# Patient Record
Sex: MALE | Race: WHITE | Employment: OTHER | URBAN - METROPOLITAN AREA
[De-identification: names, ages, dates, MRNs, and addresses within clinical notes are randomized per-mention and may not be internally consistent; named-entity substitution may affect disease eponyms.]

---

## 2017-01-03 PROBLEM — R07.9 CHEST PAIN: Status: ACTIVE | Noted: 2017-01-03

## 2017-02-07 ENCOUNTER — OFFICE VISIT (OUTPATIENT)
Dept: CARDIOLOGY CLINIC | Age: 62
End: 2017-02-07

## 2017-02-07 VITALS
OXYGEN SATURATION: 97 % | HEIGHT: 69 IN | SYSTOLIC BLOOD PRESSURE: 138 MMHG | HEART RATE: 78 BPM | BODY MASS INDEX: 41.77 KG/M2 | DIASTOLIC BLOOD PRESSURE: 72 MMHG | WEIGHT: 282 LBS

## 2017-02-07 DIAGNOSIS — I10 ESSENTIAL HYPERTENSION, BENIGN: ICD-10-CM

## 2017-02-07 DIAGNOSIS — E78.2 MIXED HYPERLIPIDEMIA: ICD-10-CM

## 2017-02-07 DIAGNOSIS — I25.10 ATHEROSCLEROSIS OF NATIVE CORONARY ARTERY OF NATIVE HEART WITHOUT ANGINA PECTORIS: Primary | ICD-10-CM

## 2017-02-07 PROCEDURE — 99214 OFFICE O/P EST MOD 30 MIN: CPT | Performed by: NURSE PRACTITIONER

## 2017-02-07 RX ORDER — GLIMEPIRIDE 2 MG/1
2 TABLET ORAL 2 TIMES DAILY
COMMUNITY
End: 2017-02-16 | Stop reason: SDUPTHER

## 2017-02-14 DIAGNOSIS — Z13.1 DM (DIABETES MELLITUS SCREEN): ICD-10-CM

## 2017-02-15 ENCOUNTER — OFFICE VISIT (OUTPATIENT)
Dept: FAMILY MEDICINE CLINIC | Age: 62
End: 2017-02-15

## 2017-02-15 VITALS
OXYGEN SATURATION: 97 % | HEIGHT: 69 IN | HEART RATE: 73 BPM | DIASTOLIC BLOOD PRESSURE: 88 MMHG | WEIGHT: 284 LBS | RESPIRATION RATE: 16 BRPM | BODY MASS INDEX: 42.06 KG/M2 | SYSTOLIC BLOOD PRESSURE: 132 MMHG

## 2017-02-15 DIAGNOSIS — I10 ESSENTIAL HYPERTENSION: ICD-10-CM

## 2017-02-15 DIAGNOSIS — Z13.1 DM (DIABETES MELLITUS SCREEN): Primary | ICD-10-CM

## 2017-02-15 DIAGNOSIS — I25.10 CORONARY ARTERY DISEASE INVOLVING NATIVE CORONARY ARTERY OF NATIVE HEART WITHOUT ANGINA PECTORIS: ICD-10-CM

## 2017-02-15 LAB
BILIRUBIN, POC: NORMAL
BLOOD URINE, POC: NORMAL
CLARITY, POC: CLEAR
COLOR, POC: YELLOW
CREATININE URINE POCT: NORMAL
GLUCOSE URINE, POC: NORMAL
HBA1C MFR BLD: NORMAL %
KETONES, POC: NORMAL
LEUKOCYTE EST, POC: NORMAL
MICROALBUMIN/CREAT 24H UR: NORMAL MG/G{CREAT}
NITRITE, POC: NORMAL
PH, POC: 5.5
PROTEIN, POC: NORMAL
SPECIFIC GRAVITY, POC: 1.01
UROBILINOGEN, POC: 0.2

## 2017-02-15 PROCEDURE — 82044 UR ALBUMIN SEMIQUANTITATIVE: CPT | Performed by: FAMILY MEDICINE

## 2017-02-15 PROCEDURE — 83036 HEMOGLOBIN GLYCOSYLATED A1C: CPT | Performed by: FAMILY MEDICINE

## 2017-02-15 PROCEDURE — 99214 OFFICE O/P EST MOD 30 MIN: CPT | Performed by: FAMILY MEDICINE

## 2017-02-15 PROCEDURE — 81002 URINALYSIS NONAUTO W/O SCOPE: CPT | Performed by: FAMILY MEDICINE

## 2017-02-16 DIAGNOSIS — I10 ESSENTIAL HYPERTENSION: ICD-10-CM

## 2017-02-16 RX ORDER — HYDROCHLOROTHIAZIDE 25 MG/1
25 TABLET ORAL DAILY
Qty: 90 TABLET | Refills: 1 | Status: SHIPPED | OUTPATIENT
Start: 2017-02-16 | End: 2017-08-08 | Stop reason: SDUPTHER

## 2017-02-16 RX ORDER — GLIMEPIRIDE 2 MG/1
2 TABLET ORAL 2 TIMES DAILY
Qty: 90 TABLET | Refills: 1 | Status: SHIPPED | OUTPATIENT
Start: 2017-02-16 | End: 2017-04-25 | Stop reason: SDUPTHER

## 2017-02-22 RX ORDER — CARVEDILOL 6.25 MG/1
TABLET ORAL
Qty: 180 TABLET | Refills: 3 | Status: SHIPPED | OUTPATIENT
Start: 2017-02-22 | End: 2018-01-17 | Stop reason: SDUPTHER

## 2017-04-25 RX ORDER — LOVASTATIN 40 MG/1
TABLET ORAL
Qty: 90 TABLET | Refills: 2 | Status: SHIPPED | OUTPATIENT
Start: 2017-04-25 | End: 2017-12-02 | Stop reason: SDUPTHER

## 2017-04-25 RX ORDER — GLIMEPIRIDE 2 MG/1
2 TABLET ORAL 2 TIMES DAILY
Qty: 90 TABLET | Refills: 3 | Status: SHIPPED | OUTPATIENT
Start: 2017-04-25 | End: 2017-08-17 | Stop reason: SDUPTHER

## 2017-05-15 ENCOUNTER — OFFICE VISIT (OUTPATIENT)
Dept: FAMILY MEDICINE CLINIC | Age: 62
End: 2017-05-15

## 2017-05-15 VITALS
RESPIRATION RATE: 14 BRPM | OXYGEN SATURATION: 97 % | HEIGHT: 72 IN | WEIGHT: 285 LBS | SYSTOLIC BLOOD PRESSURE: 136 MMHG | DIASTOLIC BLOOD PRESSURE: 80 MMHG | BODY MASS INDEX: 38.6 KG/M2 | HEART RATE: 77 BPM

## 2017-05-15 DIAGNOSIS — M10.9 PODAGRA: ICD-10-CM

## 2017-05-15 DIAGNOSIS — I25.10 CORONARY ARTERY DISEASE INVOLVING NATIVE CORONARY ARTERY OF NATIVE HEART WITHOUT ANGINA PECTORIS: ICD-10-CM

## 2017-05-15 DIAGNOSIS — I10 ESSENTIAL HYPERTENSION: Primary | ICD-10-CM

## 2017-05-15 DIAGNOSIS — Z13.1 DM (DIABETES MELLITUS SCREEN): ICD-10-CM

## 2017-05-15 DIAGNOSIS — I73.9 PVD (PERIPHERAL VASCULAR DISEASE) (HCC): ICD-10-CM

## 2017-05-15 LAB — HBA1C MFR BLD: 8.2 %

## 2017-05-15 PROCEDURE — 99214 OFFICE O/P EST MOD 30 MIN: CPT | Performed by: FAMILY MEDICINE

## 2017-05-15 PROCEDURE — 83036 HEMOGLOBIN GLYCOSYLATED A1C: CPT | Performed by: FAMILY MEDICINE

## 2017-08-08 ENCOUNTER — OFFICE VISIT (OUTPATIENT)
Dept: CARDIOLOGY CLINIC | Age: 62
End: 2017-08-08

## 2017-08-08 VITALS
DIASTOLIC BLOOD PRESSURE: 80 MMHG | OXYGEN SATURATION: 97 % | BODY MASS INDEX: 37.65 KG/M2 | SYSTOLIC BLOOD PRESSURE: 134 MMHG | WEIGHT: 278 LBS | HEIGHT: 72 IN | HEART RATE: 78 BPM

## 2017-08-08 DIAGNOSIS — E78.2 MIXED HYPERLIPIDEMIA: ICD-10-CM

## 2017-08-08 DIAGNOSIS — I25.10 ATHEROSCLEROSIS OF NATIVE CORONARY ARTERY OF NATIVE HEART WITHOUT ANGINA PECTORIS: Primary | ICD-10-CM

## 2017-08-08 DIAGNOSIS — I10 ESSENTIAL HYPERTENSION: ICD-10-CM

## 2017-08-08 PROCEDURE — 99214 OFFICE O/P EST MOD 30 MIN: CPT | Performed by: NURSE PRACTITIONER

## 2017-08-08 RX ORDER — GLIMEPIRIDE 2 MG/1
TABLET ORAL
COMMUNITY
End: 2017-08-17 | Stop reason: SDUPTHER

## 2017-08-08 RX ORDER — HYDROCHLOROTHIAZIDE 25 MG/1
25 TABLET ORAL DAILY
Qty: 90 TABLET | Refills: 3 | Status: SHIPPED | OUTPATIENT
Start: 2017-08-08 | End: 2018-06-28 | Stop reason: SDUPTHER

## 2017-08-17 ENCOUNTER — OFFICE VISIT (OUTPATIENT)
Dept: FAMILY MEDICINE CLINIC | Age: 62
End: 2017-08-17

## 2017-08-17 VITALS
OXYGEN SATURATION: 98 % | RESPIRATION RATE: 14 BRPM | SYSTOLIC BLOOD PRESSURE: 120 MMHG | BODY MASS INDEX: 37.65 KG/M2 | HEART RATE: 73 BPM | HEIGHT: 72 IN | WEIGHT: 278 LBS | DIASTOLIC BLOOD PRESSURE: 80 MMHG

## 2017-08-17 DIAGNOSIS — I25.10 CORONARY ARTERY DISEASE INVOLVING NATIVE CORONARY ARTERY OF NATIVE HEART WITHOUT ANGINA PECTORIS: ICD-10-CM

## 2017-08-17 DIAGNOSIS — E78.5 OTHER AND UNSPECIFIED HYPERLIPIDEMIA: ICD-10-CM

## 2017-08-17 DIAGNOSIS — E11.9 TYPE 2 DIABETES MELLITUS WITHOUT COMPLICATION, WITHOUT LONG-TERM CURRENT USE OF INSULIN (HCC): ICD-10-CM

## 2017-08-17 DIAGNOSIS — I10 ESSENTIAL HYPERTENSION: Primary | ICD-10-CM

## 2017-08-17 DIAGNOSIS — Z23 NEED FOR PNEUMOCOCCAL VACCINATION: ICD-10-CM

## 2017-08-17 LAB — HBA1C MFR BLD: 7.3 %

## 2017-08-17 PROCEDURE — 90471 IMMUNIZATION ADMIN: CPT | Performed by: FAMILY MEDICINE

## 2017-08-17 PROCEDURE — 90732 PPSV23 VACC 2 YRS+ SUBQ/IM: CPT | Performed by: FAMILY MEDICINE

## 2017-08-17 PROCEDURE — 99214 OFFICE O/P EST MOD 30 MIN: CPT | Performed by: FAMILY MEDICINE

## 2017-08-17 PROCEDURE — 83036 HEMOGLOBIN GLYCOSYLATED A1C: CPT | Performed by: FAMILY MEDICINE

## 2017-08-17 RX ORDER — GLIMEPIRIDE 4 MG/1
4 TABLET ORAL 2 TIMES DAILY
Qty: 180 TABLET | Refills: 3 | Status: SHIPPED | OUTPATIENT
Start: 2017-08-17 | End: 2018-06-23 | Stop reason: SDUPTHER

## 2017-08-17 RX ORDER — GABAPENTIN 100 MG/1
100 CAPSULE ORAL DAILY
Qty: 90 CAPSULE | Refills: 3 | Status: SHIPPED | OUTPATIENT
Start: 2017-08-17 | End: 2017-12-27 | Stop reason: SDUPTHER

## 2017-12-04 RX ORDER — LOVASTATIN 40 MG/1
TABLET ORAL
Qty: 90 TABLET | Refills: 2 | Status: SHIPPED | OUTPATIENT
Start: 2017-12-04 | End: 2018-07-31 | Stop reason: SDUPTHER

## 2017-12-04 NOTE — TELEPHONE ENCOUNTER
Last OV  08/17/2017 hypertension   Last Refill 04/25/2017 # 90 2 refills   Lab Results   Component Value Date    CHOL 133 01/03/2017    CHOL 122 (L) 11/13/2015    CHOL 84 11/13/2015     Lab Results   Component Value Date    TRIG 180 (H) 01/03/2017    TRIG 130 11/13/2015    TRIG 136 10/29/2014     Lab Results   Component Value Date    HDL 32 (L) 01/03/2017    HDL 38 (L) 11/13/2015    HDL 35 (L) 10/29/2014     Lab Results   Component Value Date    LDLCALC 65 01/03/2017    LDLCALC 58 11/13/2015    LDLCALC 76 10/29/2014     Lab Results   Component Value Date    LABVLDL 36 01/03/2017    LABVLDL 18 07/15/2011     Lab Results   Component Value Date    CHOLHDLRATIO 3.2 11/13/2015    CHOLHDLRATIO 3.9 10/29/2014    CHOLHDLRATIO 4.1 03/04/2013

## 2017-12-19 ENCOUNTER — TELEPHONE (OUTPATIENT)
Dept: FAMILY MEDICINE CLINIC | Age: 62
End: 2017-12-19

## 2017-12-19 RX ORDER — AMOXICILLIN AND CLAVULANATE POTASSIUM 875; 125 MG/1; MG/1
1 TABLET, FILM COATED ORAL 2 TIMES DAILY
Qty: 20 TABLET | Refills: 0 | Status: SHIPPED | OUTPATIENT
Start: 2017-12-19 | End: 2017-12-29

## 2017-12-19 NOTE — TELEPHONE ENCOUNTER
Patient is complaining of cough up greenish phlegm for two days, patient has a sore throat,no post nasal drippin  Patient has a headache and ear ache,slight ear pain no sob or wheezing body aches,with slight fever. You are requesting something in to the [facility]. please advise

## 2017-12-23 ENCOUNTER — OFFICE VISIT (OUTPATIENT)
Dept: FAMILY MEDICINE CLINIC | Age: 62
End: 2017-12-23

## 2017-12-23 VITALS
OXYGEN SATURATION: 97 % | HEIGHT: 71 IN | RESPIRATION RATE: 14 BRPM | WEIGHT: 283 LBS | SYSTOLIC BLOOD PRESSURE: 132 MMHG | TEMPERATURE: 97.9 F | DIASTOLIC BLOOD PRESSURE: 84 MMHG | BODY MASS INDEX: 39.62 KG/M2

## 2017-12-23 DIAGNOSIS — J01.90 ACUTE BACTERIAL SINUSITIS: Primary | ICD-10-CM

## 2017-12-23 DIAGNOSIS — B96.89 ACUTE BACTERIAL SINUSITIS: Primary | ICD-10-CM

## 2017-12-23 DIAGNOSIS — R09.82 POSTNASAL DRIP: ICD-10-CM

## 2017-12-23 DIAGNOSIS — R68.89 FLU-LIKE SYMPTOMS: ICD-10-CM

## 2017-12-23 LAB
INFLUENZA A ANTIBODY: NEGATIVE
INFLUENZA B ANTIBODY: NEGATIVE

## 2017-12-23 PROCEDURE — 87804 INFLUENZA ASSAY W/OPTIC: CPT | Performed by: FAMILY MEDICINE

## 2017-12-23 PROCEDURE — 99213 OFFICE O/P EST LOW 20 MIN: CPT | Performed by: FAMILY MEDICINE

## 2017-12-23 RX ORDER — FLUTICASONE PROPIONATE 50 MCG
2 SPRAY, SUSPENSION (ML) NASAL DAILY
Qty: 1 BOTTLE | Refills: 3 | Status: SHIPPED | OUTPATIENT
Start: 2017-12-23 | End: 2018-03-02

## 2017-12-23 RX ORDER — AMOXICILLIN AND CLAVULANATE POTASSIUM 875; 125 MG/1; MG/1
1 TABLET, FILM COATED ORAL 2 TIMES DAILY
Qty: 20 TABLET | Refills: 0 | Status: SHIPPED | OUTPATIENT
Start: 2017-12-23 | End: 2017-12-23 | Stop reason: SDUPTHER

## 2017-12-27 ENCOUNTER — OFFICE VISIT (OUTPATIENT)
Dept: FAMILY MEDICINE CLINIC | Age: 62
End: 2017-12-27

## 2017-12-27 VITALS
SYSTOLIC BLOOD PRESSURE: 134 MMHG | HEIGHT: 71 IN | OXYGEN SATURATION: 94 % | WEIGHT: 283 LBS | DIASTOLIC BLOOD PRESSURE: 88 MMHG | BODY MASS INDEX: 39.62 KG/M2 | HEART RATE: 78 BPM

## 2017-12-27 DIAGNOSIS — I25.10 CORONARY ARTERY DISEASE INVOLVING NATIVE CORONARY ARTERY OF NATIVE HEART WITHOUT ANGINA PECTORIS: ICD-10-CM

## 2017-12-27 DIAGNOSIS — I10 ESSENTIAL HYPERTENSION: ICD-10-CM

## 2017-12-27 DIAGNOSIS — J06.9 VIRAL URI: ICD-10-CM

## 2017-12-27 DIAGNOSIS — E11.8 TYPE 2 DIABETES MELLITUS WITH COMPLICATION, WITHOUT LONG-TERM CURRENT USE OF INSULIN (HCC): Primary | ICD-10-CM

## 2017-12-27 DIAGNOSIS — G62.9 NEUROPATHY: ICD-10-CM

## 2017-12-27 LAB — HBA1C MFR BLD: 8.6 %

## 2017-12-27 PROCEDURE — 99214 OFFICE O/P EST MOD 30 MIN: CPT | Performed by: FAMILY MEDICINE

## 2017-12-27 PROCEDURE — 83036 HEMOGLOBIN GLYCOSYLATED A1C: CPT | Performed by: FAMILY MEDICINE

## 2017-12-27 RX ORDER — GABAPENTIN 100 MG/1
100 CAPSULE ORAL 2 TIMES DAILY
Qty: 180 CAPSULE | Refills: 3 | Status: SHIPPED | OUTPATIENT
Start: 2017-12-27 | End: 2018-11-10 | Stop reason: SDUPTHER

## 2017-12-27 RX ORDER — LEVOFLOXACIN 500 MG/1
500 TABLET, FILM COATED ORAL DAILY
Qty: 10 TABLET | Refills: 0 | Status: SHIPPED | OUTPATIENT
Start: 2017-12-27 | End: 2018-01-06

## 2017-12-27 ASSESSMENT — PATIENT HEALTH QUESTIONNAIRE - PHQ9
1. LITTLE INTEREST OR PLEASURE IN DOING THINGS: 0
2. FEELING DOWN, DEPRESSED OR HOPELESS: 0
SUM OF ALL RESPONSES TO PHQ QUESTIONS 1-9: 0
SUM OF ALL RESPONSES TO PHQ9 QUESTIONS 1 & 2: 0

## 2017-12-27 NOTE — PROGRESS NOTES
TWICE DAILY WITH MEALS Yes Sharath Serrato MD   carvedilol (COREG) 6.25 MG tablet TAKE 1 TABLET TWICE DAILY WITH MEALS Yes Sharath Serrato MD   Multiple Vitamins-Minerals (MULTIVITAMIN PO) Take by mouth Yes Historical Provider, MD   aspirin 81 MG tablet Take 81 mg by mouth daily Yes Historical Provider, MD       Past Medical History:   Diagnosis Date    CAD (coronary artery disease)     Disc disorder     ED (erectile dysfunction)     Hyperlipidemia     Hypertension     MI (myocardial infarction)     Obstructive sleep apnea     Other disorders of kidney and ureter in diseases classified elsewhere     Pneumonia 1970s    Polyp of colon     Renal calculi     Type II or unspecified type diabetes mellitus without mention of complication, not stated as uncontrolled        Social History   Substance Use Topics    Smoking status: Former Smoker     Packs/day: 0.25     Years: 30.00     Types: Cigarettes, Cigars     Quit date: 4/9/2011    Smokeless tobacco: Never Used      Comment: cigars every once in a while     Alcohol use 0.6 oz/week     1 Cans of beer per week      Comment: social       Family History   Problem Relation Age of Onset    Diabetes Father     Mental Illness Mother     High Blood Pressure Brother     Heart Disease Brother     High Cholesterol Neg Hx        Allergies   Allergen Reactions    Fexofenadine-Pseudoephed Er     Lisinopril     Morphine Hives    Zithromax [Azithromycin]      Heart palpitations     Losartan      Dry c ough       OBJECTIVE:  /88   Pulse 78   Ht 5' 10.98\" (1.803 m)   Wt 283 lb (128.4 kg)   SpO2 94%   BMI 39.49 kg/m²   GEN:  in NAD, Obese weight is slightly up  HEENT:  NCAT, TMs:normal and throat: clear  NECK:  Supple without adenopathy. CV:  Regular rate and rhythm, S1 and S2 normal, no murmurs, clicks  PULM:  Chest is clear, diffuse mild rhonchi throughout ,  symmetric air entry throughout both lung fields. a1c 8.6  ASSESSMENT/PLAN:  1.  Type 2

## 2018-01-08 RX ORDER — CANAGLIFLOZIN 300 MG/1
TABLET, FILM COATED ORAL
Qty: 90 TABLET | Refills: 3 | Status: SHIPPED | OUTPATIENT
Start: 2018-01-08 | End: 2018-06-28 | Stop reason: SDUPTHER

## 2018-01-15 ENCOUNTER — OFFICE VISIT (OUTPATIENT)
Dept: FAMILY MEDICINE CLINIC | Age: 63
End: 2018-01-15

## 2018-01-15 ENCOUNTER — HOSPITAL ENCOUNTER (OUTPATIENT)
Dept: OTHER | Age: 63
Discharge: HOME | End: 2018-01-15
Attending: FAMILY MEDICINE | Admitting: FAMILY MEDICINE

## 2018-01-15 VITALS
DIASTOLIC BLOOD PRESSURE: 82 MMHG | SYSTOLIC BLOOD PRESSURE: 138 MMHG | BODY MASS INDEX: 39.42 KG/M2 | RESPIRATION RATE: 14 BRPM | OXYGEN SATURATION: 97 % | HEIGHT: 72 IN | TEMPERATURE: 97.2 F | WEIGHT: 291 LBS

## 2018-01-15 DIAGNOSIS — M54.12 CERVICAL RADICULOPATHY: Primary | ICD-10-CM

## 2018-01-15 DIAGNOSIS — M54.12 CERVICAL RADICULOPATHY: ICD-10-CM

## 2018-01-15 PROCEDURE — 99213 OFFICE O/P EST LOW 20 MIN: CPT | Performed by: FAMILY MEDICINE

## 2018-01-15 RX ORDER — PREDNISONE 10 MG/1
TABLET ORAL
Qty: 30 TABLET | Refills: 0 | Status: SHIPPED | OUTPATIENT
Start: 2018-01-15 | End: 2018-02-02 | Stop reason: ALTCHOICE

## 2018-01-15 NOTE — TELEPHONE ENCOUNTER
Pharmacy requesting clarification for Sig on Rx for predniSONE (DELTASONE) 10 MG tablet sent to pharmacy today

## 2018-01-17 RX ORDER — CARVEDILOL 6.25 MG/1
TABLET ORAL
Qty: 180 TABLET | Refills: 3 | Status: SHIPPED | OUTPATIENT
Start: 2018-01-17 | End: 2019-02-15 | Stop reason: SDUPTHER

## 2018-02-01 NOTE — PROGRESS NOTES
use of accessory muscles  · Resp Auscultation: Normal breath sounds without dullness. Cardiovascular:  · The apical impulses not displaced  · JVD is normal  · The carotid upstroke is normal in amplitude and contour without delay or bruit  · Normal S1S2, No S3, No Murmur. · Peripheral pulses are symmetrical and full  · There is no clubbing, cyanosis of the extremities. · No edema. · Pedal Pulses: 2+ and equal   Abdomen:  · No masses or tenderness  · Liver/Spleen: No Abnormalities Noted  Neurological/Psychiatric:  · Alert and oriented in all spheres  · Moves all extremities well  · Exhibits normal gait balance and coordination  · No abnormalities of mood, affect, memory, mentation, or behavior are noted    Myoview stress: 1/3/17:  Summary    There is normal isotope uptake at stress and rest. There is no evidence of    myocardial ischemia or scar.    Normal LV function.    Overall findings represent a low risk scan.           Echo: Aug '14:  Summary  -Normal left ventricle size, wall thickness and systolic function with an  estimated ejection fraction of 55%. -The aortic valve appears sclerotic but opens well. -There is mild tricuspid regurgitation with RVSP estimated at 27 mmHg.      Angiogram: April '11: [facility]  LHC showed 100% discrete distal stenosis and 80% proximal complex stenosis. Lcx with luminal irregularities throughout vessel  PCI of the RCA with BMS to mid (culprit) and proximal eccentric plaque. Inferior wall, HK; Low normal LVEF at 50%       Assessment:     1. Coronary artery disease involving native coronary artery of native heart without angina pectoris    2. Essential hypertension    3. Dyslipidemia        Coronary artery disease  4/2014 with IWMI, s/p BMS to proximal and distal RCA. Lcx with luminal irregularities.   EF 50% with inferior wall HK  8/2014 Echo - EF 55%  1/2017 Myoview GXT - no evidence of ischemia or scar, normal EF  No anginal symptoms    Hypertension:  Blood pressure 122/84,

## 2018-02-02 ENCOUNTER — OFFICE VISIT (OUTPATIENT)
Dept: CARDIOLOGY CLINIC | Age: 63
End: 2018-02-02

## 2018-02-02 VITALS
BODY MASS INDEX: 37.88 KG/M2 | WEIGHT: 279.7 LBS | HEART RATE: 80 BPM | SYSTOLIC BLOOD PRESSURE: 122 MMHG | DIASTOLIC BLOOD PRESSURE: 84 MMHG | OXYGEN SATURATION: 96 % | HEIGHT: 72 IN

## 2018-02-02 DIAGNOSIS — I25.10 CORONARY ARTERY DISEASE INVOLVING NATIVE CORONARY ARTERY OF NATIVE HEART WITHOUT ANGINA PECTORIS: Primary | ICD-10-CM

## 2018-02-02 DIAGNOSIS — I10 ESSENTIAL HYPERTENSION: ICD-10-CM

## 2018-02-02 DIAGNOSIS — E78.5 DYSLIPIDEMIA: ICD-10-CM

## 2018-02-02 PROCEDURE — 99214 OFFICE O/P EST MOD 30 MIN: CPT | Performed by: INTERNAL MEDICINE

## 2018-03-02 ENCOUNTER — OFFICE VISIT (OUTPATIENT)
Dept: FAMILY MEDICINE CLINIC | Age: 63
End: 2018-03-02

## 2018-03-02 VITALS
BODY MASS INDEX: 38.22 KG/M2 | SYSTOLIC BLOOD PRESSURE: 138 MMHG | HEART RATE: 73 BPM | OXYGEN SATURATION: 94 % | DIASTOLIC BLOOD PRESSURE: 86 MMHG | WEIGHT: 281.8 LBS

## 2018-03-02 DIAGNOSIS — M54.12 CERVICAL RADICULOPATHY AT C5: Primary | ICD-10-CM

## 2018-03-02 DIAGNOSIS — I10 ESSENTIAL HYPERTENSION: ICD-10-CM

## 2018-03-02 PROCEDURE — 99213 OFFICE O/P EST LOW 20 MIN: CPT | Performed by: FAMILY MEDICINE

## 2018-03-02 RX ORDER — HYDROCODONE BITARTRATE AND ACETAMINOPHEN 7.5; 325 MG/1; MG/1
1 TABLET ORAL EVERY 6 HOURS PRN
Qty: 20 TABLET | Refills: 0 | Status: SHIPPED | OUTPATIENT
Start: 2018-03-02 | End: 2018-03-09

## 2018-03-02 RX ORDER — PREDNISONE 20 MG/1
TABLET ORAL
Qty: 10 TABLET | Refills: 0 | Status: SHIPPED | OUTPATIENT
Start: 2018-03-02 | End: 2018-04-12 | Stop reason: ALTCHOICE

## 2018-03-06 DIAGNOSIS — G95.20 CERVICAL SPINAL CORD COMPRESSION (HCC): Primary | ICD-10-CM

## 2018-03-07 ENCOUNTER — TELEPHONE (OUTPATIENT)
Dept: FAMILY MEDICINE CLINIC | Age: 63
End: 2018-03-07

## 2018-03-07 RX ORDER — DIAZEPAM 10 MG/1
TABLET ORAL
Qty: 1 TABLET | Refills: 0 | Status: SHIPPED | OUTPATIENT
Start: 2018-03-07 | End: 2018-03-14

## 2018-03-07 RX ORDER — DIAZEPAM 10 MG/1
TABLET ORAL
Qty: 1 TABLET | Refills: 0 | Status: SHIPPED | OUTPATIENT
Start: 2018-03-07 | End: 2018-03-07 | Stop reason: SDUPTHER

## 2018-04-12 ENCOUNTER — OFFICE VISIT (OUTPATIENT)
Dept: FAMILY MEDICINE CLINIC | Age: 63
End: 2018-04-12

## 2018-04-12 VITALS
WEIGHT: 281 LBS | DIASTOLIC BLOOD PRESSURE: 80 MMHG | BODY MASS INDEX: 38.06 KG/M2 | HEIGHT: 72 IN | HEART RATE: 77 BPM | RESPIRATION RATE: 14 BRPM | OXYGEN SATURATION: 94 % | SYSTOLIC BLOOD PRESSURE: 132 MMHG

## 2018-04-12 DIAGNOSIS — R53.83 FATIGUE, UNSPECIFIED TYPE: ICD-10-CM

## 2018-04-12 DIAGNOSIS — I25.10 CORONARY ARTERY DISEASE INVOLVING NATIVE CORONARY ARTERY OF NATIVE HEART WITHOUT ANGINA PECTORIS: ICD-10-CM

## 2018-04-12 DIAGNOSIS — I10 ESSENTIAL HYPERTENSION: ICD-10-CM

## 2018-04-12 DIAGNOSIS — E11.8 TYPE 2 DIABETES MELLITUS WITH COMPLICATION, WITHOUT LONG-TERM CURRENT USE OF INSULIN (HCC): Primary | ICD-10-CM

## 2018-04-12 DIAGNOSIS — E78.5 DYSLIPIDEMIA: ICD-10-CM

## 2018-04-12 LAB
CREATININE URINE POCT: ABNORMAL
HBA1C MFR BLD: 8.5 %
MICROALBUMIN/CREAT 24H UR: ABNORMAL MG/G{CREAT}
MICROALBUMIN/CREAT UR-RTO: ABNORMAL

## 2018-04-12 PROCEDURE — 82044 UR ALBUMIN SEMIQUANTITATIVE: CPT | Performed by: FAMILY MEDICINE

## 2018-04-12 PROCEDURE — 83036 HEMOGLOBIN GLYCOSYLATED A1C: CPT | Performed by: FAMILY MEDICINE

## 2018-04-12 PROCEDURE — 99214 OFFICE O/P EST MOD 30 MIN: CPT | Performed by: FAMILY MEDICINE

## 2018-06-23 DIAGNOSIS — I10 ESSENTIAL HYPERTENSION: ICD-10-CM

## 2018-06-25 RX ORDER — GLIMEPIRIDE 4 MG/1
TABLET ORAL
Qty: 180 TABLET | Refills: 3 | Status: SHIPPED | OUTPATIENT
Start: 2018-06-25 | End: 2019-07-16 | Stop reason: SDUPTHER

## 2018-06-28 DIAGNOSIS — I10 ESSENTIAL HYPERTENSION: ICD-10-CM

## 2018-06-28 RX ORDER — HYDROCHLOROTHIAZIDE 25 MG/1
25 TABLET ORAL DAILY
Qty: 90 TABLET | Refills: 3 | Status: SHIPPED | OUTPATIENT
Start: 2018-06-28 | End: 2018-06-30

## 2018-06-30 RX ORDER — HYDROCHLOROTHIAZIDE 25 MG/1
TABLET ORAL
Qty: 90 TABLET | Refills: 3 | Status: SHIPPED | OUTPATIENT
Start: 2018-06-30 | End: 2019-07-16 | Stop reason: SDUPTHER

## 2018-07-18 ENCOUNTER — TELEPHONE (OUTPATIENT)
Dept: CARDIOLOGY CLINIC | Age: 63
End: 2018-07-18

## 2018-07-31 RX ORDER — LOVASTATIN 40 MG/1
TABLET ORAL
Qty: 90 TABLET | Refills: 2 | Status: SHIPPED | OUTPATIENT
Start: 2018-07-31 | End: 2019-02-15 | Stop reason: SDUPTHER

## 2018-07-31 NOTE — TELEPHONE ENCOUNTER
Last OV: 04/12/2018  Last refill: 12/04/2017, #90, 2 refills.      Lab Results   Component Value Date    CHOL 111 07/14/2018    CHOL 143 07/14/2018    CHOL 133 01/03/2017     Lab Results   Component Value Date    TRIG 311 (H) 07/14/2018    TRIG 180 (H) 01/03/2017    TRIG 130 11/13/2015     Lab Results   Component Value Date    HDL 32 (L) 07/14/2018    HDL 32 (L) 01/03/2017    HDL 38 (L) 11/13/2015     Lab Results   Component Value Date    LDLCALC 74 07/14/2018    LDLCALC 65 01/03/2017    LDLCALC 58 11/13/2015     Lab Results   Component Value Date    LABVLDL 36 01/03/2017    LABVLDL 18 07/15/2011     Lab Results   Component Value Date    CHOLHDLRATIO 4.5 07/14/2018    CHOLHDLRATIO 3.2 11/13/2015    CHOLHDLRATIO 3.9 10/29/2014

## 2018-08-02 ENCOUNTER — OFFICE VISIT (OUTPATIENT)
Dept: CARDIOLOGY CLINIC | Age: 63
End: 2018-08-02

## 2018-08-02 VITALS
BODY MASS INDEX: 37.93 KG/M2 | DIASTOLIC BLOOD PRESSURE: 86 MMHG | OXYGEN SATURATION: 98 % | WEIGHT: 280 LBS | HEIGHT: 72 IN | SYSTOLIC BLOOD PRESSURE: 138 MMHG | HEART RATE: 74 BPM

## 2018-08-02 DIAGNOSIS — I25.10 CORONARY ARTERY DISEASE INVOLVING NATIVE CORONARY ARTERY OF NATIVE HEART WITHOUT ANGINA PECTORIS: Primary | ICD-10-CM

## 2018-08-02 DIAGNOSIS — E78.2 MIXED HYPERLIPIDEMIA: ICD-10-CM

## 2018-08-02 DIAGNOSIS — I10 ESSENTIAL HYPERTENSION: ICD-10-CM

## 2018-08-02 PROCEDURE — 99214 OFFICE O/P EST MOD 30 MIN: CPT | Performed by: NURSE PRACTITIONER

## 2018-08-02 RX ORDER — DIPHENHYDRAMINE HCL 25 MG
25 TABLET ORAL NIGHTLY PRN
COMMUNITY

## 2018-08-02 NOTE — PATIENT INSTRUCTIONS
Ref. Range 7/14/2018 00:00   Chol/HDL Ratio Latest Ref Range: <5.0 (calc) 4.5   Cholesterol Latest Ref Range: <130 mg/dL (calc) 111   Cholesterol, Total Latest Ref Range: <200 mg/dL 143   HDL Cholesterol Latest Ref Range: >40 mg/dL 32 (L)   LDL Calculated Latest Units: mg/dL (calc) 74   Triglycerides Latest Ref Range: <150 mg/dL 311 (H)

## 2018-08-02 NOTE — PROGRESS NOTES
[address]     Outpatient Follow Up Note    Rosio Montes De Oca is 66 y.o. male who presents today with a history of IWMI CAD s/p PTCA prox & distal-RCA '11, HTN and hyperlipidemia. CHIEF COMPLAINT / HPI:  Follow Up secondary to coronary artery disease. Subjective:   He denies significant chest pain. His angina equivalent was severe left elbow pain (heaviness like a buffalo sitting on his arms; and his eyes were stuttering). He denies recurrence. There is no SOB/DAMIAN. The patient denies orthopnea/PND. The patient does not have palpitations/dizziness/swelling. He gets sinus drainage in the evening which he controls taking a benadryl and takes every HS. His weight is b/w 280-285# depending on the season. These symptoms are stable since the OV. With regard to medication therapy the patient has been compliant with prescribed regimen. They have tolerated therapy to date.      Past Medical History:   Diagnosis Date    CAD (coronary artery disease)     Disc disorder     ED (erectile dysfunction)     Hyperlipidemia     Hypertension     MI (myocardial infarction)     Obstructive sleep apnea     Other disorders of kidney and ureter in diseases classified elsewhere     Pneumonia 1970s    Polyp of colon     Renal calculi     Type II or unspecified type diabetes mellitus without mention of complication, not stated as uncontrolled      Social History:    History   Smoking Status    Former Smoker    Packs/day: 0.25    Years: 30.00    Types: Cigarettes, Cigars    Quit date: 4/9/2011   Smokeless Tobacco    Never Used     Comment: cigars every once in a while      Current Medications:  Current Outpatient Prescriptions   Medication Sig Dispense Refill    diphenhydrAMINE (BENADRYL) 25 MG tablet Take 25 mg by mouth nightly as needed for Itching      lovastatin (MEVACOR) 40 MG tablet TAKE 1 TABLET EVERY NIGHT 90 tablet 2    hydrochlorothiazide (HYDRODIURIL) 25 MG tablet TAKE 1 TABLET EVERY DAY 90 tablet 3    canagliflozin (INVOKANA) 300 MG TABS tablet TAKE 1 TABLET EVERY MORNING BEFORE BREAKFAST 90 tablet 3    glimepiride (AMARYL) 4 MG tablet TAKE 1 TABLET TWICE DAILY 180 tablet 3    metFORMIN (GLUCOPHAGE) 1000 MG tablet TAKE 1 TABLET TWICE DAILY WITH MEALS 180 tablet 3    carvedilol (COREG) 6.25 MG tablet TAKE 1 TABLET TWICE DAILY WITH MEALS 180 tablet 3    gabapentin (NEURONTIN) 100 MG capsule Take 1 capsule by mouth 2 times daily 180 capsule 3    Multiple Vitamins-Minerals (MULTIVITAMIN PO) Take by mouth      aspirin 81 MG tablet Take 81 mg by mouth daily       No current facility-administered medications for this visit. REVIEW OF SYSTEMS:    CONSTITUTIONAL: - weight change; - fatigue, weakness, night sweats or fever. HEENT: No new vision difficulties or ringing in the ears; narrow angle glaucoma. RESPIRATORY: No new SOB, PND, orthopnea or cough. CARDIOVASCULAR: See HPI  GI: No nausea, vomiting, diarrhea, constipation, abdominal pain or changes in bowel habits. : No urinary frequency, urgency, incontinence hematuria or dysuria. SKIN: No cyanosis or skin lesions. MUSCULOSKELETAL: No new muscle or joint pain. NEUROLOGICAL: No syncope or TIA-like symptoms. PSYCHIATRIC: No anxiety, pain, insomnia or depression    Objective:   PHYSICAL EXAM:    Vitals:    08/02/18 0850 08/02/18 0856 08/02/18 0908   BP: (!) 154/92 (!) 150/90 138/86   Site: Right Arm  Right Arm   Position: Sitting     Cuff Size:   Large Adult   Pulse: 74     SpO2: 98%     Weight: 280 lb (127 kg)     Height: 6' (1.829 m)            BMI 41.64 kg/m2     CONSTITUTIONAL: Cooperative, no apparent distress, and appears well nourished / over weight  NEUROLOGIC:  Awake and orientated to person, place and time. PSYCH: Calm affect. SKIN: Warm and dry. HEENT: Sclera non-icteric, normocephalic, neck supple, no elevation of JVP, normal carotid pulses with no bruits and thyroid normal size.   LUNGS:  No increased work of breathing

## 2018-08-10 ENCOUNTER — OFFICE VISIT (OUTPATIENT)
Dept: FAMILY MEDICINE CLINIC | Age: 63
End: 2018-08-10

## 2018-08-10 VITALS
OXYGEN SATURATION: 98 % | HEART RATE: 72 BPM | DIASTOLIC BLOOD PRESSURE: 84 MMHG | BODY MASS INDEX: 38.27 KG/M2 | WEIGHT: 282.2 LBS | SYSTOLIC BLOOD PRESSURE: 138 MMHG

## 2018-08-10 DIAGNOSIS — I10 ESSENTIAL HYPERTENSION: ICD-10-CM

## 2018-08-10 DIAGNOSIS — I73.9 PVD (PERIPHERAL VASCULAR DISEASE) (HCC): ICD-10-CM

## 2018-08-10 DIAGNOSIS — E78.5 DYSLIPIDEMIA: ICD-10-CM

## 2018-08-10 DIAGNOSIS — I25.10 CORONARY ARTERY DISEASE INVOLVING NATIVE CORONARY ARTERY OF NATIVE HEART WITHOUT ANGINA PECTORIS: ICD-10-CM

## 2018-08-10 DIAGNOSIS — Z13.1 ENCOUNTER FOR SCREENING FOR DIABETES MELLITUS: Primary | ICD-10-CM

## 2018-08-10 LAB — HBA1C MFR BLD: 8.1 %

## 2018-08-10 PROCEDURE — 99214 OFFICE O/P EST MOD 30 MIN: CPT | Performed by: FAMILY MEDICINE

## 2018-08-10 PROCEDURE — 83036 HEMOGLOBIN GLYCOSYLATED A1C: CPT | Performed by: FAMILY MEDICINE

## 2018-08-10 NOTE — PROGRESS NOTES
07/14/2018    PROT 6.8 07/14/2018    LABALBU 4.3 07/14/2018    BILITOT 0.4 07/14/2018    ALKPHOS 43 07/14/2018    AST 23 07/14/2018    ALT 32 07/14/2018    LABGLOM 61 07/14/2018    GFRAA 70 07/14/2018    AGRATIO 1.7 07/14/2018    GLOB 2.5 07/14/2018     Lab Results   Component Value Date    CHOL 111 07/14/2018    CHOL 143 07/14/2018    CHOL 133 01/03/2017     Lab Results   Component Value Date    TRIG 311 (H) 07/14/2018    TRIG 180 (H) 01/03/2017    TRIG 130 11/13/2015     Lab Results   Component Value Date    HDL 32 (L) 07/14/2018    HDL 32 (L) 01/03/2017    HDL 38 (L) 11/13/2015     Lab Results   Component Value Date    LDLCALC 74 07/14/2018    LDLCALC 65 01/03/2017    LDLCALC 58 11/13/2015     Lab Results   Component Value Date    LABVLDL 36 01/03/2017    LABVLDL 18 07/15/2011     Lab Results   Component Value Date    CHOLHDLRATIO 4.5 07/14/2018    CHOLHDLRATIO 3.2 11/13/2015    CHOLHDLRATIO 3.9 10/29/2014     ASSESSMENT/PLAN:  1. Encounter for screening for diabetes mellitus  Sugar is doing better but there is still room to improve  We'll hold on the current medication and encouraged him to eat better exercise more  - POCT glycosylated hemoglobin (Hb A1C)    2. Coronary artery disease involving native coronary artery of native heart without angina pectoris  Stable, continue present medication    3. Dyslipidemia  Stable except for triglyceride level which is correlating with sugar    4. Essential hypertension  Stable continue present medication  Would like to have a goal of less than 130/80    5.  PVD (peripheral vascular disease) (HCC)  Asymptomatic    6 HM/IMM - shingrix    7 ckd - stagle 3  Repeat BMP in 3 months continue aggressive blood pressure and IVs treatment    Spent 20 minutes with patient greater than 50% of time reviewing healthy lifestyle and corning his care

## 2018-10-25 ENCOUNTER — TELEPHONE (OUTPATIENT)
Dept: FAMILY MEDICINE CLINIC | Age: 63
End: 2018-10-25

## 2018-10-26 ENCOUNTER — OFFICE VISIT (OUTPATIENT)
Dept: FAMILY MEDICINE CLINIC | Age: 63
End: 2018-10-26
Payer: COMMERCIAL

## 2018-10-26 VITALS
OXYGEN SATURATION: 98 % | WEIGHT: 273.6 LBS | BODY MASS INDEX: 37.06 KG/M2 | DIASTOLIC BLOOD PRESSURE: 68 MMHG | HEIGHT: 72 IN | SYSTOLIC BLOOD PRESSURE: 124 MMHG | HEART RATE: 75 BPM

## 2018-10-26 DIAGNOSIS — J20.8 VIRAL BRONCHITIS: Primary | ICD-10-CM

## 2018-10-26 PROCEDURE — 99213 OFFICE O/P EST LOW 20 MIN: CPT | Performed by: FAMILY MEDICINE

## 2018-10-26 RX ORDER — FLUTICASONE FUROATE AND VILANTEROL 200; 25 UG/1; UG/1
200 POWDER RESPIRATORY (INHALATION) DAILY
Qty: 1 EACH | Refills: 0 | COMMUNITY
Start: 2018-10-26 | End: 2018-11-09

## 2018-10-26 RX ORDER — PROMETHAZINE HYDROCHLORIDE AND CODEINE PHOSPHATE 6.25; 1 MG/5ML; MG/5ML
5 SYRUP ORAL EVERY 4 HOURS PRN
Qty: 120 ML | Refills: 0 | Status: SHIPPED | OUTPATIENT
Start: 2018-10-26 | End: 2018-11-02

## 2018-10-26 RX ORDER — BENZONATATE 200 MG/1
200 CAPSULE ORAL 3 TIMES DAILY PRN
Qty: 30 CAPSULE | Refills: 0 | Status: SHIPPED | OUTPATIENT
Start: 2018-10-26 | End: 2018-11-02

## 2018-10-26 ASSESSMENT — PATIENT HEALTH QUESTIONNAIRE - PHQ9
2. FEELING DOWN, DEPRESSED OR HOPELESS: 0
SUM OF ALL RESPONSES TO PHQ QUESTIONS 1-9: 0
SUM OF ALL RESPONSES TO PHQ QUESTIONS 1-9: 0
1. LITTLE INTEREST OR PLEASURE IN DOING THINGS: 0
SUM OF ALL RESPONSES TO PHQ9 QUESTIONS 1 & 2: 0

## 2018-10-26 NOTE — PROGRESS NOTES
Subjective:       Kailyn Perez is a 66 y.o. male who presents for evaluation of symptoms of a URI. Symptoms include congestion, headache described as mod, nasal congestion, productive cough with  yellow colored sputum, purulent nasal discharge, sinus pressure, sore throat and wheezing. Onset of symptoms was 2 days ago, gradually worsening since that time. Treatment to date: oral decongestant, antihistamines, OTC cough suppressant. Patient's medications, allergies, past medical, surgical, social and family histories were reviewed and updated as appropriate. Review of Systems  Pertinent items are noted in HPI. Objective:      /68   Pulse 75   Ht 6' (1.829 m)   Wt 273 lb 9.6 oz (124.1 kg)   SpO2 98%   BMI 37.11 kg/m²   General appearance: alert, appears stated age, cooperative and fatigued  Ears: normal TM's and external ear canals both earsear wax bilat  Throat: lips, mucosa, and tongue normal; teeth and gums normal  Neck: no adenopathy, no carotid bruit, no JVD, supple, symmetrical, trachea midline and thyroid not enlarged, symmetric, no tenderness/mass/nodules  Lungs: clear to auscultation bilaterally  Heart: regular rate and rhythm, S1, S2 normal, no murmur, click, rub or gallop     Assessment:      viral upper respiratory illness and bronchitis     Plan:      Discussed dx and tx of URIs  Suggested symptomatic OTC remedies. Nasal saline sprays for congestion.   RTC prn.    tessaon  pheneergan with codeine  Sample breo 200

## 2018-11-09 ENCOUNTER — OFFICE VISIT (OUTPATIENT)
Dept: FAMILY MEDICINE CLINIC | Age: 63
End: 2018-11-09
Payer: COMMERCIAL

## 2018-11-09 VITALS
WEIGHT: 275 LBS | HEART RATE: 75 BPM | BODY MASS INDEX: 37.3 KG/M2 | SYSTOLIC BLOOD PRESSURE: 120 MMHG | DIASTOLIC BLOOD PRESSURE: 86 MMHG | OXYGEN SATURATION: 95 %

## 2018-11-09 DIAGNOSIS — Z23 FLU VACCINE NEED: ICD-10-CM

## 2018-11-09 DIAGNOSIS — I10 ESSENTIAL HYPERTENSION: ICD-10-CM

## 2018-11-09 DIAGNOSIS — E11.8 TYPE 2 DIABETES MELLITUS WITH COMPLICATION, WITHOUT LONG-TERM CURRENT USE OF INSULIN (HCC): Primary | ICD-10-CM

## 2018-11-09 DIAGNOSIS — I73.9 PVD (PERIPHERAL VASCULAR DISEASE) (HCC): ICD-10-CM

## 2018-11-09 DIAGNOSIS — I25.10 CORONARY ARTERY DISEASE INVOLVING NATIVE CORONARY ARTERY OF NATIVE HEART WITHOUT ANGINA PECTORIS: ICD-10-CM

## 2018-11-09 LAB — HBA1C MFR BLD: 7.1 %

## 2018-11-09 PROCEDURE — 83036 HEMOGLOBIN GLYCOSYLATED A1C: CPT | Performed by: FAMILY MEDICINE

## 2018-11-09 PROCEDURE — 99214 OFFICE O/P EST MOD 30 MIN: CPT | Performed by: FAMILY MEDICINE

## 2018-11-09 PROCEDURE — 90682 RIV4 VACC RECOMBINANT DNA IM: CPT | Performed by: FAMILY MEDICINE

## 2018-11-09 PROCEDURE — 90471 IMMUNIZATION ADMIN: CPT | Performed by: FAMILY MEDICINE

## 2018-11-10 DIAGNOSIS — G62.9 NEUROPATHY: ICD-10-CM

## 2018-11-12 RX ORDER — GABAPENTIN 100 MG/1
CAPSULE ORAL
Qty: 180 CAPSULE | Refills: 3 | Status: SHIPPED | OUTPATIENT
Start: 2018-11-12 | End: 2019-09-24 | Stop reason: SDUPTHER

## 2019-01-25 ENCOUNTER — OFFICE VISIT (OUTPATIENT)
Dept: CARDIOLOGY CLINIC | Age: 64
End: 2019-01-25
Payer: COMMERCIAL

## 2019-01-25 VITALS
BODY MASS INDEX: 37.05 KG/M2 | HEIGHT: 72 IN | HEART RATE: 70 BPM | OXYGEN SATURATION: 95 % | WEIGHT: 273.5 LBS | DIASTOLIC BLOOD PRESSURE: 72 MMHG | SYSTOLIC BLOOD PRESSURE: 142 MMHG

## 2019-01-25 DIAGNOSIS — E78.2 MIXED HYPERLIPIDEMIA: ICD-10-CM

## 2019-01-25 DIAGNOSIS — I25.10 CORONARY ARTERY DISEASE INVOLVING NATIVE CORONARY ARTERY OF NATIVE HEART WITHOUT ANGINA PECTORIS: Primary | ICD-10-CM

## 2019-01-25 DIAGNOSIS — I10 ESSENTIAL HYPERTENSION: ICD-10-CM

## 2019-01-25 PROCEDURE — 99214 OFFICE O/P EST MOD 30 MIN: CPT | Performed by: NURSE PRACTITIONER

## 2019-01-25 PROCEDURE — 93000 ELECTROCARDIOGRAM COMPLETE: CPT | Performed by: NURSE PRACTITIONER

## 2019-01-25 RX ORDER — AMLODIPINE BESYLATE 5 MG/1
5 TABLET ORAL DAILY
Qty: 90 TABLET | Refills: 1 | Status: SHIPPED | OUTPATIENT
Start: 2019-01-25 | End: 2019-02-28 | Stop reason: DRUGHIGH

## 2019-01-29 ENCOUNTER — HOSPITAL ENCOUNTER (OUTPATIENT)
Dept: NON INVASIVE DIAGNOSTICS | Age: 64
Discharge: HOME | End: 2019-01-29
Payer: COMMERCIAL

## 2019-01-29 LAB
LV EF: 72 %
LVEF MODALITY: NORMAL

## 2019-01-29 PROCEDURE — 78452 HT MUSCLE IMAGE SPECT MULT: CPT

## 2019-01-29 PROCEDURE — A9502 TC99M TETROFOSMIN: HCPCS | Performed by: NURSE PRACTITIONER

## 2019-01-29 PROCEDURE — 3430000000 HC RX DIAGNOSTIC RADIOPHARMACEUTICAL: Performed by: NURSE PRACTITIONER

## 2019-01-29 PROCEDURE — 93017 CV STRESS TEST TRACING ONLY: CPT | Performed by: INTERNAL MEDICINE

## 2019-01-29 RX ADMIN — TETROFOSMIN 30 MILLICURIE: 0.23 INJECTION, POWDER, LYOPHILIZED, FOR SOLUTION INTRAVENOUS at 08:55

## 2019-01-29 RX ADMIN — TETROFOSMIN 10 MILLICURIE: 0.23 INJECTION, POWDER, LYOPHILIZED, FOR SOLUTION INTRAVENOUS at 07:58

## 2019-02-15 ENCOUNTER — OFFICE VISIT (OUTPATIENT)
Dept: FAMILY MEDICINE CLINIC | Age: 64
End: 2019-02-15
Payer: COMMERCIAL

## 2019-02-15 VITALS
DIASTOLIC BLOOD PRESSURE: 95 MMHG | HEART RATE: 73 BPM | BODY MASS INDEX: 37.27 KG/M2 | WEIGHT: 274.8 LBS | OXYGEN SATURATION: 98 % | SYSTOLIC BLOOD PRESSURE: 152 MMHG

## 2019-02-15 DIAGNOSIS — E11.9 TYPE 2 DIABETES MELLITUS WITHOUT COMPLICATION, WITHOUT LONG-TERM CURRENT USE OF INSULIN (HCC): Primary | ICD-10-CM

## 2019-02-15 DIAGNOSIS — I10 ESSENTIAL HYPERTENSION: ICD-10-CM

## 2019-02-15 DIAGNOSIS — E78.5 DYSLIPIDEMIA: ICD-10-CM

## 2019-02-15 DIAGNOSIS — R10.9 FLANK PAIN: ICD-10-CM

## 2019-02-15 DIAGNOSIS — H61.23 BILATERAL IMPACTED CERUMEN: ICD-10-CM

## 2019-02-15 DIAGNOSIS — I25.10 CORONARY ARTERY DISEASE INVOLVING NATIVE CORONARY ARTERY OF NATIVE HEART WITHOUT ANGINA PECTORIS: ICD-10-CM

## 2019-02-15 LAB
BILIRUBIN, POC: NORMAL
BLOOD URINE, POC: NORMAL
CLARITY, POC: NORMAL
COLOR, POC: YELLOW
GLUCOSE URINE, POC: 500
HBA1C MFR BLD: 7.7 %
KETONES, POC: NORMAL
LEUKOCYTE EST, POC: NORMAL
NITRITE, POC: NORMAL
PH, POC: 6
PROTEIN, POC: NORMAL
SPECIFIC GRAVITY, POC: 1.02
UROBILINOGEN, POC: 0.2

## 2019-02-15 PROCEDURE — 83036 HEMOGLOBIN GLYCOSYLATED A1C: CPT | Performed by: FAMILY MEDICINE

## 2019-02-15 PROCEDURE — 99214 OFFICE O/P EST MOD 30 MIN: CPT | Performed by: FAMILY MEDICINE

## 2019-02-15 PROCEDURE — 69210 REMOVE IMPACTED EAR WAX UNI: CPT | Performed by: FAMILY MEDICINE

## 2019-02-15 PROCEDURE — 81002 URINALYSIS NONAUTO W/O SCOPE: CPT | Performed by: FAMILY MEDICINE

## 2019-02-15 RX ORDER — LOVASTATIN 40 MG/1
TABLET ORAL
Qty: 90 TABLET | Refills: 2 | Status: SHIPPED | OUTPATIENT
Start: 2019-02-15 | End: 2020-02-10

## 2019-02-15 RX ORDER — AMLODIPINE BESYLATE 10 MG/1
10 TABLET ORAL DAILY
Qty: 90 TABLET | Refills: 3 | Status: SHIPPED | OUTPATIENT
Start: 2019-02-15 | End: 2020-02-10

## 2019-02-15 RX ORDER — CARVEDILOL 6.25 MG/1
TABLET ORAL
Qty: 180 TABLET | Refills: 3 | Status: SHIPPED | OUTPATIENT
Start: 2019-02-15 | End: 2020-02-12

## 2019-02-15 ASSESSMENT — PATIENT HEALTH QUESTIONNAIRE - PHQ9
SUM OF ALL RESPONSES TO PHQ QUESTIONS 1-9: 0
2. FEELING DOWN, DEPRESSED OR HOPELESS: 0
SUM OF ALL RESPONSES TO PHQ QUESTIONS 1-9: 0
1. LITTLE INTEREST OR PLEASURE IN DOING THINGS: 0
SUM OF ALL RESPONSES TO PHQ9 QUESTIONS 1 & 2: 0

## 2019-02-28 ENCOUNTER — OFFICE VISIT (OUTPATIENT)
Dept: CARDIOLOGY CLINIC | Age: 64
End: 2019-02-28
Payer: COMMERCIAL

## 2019-02-28 VITALS
HEART RATE: 59 BPM | SYSTOLIC BLOOD PRESSURE: 130 MMHG | OXYGEN SATURATION: 94 % | WEIGHT: 275.2 LBS | HEIGHT: 72 IN | BODY MASS INDEX: 37.27 KG/M2 | DIASTOLIC BLOOD PRESSURE: 78 MMHG

## 2019-02-28 DIAGNOSIS — E78.2 MIXED HYPERLIPIDEMIA: ICD-10-CM

## 2019-02-28 DIAGNOSIS — I25.10 CORONARY ARTERY DISEASE INVOLVING NATIVE CORONARY ARTERY OF NATIVE HEART WITHOUT ANGINA PECTORIS: Primary | ICD-10-CM

## 2019-02-28 DIAGNOSIS — I10 ESSENTIAL HYPERTENSION: ICD-10-CM

## 2019-02-28 PROCEDURE — 99214 OFFICE O/P EST MOD 30 MIN: CPT | Performed by: NURSE PRACTITIONER

## 2019-03-26 LAB
CATARACTS: POSITIVE
DIABETIC RETINOPATHY: NEGATIVE
GLAUCOMA: NEGATIVE
INTRAOCULAR PRESSURE EYE: NORMAL
VISUAL ACUITY DISTANCE LEFT EYE: NORMAL
VISUAL ACUITY DISTANCE RIGHT EYE: NORMAL

## 2019-05-16 ENCOUNTER — OFFICE VISIT (OUTPATIENT)
Dept: FAMILY MEDICINE CLINIC | Age: 64
End: 2019-05-16
Payer: COMMERCIAL

## 2019-05-16 VITALS
HEIGHT: 72 IN | BODY MASS INDEX: 37.17 KG/M2 | SYSTOLIC BLOOD PRESSURE: 138 MMHG | DIASTOLIC BLOOD PRESSURE: 88 MMHG | WEIGHT: 274.4 LBS | TEMPERATURE: 97.9 F | OXYGEN SATURATION: 98 % | HEART RATE: 72 BPM

## 2019-05-16 DIAGNOSIS — I10 ESSENTIAL HYPERTENSION: ICD-10-CM

## 2019-05-16 DIAGNOSIS — I25.10 CORONARY ARTERY DISEASE INVOLVING NATIVE CORONARY ARTERY OF NATIVE HEART WITHOUT ANGINA PECTORIS: ICD-10-CM

## 2019-05-16 DIAGNOSIS — E78.5 DYSLIPIDEMIA: ICD-10-CM

## 2019-05-16 DIAGNOSIS — E11.9 TYPE 2 DIABETES MELLITUS WITHOUT COMPLICATION, WITHOUT LONG-TERM CURRENT USE OF INSULIN (HCC): Primary | ICD-10-CM

## 2019-05-16 DIAGNOSIS — Z00.00 WELL ADULT EXAM: ICD-10-CM

## 2019-05-16 DIAGNOSIS — E11.9 TYPE 2 DIABETES MELLITUS WITHOUT COMPLICATION, WITHOUT LONG-TERM CURRENT USE OF INSULIN (HCC): ICD-10-CM

## 2019-05-16 LAB
A/G RATIO: 1.6 (ref 1.1–2.2)
ALBUMIN SERPL-MCNC: 4.5 G/DL (ref 3.4–5)
ALP BLD-CCNC: 49 U/L (ref 40–129)
ALT SERPL-CCNC: 38 U/L (ref 10–40)
ANION GAP SERPL CALCULATED.3IONS-SCNC: 13 MMOL/L (ref 3–16)
AST SERPL-CCNC: 24 U/L (ref 15–37)
BASOPHILS ABSOLUTE: 0.1 K/UL (ref 0–0.2)
BASOPHILS RELATIVE PERCENT: 0.6 %
BILIRUB SERPL-MCNC: 0.3 MG/DL (ref 0–1)
BUN BLDV-MCNC: 20 MG/DL (ref 7–20)
CALCIUM SERPL-MCNC: 10 MG/DL (ref 8.3–10.6)
CHLORIDE BLD-SCNC: 100 MMOL/L (ref 99–110)
CHOLESTEROL, TOTAL: 132 MG/DL (ref 0–199)
CO2: 26 MMOL/L (ref 21–32)
CREAT SERPL-MCNC: 1.2 MG/DL (ref 0.8–1.3)
CREATININE URINE POCT: 100
EOSINOPHILS ABSOLUTE: 0.3 K/UL (ref 0–0.6)
EOSINOPHILS RELATIVE PERCENT: 2.7 %
GFR AFRICAN AMERICAN: >60
GFR NON-AFRICAN AMERICAN: >60
GLOBULIN: 2.8 G/DL
GLUCOSE BLD-MCNC: 209 MG/DL (ref 70–99)
HCT VFR BLD CALC: 42.3 % (ref 40.5–52.5)
HDLC SERPL-MCNC: 37 MG/DL (ref 40–60)
HEMOGLOBIN: 14.3 G/DL (ref 13.5–17.5)
LDL CHOLESTEROL CALCULATED: 61 MG/DL
LYMPHOCYTES ABSOLUTE: 2.7 K/UL (ref 1–5.1)
LYMPHOCYTES RELATIVE PERCENT: 28.8 %
MCH RBC QN AUTO: 29.8 PG (ref 26–34)
MCHC RBC AUTO-ENTMCNC: 33.8 G/DL (ref 31–36)
MCV RBC AUTO: 88.3 FL (ref 80–100)
MICROALBUMIN/CREAT 24H UR: 30 MG/G{CREAT}
MICROALBUMIN/CREAT UR-RTO: 30
MONOCYTES ABSOLUTE: 0.6 K/UL (ref 0–1.3)
MONOCYTES RELATIVE PERCENT: 6.6 %
NEUTROPHILS ABSOLUTE: 5.7 K/UL (ref 1.7–7.7)
NEUTROPHILS RELATIVE PERCENT: 61.3 %
PDW BLD-RTO: 14 % (ref 12.4–15.4)
PLATELET # BLD: 225 K/UL (ref 135–450)
PMV BLD AUTO: 8 FL (ref 5–10.5)
POTASSIUM SERPL-SCNC: 4.2 MMOL/L (ref 3.5–5.1)
PROSTATE SPECIFIC ANTIGEN: 0.73 NG/ML (ref 0–4)
RBC # BLD: 4.79 M/UL (ref 4.2–5.9)
SODIUM BLD-SCNC: 139 MMOL/L (ref 136–145)
TOTAL PROTEIN: 7.3 G/DL (ref 6.4–8.2)
TRIGL SERPL-MCNC: 168 MG/DL (ref 0–150)
TSH SERPL DL<=0.05 MIU/L-ACNC: 2.64 UIU/ML (ref 0.27–4.2)
VLDLC SERPL CALC-MCNC: 34 MG/DL
WBC # BLD: 9.3 K/UL (ref 4–11)

## 2019-05-16 PROCEDURE — 82044 UR ALBUMIN SEMIQUANTITATIVE: CPT | Performed by: FAMILY MEDICINE

## 2019-05-16 PROCEDURE — 99214 OFFICE O/P EST MOD 30 MIN: CPT | Performed by: FAMILY MEDICINE

## 2019-05-16 NOTE — PROGRESS NOTES
Jose Velasco is a 60 y.o. male. HPI: Here for complex medical visit  Has managed to lose symmetric and weight but has plateaued now down from 300-275 range  Knees are painful causes him to be unsteady took one of his wife's pain pills once and he can walk just fine  He's been taking for adult strength aspirin twice daily to control the knee pain fortunately has no GI distress  He has not been to his orthopedic specialist in some time  Sees his eye doctor every 6 months for borderline glaucoma  Occasional numbness but no pain in his feet  Meds, vitamins and allergies reviewed with pt    ROS: No TIA's or unusual headaches, no dysphagia. No prolonged cough. No dyspnea or chest pain on exertion. No abdominal pain, change in bowel habits, black or bloody stools. No urinary tract symptoms. No new or unusual musculoskeletal symptoms. Prior to Visit Medications    Medication Sig Taking?  Authorizing Provider   carvedilol (COREG) 6.25 MG tablet TAKE 1 TABLET TWICE DAILY WITH MEALS Yes Herbert Evans MD   metFORMIN (GLUCOPHAGE) 1000 MG tablet TAKE 1 TABLET TWICE DAILY WITH MEALS Yes Herbert Evans MD   lovastatin (MEVACOR) 40 MG tablet TAKE 1 TABLET EVERY NIGHT Yes Herbert Evans MD   amLODIPine (NORVASC) 10 MG tablet Take 1 tablet by mouth daily Yes Herbert Evans MD   diphenhydrAMINE (BENADRYL) 25 MG tablet Take 25 mg by mouth nightly as needed for Allergies  Yes Historical Provider, MD   hydrochlorothiazide (HYDRODIURIL) 25 MG tablet TAKE 1 TABLET EVERY DAY Yes Herbert Taylor APRN - CNP   canagliflozin (INVOKANA) 300 MG TABS tablet TAKE 1 TABLET EVERY MORNING BEFORE BREAKFAST Yes Herbert Evans MD   glimepiride (AMARYL) 4 MG tablet TAKE 1 TABLET TWICE DAILY Yes Herbert Evans MD   Multiple Vitamins-Minerals (MULTIVITAMIN PO) Take by mouth Yes Historical Provider, MD   aspirin 81 MG tablet Take 81 mg by mouth daily Yes Historical Provider, MD   gabapentin (NEURONTIN) 100 MG capsule TAKE 1 CAPSULE TWICE DAILY  Estephania Holm MD       Past Medical History:   Diagnosis Date    CAD (coronary artery disease)     Disc disorder     ED (erectile dysfunction)     Hyperlipidemia     Hypertension     MI [facility]myocardial infarction) ([facility] [address]     Obstructive sleep apnea     Other disorders of kidney and ureter in diseases classified elsewhere     Pneumonia 1970s    Polyp of colon     Renal calculi     Type II or unspecified type diabetes mellitus without mention of complication, not stated as uncontrolled        Social History     Tobacco Use    Smoking status: Former Smoker     Packs/day: 0.25     Years: 30.00     Pack years: 7.50     Types: Cigarettes, Cigars     Last attempt to quit: 2011     Years since quittin.1    Smokeless tobacco: Never Used    Tobacco comment: cigars every once in a while    Substance Use Topics    Alcohol use: Yes     Alcohol/week: 0.6 oz     Types: 1 Cans of beer per week     Comment: social    Drug use: No       Family History   Problem Relation Age of Onset    Diabetes Father     Mental Illness Mother     High Blood Pressure Brother     Heart Disease Brother     High Cholesterol Neg Hx        Allergies   Allergen Reactions    Fexofenadine-Pseudoephed Er Other (See Comments)     cough    Lisinopril     Morphine Hives    Zithromax [Azithromycin]      Heart palpitations     Losartan      Dry c ough       OBJECTIVE:  /88   Pulse 72   Temp 97.9 °F (36.6 °C) (Tympanic)   Ht 6' 0.01\" (1.829 m)   Wt 274 lb 6.4 oz (124.5 kg)   SpO2 98%   BMI 37.21 kg/m²   GEN:  in NAD, obese but has lost weight  NECK:  Supple without adenopathy. No bruits  CV:  Regular rate and rhythm, S1 and S2 normal, no murmurs, clicks  PULM:  Chest is clear, no wheezing ,  symmetric air entry throughout both lung fields. EXT: No rash or edema bilateral knee crepitus  NEURO: nonfocal    ASSESSMENT/PLAN:  1.  Type 2 diabetes mellitus without complication, without long-term current use of insulin ([facility] [address]  Up-to-date with eye exam  - POCT microalbumin  - HM DIABETES FOOT EXAM  - Hemoglobin A1C; Future    2. Well adult exam    - CBC Auto Differential; Future  - Comprehensive Metabolic Panel; Future  - Lipid Panel; Future  - Psa screening; Future  - TSH without Reflex; Future    3. Essential hypertension  Stable continue current medication    4. Coronary artery disease involving native coronary artery of native heart without angina pectoris  Stable-continue aggressive risk  factor modification  See cardiology every 6 months    5.  Dyslipidemia  Fasting lipid panel today    6 imm - shingrix    7 djd knees -strongly urged him to see ortho  Perhaps he get a cortisone injection and stop taking so much aspirin which is high risk    To 25 minutes with patient greater than 50% of time addressing his arthritis diabetes hypertension and coronary artery disease as well as coordinating his care

## 2019-05-16 NOTE — PROGRESS NOTES
Visual inspection:  Deformity/amputation: absent  Skin lesions/pre-ulcerative calluses: absent  Edema: right- negative, left- negative    Sensory exam:  Monofilament sensation: normal  (minimum of 5 random plantar locations tested, avoiding callused areas - > 1 area with absence of sensation is + for neuropathy)    Plus at least one of the following:  Pulses: normal,   Pinprick: Intact  Proprioception: Intact  Vibration (128 Hz):  Intact

## 2019-05-17 LAB
ESTIMATED AVERAGE GLUCOSE: 162.8 MG/DL
HBA1C MFR BLD: 7.3 %

## 2019-07-16 DIAGNOSIS — I10 ESSENTIAL HYPERTENSION: ICD-10-CM

## 2019-07-17 RX ORDER — GLIMEPIRIDE 4 MG/1
TABLET ORAL
Qty: 180 TABLET | Refills: 3 | Status: SHIPPED | OUTPATIENT
Start: 2019-07-17 | End: 2020-04-27

## 2019-07-17 RX ORDER — HYDROCHLOROTHIAZIDE 25 MG/1
TABLET ORAL
Qty: 90 TABLET | Refills: 3 | Status: SHIPPED | OUTPATIENT
Start: 2019-07-17 | End: 2019-07-18 | Stop reason: SDUPTHER

## 2019-07-18 DIAGNOSIS — I10 ESSENTIAL HYPERTENSION: ICD-10-CM

## 2019-07-18 RX ORDER — HYDROCHLOROTHIAZIDE 25 MG/1
TABLET ORAL
Qty: 90 TABLET | Refills: 3 | Status: SHIPPED | OUTPATIENT
Start: 2019-07-18 | End: 2020-06-04

## 2019-08-13 ENCOUNTER — APPOINTMENT (RX ONLY)
Dept: URBAN - METROPOLITAN AREA CLINIC 170 | Facility: CLINIC | Age: 64
Setting detail: DERMATOLOGY
End: 2019-08-13

## 2019-08-13 DIAGNOSIS — D22 MELANOCYTIC NEVI: ICD-10-CM

## 2019-08-13 DIAGNOSIS — L82.1 OTHER SEBORRHEIC KERATOSIS: ICD-10-CM

## 2019-08-13 DIAGNOSIS — L81.4 OTHER MELANIN HYPERPIGMENTATION: ICD-10-CM

## 2019-08-13 DIAGNOSIS — D18.0 HEMANGIOMA: ICD-10-CM

## 2019-08-13 DIAGNOSIS — L57.0 ACTINIC KERATOSIS: ICD-10-CM

## 2019-08-13 PROBLEM — D18.01 HEMANGIOMA OF SKIN AND SUBCUTANEOUS TISSUE: Status: ACTIVE | Noted: 2019-08-13

## 2019-08-13 PROBLEM — I10 ESSENTIAL (PRIMARY) HYPERTENSION: Status: ACTIVE | Noted: 2019-08-13

## 2019-08-13 PROBLEM — D48.5 NEOPLASM OF UNCERTAIN BEHAVIOR OF SKIN: Status: ACTIVE | Noted: 2019-08-13

## 2019-08-13 PROBLEM — D22.5 MELANOCYTIC NEVI OF TRUNK: Status: ACTIVE | Noted: 2019-08-13

## 2019-08-13 PROCEDURE — ? PHOTO-DOCUMENTATION

## 2019-08-13 PROCEDURE — ? INVENTORY

## 2019-08-13 PROCEDURE — ? COUNSELING

## 2019-08-13 PROCEDURE — ? LIQUID NITROGEN

## 2019-08-13 PROCEDURE — ? DEFER

## 2019-08-13 PROCEDURE — 17003 DESTRUCT PREMALG LES 2-14: CPT

## 2019-08-13 PROCEDURE — 99202 OFFICE O/P NEW SF 15 MIN: CPT | Mod: 25

## 2019-08-13 PROCEDURE — 17000 DESTRUCT PREMALG LESION: CPT

## 2019-08-13 PROCEDURE — ? SUNSCREEN RECOMMENDATIONS

## 2019-08-13 ASSESSMENT — LOCATION SIMPLE DESCRIPTION DERM
LOCATION SIMPLE: RIGHT SCALP
LOCATION SIMPLE: SCALP
LOCATION SIMPLE: LEFT LOWER BACK
LOCATION SIMPLE: LEFT SCALP
LOCATION SIMPLE: RIGHT UPPER BACK
LOCATION SIMPLE: ABDOMEN

## 2019-08-13 ASSESSMENT — LOCATION DETAILED DESCRIPTION DERM
LOCATION DETAILED: RIGHT INFERIOR UPPER BACK
LOCATION DETAILED: LEFT SUPERIOR MEDIAL MIDBACK
LOCATION DETAILED: PERIUMBILICAL SKIN
LOCATION DETAILED: LEFT CENTRAL FRONTAL SCALP
LOCATION DETAILED: RIGHT SUPERIOR PARIETAL SCALP
LOCATION DETAILED: LEFT SUPERIOR PARIETAL SCALP
LOCATION DETAILED: EPIGASTRIC SKIN
LOCATION DETAILED: RIGHT CENTRAL FRONTAL SCALP

## 2019-08-13 ASSESSMENT — LOCATION ZONE DERM
LOCATION ZONE: SCALP
LOCATION ZONE: TRUNK

## 2019-08-13 NOTE — PROCEDURE: PHOTO-DOCUMENTATION
Details (Free Text): Left cheek
Detail Level: Detailed
Photo Preface (Leave Blank If You Do Not Want): Photographs were obtained today

## 2019-08-13 NOTE — PROCEDURE: LIQUID NITROGEN
Detail Level: Detailed
Render Post-Care Instructions In Note?: yes
Number Of Freeze-Thaw Cycles: 1 freeze-thaw cycle
Consent: The patient's consent was obtained including but not limited to risks of crusting, scabbing, blistering, scarring, darker or lighter pigmentary change, recurrence, incomplete removal and infection.
Render Note In Bullet Format When Appropriate: No
Duration Of Freeze Thaw-Cycle (Seconds): 5
Post-Care Instructions: I reviewed with the patient in detail post-care instructions. Patient is to wear sunprotection, and avoid picking at any of the treated lesions. Pt may apply Vaseline to crusted or scabbing areas.

## 2019-08-19 ENCOUNTER — OFFICE VISIT (OUTPATIENT)
Dept: FAMILY MEDICINE CLINIC | Age: 64
End: 2019-08-19
Payer: COMMERCIAL

## 2019-08-19 VITALS
OXYGEN SATURATION: 97 % | BODY MASS INDEX: 37.49 KG/M2 | DIASTOLIC BLOOD PRESSURE: 88 MMHG | SYSTOLIC BLOOD PRESSURE: 132 MMHG | HEIGHT: 72 IN | TEMPERATURE: 98.2 F | HEART RATE: 70 BPM | WEIGHT: 276.8 LBS

## 2019-08-19 DIAGNOSIS — I10 ESSENTIAL HYPERTENSION: ICD-10-CM

## 2019-08-19 DIAGNOSIS — E11.9 TYPE 2 DIABETES MELLITUS WITHOUT COMPLICATION, WITHOUT LONG-TERM CURRENT USE OF INSULIN (HCC): Primary | ICD-10-CM

## 2019-08-19 DIAGNOSIS — E78.5 DYSLIPIDEMIA: ICD-10-CM

## 2019-08-19 DIAGNOSIS — I25.10 CORONARY ARTERY DISEASE INVOLVING NATIVE CORONARY ARTERY OF NATIVE HEART WITHOUT ANGINA PECTORIS: ICD-10-CM

## 2019-08-19 DIAGNOSIS — G47.33 OBSTRUCTIVE SLEEP APNEA: ICD-10-CM

## 2019-08-19 LAB — HBA1C MFR BLD: 7.4 %

## 2019-08-19 PROCEDURE — 83036 HEMOGLOBIN GLYCOSYLATED A1C: CPT | Performed by: FAMILY MEDICINE

## 2019-08-19 PROCEDURE — 99214 OFFICE O/P EST MOD 30 MIN: CPT | Performed by: FAMILY MEDICINE

## 2019-08-19 NOTE — PROGRESS NOTES
Mariely Chaudhry is a 60 y.o. male. HPI: For three-month complex medical visit  Still working 3 days a week  Has been exercising  Has seen his eye doctor last week  His feet are numb and tingly but there are no open sores or wounds  Sees cardiology dermatology ophthalmology and myself  Had several precancerous keratoses on his scalp frozen the other day and has a small lesion to the left side of his nose that will be biopsied shortly  Meds, vitamins and allergies reviewed with pt    ROS: No TIA's or unusual headaches, no dysphagia. No prolonged cough. No dyspnea or chest pain on exertion. No abdominal pain, change in bowel habits, black or bloody stools. No urinary tract symptoms. No new or unusual musculoskeletal symptoms. Prior to Visit Medications    Medication Sig Taking?  Authorizing Provider   canagliflozin (INVOKANA) 300 MG TABS tablet TAKE 1 TABLET EVERY MORNING BEFORE BREAKFAST Yes Manju Yadav MD   hydrochlorothiazide (HYDRODIURIL) 25 MG tablet TAKE 1 TABLET EVERY DAY Yes Manju Yadav MD   glimepiride (AMARYL) 4 MG tablet TAKE 1 TABLET TWICE DAILY Yes Manju Yadav MD   carvedilol (COREG) 6.25 MG tablet TAKE 1 TABLET TWICE DAILY WITH MEALS Yes Manju Yadav MD   metFORMIN (GLUCOPHAGE) 1000 MG tablet TAKE 1 TABLET TWICE DAILY WITH MEALS Yes Manju Yadav MD   lovastatin (MEVACOR) 40 MG tablet TAKE 1 TABLET EVERY NIGHT Yes Manju Yadav MD   amLODIPine (NORVASC) 10 MG tablet Take 1 tablet by mouth daily Yes Manju Yadav MD   diphenhydrAMINE (BENADRYL) 25 MG tablet Take 25 mg by mouth nightly as needed for Allergies  Yes Historical Provider, MD   Multiple Vitamins-Minerals (MULTIVITAMIN PO) Take by mouth Yes Historical Provider, MD   aspirin 81 MG tablet Take 81 mg by mouth daily Yes Historical Provider, MD   gabapentin (NEURONTIN) 100 MG capsule TAKE 1 CAPSULE TWICE DAILY  Manju Yadav MD       Past Medical History:   Diagnosis Date    CAD (coronary artery disease)     Disc disorder     ED (erectile dysfunction)     Hyperlipidemia     Hypertension     MI [facility]myocardial infarction) [facility] [address]     Obstructive sleep apnea     Other disorders of kidney and ureter in diseases classified elsewhere     Pneumonia 1970s    Polyp of colon     Renal calculi     Type II or unspecified type diabetes mellitus without mention of complication, not stated as uncontrolled        Social History     Tobacco Use    Smoking status: Former Smoker     Packs/day: 0.25     Years: 30.00     Pack years: 7.50     Types: Cigarettes, Cigars     Last attempt to quit: 2011     Years since quittin.3    Smokeless tobacco: Never Used    Tobacco comment: cigars every once in a while    Substance Use Topics    Alcohol use: Yes     Alcohol/week: 1.0 standard drinks     Types: 1 Cans of beer per week     Comment: social    Drug use: No       Family History   Problem Relation Age of Onset    Diabetes Father     Mental Illness Mother     High Blood Pressure Brother     Heart Disease Brother     High Cholesterol Neg Hx        Allergies   Allergen Reactions    Fexofenadine-Pseudoephed Er Other (See Comments)     cough    Lisinopril     Morphine Hives    Zithromax [Azithromycin]      Heart palpitations     Losartan      Dry c ough       OBJECTIVE:  /88   Pulse 70   Temp 98.2 °F (36.8 °C) (Tympanic)   Ht 6' 0.01\" (1.829 m)   Wt 276 lb 12.8 oz (125.6 kg)   SpO2 97%   BMI 37.53 kg/m²   GEN:  in NAD, moderately obese but weight is stable  NECK:  Supple without adenopathy. No bruits  CV:  Regular rate and rhythm, S1 and S2 normal, no murmurs, clicks  PULM:  Chest is clear, no wheezing ,  symmetric air entry throughout both lung fields. EXT: No rash or edema  NEURO: nonfocal  .a1c - 7.4  ASSESSMENT/PLAN:  1.  Type 2 diabetes mellitus without complication, without long-term current use of insulin (HCC)  To be stable on Invokana metformin and glimepiride  Continue to try to lose weight  Return to office in 3 months  - POCT glycosylated hemoglobin (Hb A1C)    2. Coronary artery disease involving native coronary artery of native heart without angina pectoris  Stable, continue current medication and aggressive risk factor modification  Follow-up with cardiology    3. Dyslipidemia  Stable when last checked continue present medication    4. Essential hypertension  Stable,, continue present medication    5.  Obstructive sleep apnea  Only use CPAP when his weight was up around 300 now that is down to 275 his wife says he does not snore so he no longer uses the CPAP    6 precancerous skin lesions  Avoid sun  Follow-up with dermatology    7 IMM   Shingrix when available    Spent 25 minutes with patient greater than 50% of time reviewing healthy lifestyle, discussing weight loss techniques, and coordinating his care

## 2019-09-05 ENCOUNTER — APPOINTMENT (RX ONLY)
Dept: URBAN - METROPOLITAN AREA CLINIC 170 | Facility: CLINIC | Age: 64
Setting detail: DERMATOLOGY
End: 2019-09-05

## 2019-09-05 DIAGNOSIS — L57.0 ACTINIC KERATOSIS: ICD-10-CM

## 2019-09-05 PROBLEM — D48.5 NEOPLASM OF UNCERTAIN BEHAVIOR OF SKIN: Status: ACTIVE | Noted: 2019-09-05

## 2019-09-05 PROCEDURE — 17000 DESTRUCT PREMALG LESION: CPT | Mod: 59

## 2019-09-05 PROCEDURE — ? LIQUID NITROGEN

## 2019-09-05 PROCEDURE — ? BIOPSY BY SHAVE METHOD

## 2019-09-05 PROCEDURE — 11102 TANGNTL BX SKIN SINGLE LES: CPT

## 2019-09-05 PROCEDURE — ? COUNSELING

## 2019-09-05 ASSESSMENT — LOCATION SIMPLE DESCRIPTION DERM: LOCATION SIMPLE: LEFT SCALP

## 2019-09-05 ASSESSMENT — LOCATION DETAILED DESCRIPTION DERM: LOCATION DETAILED: LEFT CENTRAL FRONTAL SCALP

## 2019-09-05 ASSESSMENT — LOCATION ZONE DERM: LOCATION ZONE: SCALP

## 2019-09-05 NOTE — PROCEDURE: MIPS QUALITY
Quality 226: Preventive Care And Screening: Tobacco Use: Screening And Cessation Intervention: Patient screened for tobacco use and is an ex/non-smoker
Quality 130: Documentation Of Current Medications In The Medical Record: Current Medications Documented
Detail Level: Detailed
Quality 431: Preventive Care And Screening: Unhealthy Alcohol Use - Screening: Patient screened for unhealthy alcohol use using a single question and scores less than 2 times per year
Quality 402: Tobacco Use And Help With Quitting Among Adolescents: Patient screened for tobacco and is an ex-smoker

## 2019-09-05 NOTE — PROCEDURE: BIOPSY BY SHAVE METHOD
Was A Bandage Applied: Yes
Silver Nitrate Text: The wound bed was treated with silver nitrate after the biopsy was performed.
Bill [id number] For Specimen Handling/Conveyance To Laboratory?: no
Anesthesia Volume In Cc (Will Not Render If 0): 0.5
Dressing: Band-Aid
Billing Type: Third-Party Bill
Lab: -[id number]
Hemostasis: Aluminum Chloride and Electrocautery
Biopsy Method: Dermablade
Curettage Text: The wound bed was treated with curettage after the biopsy was performed.
Detail Level: Detailed
Additional Anesthesia Volume In Cc (Will Not Render If 0): 0
Wound Care: Aquaphor
Electrodesiccation Text: The wound bed was treated with electrodesiccation after the biopsy was performed.
Cryotherapy Text: The wound bed was treated with cryotherapy after the biopsy was performed.
Post-Care Instructions: I reviewed with the patient in detail post-care instructions. Patient is to keep the biopsy site dry overnight, and then apply bacitracin twice daily until healed. Patient may apply hydrogen peroxide soaks to remove any crusting.
Lab Facility: [id number]
Anesthesia Type: 2% Xylocaine with 1:100,000 epinephrine
Type Of Destruction Used: Curettage
Electrodesiccation And Curettage Text: The wound bed was treated with electrodesiccation and curettage after the biopsy was performed.
Notification Instructions: Patient will be notified of biopsy results. However, patient instructed to call the office if not contacted within 2 weeks.
Depth Of Biopsy: dermis
Biopsy Type: H and E
Consent: Written consent was obtained and risks were reviewed including but not limited to scarring, infection, bleeding, scabbing, incomplete removal, nerve damage and allergy to anesthesia.

## 2019-09-05 NOTE — PROCEDURE: LIQUID NITROGEN
Render Note In Bullet Format When Appropriate: No
Render Post-Care Instructions In Note?: yes
Number Of Freeze-Thaw Cycles: 1 freeze-thaw cycle
Consent: The patient's consent was obtained including but not limited to risks of crusting, scabbing, blistering, scarring, darker or lighter pigmentary change, recurrence, incomplete removal and infection.
Post-Care Instructions: I reviewed with the patient in detail post-care instructions. Patient is to wear sunprotection, and avoid picking at any of the treated lesions. Pt may apply Vaseline to crusted or scabbing areas.
Duration Of Freeze Thaw-Cycle (Seconds): 5
Detail Level: Detailed

## 2019-09-13 ENCOUNTER — OFFICE VISIT (OUTPATIENT)
Dept: CARDIOLOGY CLINIC | Age: 64
End: 2019-09-13
Payer: COMMERCIAL

## 2019-09-13 VITALS
HEIGHT: 73 IN | SYSTOLIC BLOOD PRESSURE: 124 MMHG | WEIGHT: 275 LBS | BODY MASS INDEX: 36.45 KG/M2 | HEART RATE: 76 BPM | OXYGEN SATURATION: 96 % | DIASTOLIC BLOOD PRESSURE: 78 MMHG

## 2019-09-13 DIAGNOSIS — I10 ESSENTIAL HYPERTENSION: ICD-10-CM

## 2019-09-13 DIAGNOSIS — I25.10 CORONARY ARTERY DISEASE INVOLVING NATIVE CORONARY ARTERY OF NATIVE HEART WITHOUT ANGINA PECTORIS: Primary | ICD-10-CM

## 2019-09-13 DIAGNOSIS — E78.2 MIXED HYPERLIPIDEMIA: ICD-10-CM

## 2019-09-13 PROCEDURE — 99214 OFFICE O/P EST MOD 30 MIN: CPT | Performed by: NURSE PRACTITIONER

## 2019-09-13 NOTE — PROGRESS NOTES
EVERY DAY 90 tablet 3    glimepiride (AMARYL) 4 MG tablet TAKE 1 TABLET TWICE DAILY 180 tablet 3    carvedilol (COREG) 6.25 MG tablet TAKE 1 TABLET TWICE DAILY WITH MEALS 180 tablet 3    metFORMIN (GLUCOPHAGE) 1000 MG tablet TAKE 1 TABLET TWICE DAILY WITH MEALS 180 tablet 3    lovastatin (MEVACOR) 40 MG tablet TAKE 1 TABLET EVERY NIGHT 90 tablet 2    amLODIPine (NORVASC) 10 MG tablet Take 1 tablet by mouth daily 90 tablet 3    diphenhydrAMINE (BENADRYL) 25 MG tablet Take 25 mg by mouth nightly as needed for Allergies       Multiple Vitamins-Minerals (MULTIVITAMIN PO) Take by mouth      aspirin 81 MG tablet Take 81 mg by mouth daily      gabapentin (NEURONTIN) 100 MG capsule TAKE 1 CAPSULE TWICE DAILY 180 capsule 3     No current facility-administered medications for this visit. REVIEW OF SYSTEMS:    CONSTITUTIONAL: - weight loss; - fatigue, weakness, night sweats or fever. HEENT: No new vision difficulties or ringing in the ears; narrow angle glaucoma. RESPIRATORY: No new SOB, PND, orthopnea or cough. CARDIOVASCULAR: See HPI  GI: No nausea, vomiting, diarrhea, constipation, abdominal pain or changes in bowel habits. : No urinary frequency, urgency, incontinence hematuria or dysuria. SKIN: No cyanosis or skin lesions. MUSCULOSKELETAL: No new muscle or joint pain. NEUROLOGICAL: No syncope or TIA-like symptoms. PSYCHIATRIC: No anxiety, pain, insomnia or depression    Objective:   PHYSICAL EXAM:    Vitals:    09/13/19 1128 09/13/19 1148   BP: 118/70 124/78   Site: Left Upper Arm Left Upper Arm   Position: Sitting    Cuff Size: Large Adult Large Adult   Pulse: 76    SpO2: 96%    Weight: 275 lb (124.7 kg)    Height: 6' 1\" (1.854 m)        CONSTITUTIONAL: Cooperative, no apparent distress, and appears well nourished / over weight  NEUROLOGIC:  Awake and orientated to person, place and time. PSYCH: Calm affect. SKIN: Warm and dry.   HEENT: Sclera non-icteric, normocephalic, neck supple, no

## 2019-09-24 DIAGNOSIS — G62.9 NEUROPATHY: ICD-10-CM

## 2019-09-24 RX ORDER — GABAPENTIN 100 MG/1
CAPSULE ORAL
Qty: 180 CAPSULE | Refills: 3 | Status: SHIPPED | OUTPATIENT
Start: 2019-09-24 | End: 2020-12-29

## 2019-11-18 ENCOUNTER — OFFICE VISIT (OUTPATIENT)
Dept: FAMILY MEDICINE CLINIC | Age: 64
End: 2019-11-18
Payer: COMMERCIAL

## 2019-11-18 VITALS
SYSTOLIC BLOOD PRESSURE: 130 MMHG | WEIGHT: 277 LBS | DIASTOLIC BLOOD PRESSURE: 80 MMHG | HEIGHT: 73 IN | BODY MASS INDEX: 36.71 KG/M2 | HEART RATE: 75 BPM | OXYGEN SATURATION: 95 %

## 2019-11-18 DIAGNOSIS — E11.9 TYPE 2 DIABETES MELLITUS WITHOUT COMPLICATION, WITHOUT LONG-TERM CURRENT USE OF INSULIN (HCC): ICD-10-CM

## 2019-11-18 DIAGNOSIS — Z23 FLU VACCINE NEED: ICD-10-CM

## 2019-11-18 DIAGNOSIS — G47.33 OBSTRUCTIVE SLEEP APNEA: Primary | ICD-10-CM

## 2019-11-18 DIAGNOSIS — E78.5 DYSLIPIDEMIA: ICD-10-CM

## 2019-11-18 DIAGNOSIS — I25.10 CORONARY ARTERY DISEASE INVOLVING NATIVE CORONARY ARTERY OF NATIVE HEART WITHOUT ANGINA PECTORIS: ICD-10-CM

## 2019-11-18 DIAGNOSIS — I10 ESSENTIAL HYPERTENSION: ICD-10-CM

## 2019-11-18 LAB — HBA1C MFR BLD: 7.8 %

## 2019-11-18 PROCEDURE — 90471 IMMUNIZATION ADMIN: CPT | Performed by: FAMILY MEDICINE

## 2019-11-18 PROCEDURE — 99214 OFFICE O/P EST MOD 30 MIN: CPT | Performed by: FAMILY MEDICINE

## 2019-11-18 PROCEDURE — 90686 IIV4 VACC NO PRSV 0.5 ML IM: CPT | Performed by: FAMILY MEDICINE

## 2019-11-18 PROCEDURE — 83036 HEMOGLOBIN GLYCOSYLATED A1C: CPT | Performed by: FAMILY MEDICINE

## 2020-02-10 RX ORDER — AMLODIPINE BESYLATE 10 MG/1
TABLET ORAL
Qty: 90 TABLET | Refills: 1 | Status: SHIPPED | OUTPATIENT
Start: 2020-02-10 | End: 2020-06-04

## 2020-02-10 RX ORDER — LOVASTATIN 40 MG/1
TABLET ORAL
Qty: 90 TABLET | Refills: 1 | Status: SHIPPED | OUTPATIENT
Start: 2020-02-10 | End: 2020-06-04

## 2020-02-10 NOTE — TELEPHONE ENCOUNTER
Medication:   Requested Prescriptions     Pending Prescriptions Disp Refills    lovastatin (MEVACOR) 40 MG tablet [Pharmacy Med Name: LOVASTATIN 40 MG Tablet] 90 tablet 2     Sig: TAKE 1 TABLET EVERY NIGHT    amLODIPine (NORVASC) 10 MG tablet [Pharmacy Med Name: AMLODIPINE BESYLATE 10 MG Tablet] 90 tablet 3     Sig: TAKE 1 TABLET EVERY DAY    canagliflozin (INVOKANA) 300 MG TABS tablet [Pharmacy Med Name: Narvis Burton 300 MG Tablet] 90 tablet 2     Sig: TAKE 1 TABLET EVERY MORNING BEFORE BREAKFAST    metFORMIN (GLUCOPHAGE) 1000 MG tablet [Pharmacy Med Name: METFORMIN HYDROCHLORIDE 1000 MG Tablet] 180 tablet 3     Sig: TAKE 1 TABLET TWICE DAILY WITH MEALS       Last Filled:  invokana 8/5/19 #90, 2 RF   Amlodipine 2/15/19 #90, 3 RF  Lovastatin 2/15/19 #90, 2 RF  Metformin 2/15/19 #180, 3 RF     Patient Phone Number: [phone] (home) [phone] (work)    Last appt: 11/18/2019 sleep apnea  Next appt: 2/19/2020    Lab Results   Component Value Date     05/16/2019    K 4.2 05/16/2019     05/16/2019    CO2 26 05/16/2019    BUN 20 05/16/2019    CREATININE 1.2 05/16/2019    GLUCOSE 209 (H) 05/16/2019    CALCIUM 10.0 05/16/2019    PROT 7.3 05/16/2019    LABALBU 4.5 05/16/2019    BILITOT 0.3 05/16/2019    ALKPHOS 49 05/16/2019    AST 24 05/16/2019    ALT 38 05/16/2019    LABGLOM >60 05/16/2019    GFRAA >60 05/16/2019    AGRATIO 1.6 05/16/2019    GLOB 2.8 05/16/2019     Lab Results   Component Value Date    LABA1C 7.8 11/18/2019     Lab Results   Component Value Date    .8 05/16/2019     Lab Results   Component Value Date    CHOL 132 05/16/2019    CHOL 111 07/14/2018    CHOL 143 07/14/2018     Lab Results   Component Value Date    TRIG 168 (H) 05/16/2019    TRIG 311 (H) 07/14/2018    TRIG 180 (H) 01/03/2017     Lab Results   Component Value Date    HDL 37 (L) 05/16/2019    HDL 32 (L) 07/14/2018    HDL 32 (L) 01/03/2017     Lab Results   Component Value Date    LDLCALC 61 05/16/2019    LDLCALC 74

## 2020-02-12 RX ORDER — CARVEDILOL 6.25 MG/1
TABLET ORAL
Qty: 180 TABLET | Refills: 3 | Status: SHIPPED | OUTPATIENT
Start: 2020-02-12 | End: 2021-02-17

## 2020-02-12 NOTE — TELEPHONE ENCOUNTER
Medication:   Requested Prescriptions     Pending Prescriptions Disp Refills    carvedilol (COREG) 6.25 MG tablet [Pharmacy Med Name: CARVEDILOL 6.25 MG Tablet] 180 tablet 3     Sig: TAKE 1 TABLET TWICE DAILY WITH MEALS       Last Filled:  2/15/19 #180 3rf    Patient Phone Number: [phone] (home) [phone] (work)    Last appt: 11/18/2019   Next appt: 2/19/2020    Lab Results   Component Value Date     05/16/2019    K 4.2 05/16/2019     05/16/2019    CO2 26 05/16/2019    BUN 20 05/16/2019    CREATININE 1.2 05/16/2019    GLUCOSE 209 (H) 05/16/2019    CALCIUM 10.0 05/16/2019    PROT 7.3 05/16/2019    LABALBU 4.5 05/16/2019    BILITOT 0.3 05/16/2019    ALKPHOS 49 05/16/2019    AST 24 05/16/2019    ALT 38 05/16/2019    LABGLOM >60 05/16/2019    GFRAA >60 05/16/2019    AGRATIO 1.6 05/16/2019    GLOB 2.8 05/16/2019

## 2020-02-19 ENCOUNTER — OFFICE VISIT (OUTPATIENT)
Dept: FAMILY MEDICINE CLINIC | Age: 65
End: 2020-02-19
Payer: COMMERCIAL

## 2020-02-19 VITALS
DIASTOLIC BLOOD PRESSURE: 86 MMHG | BODY MASS INDEX: 37.24 KG/M2 | HEART RATE: 79 BPM | OXYGEN SATURATION: 95 % | SYSTOLIC BLOOD PRESSURE: 138 MMHG | HEIGHT: 73 IN | WEIGHT: 281 LBS

## 2020-02-19 LAB — HBA1C MFR BLD: 7.9 %

## 2020-02-19 PROCEDURE — 3051F HG A1C>EQUAL 7.0%<8.0%: CPT | Performed by: FAMILY MEDICINE

## 2020-02-19 PROCEDURE — 83036 HEMOGLOBIN GLYCOSYLATED A1C: CPT | Performed by: FAMILY MEDICINE

## 2020-02-19 PROCEDURE — 99214 OFFICE O/P EST MOD 30 MIN: CPT | Performed by: FAMILY MEDICINE

## 2020-02-19 ASSESSMENT — PATIENT HEALTH QUESTIONNAIRE - PHQ9
SUM OF ALL RESPONSES TO PHQ QUESTIONS 1-9: 0
2. FEELING DOWN, DEPRESSED OR HOPELESS: 0
1. LITTLE INTEREST OR PLEASURE IN DOING THINGS: 0
SUM OF ALL RESPONSES TO PHQ9 QUESTIONS 1 & 2: 0
SUM OF ALL RESPONSES TO PHQ QUESTIONS 1-9: 0

## 2020-02-19 NOTE — PROGRESS NOTES
dysfunction)     Hyperlipidemia     Hypertension     MI [facility]my[facility] infarction) ([facility] [address]     Obstructive sleep apnea     Other disorders of kidney and ureter in diseases classified elsewhere     Pneumonia 1970s    Polyp of colon     Renal calculi     Type II or unspecified type diabetes mellitus without mention of complication, not stated as uncontrolled        Social History     Tobacco Use    Smoking status: Former Smoker     Packs/day: 0.25     Years: 30.00     Pack years: 7.50     Types: Cigarettes, Cigars     Last attempt to quit: 2011     Years since quittin.8    Smokeless tobacco: Never Used    Tobacco comment: cigars every once in a while    Substance Use Topics    Alcohol use: Yes     Alcohol/week: 1.0 standard drinks     Types: 1 Cans of beer per week     Comment: social    Drug use: No       Family History   Problem Relation Age of Onset    Diabetes Father     Mental Illness Mother     High Blood Pressure Brother     Heart Disease Brother     High Cholesterol Neg Hx        Allergies   Allergen Reactions    Fexofenadine-Pseudoephed Er Other (See Comments)     cough    Lisinopril     Morphine Hives    Zithromax [Azithromycin]      Heart palpitations     Losartan      Dry c ough       OBJECTIVE:  /86   Pulse 79   Ht 6' 0.91\" (1.852 m)   Wt 281 lb (127.5 kg)   SpO2 95%   BMI 37.16 kg/m²   GEN:  in NAD, moderately obese weight up 4 5 pounds  HEENT:  NCAT, TMs:normal and throat: Throat slightly red without exudate  NECK:  Supple without adenopathy. No bruit  CV:  Regular rate and rhythm, S1 and S2 normal, no murmurs, clicks  PULM:  Chest is clear, no wheezing ,  symmetric air entry throughout both lung fields.     EXT: No rash or edema  NEURO: nonfocal  a1c - 7.9  Lab Results   Component Value Date     2019    K 4.2 2019     2019    CO2 26 2019    BUN 20 2019    CREATININE 1.2 2019    GLUCOSE 209 2019    CALCIUM 10.0 05/16/2019     Lab Results   Component Value Date    CHOL 132 05/16/2019    CHOL 111 07/14/2018    CHOL 143 07/14/2018     Lab Results   Component Value Date    TRIG 168 (H) 05/16/2019    TRIG 311 (H) 07/14/2018    TRIG 180 (H) 01/03/2017     Lab Results   Component Value Date    HDL 37 (L) 05/16/2019    HDL 32 (L) 07/14/2018    HDL 32 (L) 01/03/2017     Lab Results   Component Value Date    LDLCALC 61 05/16/2019    LDLCALC 74 07/14/2018    LDLCALC 65 01/03/2017     Lab Results   Component Value Date    LABVLDL 34 05/16/2019    LABVLDL 36 01/03/2017    LABVLDL 18 07/15/2011     Lab Results   Component Value Date    CHOLHDLRATIO 4.5 07/14/2018    CHOLHDLRATIO 3.2 11/13/2015    CHOLHDLRATIO 3.9 10/29/2014     ASSESSMENT/PLAN:  1. Type 2 diabetes mellitus without complication, without long-term current use of insulin [facility]  sli worse today  Patient motivated to eat better have smaller portions continue exercise and lose weight  Does not wish to add any medication at this time  Continue his triple therapy and return to office in 3 months  - POCT glycosylated hemoglobin (Hb A1C)    2. Obstructive sleep apnea  Not using cpap  Not snoring  Further weight loss    3. Essential hypertension  stable continue present medication    4. Coronary artery disease involving native coronary artery of native heart without angina pectoris  stable, continue aggressive risk factor modification  See cardiologist regularly    5.  Dyslipidemia  stable, continue present medication    6 IMM - shingrix when available otherwise up-to-date    7 URI/pharyngitis  Appears viral, treat supportively  Warning signs given, no antibiotics at this time    Spent 25 minutes with patient greater than 50% of time reviewing his medications, going over his labs, and encouraging healthy lifestyle as well as coordinating his care

## 2020-02-21 ENCOUNTER — TELEPHONE (OUTPATIENT)
Dept: FAMILY MEDICINE CLINIC | Age: 65
End: 2020-02-21

## 2020-02-21 RX ORDER — CEFUROXIME AXETIL 250 MG/1
250 TABLET ORAL 2 TIMES DAILY
Qty: 20 TABLET | Refills: 0 | Status: SHIPPED | OUTPATIENT
Start: 2020-02-21 | End: 2020-03-02

## 2020-02-25 ENCOUNTER — TELEPHONE (OUTPATIENT)
Dept: FAMILY MEDICINE CLINIC | Age: 65
End: 2020-02-25

## 2020-02-25 RX ORDER — PROMETHAZINE HYDROCHLORIDE AND CODEINE PHOSPHATE 6.25; 1 MG/5ML; MG/5ML
5 SYRUP ORAL EVERY 4 HOURS PRN
Qty: 120 ML | Refills: 0 | Status: SHIPPED | OUTPATIENT
Start: 2020-02-25 | End: 2020-02-28

## 2020-02-25 RX ORDER — CLINDAMYCIN HYDROCHLORIDE 300 MG/1
300 CAPSULE ORAL 3 TIMES DAILY
Qty: 21 CAPSULE | Refills: 0 | Status: SHIPPED | OUTPATIENT
Start: 2020-02-25 | End: 2020-03-03

## 2020-02-25 NOTE — TELEPHONE ENCOUNTER
Patient calling stating he has had no relief from the new medication, patient is still coughing (so much rib cage sore), congestion , runny nose and phelgm.  Questions for patient call [address] [facility], [address] [place] [address] Drive [address]

## 2020-03-13 ENCOUNTER — OFFICE VISIT (OUTPATIENT)
Dept: FAMILY MEDICINE CLINIC | Age: 65
End: 2020-03-13
Payer: COMMERCIAL

## 2020-03-13 ENCOUNTER — NURSE TRIAGE (OUTPATIENT)
Dept: OTHER | Facility: CLINIC | Age: 65
End: 2020-03-13

## 2020-03-13 VITALS
HEART RATE: 75 BPM | DIASTOLIC BLOOD PRESSURE: 82 MMHG | BODY MASS INDEX: 38.19 KG/M2 | WEIGHT: 282 LBS | OXYGEN SATURATION: 95 % | SYSTOLIC BLOOD PRESSURE: 128 MMHG | HEIGHT: 72 IN

## 2020-03-13 PROCEDURE — 99213 OFFICE O/P EST LOW 20 MIN: CPT | Performed by: FAMILY MEDICINE

## 2020-03-13 RX ORDER — CEPHALEXIN 500 MG/1
500 CAPSULE ORAL 3 TIMES DAILY
Qty: 21 CAPSULE | Refills: 0 | Status: SHIPPED | OUTPATIENT
Start: 2020-03-13 | End: 2020-06-02 | Stop reason: ALTCHOICE

## 2020-03-13 NOTE — PROGRESS NOTES
Zac Arechiga is a 63 y.o. male. HPI:  Was at a conference in [facility] walking 13 miles a day at times when he noticed his feet were swelling and's red flat non-itchy rash developed on both ankles  He is back home now  Denies any fever cough or shortness of breath  Wife concerned about cellulitis as he is a diabetic    Wt Readings from Last 3 Encounters:   03/13/20 282 lb (127.9 kg)   02/19/20 281 lb (127.5 kg)   11/18/19 277 lb (125.6 kg)     Meds, vitamins and allergies reviewed with Patient    ROS:  Gen: No fever  HEENT: No cold symptoms, no sore throat. CV:  Denies chest pain or palpitations. Pulm:  Denies shortness of breath, cough. Abd:  Denies abdominal pain, nausea and vomiting. Skin: no rash    Allergies   Allergen Reactions    Fexofenadine-Pseudoephed Er Other (See Comments)     cough    Lisinopril     Morphine Hives    Zithromax [Azithromycin]      Heart palpitations     Losartan      Dry c ough       Prior to Visit Medications    Medication Sig Taking?  Authorizing Provider   cephALEXin (KEFLEX) 500 MG capsule Take 1 capsule by mouth 3 times daily Yes Rhoda Henry MD   carvedilol (COREG) 6.25 MG tablet TAKE 1 TABLET TWICE DAILY WITH MEALS Yes Rhoda Henry MD   lovastatin (MEVACOR) 40 MG tablet TAKE 1 TABLET EVERY NIGHT Yes Rhoda Henry MD   amLODIPine (NORVASC) 10 MG tablet TAKE 1 TABLET EVERY DAY Yes Rhoda Henry MD   canagliflozin (INVOKANA) 300 MG TABS tablet TAKE 1 TABLET EVERY MORNING BEFORE BREAKFAST Yes Rhoda Henry MD   metFORMIN (GLUCOPHAGE) 1000 MG tablet TAKE 1 TABLET TWICE DAILY WITH MEALS Yes Rhoda Henry MD   hydrochlorothiazide (HYDRODIURIL) 25 MG tablet TAKE 1 TABLET EVERY DAY Yes Rhoda Henry MD   glimepiride (AMARYL) 4 MG tablet TAKE 1 TABLET TWICE DAILY Yes Rhoda Henry MD   diphenhydrAMINE (BENADRYL) 25 MG tablet Take 25 mg by mouth nightly as needed for Allergies  Yes Historical Provider, MD   Multiple Vitamins-Minerals

## 2020-04-27 RX ORDER — GLIMEPIRIDE 4 MG/1
TABLET ORAL
Qty: 180 TABLET | Refills: 3 | Status: SHIPPED | OUTPATIENT
Start: 2020-04-27 | End: 2021-03-01

## 2020-04-27 NOTE — TELEPHONE ENCOUNTER
Medication:   Requested Prescriptions     Pending Prescriptions Disp Refills    glimepiride (AMARYL) 4 MG tablet [Pharmacy Med Name: GLIMEPIRIDE 4 MG Tablet] 180 tablet 3     Sig: TAKE 1 TABLET TWICE DAILY       Last Filled:  07/17/2019 #180 3rf     Patient Phone Number: [phone] (home) [phone] (work)    Last appt: 3/13/2020   Next appt: Visit date not found    Last Labs DM:   Lab Results   Component Value Date    LABA1C 7.9 02/19/2020

## 2020-05-21 ENCOUNTER — OFFICE VISIT (OUTPATIENT)
Dept: FAMILY MEDICINE CLINIC | Age: 65
End: 2020-05-21
Payer: COMMERCIAL

## 2020-05-21 VITALS
BODY MASS INDEX: 36.98 KG/M2 | DIASTOLIC BLOOD PRESSURE: 84 MMHG | TEMPERATURE: 98.4 F | SYSTOLIC BLOOD PRESSURE: 130 MMHG | OXYGEN SATURATION: 96 % | HEIGHT: 72 IN | HEART RATE: 69 BPM | WEIGHT: 273 LBS

## 2020-05-21 LAB
CREATININE URINE POCT: 100
HBA1C MFR BLD: 8.2 %
MICROALBUMIN/CREAT 24H UR: 80 MG/G{CREAT}
MICROALBUMIN/CREAT UR-RTO: ABNORMAL

## 2020-05-21 PROCEDURE — 83036 HEMOGLOBIN GLYCOSYLATED A1C: CPT | Performed by: FAMILY MEDICINE

## 2020-05-21 PROCEDURE — 90750 HZV VACC RECOMBINANT IM: CPT | Performed by: FAMILY MEDICINE

## 2020-05-21 PROCEDURE — 82044 UR ALBUMIN SEMIQUANTITATIVE: CPT | Performed by: FAMILY MEDICINE

## 2020-05-21 PROCEDURE — 90471 IMMUNIZATION ADMIN: CPT | Performed by: FAMILY MEDICINE

## 2020-05-21 PROCEDURE — 3051F HG A1C>EQUAL 7.0%<8.0%: CPT | Performed by: FAMILY MEDICINE

## 2020-05-21 PROCEDURE — 99214 OFFICE O/P EST MOD 30 MIN: CPT | Performed by: FAMILY MEDICINE

## 2020-05-21 NOTE — PROGRESS NOTES
Historical Provider, MD   aspirin 81 MG tablet Take 81 mg by mouth daily Yes Historical Provider, MD   gabapentin (NEURONTIN) 100 MG capsule TAKE 1 CAPSULE TWICE DAILY  Beronica Bledsoe MD       Past Medical History:   Diagnosis Date    CAD (coronary artery disease)     Disc disorder     ED (erectile dysfunction)     Hyperlipidemia     Hypertension     MI (my[facility] infarction) ([facility] [address]     Obstructive sleep apnea     Other disorders of kidney and ureter in diseases classified elsewhere     Pneumonia 1970s    Polyp of colon     Renal calculi     Type II or unspecified type diabetes mellitus without mention of complication, not stated as uncontrolled        Social History     Tobacco Use    Smoking status: Former Smoker     Packs/day: 0.25     Years: 30.00     Pack years: 7.50     Types: Cigarettes, Cigars     Last attempt to quit: 2011     Years since quittin.1    Smokeless tobacco: Never Used    Tobacco comment: cigars every once in a while    Substance Use Topics    Alcohol use: Yes     Alcohol/week: 1.0 standard drinks     Types: 1 Cans of beer per week     Comment: social    Drug use: No       Family History   Problem Relation Age of Onset    Diabetes Father     Mental Illness Mother     High Blood Pressure Brother     Heart Disease Brother     High Cholesterol Neg Hx        Allergies   Allergen Reactions    Fexofenadine-Pseudoephed Er Other (See Comments)     cough    Lisinopril     Morphine Hives    Zithromax [Azithromycin]      Heart palpitations     Losartan      Dry c ough       OBJECTIVE:  /84   Pulse 69   Temp 98.4 °F (36.9 °C) (Oral)   Ht 6' 0.01\" (1.829 m)   Wt 273 lb (123.8 kg)   SpO2 96%   BMI 37.02 kg/m²   GEN:  in NAD, wt down 7 #  NECK:  Supple without adenopathy. no bruit  CV:  Regular rate and rhythm, S1 and S2 normal, no murmurs, clicks  PULM:  Chest is clear, no wheezing ,  symmetric air entry throughout both lung fields.     EXT: No rash or edema, slight ulnar nerve weakness 4 out of 5 on the left only  Diabetic foot exam was normal per MA  NEURO: nonfocal  Lab Results   Component Value Date    LABA1C 7.9 02/19/2020     Lab Results   Component Value Date    .8 05/16/2019   a1c - 8.2  Lab Results   Component Value Date    CHOL 132 05/16/2019    CHOL 111 07/14/2018    CHOL 143 07/14/2018     Lab Results   Component Value Date    TRIG 168 (H) 05/16/2019    TRIG 311 (H) 07/14/2018    TRIG 180 (H) 01/03/2017     Lab Results   Component Value Date    HDL 37 (L) 05/16/2019    HDL 32 (L) 07/14/2018    HDL 32 (L) 01/03/2017     Lab Results   Component Value Date    LDLCALC 61 05/16/2019    LDLCALC 74 07/14/2018    LDLCALC 65 01/03/2017     Lab Results   Component Value Date    LABVLDL 34 05/16/2019    LABVLDL 36 01/03/2017    LABVLDL 18 07/15/2011     Lab Results   Component Value Date    CHOLHDLRATIO 4.5 07/14/2018    CHOLHDLRATIO 3.2 11/13/2015    CHOLHDLRATIO 3.9 10/29/2014     ASSESSMENT/PLAN:  1. Coronary artery disease involving native coronary artery of native heart without angina pectoris  Stable, continue aggressive medical treatment  Follow-up with cardiology    2. Dyslipidemia  Stable, continue current medication  Repeat fasting lipid panel in 3 months  3. Essential hypertension  Stable, continue current medication  Repeat BMP in 3 months    4. Obstructive sleep apnea  Not using CPAP as he is lost 25 pounds  Asked wife to monitor him for apnea and snoring    5.  Type 2 diabetes mellitus without complication, without long-term current use of insulin (HCC)  Despite Invokana, metformin, and glipizide his A1c is worse  He will try better diet and exercise  Return to office in 3 months  Consider adding Januvia or pioglitazone as needed    6 HM/IMM   shingrix # 1      25 minutes with patient greater than 50% time reviewing his medications labs diet and emphasizing healthy lifestyle

## 2020-06-01 ENCOUNTER — PROCEDURE VISIT (OUTPATIENT)
Dept: NEUROLOGY | Age: 65
End: 2020-06-01
Payer: COMMERCIAL

## 2020-06-01 PROCEDURE — 95911 NRV CNDJ TEST 9-10 STUDIES: CPT | Performed by: PSYCHIATRY & NEUROLOGY

## 2020-06-01 PROCEDURE — 95886 MUSC TEST DONE W/N TEST COMP: CPT | Performed by: PSYCHIATRY & NEUROLOGY

## 2020-06-02 ENCOUNTER — OFFICE VISIT (OUTPATIENT)
Dept: CARDIOLOGY CLINIC | Age: 65
End: 2020-06-02
Payer: COMMERCIAL

## 2020-06-02 VITALS
WEIGHT: 271 LBS | HEART RATE: 74 BPM | SYSTOLIC BLOOD PRESSURE: 132 MMHG | HEIGHT: 72 IN | BODY MASS INDEX: 36.7 KG/M2 | DIASTOLIC BLOOD PRESSURE: 72 MMHG | OXYGEN SATURATION: 98 %

## 2020-06-02 PROCEDURE — 99214 OFFICE O/P EST MOD 30 MIN: CPT | Performed by: NURSE PRACTITIONER

## 2020-06-02 NOTE — PROGRESS NOTES
Calm affect. SKIN: Warm and dry. HEENT: Sclera non-icteric, normocephalic, neck supple, no elevation of JVP, normal carotid pulses with no bruits and thyroid normal size. LUNGS:  No increased work of breathing and clear to auscultation, no crackles or wheezing  CARDIOVASCULAR:  Regular rate 76 and rhythm with no murmurs, gallops, rubs, or abnormal heart sounds, normal PMI. The apical impulses not displaced  JVP less than 8 cm H2O  Heart tones are crisp and normal  Cervical veins are not engorged  The carotid upstroke is normal in amplitude and contour without delay or bruit  JVP is not elevated  ABDOMEN:  Normal bowel sounds, non-distended and non-tender to palpation  EXT: trace elsa LE edema, no calf tenderness. Pulses are present bilaterally. DATA:    Lab Results   Component Value Date    ALT 38 05/16/2019    AST 24 05/16/2019    ALKPHOS 49 05/16/2019    BILITOT 0.3 05/16/2019     Lab Results   Component Value Date    CREATININE 1.2 05/16/2019    BUN 20 05/16/2019     05/16/2019    K 4.2 05/16/2019     05/16/2019    CO2 26 05/16/2019     Lab Results   Component Value Date    TSH 2.64 05/16/2019    Y2NHKPS 6.5 09/18/2010       Lab Results   Component Value Date    TRIG 168 (H) 05/16/2019    TRIG 311 (H) 07/14/2018    TRIG 180 (H) 01/03/2017     Lab Results   Component Value Date    HDL 37 (L) 05/16/2019    HDL 32 (L) 07/14/2018    HDL 32 (L) 01/03/2017     Lab Results   Component Value Date    LDLCALC 61 05/16/2019    [address] 74 07/14/2018    [address] 65 01/03/2017     Radiology Review:  Pertinent images / reports were reviewed as a part of this visit and reveals the following:    Myoview: 1/29/19:      Summary    Normal myocardial perfusion study.    Normal LV size and systolic function. Last Echo: Aug '14:  Summary  -Normal left ventricle size, wall thickness and systolic function with an  estimated ejection fraction of 55%. -The aortic valve appears sclerotic but opens well.   -There is mild

## 2020-06-04 RX ORDER — AMLODIPINE BESYLATE 10 MG/1
TABLET ORAL
Qty: 90 TABLET | Refills: 2 | Status: SHIPPED | OUTPATIENT
Start: 2020-06-04 | End: 2021-04-12

## 2020-06-04 RX ORDER — HYDROCHLOROTHIAZIDE 25 MG/1
TABLET ORAL
Qty: 90 TABLET | Refills: 2 | Status: SHIPPED | OUTPATIENT
Start: 2020-06-04 | End: 2021-02-17

## 2020-06-04 RX ORDER — LOVASTATIN 40 MG/1
TABLET ORAL
Qty: 90 TABLET | Refills: 2 | Status: SHIPPED | OUTPATIENT
Start: 2020-06-04 | End: 2021-07-07

## 2020-06-04 RX ORDER — CANAGLIFLOZIN 300 MG/1
TABLET, FILM COATED ORAL
Qty: 90 TABLET | Refills: 2 | Status: SHIPPED | OUTPATIENT
Start: 2020-06-04 | End: 2021-07-06

## 2020-06-17 ENCOUNTER — TELEPHONE (OUTPATIENT)
Dept: FAMILY MEDICINE CLINIC | Age: 65
End: 2020-06-17

## 2020-06-17 NOTE — TELEPHONE ENCOUNTER
EMG shows mild to moderate carpal tunnel syndrome on both right and left wrists, and mild right and severe left elbow ulnar tunnel syndrome. Suggest patient now go to see his hand specialist with these results to get further treatment options.   Referral to Quang Mclean been made

## 2020-06-18 ENCOUNTER — TELEPHONE (OUTPATIENT)
Dept: FAMILY MEDICINE CLINIC | Age: 65
End: 2020-06-18

## 2020-08-24 ENCOUNTER — OFFICE VISIT (OUTPATIENT)
Dept: FAMILY MEDICINE CLINIC | Age: 65
End: 2020-08-24
Payer: COMMERCIAL

## 2020-08-24 VITALS
HEIGHT: 72 IN | SYSTOLIC BLOOD PRESSURE: 138 MMHG | DIASTOLIC BLOOD PRESSURE: 88 MMHG | HEART RATE: 72 BPM | WEIGHT: 274 LBS | BODY MASS INDEX: 37.11 KG/M2 | OXYGEN SATURATION: 96 % | TEMPERATURE: 97.4 F

## 2020-08-24 LAB
A/G RATIO: 1.8 (ref 1.1–2.2)
ALBUMIN SERPL-MCNC: 4.6 G/DL (ref 3.4–5)
ALP BLD-CCNC: 51 U/L (ref 40–129)
ALT SERPL-CCNC: 37 U/L (ref 10–40)
ANION GAP SERPL CALCULATED.3IONS-SCNC: 12 MMOL/L (ref 3–16)
AST SERPL-CCNC: 25 U/L (ref 15–37)
BASOPHILS ABSOLUTE: 0 K/UL (ref 0–0.2)
BASOPHILS RELATIVE PERCENT: 0.5 %
BILIRUB SERPL-MCNC: 0.3 MG/DL (ref 0–1)
BUN BLDV-MCNC: 24 MG/DL (ref 7–20)
CALCIUM SERPL-MCNC: 10.3 MG/DL (ref 8.3–10.6)
CHLORIDE BLD-SCNC: 102 MMOL/L (ref 99–110)
CHOLESTEROL, TOTAL: 148 MG/DL (ref 0–199)
CO2: 25 MMOL/L (ref 21–32)
CREAT SERPL-MCNC: 1.2 MG/DL (ref 0.8–1.3)
EOSINOPHILS ABSOLUTE: 0.3 K/UL (ref 0–0.6)
EOSINOPHILS RELATIVE PERCENT: 3.4 %
GFR AFRICAN AMERICAN: >60
GFR NON-AFRICAN AMERICAN: >60
GLOBULIN: 2.5 G/DL
GLUCOSE BLD-MCNC: 181 MG/DL (ref 70–99)
HBA1C MFR BLD: 7.8 %
HCT VFR BLD CALC: 43.6 % (ref 40.5–52.5)
HDLC SERPL-MCNC: 38 MG/DL (ref 40–60)
HEMOGLOBIN: 14.2 G/DL (ref 13.5–17.5)
LDL CHOLESTEROL CALCULATED: 62 MG/DL
LYMPHOCYTES ABSOLUTE: 2.9 K/UL (ref 1–5.1)
LYMPHOCYTES RELATIVE PERCENT: 29 %
MCH RBC QN AUTO: 28.8 PG (ref 26–34)
MCHC RBC AUTO-ENTMCNC: 32.6 G/DL (ref 31–36)
MCV RBC AUTO: 88.3 FL (ref 80–100)
MONOCYTES ABSOLUTE: 0.7 K/UL (ref 0–1.3)
MONOCYTES RELATIVE PERCENT: 7 %
NEUTROPHILS ABSOLUTE: 5.9 K/UL (ref 1.7–7.7)
NEUTROPHILS RELATIVE PERCENT: 60.1 %
PDW BLD-RTO: 13.9 % (ref 12.4–15.4)
PLATELET # BLD: 228 K/UL (ref 135–450)
PMV BLD AUTO: 8.6 FL (ref 5–10.5)
POTASSIUM SERPL-SCNC: 4.5 MMOL/L (ref 3.5–5.1)
PROSTATE SPECIFIC ANTIGEN: 0.81 NG/ML (ref 0–4)
RBC # BLD: 4.93 M/UL (ref 4.2–5.9)
SODIUM BLD-SCNC: 139 MMOL/L (ref 136–145)
TOTAL PROTEIN: 7.1 G/DL (ref 6.4–8.2)
TRIGL SERPL-MCNC: 240 MG/DL (ref 0–150)
VLDLC SERPL CALC-MCNC: 48 MG/DL
WBC # BLD: 9.9 K/UL (ref 4–11)

## 2020-08-24 PROCEDURE — 99214 OFFICE O/P EST MOD 30 MIN: CPT | Performed by: FAMILY MEDICINE

## 2020-08-24 PROCEDURE — 3051F HG A1C>EQUAL 7.0%<8.0%: CPT | Performed by: FAMILY MEDICINE

## 2020-08-24 PROCEDURE — 90471 IMMUNIZATION ADMIN: CPT | Performed by: FAMILY MEDICINE

## 2020-08-24 PROCEDURE — 90750 HZV VACC RECOMBINANT IM: CPT | Performed by: FAMILY MEDICINE

## 2020-08-24 PROCEDURE — 83036 HEMOGLOBIN GLYCOSYLATED A1C: CPT | Performed by: FAMILY MEDICINE

## 2020-08-24 RX ORDER — ZOSTER VACCINE RECOMBINANT, ADJUVANTED 50 MCG/0.5
0.5 KIT INTRAMUSCULAR SEE ADMIN INSTRUCTIONS
Qty: 0.5 ML | Refills: 0 | Status: SHIPPED | OUTPATIENT
Start: 2020-08-24 | End: 2020-08-25

## 2020-08-24 NOTE — PROGRESS NOTES
Elba Horta is a 63 y.o. male. HPI: Here for complex medical visit  Exercising more than usual, contemplating early senior living  Still eats out a lot and finds it hard to eat right all the time  Wife works in an extended care facility gets tested for [place] regularly and has been negative for times and he is never had any symptoms  Sees an eye doctor twice a year  No pain in the feet but does have some numbness has had for years known ulnar and medial nerve issues by EMG but did not see the hand specialist yet  Has been diagnosed with struct of sleep apnea but is only lasted a week on the CPAP, not using it at all    Meds, vitamins and allergies reviewed with pt    ROS: No TIA's or unusual headaches, no dysphagia. No prolonged cough. No dyspnea or chest pain on exertion. No abdominal pain, change in bowel habits, black or bloody stools. No urinary tract symptoms. No new or unusual musculoskeletal symptoms. Prior to Visit Medications    Medication Sig Taking?  Authorizing Provider   amLODIPine (NORVASC) 10 MG tablet TAKE 1 TABLET EVERY DAY Yes Roxane Goodrich MD   canagliflozin (INVOKANA) 300 MG TABS tablet TAKE 1 TABLET EVERY MORNING BEFORE BREAKFAST Yes Roxane Goodrich MD   metFORMIN (GLUCOPHAGE) 1000 MG tablet TAKE 1 TABLET TWICE DAILY WITH MEALS Yes Roxane Goodrich MD   lovastatin (MEVACOR) 40 MG tablet TAKE 1 TABLET EVERY NIGHT Yes Roxane Goodrich MD   hydroCHLOROthiazide (HYDRODIURIL) 25 MG tablet TAKE 1 TABLET EVERY DAY Yes Roxane Goodrich MD   glimepiride (AMARYL) 4 MG tablet TAKE 1 TABLET TWICE DAILY Yes Roxane Goodrich MD   carvedilol (COREG) 6.25 MG tablet TAKE 1 TABLET TWICE DAILY WITH MEALS Yes Roxane Goodrich MD   diphenhydrAMINE (BENADRYL) 25 MG tablet Take 25 mg by mouth nightly as needed for Sleep  Yes Historical Provider, MD   Multiple Vitamins-Minerals (MULTIVITAMIN PO) Take by mouth Yes Historical Provider, MD   aspirin 81 MG tablet Take 81 mg by mouth daily Yes Historical Provider, MD   gabapentin (NEURONTIN) 100 MG capsule TAKE 1 CAPSULE TWICE DAILY  Ingris Naidu MD       Past Medical History:   Diagnosis Date    CAD (coronary artery disease)     Disc disorder     ED (erectile dysfunction)     Hyperlipidemia     Hypertension     [facility]) [facility]     Obstructive sleep apnea     Other disorders of kidney and ureter in diseases classified elsewhere     Pneumonia 1970s    Polyp of colon     Renal calculi     Type 2 diabetes mellitus without complication, without long-term current use of insulin ([facility] 2019    Type II or unspecified type diabetes mellitus without mention of complication, not stated as uncontrolled        Social History     Tobacco Use    Smoking status: Former Smoker     Packs/day: 0.25     Years: 30.00     Pack years: 7.50     Types: Cigarettes, Cigars     Last attempt to quit: 2011     Years since quittin.3    Smokeless tobacco: Never Used    Tobacco comment: cigars every once in a while    Substance Use Topics    Alcohol use: Yes     Alcohol/week: 1.0 standard drinks     Types: 1 Cans of beer per week     Comment: social    Drug use: No       Family History   Problem Relation Age of Onset    Diabetes Father     Mental Illness Mother     High Blood Pressure Brother     Heart Disease Brother     High Cholesterol Neg Hx        Allergies   Allergen Reactions    Fexofenadine-Pseudoephed Er Other (See Comments)     cough    Lisinopril     Morphine Hives    Zithromax [Azithromycin]      Heart palpitations     Losartan      Dry c ough       OBJECTIVE:  /88   Pulse 72   Temp 97.4 °F (36.3 °C)   Ht 6' 0.01\" (1.829 m)   Wt 247 lb (112 kg)   SpO2 96%   BMI 33.49 kg/m²   GEN:  in NAD, obese weight is up a few pounds  NECK:  Supple without adenopathy.   No bruits  CV:  Regular rate and rhythm, S1 and S2 normal, no murmurs, clicks  PULM:  Chest is clear, no wheezing ,  symmetric air entry throughout both lung fields. EXT: No rash or edema  NEURO: nonfocal  a1c - 7.8  ASSESSMENT/PLAN:  1. Type 2 diabetes mellitus without complication, without long-term current use of insulin (HCC)  Diabetes control is slightly improved on fark [employer] metformin and glipizide. Patient does not want take a fourth medicine to get his A1c down to 7  He will try harder with diet and exercise  Return to office in 3 months  - POCT glycosylated hemoglobin (Hb A1C)    2. Essential hypertension  stable, continue present medication  CMP, CBC   today  3. Dyslipidemia  flp today    4. Coronary artery disease involving native coronary artery of native heart without angina pectoris  Stable no angina  To new aggressive risk factor management    5.  Obstructive sleep apnea  Currently not treated  Denies snoring or apnea  We will asked wife to observe and consider repeat sleep study  Further weight loss would be ideal    6 IMM/HM  shingrix #2 given today      Spent 25 minutes with patient greater than 50% time addressing healthy lifestyle issues, updating immunizations, and coordinating his care

## 2020-09-29 ENCOUNTER — OFFICE VISIT (OUTPATIENT)
Dept: ORTHOPEDIC SURGERY | Age: 65
End: 2020-09-29
Payer: COMMERCIAL

## 2020-09-29 VITALS — WEIGHT: 274 LBS | BODY MASS INDEX: 37.11 KG/M2 | HEIGHT: 72 IN

## 2020-09-29 PROCEDURE — 99203 OFFICE O/P NEW LOW 30 MIN: CPT | Performed by: ORTHOPAEDIC SURGERY

## 2020-09-29 NOTE — PROGRESS NOTES
Chief Complaint  Hand Pain (bilateral)      HISTORY OF PRESENT ILLNESS:  Nuris Abraham is a  right-hand-dominant patient , here for a numbness & tingling in ulnar fingers of his Bilateral hand(s)  that began approximately 5 months ago. Since then symptoms have been persistent. Patient does  have associated hand weakness. Symptoms are worsening over time. EMG testing: yes -electrodiagnostic study from 6/1/2020 demonstrating bilateral ulnar nerve lesion at the elbow left side severe right side moderately severe and mild to moderate bilateral carpal tunnel syndrome  Patient does report a history of bilateral carpal tunnel release performed approximately 18 years ago  He also reports a history of right elbow surgery about 15 years ago where he thinks that the ulnar nerve was \"moved\" at that time  In addition he does have history of diabetes mellitus with reported peripheral neuropathy bilateral lower extremities    The patient was referred for a hand surgery specialty evaluation by: Dr. Melinda Mitchell History:  Patient's medications, allergies, past medical, surgical, social and family histories were reviewed and updated as appropriate.   Past Medical History:   Diagnosis Date    CAD (coronary artery disease)     Disc disorder     ED (erectile dysfunction)     Hyperlipidemia     Hypertension     MI (myocardial infarction) ([address]     Obstructive sleep apnea     Other disorders of kidney and ureter in diseases classified elsewhere     Pneumonia 1970s    Polyp of colon     Renal calculi     Type 2 diabetes mellitus without complication, without long-term current use of insulin ([address] 5/16/2019    Type II or unspecified type diabetes mellitus without mention of complication, not stated as uncontrolled        ROS:  Pertinent items are noted in HPI  Review of systems reviewed from Patient History Form dated on 9/29/20 and available in the patient's chart under the Media tab. PHYSICAL EXAMINATION:    Gen/Psych: Examination reveals a pleasant individual in no acute distress. The patient is oriented to time, place and person. The patient's mood and affect are appropriate. Lymph: The lymphatic examination bilaterally reveals all areas to be without enlargement or induration. Skin intact without lymphadenopathy, discoloration, or abnormal temperature. Vascular: There is intact, symmetric circulation in both upper extremities. Musculoskeletal/Neurologic           Cervical spine and shoulders      Cervical spine, shoulders, elbows, wrist:  satisfactory comfortable baseline range of motion, strength, and stability          Bilateral hand(s) wrist(s)  Satisfactory range of motion bilaterally with full composite fist full active extension of all fingers. There is  muscular atrophy. Wartenberg's sign is not present. Sensation is subjectively decreased, tingling in the Middle Finger, Ring Finger and Small Finger. Positive tinel's, nerve compression, and Phalen's sign at level of the wrist at the carpal tunnel. Well-healed bilateral palmar incisions at carpal tunnel  No thenar atrophy  No additional skin changes throughout bilateral hands  Right side pinch strength 15 pounds  strength 36 pounds  Left side pinch strength 8 pounds,  strength 27 pounds           Bilateral elbow(s)   well-healed medial surgical incision left elbow full ROM 0 to 130 degrees. Right elbow range of motion 10 to 125 degrees positive Tinel's at the elbow bilaterally. No crepitus throughout range of motion or instability  Positive elbow flexion test.  Absence of tenderness to palpation of medial epicondyle and common flexor origin.       2+ brachioradialis triceps tendon reflexes negative German sign bilaterally      Radiology:     X-rays obtained and reviewed in office:  3 Views of right elbow: Degenerative changes the right elbow radiocapitellar as well as ulnohumeral joint. Joint space narrowing and osteophyte formation noted  No acute fracture or additional acute osseous abnormality    3 views of left elbow: Degenerative changes of the left elbow radiocapitellar and ulnohumeral joint with joint space narrowing and osteophyte formation with no evidence of acute osseous abnormality    DIAGNOSTIC TESTING: EMG: Bilateral carpal tunnel syndrome and bilateral cubital tunnel syndrome, please see report for full details personally reviewed by me from 6/1/2020    IMPRESSION AND PLAN: 65-year-old male presenting with history of bilateral hand numbness and tingling with associated weakness worsening now    1 Bilateral cubital tunnel syndrome. 2 bilateral carpal tunnel syndrome recurrent  I have reviewed the diagnosis and evidence based treatment options for the patient, both surgical and non-surgical.      Electrodiagnostic study was reviewed and correlated with patient's clinical exam.  He does have objective weakness noted on exam with atrophy intrinsic musculature. At this time we discussed some of the complex nature of his symptoms including his history of right elbow surgery with possible ulnar nerve transposition  We will attempt to obtain records for his right upper extremity  In addition he has had prior right carpal tunnel release and left carpal tunnel release but it appears that a component of his symptoms may be related to this as well and considering treatment for carpal tunnel syndrome should be considered along with cubital tunnel syndrome  Finally he does have a history of diabetes mellitus with history of peripheral neuropathy that may be contributing to his symptoms as well  We discussed spectrum of treatment options today and will initiate conservative management with ulnar nerve glides and relative extension splinting and wrist splinting at night.   We will also begin the process of discussing surgical intervention as the patient does have rather severe and advanced changes with muscle atrophy bilateral upper extremity. Specifically he feels that left-sided symptoms are more severe than right-sided symptoms  Risk benefits alternatives of surgery were discussed today including but not limited to infection bleeding damage to tendon nerve or blood vessel need for additional procedures continued pain numbness and tingling continued incomplete relief of symptoms weakness as well as risks of anesthesia including stroke heart attack blood clot and death.   After thorough discussion he understands his treatment options and we will anticipate surgical intervention with left carpal tunnel release as well as left in situ cubital tunnel release versus anterior ulnar nerve transposition at the elbow

## 2020-09-29 NOTE — LETTER
[facility]  [address] [place] [address]  [facility] [address]  Phone: [phone]  Fax: [phone]    Jennyfer Blackmon MD        September 29, 2020     [address] [place], [address]    Patient: Liana Ferguson   MR Number: [id number]   YOB: 1955   Date of Visit: 9/29/2020       Dear Dr. Marthena Galeazzi: Thank you for referring Андрей Reynolds to me for evaluation. Below are the relevant portions of my assessment and plan of care. IMPRESSION AND PLAN: 30-year-old male presenting with history of bilateral hand numbness and tingling with associated weakness worsening now    1 Bilateral cubital tunnel syndrome. 2 bilateral carpal tunnel syndrome recurrent  I have reviewed the diagnosis and evidence based treatment options for the patient, both surgical and non-surgical.      Electrodiagnostic study was reviewed and correlated with patient's clinical exam.  He does have objective weakness noted on exam with atrophy intrinsic musculature.   At this time we discussed some of the complex nature of his symptoms including his history of right elbow surgery with possible ulnar nerve transposition  We will attempt to obtain records for his right upper extremity  In addition he has had prior right carpal tunnel release and left carpal tunnel release but it appears that a component of his symptoms may be related to this as well and considering treatment for carpal tunnel syndrome should be considered along with cubital tunnel syndrome  Finally he does have a history of diabetes mellitus with history of peripheral neuropathy that may be contributing to his symptoms as well We discussed spectrum of treatment options today and will initiate conservative management with ulnar nerve glides and relative extension splinting and wrist splinting at night. We will also begin the process of discussing surgical intervention as the patient does have rather severe and advanced changes with muscle atrophy bilateral upper extremity. Specifically he feels that left-sided symptoms are more severe than right-sided symptoms  Risk benefits alternatives of surgery were discussed today including but not limited to infection bleeding damage to tendon nerve or blood vessel need for additional procedures continued pain numbness and tingling continued incomplete relief of symptoms weakness as well as risks of anesthesia including stroke heart attack blood clot and death. After thorough discussion he understands his treatment options and we will anticipate surgical intervention with left carpal tunnel release as well as left in situ cubital tunnel release versus anterior ulnar nerve transposition at the elbow    If you have questions, please do not hesitate to call me. I look forward to following Jimmy Fortune along with you.     Sincerely,      Cristino Gonzalez MD    Hand & Upper Extremity Surgery  [address]  A partner of Caty Priest MD

## 2020-10-06 ENCOUNTER — TELEPHONE (OUTPATIENT)
Dept: FAMILY MEDICINE CLINIC | Age: 65
End: 2020-10-06

## 2020-10-06 NOTE — TELEPHONE ENCOUNTER
Pt having surgery oct 27 on ulnar nerve. He needs a preop asap. Surgeon is dr Sourav Curtis.  Where can we work him in?

## 2020-10-21 ENCOUNTER — OFFICE VISIT (OUTPATIENT)
Dept: FAMILY MEDICINE CLINIC | Age: 65
End: 2020-10-21
Payer: COMMERCIAL

## 2020-10-21 ENCOUNTER — TELEPHONE (OUTPATIENT)
Dept: ORTHOPEDIC SURGERY | Age: 65
End: 2020-10-21

## 2020-10-21 VITALS
HEIGHT: 72 IN | DIASTOLIC BLOOD PRESSURE: 76 MMHG | OXYGEN SATURATION: 97 % | TEMPERATURE: 97.5 F | BODY MASS INDEX: 37.25 KG/M2 | HEART RATE: 75 BPM | WEIGHT: 275 LBS | SYSTOLIC BLOOD PRESSURE: 134 MMHG

## 2020-10-21 PROCEDURE — 99214 OFFICE O/P EST MOD 30 MIN: CPT | Performed by: FAMILY MEDICINE

## 2020-10-21 PROCEDURE — 90686 IIV4 VACC NO PRSV 0.5 ML IM: CPT | Performed by: FAMILY MEDICINE

## 2020-10-21 PROCEDURE — 90471 IMMUNIZATION ADMIN: CPT | Performed by: FAMILY MEDICINE

## 2020-10-21 PROCEDURE — 93000 ELECTROCARDIOGRAM COMPLETE: CPT | Performed by: FAMILY MEDICINE

## 2020-10-21 NOTE — PROGRESS NOTES
Chief Complaint:     Arin Khalil is a 63 y.o. male who presents for a preoperative physical examination. He is scheduled to have left wrist surgery  done by Rose Carnes at [facility] on 10-27-20.     History of Present Illness:      Recurrent left cts and left ulnar nerve damage  Failed conservative therapy  Having bilat finger numbness and forearm pain left greater than right  Past Medical History:   Diagnosis Date    CAD (coronary artery disease)     Disc disorder     ED (erectile dysfunction)     Hyperlipidemia     Hypertension     MI (myocardial infarction) ([address]     Obstructive sleep apnea     Other disorders of kidney and ureter in diseases classified elsewhere     Pneumonia 1970s    Polyp of colon     Renal calculi     Type 2 diabetes mellitus without complication, without long-term current use of insulin ([facility] [facility] 5/16/2019    Type II or unspecified type diabetes mellitus without mention of complication, not stated as uncontrolled         Review of patient's past surgical history indicates:     Past Surgical History:   Procedure Laterality Date    ABDOMEN SURGERY      Exploratory Laparotomy    CARPAL TUNNEL RELEASE      right and left    COLONOSCOPY  12/2/13    COLONOSCOPY  12/2/13    polyps X 2 removed    CORONARY ANGIOPLASTY WITH STENT PLACEMENT      CYSTOSCOPY Left 12/12/14    CYSTOSCOPY, LEFT RETROGRADE PYELOGRAM, PLACEMENT OF LEFT    FRACTURE SURGERY      Ankle & Wrist    TONSILLECTOMY AND ADENOIDECTOMY                                                     Current Outpatient Medications   Medication Sig Dispense Refill    amLODIPine (NORVASC) 10 MG tablet TAKE 1 TABLET EVERY DAY 90 tablet 2    canagliflozin (INVOKANA) 300 MG TABS tablet TAKE 1 TABLET EVERY MORNING BEFORE BREAKFAST 90 tablet 2    metFORMIN (GLUCOPHAGE) 1000 MG tablet TAKE 1 TABLET TWICE DAILY WITH MEALS 180 tablet 2    lovastatin (MEVACOR) 40 MG tablet TAKE 1 TABLET EVERY NIGHT 90 tablet 2    hydroCHLOROthiazide (1.829 m)   Wt 275 lb (124.7 kg)   SpO2 97%   BMI 37.29 kg/m²   General appearance - healthy, alert, no distress  Skin - Skin color, texture, turgor normal. No rashes or lesions. Head - Normocephalic. No masses, lesions, tenderness or abnormalities  Eyes - conjunctivae/corneas clear. PERRL, EOM's intact. Ears - External ears normal. Canals clear. TM's normal. Hearing grossly intact to finger rub. Nose/Sinuses - Nares normal. Septum midline. Mucosa normal. No drainage or sinus tenderness. Oropharynx - Lips, mucosa, and tongue normal. Teeth and gums normal. Orop  Neck - Neck supple. No adenopathy. Thyroid symmetric, normal size. No bruits. Back - Back symmetric, no curvature. ROM normal. No CVA tenderness. Lungs - Percussion normal. Good diaphragmatic excursion. Lungs clear  Heart - Regular rate and rhythm, with no rub, murmur or gallop noted. Abdomen - Abdomen soft, non-tender. BS normal. No masses, organomegaly, obese  Extremities - Extremities normal. No deformities, edema, or skin discolora  Musculoskeletal - Spine ROM normal. Muscular strength intact. Peripheral pulses - radial=4/4, dorsalis pedis=4/4,  Neuro - Gait normal. Reflexes normal and symmetric.  Sensation grossly normal.  No focal weakness  Lab Results   Component Value Date    WBC 9.9 08/24/2020    HGB 14.2 08/24/2020    HCT 43.6 08/24/2020    MCV 88.3 08/24/2020     08/24/2020     Lab Results   Component Value Date    LABA1C 7.8 08/24/2020     Lab Results   Component Value Date    .8 05/16/2019     Lab Results   Component Value Date     08/24/2020    K 4.5 08/24/2020     08/24/2020    CO2 25 08/24/2020    BUN 24 (H) 08/24/2020    CREATININE 1.2 08/24/2020    GLUCOSE 181 (H) 08/24/2020    CALCIUM 10.3 08/24/2020    PROT 7.1 08/24/2020    LABALBU 4.6 08/24/2020    BILITOT 0.3 08/24/2020    ALKPHOS 51 08/24/2020    AST 25 08/24/2020    ALT 37 08/24/2020    LABGLOM >60 08/24/2020    GFRAA >60 08/24/2020    AGRATIO 1.8 08/24/2020    GLOB 2.5 08/24/2020       EKG - nl  ASSESSMENT:  Encounter Diagnoses   Name Primary?  Pre-op examination Yes    PVD (peripheral vascular disease) ([facility]     Essential hypertension     Dyslipidemia     Coronary artery disease involving native coronary artery of native heart without angina pectoris     Mixed hyperlipidemia     Obstructive sleep apnea    left ulnar neuropathy and mild recurrent cts  Per encounter diagnoses  He is medically cleared for surgery and anesthesia. If covid test  neg    Plan:  Avoid nsaids  Per operating surgeon. See also orders filed with this encounter, if any.

## 2020-10-22 ENCOUNTER — TELEPHONE (OUTPATIENT)
Dept: ORTHOPEDIC SURGERY | Age: 65
End: 2020-10-22

## 2020-10-22 ENCOUNTER — OFFICE VISIT (OUTPATIENT)
Dept: PRIMARY CARE CLINIC | Age: 65
End: 2020-10-22
Payer: COMMERCIAL

## 2020-10-22 PROCEDURE — 99211 OFF/OP EST MAY X REQ PHY/QHP: CPT | Performed by: NURSE PRACTITIONER

## 2020-10-22 NOTE — PATIENT INSTRUCTIONS

## 2020-10-22 NOTE — PROCEDURE: DEFER
Patient's first and last name, , procedure, and correct site confirmed prior to the start of procedure.
Detail Level: Zone
Introduction Text (Please End With A Colon): The following procedure was deferred:
Instructions (Optional): Left cheek r/o bcc shave biopsy
Scheduling Instructions (Optional): 20 minutes

## 2020-10-22 NOTE — PROGRESS NOTES
[facility] / [facility] [place] ([facility]) [address] [facility], [address]    Acknowledgment of Informed Consent for Surgical or Medical Procedure and Sedation  I agree to allow doctor(s) [facility] and his/her associates or assistants, including residents and/or other qualified medical practitioner to perform the following medical treatment or procedure and to administer or direct the administration of sedation as necessary:  Procedure(s): LEFT CARPAL TUNNEL RELEASE; LEFT INSITU CUBITAL TUNNEL RELEASE VERSUS ANTERIOR ULNAR NERVE TRANSPOSITION AT [address]  My doctor has explained the following regarding the proposed procedure:   the explanation of the procedure   the benefits of the procedure   the potential problems that might occur during recuperation   the risks and side effects of the procedure which could include but are not limited to severe blood loss, infection, stroke or death   the benefits, risks and side effect of alternative procedures including the consequences of declining this procedure or any alternative procedures   the likelihood of achieving satisfactory results. I acknowledge no guarantee or assurance has been made to me regarding the results. I understand that during the course of this treatment/procedure, unforeseen conditions can occur which require an additional or different procedure. I agree to allow my physician or assistants to perform such extension of the original procedure as they may find necessary. I understand that sedation will often result in temporary impairment of memory and fine motor skills and that sedation can occasionally progress to a state of deep sedation or general anesthesia. I understand the risks of anesthesia for surgery include, but are not limited to, sore throat, hoarseness, injury to face, mouth, or teeth; nausea; headache; injury to blood vessels or nerves; death, brain damage, or paralysis.     I understand that if I have a Limitation of Treatment order in effect during my hospitalization, the order may or may not be in effect during this procedure. I give my doctor permission to give me blood or blood products. I understand that there are risks with receiving blood such as hepatitis, AIDS, fever, or allergic reaction. I acknowledge that the risks, benefits, and alternatives of this treatment have been explained to me and that no express or implied warranty has been given by the hospital, any blood bank, or any person or entity as to the blood or blood components transfused. At the discretion of my doctor, I agree to allow observers, equipment/product representatives and allow photographing, and/or televising of the procedure, provided my name or identity is maintained confidentially. I agree the hospital may dispose of or use for scientific or educational purposes any tissue, fluid, or body parts which may be removed.     ________________________________Date________Time______ am/pm  ([facility])  Patient or Signature of Closest Relative or Legal Guardian    ________________________________Date________Time______am/pm      Page 1 of  1  Witness

## 2020-10-23 NOTE — PROGRESS NOTES

## 2020-10-24 LAB — SARS-COV-2, PCR: NOT DETECTED

## 2020-10-26 ENCOUNTER — ANESTHESIA EVENT (OUTPATIENT)
Dept: OPERATING ROOM | Age: 65
End: 2020-10-26
Payer: COMMERCIAL

## 2020-10-26 NOTE — RESULT ENCOUNTER NOTE

## 2020-10-27 ENCOUNTER — ANESTHESIA (OUTPATIENT)
Dept: OPERATING ROOM | Age: 65
End: 2020-10-27
Payer: COMMERCIAL

## 2020-10-27 ENCOUNTER — HOSPITAL ENCOUNTER (OUTPATIENT)
Age: 65
Setting detail: OUTPATIENT SURGERY
Discharge: HOME | End: 2020-10-27
Attending: ORTHOPAEDIC SURGERY | Admitting: ORTHOPAEDIC SURGERY
Payer: COMMERCIAL

## 2020-10-27 VITALS
SYSTOLIC BLOOD PRESSURE: 128 MMHG | TEMPERATURE: 97.7 F | OXYGEN SATURATION: 93 % | RESPIRATION RATE: 16 BRPM | HEART RATE: 78 BPM | HEIGHT: 72 IN | BODY MASS INDEX: 36.3 KG/M2 | DIASTOLIC BLOOD PRESSURE: 85 MMHG | WEIGHT: 268 LBS

## 2020-10-27 VITALS — SYSTOLIC BLOOD PRESSURE: 106 MMHG | TEMPERATURE: 97.9 F | OXYGEN SATURATION: 99 % | DIASTOLIC BLOOD PRESSURE: 66 MMHG

## 2020-10-27 LAB
GLUCOSE BLD-MCNC: 141 MG/DL (ref 70–99)
GLUCOSE BLD-MCNC: 172 MG/DL (ref 70–99)
PERFORMED ON: ABNORMAL
PERFORMED ON: ABNORMAL

## 2020-10-27 PROCEDURE — 3600000014 HC SURGERY LEVEL 4 ADDTL 15MIN: Performed by: ORTHOPAEDIC SURGERY

## 2020-10-27 PROCEDURE — 7100000010 HC PHASE II RECOVERY - FIRST 15 MIN: Performed by: ORTHOPAEDIC SURGERY

## 2020-10-27 PROCEDURE — 3700000001 HC ADD 15 MINUTES (ANESTHESIA): Performed by: ORTHOPAEDIC SURGERY

## 2020-10-27 PROCEDURE — 2500000003 HC RX 250 WO HCPCS: Performed by: NURSE ANESTHETIST, CERTIFIED REGISTERED

## 2020-10-27 PROCEDURE — 2709999900 HC NON-CHARGEABLE SUPPLY: Performed by: ORTHOPAEDIC SURGERY

## 2020-10-27 PROCEDURE — 2580000003 HC RX 258: Performed by: ANESTHESIOLOGY

## 2020-10-27 PROCEDURE — 3600000004 HC SURGERY LEVEL 4 BASE: Performed by: ORTHOPAEDIC SURGERY

## 2020-10-27 PROCEDURE — 7100000011 HC PHASE II RECOVERY - ADDTL 15 MIN: Performed by: ORTHOPAEDIC SURGERY

## 2020-10-27 PROCEDURE — 7100000000 HC PACU RECOVERY - FIRST 15 MIN: Performed by: ORTHOPAEDIC SURGERY

## 2020-10-27 PROCEDURE — 6370000000 HC RX 637 (ALT 250 FOR IP): Performed by: ORTHOPAEDIC SURGERY

## 2020-10-27 PROCEDURE — C1763 CONN TISS, NON-HUMAN: HCPCS | Performed by: ORTHOPAEDIC SURGERY

## 2020-10-27 PROCEDURE — 2500000003 HC RX 250 WO HCPCS: Performed by: ORTHOPAEDIC SURGERY

## 2020-10-27 PROCEDURE — 7100000001 HC PACU RECOVERY - ADDTL 15 MIN: Performed by: ORTHOPAEDIC SURGERY

## 2020-10-27 PROCEDURE — 3700000000 HC ANESTHESIA ATTENDED CARE: Performed by: ORTHOPAEDIC SURGERY

## 2020-10-27 PROCEDURE — 6370000000 HC RX 637 (ALT 250 FOR IP): Performed by: ANESTHESIOLOGY

## 2020-10-27 PROCEDURE — 2580000003 HC RX 258: Performed by: ORTHOPAEDIC SURGERY

## 2020-10-27 PROCEDURE — 6360000002 HC RX W HCPCS: Performed by: NURSE ANESTHETIST, CERTIFIED REGISTERED

## 2020-10-27 DEVICE — PROTECTOR NRV L40MM DIA7MM PORCINE EXTRACELLULAR MTRX WRP: Type: IMPLANTABLE DEVICE | Site: ELBOW | Status: FUNCTIONAL

## 2020-10-27 RX ORDER — CARVEDILOL 6.25 MG/1
6.25 TABLET ORAL ONCE
Status: COMPLETED | OUTPATIENT
Start: 2020-10-27 | End: 2020-10-27

## 2020-10-27 RX ORDER — SODIUM CHLORIDE 0.9 % (FLUSH) 0.9 %
10 SYRINGE (ML) INJECTION EVERY 12 HOURS SCHEDULED
Status: DISCONTINUED | OUTPATIENT
Start: 2020-10-27 | End: 2020-10-27 | Stop reason: HOSPADM

## 2020-10-27 RX ORDER — EPHEDRINE SULFATE 50 MG/ML
INJECTION INTRAVENOUS PRN
Status: DISCONTINUED | OUTPATIENT
Start: 2020-10-27 | End: 2020-10-27 | Stop reason: SDUPTHER

## 2020-10-27 RX ORDER — PHENYLEPHRINE HYDROCHLORIDE 10 MG/ML
INJECTION INTRAVENOUS PRN
Status: DISCONTINUED | OUTPATIENT
Start: 2020-10-27 | End: 2020-10-27 | Stop reason: SDUPTHER

## 2020-10-27 RX ORDER — ONDANSETRON 2 MG/ML
4 INJECTION INTRAMUSCULAR; INTRAVENOUS
Status: DISCONTINUED | OUTPATIENT
Start: 2020-10-27 | End: 2020-10-27 | Stop reason: HOSPADM

## 2020-10-27 RX ORDER — LIDOCAINE HYDROCHLORIDE 20 MG/ML
INJECTION, SOLUTION INTRAVENOUS PRN
Status: DISCONTINUED | OUTPATIENT
Start: 2020-10-27 | End: 2020-10-27 | Stop reason: SDUPTHER

## 2020-10-27 RX ORDER — SODIUM CHLORIDE 0.9 % (FLUSH) 0.9 %
10 SYRINGE (ML) INJECTION PRN
Status: DISCONTINUED | OUTPATIENT
Start: 2020-10-27 | End: 2020-10-27 | Stop reason: HOSPADM

## 2020-10-27 RX ORDER — LIDOCAINE HYDROCHLORIDE 10 MG/ML
1 INJECTION, SOLUTION EPIDURAL; INFILTRATION; INTRACAUDAL; PERINEURAL
Status: DISCONTINUED | OUTPATIENT
Start: 2020-10-27 | End: 2020-10-27 | Stop reason: HOSPADM

## 2020-10-27 RX ORDER — ONDANSETRON 2 MG/ML
INJECTION INTRAMUSCULAR; INTRAVENOUS PRN
Status: DISCONTINUED | OUTPATIENT
Start: 2020-10-27 | End: 2020-10-27 | Stop reason: SDUPTHER

## 2020-10-27 RX ORDER — SODIUM CHLORIDE, SODIUM LACTATE, POTASSIUM CHLORIDE, CALCIUM CHLORIDE 600; 310; 30; 20 MG/100ML; MG/100ML; MG/100ML; MG/100ML
INJECTION, SOLUTION INTRAVENOUS CONTINUOUS
Status: DISCONTINUED | OUTPATIENT
Start: 2020-10-27 | End: 2020-10-27 | Stop reason: HOSPADM

## 2020-10-27 RX ORDER — FENTANYL CITRATE 50 UG/ML
50 INJECTION, SOLUTION INTRAMUSCULAR; INTRAVENOUS EVERY 5 MIN PRN
Status: DISCONTINUED | OUTPATIENT
Start: 2020-10-27 | End: 2020-10-27 | Stop reason: HOSPADM

## 2020-10-27 RX ORDER — TRAMADOL HYDROCHLORIDE 50 MG/1
50 TABLET ORAL EVERY 6 HOURS PRN
Qty: 16 TABLET | Refills: 0 | Status: SHIPPED | OUTPATIENT
Start: 2020-10-27 | End: 2020-11-01

## 2020-10-27 RX ORDER — MIDAZOLAM HYDROCHLORIDE 1 MG/ML
INJECTION INTRAMUSCULAR; INTRAVENOUS PRN
Status: DISCONTINUED | OUTPATIENT
Start: 2020-10-27 | End: 2020-10-27 | Stop reason: SDUPTHER

## 2020-10-27 RX ORDER — MAGNESIUM HYDROXIDE 1200 MG/15ML
LIQUID ORAL CONTINUOUS PRN
Status: COMPLETED | OUTPATIENT
Start: 2020-10-27 | End: 2020-10-27

## 2020-10-27 RX ORDER — GLYCOPYRROLATE 0.2 MG/ML
INJECTION INTRAMUSCULAR; INTRAVENOUS PRN
Status: DISCONTINUED | OUTPATIENT
Start: 2020-10-27 | End: 2020-10-27 | Stop reason: SDUPTHER

## 2020-10-27 RX ORDER — PROPOFOL 10 MG/ML
INJECTION, EMULSION INTRAVENOUS PRN
Status: DISCONTINUED | OUTPATIENT
Start: 2020-10-27 | End: 2020-10-27 | Stop reason: SDUPTHER

## 2020-10-27 RX ORDER — LABETALOL 20 MG/4 ML (5 MG/ML) INTRAVENOUS SYRINGE
5 EVERY 10 MIN PRN
Status: DISCONTINUED | OUTPATIENT
Start: 2020-10-27 | End: 2020-10-27 | Stop reason: HOSPADM

## 2020-10-27 RX ORDER — FENTANYL CITRATE 50 UG/ML
25 INJECTION, SOLUTION INTRAMUSCULAR; INTRAVENOUS EVERY 5 MIN PRN
Status: DISCONTINUED | OUTPATIENT
Start: 2020-10-27 | End: 2020-10-27 | Stop reason: HOSPADM

## 2020-10-27 RX ORDER — ACETAMINOPHEN 325 MG/1
650 TABLET ORAL EVERY 6 HOURS PRN
COMMUNITY
End: 2021-06-25

## 2020-10-27 RX ORDER — HYDRALAZINE HYDROCHLORIDE 20 MG/ML
5 INJECTION INTRAMUSCULAR; INTRAVENOUS EVERY 10 MIN PRN
Status: DISCONTINUED | OUTPATIENT
Start: 2020-10-27 | End: 2020-10-27 | Stop reason: HOSPADM

## 2020-10-27 RX ORDER — OXYCODONE HYDROCHLORIDE AND ACETAMINOPHEN 5; 325 MG/1; MG/1
1 TABLET ORAL
Status: COMPLETED | OUTPATIENT
Start: 2020-10-27 | End: 2020-10-27

## 2020-10-27 RX ORDER — GINSENG 100 MG
CAPSULE ORAL PRN
Status: DISCONTINUED | OUTPATIENT
Start: 2020-10-27 | End: 2020-10-27 | Stop reason: ALTCHOICE

## 2020-10-27 RX ORDER — PROMETHAZINE HYDROCHLORIDE 25 MG/ML
6.25 INJECTION, SOLUTION INTRAMUSCULAR; INTRAVENOUS
Status: DISCONTINUED | OUTPATIENT
Start: 2020-10-27 | End: 2020-10-27 | Stop reason: HOSPADM

## 2020-10-27 RX ORDER — FENTANYL CITRATE 50 UG/ML
INJECTION, SOLUTION INTRAMUSCULAR; INTRAVENOUS PRN
Status: DISCONTINUED | OUTPATIENT
Start: 2020-10-27 | End: 2020-10-27 | Stop reason: SDUPTHER

## 2020-10-27 RX ADMIN — MIDAZOLAM HYDROCHLORIDE 1 MG: 2 INJECTION, SOLUTION INTRAMUSCULAR; INTRAVENOUS at 13:16

## 2020-10-27 RX ADMIN — LIDOCAINE HYDROCHLORIDE 80 MG: 20 INJECTION, SOLUTION INTRAVENOUS at 13:16

## 2020-10-27 RX ADMIN — FENTANYL CITRATE 25 MCG: 50 INJECTION INTRAMUSCULAR; INTRAVENOUS at 14:18

## 2020-10-27 RX ADMIN — EPHEDRINE SULFATE 10 MG: 50 INJECTION INTRAVENOUS at 14:31

## 2020-10-27 RX ADMIN — ONDANSETRON 4 MG: 2 INJECTION INTRAMUSCULAR; INTRAVENOUS at 13:16

## 2020-10-27 RX ADMIN — PHENYLEPHRINE HYDROCHLORIDE 80 MCG: 10 INJECTION INTRAVENOUS at 13:25

## 2020-10-27 RX ADMIN — MIDAZOLAM HYDROCHLORIDE 1 MG: 2 INJECTION, SOLUTION INTRAMUSCULAR; INTRAVENOUS at 13:07

## 2020-10-27 RX ADMIN — GLYCOPYRROLATE 0.2 MG: 0.2 INJECTION INTRAMUSCULAR; INTRAVENOUS at 13:22

## 2020-10-27 RX ADMIN — OXYCODONE HYDROCHLORIDE AND ACETAMINOPHEN 1 TABLET: 5; 325 TABLET ORAL at 16:26

## 2020-10-27 RX ADMIN — CARVEDILOL 6.25 MG: 6.25 TABLET, FILM COATED ORAL at 11:32

## 2020-10-27 RX ADMIN — SODIUM CHLORIDE, SODIUM LACTATE, POTASSIUM CHLORIDE, AND CALCIUM CHLORIDE: 600; 310; 30; 20 INJECTION, SOLUTION INTRAVENOUS at 14:41

## 2020-10-27 RX ADMIN — EPHEDRINE SULFATE 10 MG: 50 INJECTION INTRAVENOUS at 13:24

## 2020-10-27 RX ADMIN — EPHEDRINE SULFATE 5 MG: 50 INJECTION INTRAVENOUS at 13:25

## 2020-10-27 RX ADMIN — PHENYLEPHRINE HYDROCHLORIDE 80 MCG: 10 INJECTION INTRAVENOUS at 13:34

## 2020-10-27 RX ADMIN — FENTANYL CITRATE 100 MCG: 50 INJECTION INTRAMUSCULAR; INTRAVENOUS at 13:16

## 2020-10-27 RX ADMIN — PHENYLEPHRINE HYDROCHLORIDE 80 MCG: 10 INJECTION INTRAVENOUS at 13:21

## 2020-10-27 RX ADMIN — FENTANYL CITRATE 50 MCG: 50 INJECTION INTRAMUSCULAR; INTRAVENOUS at 13:52

## 2020-10-27 RX ADMIN — PROPOFOL 250 MG: 10 INJECTION, EMULSION INTRAVENOUS at 13:16

## 2020-10-27 RX ADMIN — FENTANYL CITRATE 25 MCG: 50 INJECTION INTRAMUSCULAR; INTRAVENOUS at 15:03

## 2020-10-27 RX ADMIN — SODIUM CHLORIDE, SODIUM LACTATE, POTASSIUM CHLORIDE, AND CALCIUM CHLORIDE: 600; 310; 30; 20 INJECTION, SOLUTION INTRAVENOUS at 11:20

## 2020-10-27 RX ADMIN — PHENYLEPHRINE HYDROCHLORIDE 80 MCG: 10 INJECTION INTRAVENOUS at 13:23

## 2020-10-27 RX ADMIN — EPHEDRINE SULFATE 15 MG: 50 INJECTION INTRAVENOUS at 14:36

## 2020-10-27 RX ADMIN — EPHEDRINE SULFATE 10 MG: 50 INJECTION INTRAVENOUS at 13:34

## 2020-10-27 ASSESSMENT — PULMONARY FUNCTION TESTS
PIF_VALUE: 9
PIF_VALUE: 6
PIF_VALUE: 9
PIF_VALUE: 8
PIF_VALUE: 15
PIF_VALUE: 9
PIF_VALUE: 9
PIF_VALUE: 10
PIF_VALUE: 9
PIF_VALUE: 6
PIF_VALUE: 9
PIF_VALUE: 9
PIF_VALUE: 8
PIF_VALUE: 8
PIF_VALUE: 9
PIF_VALUE: 14
PIF_VALUE: 9
PIF_VALUE: 10
PIF_VALUE: 14
PIF_VALUE: 8
PIF_VALUE: 10
PIF_VALUE: 9
PIF_VALUE: 8
PIF_VALUE: 15
PIF_VALUE: 17
PIF_VALUE: 13
PIF_VALUE: 8
PIF_VALUE: 19
PIF_VALUE: 9
PIF_VALUE: 0
PIF_VALUE: 9
PIF_VALUE: 10
PIF_VALUE: 9
PIF_VALUE: 17
PIF_VALUE: 9
PIF_VALUE: 8
PIF_VALUE: 18
PIF_VALUE: 17
PIF_VALUE: 11
PIF_VALUE: 8
PIF_VALUE: 9
PIF_VALUE: 8
PIF_VALUE: 8
PIF_VALUE: 13
PIF_VALUE: 12
PIF_VALUE: 3
PIF_VALUE: 9
PIF_VALUE: 9
PIF_VALUE: 17
PIF_VALUE: 17
PIF_VALUE: 9
PIF_VALUE: 11
PIF_VALUE: 18
PIF_VALUE: 14
PIF_VALUE: 10
PIF_VALUE: 9
PIF_VALUE: 15
PIF_VALUE: 3
PIF_VALUE: 1
PIF_VALUE: 5
PIF_VALUE: 9
PIF_VALUE: 9
PIF_VALUE: 8
PIF_VALUE: 15
PIF_VALUE: 10
PIF_VALUE: 40
PIF_VALUE: 19
PIF_VALUE: 15
PIF_VALUE: 9
PIF_VALUE: 1
PIF_VALUE: 9
PIF_VALUE: 21
PIF_VALUE: 3
PIF_VALUE: 8
PIF_VALUE: 7
PIF_VALUE: 22
PIF_VALUE: 17
PIF_VALUE: 5
PIF_VALUE: 18
PIF_VALUE: 17
PIF_VALUE: 17
PIF_VALUE: 9
PIF_VALUE: 14
PIF_VALUE: 9
PIF_VALUE: 7
PIF_VALUE: 9
PIF_VALUE: 12
PIF_VALUE: 17
PIF_VALUE: 17
PIF_VALUE: 0
PIF_VALUE: 5
PIF_VALUE: 8
PIF_VALUE: 8
PIF_VALUE: 10
PIF_VALUE: 9
PIF_VALUE: 8
PIF_VALUE: 9
PIF_VALUE: 9
PIF_VALUE: 7
PIF_VALUE: 14
PIF_VALUE: 9
PIF_VALUE: 8
PIF_VALUE: 2
PIF_VALUE: 17
PIF_VALUE: 10

## 2020-10-27 ASSESSMENT — PAIN - FUNCTIONAL ASSESSMENT
PAIN_FUNCTIONAL_ASSESSMENT: ACTIVITIES ARE NOT PREVENTED
PAIN_FUNCTIONAL_ASSESSMENT: 0-10

## 2020-10-27 ASSESSMENT — PAIN DESCRIPTION - PROGRESSION: CLINICAL_PROGRESSION: GRADUALLY WORSENING

## 2020-10-27 ASSESSMENT — PAIN SCALES - GENERAL
PAINLEVEL_OUTOF10: 2
PAINLEVEL_OUTOF10: 0
PAINLEVEL_OUTOF10: 3

## 2020-10-27 ASSESSMENT — PAIN DESCRIPTION - DESCRIPTORS: DESCRIPTORS: SHARP

## 2020-10-27 ASSESSMENT — PAIN DESCRIPTION - PAIN TYPE: TYPE: ACUTE PAIN

## 2020-10-27 ASSESSMENT — PAIN DESCRIPTION - FREQUENCY: FREQUENCY: CONTINUOUS

## 2020-10-27 ASSESSMENT — PAIN DESCRIPTION - LOCATION: LOCATION: WRIST

## 2020-10-27 ASSESSMENT — PAIN DESCRIPTION - ONSET: ONSET: ON-GOING

## 2020-10-27 ASSESSMENT — PAIN DESCRIPTION - ORIENTATION: ORIENTATION: LEFT

## 2020-10-27 NOTE — H&P
[address] [facility] Same Day Surgery Update H & P  Department of General Surgery   Surgical Service   Pre-operative History and Physical  Last H & P within the last 30 days. DIAGNOSIS:   Carpal tunnel syndrome on left [G56.02]  Cubital tunnel syndrome, left [G56.22]    Procedure(s):  LEFT CARPAL TUNNEL RELEASE; LEFT INSITU CUBITAL TUNNEL RELEASE VERSUS ANTERIOR ULNAR NERVE TRANSPOSITION AT THE ELBOW  . HISTORY OF PRESENT ILLNESS:   Patient with left hand pain, numbness and tingling in the setting of EMG demonstrated ulnar and median nerve compression. The symptoms have been recalcitrant to conservative treatment and the patient presents today for the above procedure. Covid 19:  Patient denies fever, chills, cough or known exposure to Covid-19.       Past Medical History:        Diagnosis Date    CAD (coronary artery disease)     Disc disorder     ED (erectile dysfunction)     Hyperlipidemia     Hyperlipidemia     Hypertension     MI (myocardial infarction) ([address]     Obstructive sleep apnea     Other disorders of kidney and ureter in diseases classified elsewhere     Pneumonia 1970s    Polyp of colon     Renal calculi     Type 2 diabetes mellitus without complication, without long-term current use of insulin ([address] 5/16/2019    Type II or unspecified type diabetes mellitus without mention of complication, not stated as uncontrolled      Past Surgical History:        Procedure Laterality Date    ABDOMEN SURGERY      Exploratory Laparotomy    CARPAL TUNNEL RELEASE      right and left    COLONOSCOPY  12/2/13    COLONOSCOPY  12/2/13    polyps X 2 removed    CORONARY ANGIOPLASTY WITH STENT PLACEMENT      CYSTOSCOPY Left 12/12/14    CYSTOSCOPY, LEFT RETROGRADE PYELOGRAM, PLACEMENT OF LEFT    FRACTURE SURGERY      Ankle & Wrist    TONSILLECTOMY AND ADENOIDECTOMY       Past Social History:  Social History     Socioeconomic History    Marital status:  Spouse name: Not on file    Number of children: Not on file    Years of education: Not on file    Highest education level: Not on file   Occupational History    Not on file   Social Needs    Financial resource strain: Not on file    Food insecurity     Worry: Not on file     Inability: Not on file    Transportation needs     Medical: Not on file     Non-medical: Not on file   Tobacco Use    Smoking status: Former Smoker     Packs/day: 0.25     Years: 30.00     Pack years: 7.50     Types: Cigarettes, Cigars     Last attempt to quit: 2011     Years since quittin.5    Smokeless tobacco: Never Used    Tobacco comment: cigars every once in a while    Substance and Sexual Activity    Alcohol use: Yes     Alcohol/week: 1.0 standard drinks     Types: 1 Cans of beer per week     Comment: social    Drug use: No    Sexual activity: Yes     Partners: Female   Lifestyle    Physical activity     Days per week: Not on file     Minutes per session: Not on file    Stress: Not on file   Relationships    Social connections     Talks on phone: Not on file     Gets together: Not on file     Attends [employer] service: Not on file     Active member of club or organization: Not on file     Attends meetings of clubs or organizations: Not on file     Relationship status: Not on file    Intimate partner violence     Fear of current or ex partner: Not on file     Emotionally abused: Not on file     Physically abused: Not on file     Forced sexual activity: Not on file   Other Topics Concern    Not on file   Social History Narrative    Not on file         Medications Prior to Admission:      Prior to Admission medications    Medication Sig Start Date End Date Taking?  Authorizing Provider   amLODIPine (NORVASC) 10 MG tablet TAKE 1 TABLET EVERY DAY 20   Ariana Vigil MD   canagliflozin (INVOKANA) 300 MG TABS tablet TAKE 1 TABLET EVERY MORNING BEFORE BREAKFAST 20   Ariana Vigil MD   metFORMIN (GLUCOPHAGE) 1000 MG tablet TAKE 1 TABLET TWICE DAILY WITH MEALS 6/4/20   Sangeeta Huddleston MD   lovastatin (MEVACOR) 40 MG tablet TAKE 1 TABLET EVERY NIGHT 6/4/20   Sangeeta Huddleston MD   hydroCHLOROthiazide (HYDRODIURIL) 25 MG tablet TAKE 1 TABLET EVERY DAY 6/4/20   Sangeeta Huddleston MD   glimepiride (AMARYL) 4 MG tablet TAKE 1 TABLET TWICE DAILY 4/27/20   Sangeeta Huddleston MD   carvedilol (COREG) 6.25 MG tablet TAKE 1 TABLET TWICE DAILY WITH MEALS 2/12/20   Sangeeta Huddleston MD   gabapentin (NEURONTIN) 100 MG capsule TAKE 1 CAPSULE TWICE DAILY 9/24/19 12/23/19  Sangeeta Huddleston MD   diphenhydrAMINE (BENADRYL) 25 MG tablet Take 25 mg by mouth nightly as needed for Sleep     Historical Provider, MD   Multiple Vitamins-Minerals (MULTIVITAMIN PO) Take by mouth    Historical Provider, MD   aspirin 81 MG tablet Take 81 mg by mouth daily    Historical Provider, MD         Allergies:  Fexofenadine-pseudoephed er; Lisinopril; Morphine; Zithromax [azithromycin]; and Losartan    PHYSICAL EXAM:      BP (!) 143/91   Pulse 71   Temp 97.3 °F (36.3 °C) (Oral)   Resp 20   Ht 6' (1.829 m)   Wt 268 lb (121.6 kg)   SpO2 96%   BMI 36.35 kg/m²      Airway:  Airway patent with no audible stridor    Heart:  Regular rate and rhythm, No murmur noted    Lungs:  No increased work of breathing, good air exchange, clear to auscultation bilaterally, no crackles or wheezing    Abdomen:  Soft, non-distended, non-tender, normal active bowel sounds, no masses palpated    ASSESSMENT AND PLAN    Patient is a 63 y.o. male with above specified procedure planned. 1.  Patient seen and focused exam done today- no new changes since last physical exam on 10/21/20     2. Access to ancillary services are available per request of the provider.     KWAKU Miller - CNP     10/27/2020

## 2020-10-27 NOTE — OP NOTE
Alda Acosta (1955)    Date of Surgery- 10/27/2020    Pre-Op Diagnoses:  1.   left carpal tunnel syndrome, recurrent  2. left cubital tunnel syndrome         Post-op Diagnoses:   1. Same  2. Procedure(s) Performed:  1.  left carpal tunnel release, open with neurolysis and protective nerve wrap application   2. left cubital tunnel release, in-situ          Surgeon: Talita Pendleton MD    Anesthesia Type:   General, Local    Estimated Blood Loss:   10ml  Pathology:   None    Complications:   None immediate apparent    Assistant:  [facility]    The patient has history of left carpal tunnel syndrome and left cubital tunnel syndrome based on clinical evaluation and electrodiagnostic study. He has had prior left carpal tunnel release performed approximately 20 years ago with a component of recurrent symptoms. The risks and benefits were discussed thoroughly involving cubital tunnel release and open carpal tunnel release. These included, but were not limited to infection, tendon or nerve injury, scar or thenar sensitivity, need for transfusion, adverse effects of anesthesia, incomplete relief of numbness, pain, and/or weakness. The left palm and elbow were marked in preop holding by Dr Nima Szymanski after discussing the surgical procedures once again with the patient. All questions and concerns were addressed. Informed consent was on the chart. The patient was brought to the operating and room and placed in the supine position. After the induction of anesthesia a standard time-out was performed. Description of the Procedure: We verified that antibiotics had been given. The left upper extremity was prepped and draped in normal sterile fashion. Final timeout was held. The upper extremity was exsanguinated and the tourniquet was inflated to 250 mmHg.   An extensile curvilinear incision was made in the mid palm extending obliquely across the wrist crease into the left wrist.  This was carried down through the subcutaneous tissues and palmar fascia to the transverse carpal ligament. The distal one half of the transverse carpal ligament was incised longitudinally under direct vision using a #15 blade. Careful dissection was performed and the median nerve was identified. The dissection was carried more proximally through the antebrachial fascia as well until the median nerve was identified and well visualized proximally. The proximal portion of the transverse carpal ligament was also released at this point. .  At this point the contents of the carpal tunnel were visualized with no evidence of mass or bony prominence. There was evidence of compression of the median nerve and scar tissue formation surrounding the nerve at the  Carpal tunnel. This was gently dissected using loupe magnification and microinstruments while protecting the nerve fibers. No other abnormalities were noted. The nerve was nicely decompressed at this time. .  The wound was copiously irrigated. At this point with the nerve decompressed an Axogen nerve protective wrap was applied around the nerve as this was a recurrent carpal tunnel that had occurred. A size 7 x 40mm wrap was selected and prepared on the back table per manufacuture instructions. This was then cut to fit nicely around the nerve at the area of most previous compression and sutured in place with several 8.0 interrupted nylon sutures with no excess tension or compression around the nerve. After tourniquet was deflated, the wound was irrigated with normal saline irrigation and then closed with skin 4.0 vicryl and 4.0 nylon after hemostasis achieved. We then turned our attention to the left cubital tunnel release. A separate 5-6 cm curvilinear incision was made just behind the medial epicondyle of the left elbow, through skin only. Meticulous hemostasis. Transversing sensory branches to the medial elbow and medial forearm were identified and protected.

## 2020-10-27 NOTE — H&P
I have reviewed the history and physical and examined the patient and find no relevant changes. I have reviewed with the patient and/or family the risks, benefits, and alternatives to the procedure.     Juan Matute MD  10/27/2020

## 2020-10-27 NOTE — ANESTHESIA POSTPROCEDURE EVALUATION
Department of Anesthesiology  Postprocedure Note    Patient: Dawson Ramos  MRN: [id number]  YOB: 1955  Date of evaluation: 10/27/2020  Time:  5:46 PM     Procedure Summary     Date:  10/27/20 Room / Location:  [facility] OR [facility] / [facility]    Anesthesia Start:  7656 Anesthesia Stop:  6882    Procedures:       LEFT CARPAL TUNNEL RELEASE; LEFT INSITU CUBITAL TUNNEL RELEASE (Left Hand)      . (Left Elbow) Diagnosis:       Carpal tunnel syndrome on left      Cubital tunnel syndrome, left      (G56.02 LEFT CARPAL TUNNEL SYNDROME; G56.22 LEFT CUBITAL TUNNEL SYNDROME)    Surgeon:  Selena Urena MD Responsible Provider:  Jaden Caldwell DO    Anesthesia Type:  general ASA Status:  3          Anesthesia Type: general    Cuong Phase I: Cuong Score: 10    Cuong Phase II: Cuong Score: 10    Last vitals: Reviewed and per EMR flowsheets.        Anesthesia Post Evaluation    Patient location during evaluation: PACU  Patient participation: complete - patient participated  Level of consciousness: awake and alert  Airway patency: patent  Nausea & Vomiting: no nausea and no vomiting  Cardiovascular status: blood pressure returned to baseline  Respiratory status: acceptable  Hydration status: euvolemic

## 2020-10-27 NOTE — BRIEF OP NOTE
Brief Postoperative Note      Patient: Bradley Bhandari  YOB: 1955  MRN: [id number]    Date of Procedure: 10/27/2020    Pre-Op Diagnosis: G56.02 LEFT CARPAL TUNNEL SYNDROME; G56.22 LEFT CUBITAL TUNNEL SYNDROME    Post-Op Diagnosis: Same       Procedure(s):  LEFT CARPAL TUNNEL RELEASE; LEFT INSITU CUBITAL TUNNEL RELEASE  .     Surgeon(s):  Harry Weston MD    Assistant:  Surgical Assistant: Rich Soto RN    Anesthesia: General, local    Estimated Blood Loss (mL): 48ZR    Complications: None immediate apparent    Specimens:   none  Implants:  none    Drains: none    Findings: see fully dictated operative report    Electronically signed by Judy Lui MD on 10/27/2020 at 3:13 PM

## 2020-10-27 NOTE — PROGRESS NOTES
Dressing to LUE CDI. Sling to L arm. Pt c/o sharp pain to L arm. Percocet given. Able to eat and drink. Denies N/V. Will continue to monitor comfort.

## 2020-10-27 NOTE — ANESTHESIA PRE PROCEDURE
Department of Anesthesiology  Preprocedure Note       Name:  Alia Humphrey   Age:  63 y.o.  :  1955                                          MRN:  [id number]         Date:  10/27/2020      Surgeon: Lesia Traylor):  Timi Lindsay MD    Procedure: Procedure(s):  LEFT CARPAL TUNNEL RELEASE; LEFT INSITU CUBITAL TUNNEL RELEASE VERSUS ANTERIOR ULNAR NERVE TRANSPOSITION AT THE ELBOW  . Medications prior to admission:   Prior to Admission medications    Medication Sig Start Date End Date Taking?  Authorizing Provider   acetaminophen (TYLENOL) 325 MG tablet Take 650 mg by mouth every 6 hours as needed for Pain   Yes Historical Provider, MD   amLODIPine (NORVASC) 10 MG tablet TAKE 1 TABLET EVERY DAY 20  Yes Nancie Hernandez MD   canagliflozin (INVOKANA) 300 MG TABS tablet TAKE 1 TABLET EVERY MORNING BEFORE BREAKFAST 20  Yes Nancie Hernandez MD   lovastatin (MEVACOR) 40 MG tablet TAKE 1 TABLET EVERY NIGHT 20  Yes Nancie Hernandez MD   hydroCHLOROthiazide (HYDRODIURIL) 25 MG tablet TAKE 1 TABLET EVERY DAY 20  Yes Nancie Hernandez MD   glimepiride (AMARYL) 4 MG tablet TAKE 1 TABLET TWICE DAILY 20  Yes Nancie Hernandez MD   carvedilol (COREG) 6.25 MG tablet TAKE 1 TABLET TWICE DAILY WITH MEALS 20  Yes Nancie Hernandez MD   diphenhydrAMINE (BENADRYL) 25 MG tablet Take 25 mg by mouth nightly as needed for Sleep    Yes Historical Provider, MD   Multiple Vitamins-Minerals (MULTIVITAMIN PO) Take by mouth   Yes Historical Provider, MD   metFORMIN (GLUCOPHAGE) 1000 MG tablet TAKE 1 TABLET TWICE DAILY WITH MEALS 20   Nancie Hernandez MD   gabapentin (NEURONTIN) 100 MG capsule TAKE 1 CAPSULE TWICE DAILY 19  Nancie Hernandez MD   aspirin 81 MG tablet Take 81 mg by mouth daily    Historical Provider, MD       Current medications:    Current Facility-Administered Medications   Medication Dose Route Frequency Provider Last Rate Last Dose    ceFAZolin (ANCEF) 3 g in dextrose 5 % 100 mL IVPB  3 g Intravenous Once Sridhar Junior MD        lactated ringers infusion   Intravenous Continuous Myla Delvalle  mL/hr at 10/27/20 1120      sodium chloride flush 0.9 % injection 10 mL  10 mL Intravenous 2 times per day Myla Delvalle MD        sodium chloride flush 0.9 % injection 10 mL  10 mL Intravenous PRN Myla Delvalle MD        lidocaine PF 1 % injection 1 mL  1 mL Intradermal Once PRN Myla Delvalle MD        carvedilol (COREG) tablet 6.25 mg  6.25 mg Oral Once Myla Delvalle MD           Allergies:     Allergies   Allergen Reactions    Fexofenadine-Pseudoephed Er Other (See Comments)     cough    Lisinopril     Morphine Hives    Zithromax [Azithromycin]      Heart palpitations     Losartan      Dry c ough       Problem List:    Patient Active Problem List   Diagnosis Code    Hypertension I10    Disorder of intervertebral disc M51.9    Polyp of colon K63.5    CAD (coronary artery disease) I25.10    Dyslipidemia E78.5    PVD (peripheral vascular disease) ([facility]) I73.9    Cough due to ACE inhibitor R05, T46.4X5A    MI (myocardial infarction) ([facility]) I21.9    ED (erectile dysfunction) N52.9    Ureteral calculi W74.1    Renal colic on left side H97    Hydronephrosis N13.30    Microscopic hematuria R31.29    Chest pain R07.9    Obstructive sleep apnea G47.33    Hyperlipidemia E78.5       Past Medical History:        Diagnosis Date    CAD (coronary artery disease)     Disc disorder     ED (erectile dysfunction)     Hyperlipidemia     Hyperlipidemia     Hypertension     [facility] infarction) [address]     Obstructive sleep apnea     Other disorders of kidney and ureter in diseases classified elsewhere     Pneumonia 1970s    Polyp of colon     Renal calculi     Type 2 diabetes mellitus without complication, without long-term current use of insulin ([facility] 5/16/2019    Type II or unspecified type diabetes mellitus without mention of complication, not stated as uncontrolled        Past Surgical History:        Procedure Laterality Date    ABDOMEN SURGERY      Exploratory Laparotomy    CARPAL TUNNEL RELEASE      right and left    COLONOSCOPY  13    COLONOSCOPY  13    polyps X 2 removed    CORONARY ANGIOPLASTY WITH STENT PLACEMENT      CYSTOSCOPY Left 14    CYSTOSCOPY, LEFT RETROGRADE PYELOGRAM, PLACEMENT OF LEFT    FRACTURE SURGERY      Ankle & Wrist    TONSILLECTOMY AND ADENOIDECTOMY         Social History:    Social History     Tobacco Use    Smoking status: Former Smoker     Packs/day: 0.25     Years: 30.00     Pack years: 7.50     Types: Cigarettes, Cigars     Last attempt to quit: 2011     Years since quittin.5    Smokeless tobacco: Never Used    Tobacco comment: cigars every once in a while    Substance Use Topics    Alcohol use: Yes     Alcohol/week: 1.0 standard drinks     Types: 1 Cans of beer per week     Comment: social                                Counseling given: Not Answered  Comment: cigars every once in a while       Vital Signs (Current):   Vitals:    10/23/20 1341 10/27/20 1054   BP:  (!) 143/91   Pulse:  71   Resp:  20   Temp:  97.3 °F (36.3 °C)   TempSrc:  Oral   SpO2:  96%   Weight: 275 lb (124.7 kg) 268 lb (121.6 kg)   Height: 6' (1.829 m) 6' (1.829 m)                                              BP Readings from Last 3 Encounters:   10/27/20 (!) 143/91   10/21/20 134/76   20 138/88       NPO Status: Time of last liquid consumption:                         Time of last solid consumption:                         Date of last liquid consumption: 10/26/20                        Date of last solid food consumption: 10/26/20    BMI:   Wt Readings from Last 3 Encounters:   10/27/20 268 lb (121.6 kg)   10/21/20 275 lb (124.7 kg)   20 274 lb (124.3 kg)     Body mass index is 36.35 kg/m².     CBC:   Lab Results   Component Value Date    WBC 9.9 2020    RBC 4.93 2020    HGB 14.2 08/24/2020    HCT 43.6 08/24/2020    MCV 88.3 08/24/2020    RDW 13.9 08/24/2020     08/24/2020       CMP:   Lab Results   Component Value Date     08/24/2020    K 4.5 08/24/2020     08/24/2020    CO2 25 08/24/2020    BUN 24 08/24/2020    CREATININE 1.2 08/24/2020    GFRAA >60 08/24/2020    GFRAA >60 07/15/2011    AGRATIO 1.8 08/24/2020    LABGLOM >60 08/24/2020    LABGLOM 61 07/14/2018    GLUCOSE 181 08/24/2020    PROT 7.1 08/24/2020    PROT 6.8 03/04/2013    PROT 6.8 03/04/2013    CALCIUM 10.3 08/24/2020    BILITOT 0.3 08/24/2020    ALKPHOS 51 08/24/2020    AST 25 08/24/2020    ALT 37 08/24/2020       POC Tests:   Recent Labs     10/27/20  1114   POCGLU 172*       Coags:   Lab Results   Component Value Date    PROTIME 10.4 01/02/2017    INR 0.91 01/02/2017    APTT 26.4 04/09/2011       HCG (If Applicable): No results found for: PREGTESTUR, PREGSERUM, HCG, HCGQUANT     ABGs: No results found for: PHART, PO2ART, UOW4XYW, EMS9ALS, BEART, I8HBBQQB     Type & Screen (If Applicable):  No results found for: LABABO, LABRH    Drug/Infectious Status (If Applicable):  Lab Results   Component Value Date    HEPCAB NON-REACTIVE 07/14/2018       COVID-19 Screening (If Applicable):   Lab Results   Component Value Date    COVID19 Not Detected 10/22/2020         Anesthesia Evaluation  Patient summary reviewed and Nursing notes reviewed no history of anesthetic complications:   Airway: Mallampati: II  TM distance: >3 FB   Neck ROM: full  Mouth opening: > = 3 FB Dental:          Pulmonary:   (+) pneumonia:  sleep apnea: on noncompliant,                             Cardiovascular:    (+) hypertension:, past MI:, CAD:, hyperlipidemia                  Neuro/Psych:   Negative Neuro/Psych ROS              GI/Hepatic/Renal:             Endo/Other:    (+) DiabetesType II DM, , .                 Abdominal:           Vascular: negative vascular ROS.                                        Anesthesia Plan      general     ASA 3

## 2020-10-27 NOTE — PROGRESS NOTES
PACU Transfer to [facility]    Vitals:    10/27/20 1600   BP: 116/74   Pulse: 77   Resp: 12   Temp: 96.2 °F (35.7 °C)   SpO2: 93%         Intake/Output Summary (Last 24 hours) at 10/27/2020 1603  Last data filed at 10/27/2020 1442  Gross per 24 hour   Intake 1200 ml   Output 20 ml   Net 1180 ml       Pain assessment:  Pain Level: 0    Patient transferred to care of [facility] RN.    10/27/2020 4:03 PM

## 2020-11-02 ENCOUNTER — PATIENT MESSAGE (OUTPATIENT)
Dept: FAMILY MEDICINE CLINIC | Age: 65
End: 2020-11-02

## 2020-11-02 NOTE — TELEPHONE ENCOUNTER
From: Amita Garza  To: Stephen Snow MD  Sent: 11/2/2020 5:50 PM EST  Subject: Non-Urgent Medical Question    Dr Abi Acosta  I need referral to a good knee surgeon with [facility]) -part[facility] ?   Stephanie urgent knee is giving out when walking

## 2020-11-03 PROBLEM — E78.5 HYPERLIPIDEMIA: Status: RESOLVED | Noted: 2020-11-03 | Resolved: 2020-11-03

## 2020-11-04 ENCOUNTER — OFFICE VISIT (OUTPATIENT)
Dept: ORTHOPEDIC SURGERY | Age: 65
End: 2020-11-04
Payer: COMMERCIAL

## 2020-11-04 VITALS — WEIGHT: 268 LBS | BODY MASS INDEX: 36.3 KG/M2 | HEIGHT: 72 IN

## 2020-11-04 PROCEDURE — 99024 POSTOP FOLLOW-UP VISIT: CPT | Performed by: ORTHOPAEDIC SURGERY

## 2020-11-04 PROCEDURE — L3908 WHO COCK-UP NONMOLDE PRE OTS: HCPCS | Performed by: ORTHOPAEDIC SURGERY

## 2020-11-04 NOTE — PROGRESS NOTES
Chief Complaint:  Post-Op Check (Left Wrist & Elbow)      History of Present of Illness:  Mr. Meena Westbrook is a 43-year-old male presenting his first postoperative visit after his left arm surgery  Procedure: Left carpal tunnel release  Left in situ cubital tunnel release  He is here today approximately 8 days status post surgery  Patient has been doing well overall  No fever chills or other constitutional symptoms reported today  He does feel that there is some overall improvement in the sensation in his hand and all fingers although he does continue to have some numbness and tingling throughout all the fingers. He denies any other additional changes today with regards to his left upper extremity    Examination:  General: Pleasant well-appearing no acute distress  Left hand and elbow/left upper extremity:  Elbow medial incision appears clean dry and intact with sutures in place with healed incision  No evidence of hematoma and no evidence of additional skin changes  Palmar and volar wrist incision from extensile carpal tunnel release appears clean with mild swelling, no erythema no fluctuance drainage no evidence of wound dehiscence  Sutures are in place  There is appropriate finger tenodesis and resting cascade  Comfortable full composite fist and full active extension of all fingers and active motion of thumb  Gross sensation intact median ulnar radial nerve with subjective diminished sensation in the median and ulnar nerve distribution although improved compared with prior to surgery  Brisk capillary refill all fingers    Radiology:     X-rays obtained and reviewed in office:  Images obtained today    No orders of the defined types were placed in this encounter. Impression:  Encounter Diagnosis   Name Primary?     Cubital tunnel syndrome of both upper extremities Yes         Treatment Plan:  Postoperative treatment plan discussed with patient today  We will plan to remove sutures from his elbow today as this area does appear healed. We discussed local wound care including scar massage to this area  For his palm, this is a more extensile incision and based on his skin and still the amount of swelling would like to keep his sutures in place at least for another additional 1 week. We will plan for a wound check in 1 week and likely plan for suture removal at that time  Okay for finger range of motion as tolerated but I did discuss with him avoiding lifting and gripping with the hand.   A gel pad brace was also provided to patient for comfort  Okay for some gentle hand hygiene for his left hand and left upper extremity in the meanwhile  He is agreeable and understanding of the plan today

## 2020-11-06 ENCOUNTER — OFFICE VISIT (OUTPATIENT)
Dept: ORTHOPEDIC SURGERY | Age: 65
End: 2020-11-06
Payer: COMMERCIAL

## 2020-11-06 VITALS — BODY MASS INDEX: 36.3 KG/M2 | TEMPERATURE: 97.9 F | WEIGHT: 268 LBS | HEIGHT: 72 IN

## 2020-11-06 PROCEDURE — 20610 DRAIN/INJ JOINT/BURSA W/O US: CPT | Performed by: PHYSICIAN ASSISTANT

## 2020-11-06 PROCEDURE — 99213 OFFICE O/P EST LOW 20 MIN: CPT | Performed by: PHYSICIAN ASSISTANT

## 2020-11-06 RX ORDER — LIDOCAINE HYDROCHLORIDE 10 MG/ML
5 INJECTION, SOLUTION INFILTRATION; PERINEURAL ONCE
Status: COMPLETED | OUTPATIENT
Start: 2020-11-06 | End: 2020-11-06

## 2020-11-06 RX ORDER — BETAMETHASONE SODIUM PHOSPHATE AND BETAMETHASONE ACETATE 3; 3 MG/ML; MG/ML
12 INJECTION, SUSPENSION INTRA-ARTICULAR; INTRALESIONAL; INTRAMUSCULAR; SOFT TISSUE ONCE
Status: COMPLETED | OUTPATIENT
Start: 2020-11-06 | End: 2020-11-06

## 2020-11-06 RX ADMIN — LIDOCAINE HYDROCHLORIDE 5 ML: 10 INJECTION, SOLUTION INFILTRATION; PERINEURAL at 16:27

## 2020-11-06 RX ADMIN — BETAMETHASONE SODIUM PHOSPHATE AND BETAMETHASONE ACETATE 12 MG: 3; 3 INJECTION, SUSPENSION INTRA-ARTICULAR; INTRALESIONAL; INTRAMUSCULAR; SOFT TISSUE at 16:26

## 2020-11-06 NOTE — PROGRESS NOTES
Patient Name:Jeramie Rich  Medical Record Number: [id number]  YOB: 1955  Date of Encounter: 11/6/2020     Chief Complaint   Patient presents with    Knee Pain     new, Bilateral knee pain. NKI, pain for 2 months. RT is worse than LT       History of Present Illness:  Jerrold Alpers is a 63 y.o. male here for evaluation of his bilateral knee pain. His pain assessment is documented below and I reviewed this with him today. Patient states he had a left knee arthroscopy with meniscectomy in 2010. He has had chronic intermittent left knee pain since that time. 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 He states over the last several months his right knee has worsened significantly. He states it is his right knee pain that keeps him awake at night. He frequently has popping and catching of the right knee. Most of his pain is along the medial aspect.     Pain Assessment  Location of Pain: Knee  Location Modifiers: Left, Right  Severity of Pain: 10  Quality of Pain: Aching, Dull, Popping, Cracking, Buckling, Grinding  Duration of Pain: Persistent  Frequency of Pain: Constant  Aggravating Factors: Walking, Standing, Squatting  Limiting Behavior: Yes  Relieving Factors: Rest  Result of Injury: No  Work-Related Injury: No  Are there other pain locations you wish to document?: No    Past Medical History:   Diagnosis Date    CAD (coronary artery disease)     Disc disorder     ED (erectile dysfunction)     Hyperlipidemia     Hyperlipidemia     Hypertension     [facility]) [facility] [address]     Obstructive sleep apnea     Other disorders of kidney and ureter in diseases classified elsewhere     Pneumonia 1970s    Polyp of colon     Renal calculi     Type 2 diabetes mellitus without complication, without long-term current use of insulin ([facility] 5/16/2019    Type II or unspecified type diabetes mellitus without mention of complication, not stated as uncontrolled Past Surgical History:   Procedure Laterality Date    ABDOMEN SURGERY      Exploratory Laparotomy    ARM SURGERY Left 10/27/2020    .  performed by Desiree Borges MD at [place] [address]      right and left    CARPAL TUNNEL RELEASE Left 10/27/2020    LEFT CARPAL TUNNEL RELEASE; LEFT INSITU CUBITAL TUNNEL RELEASE performed by Desiree Borges MD at [facility]  12/2/13    COLONOSCOPY  12/2/13    polyps X 2 removed    CORONARY ANGIOPLASTY WITH STENT PLACEMENT      CYSTOSCOPY Left 12/12/14    CYSTOSCOPY, LEFT RETROGRADE PYELOGRAM, PLACEMENT OF LEFT    FRACTURE SURGERY      Ankle & Wrist    TONSILLECTOMY AND ADENOIDECTOMY         Current Outpatient Medications   Medication Sig Dispense Refill    acetaminophen (TYLENOL) 325 MG tablet Take 650 mg by mouth every 6 hours as needed for Pain      amLODIPine (NORVASC) 10 MG tablet TAKE 1 TABLET EVERY DAY 90 tablet 2    canagliflozin (INVOKANA) 300 MG TABS tablet TAKE 1 TABLET EVERY MORNING BEFORE BREAKFAST 90 tablet 2    metFORMIN (GLUCOPHAGE) 1000 MG tablet TAKE 1 TABLET TWICE DAILY WITH MEALS 180 tablet 2    lovastatin (MEVACOR) 40 MG tablet TAKE 1 TABLET EVERY NIGHT 90 tablet 2    hydroCHLOROthiazide (HYDRODIURIL) 25 MG tablet TAKE 1 TABLET EVERY DAY 90 tablet 2    glimepiride (AMARYL) 4 MG tablet TAKE 1 TABLET TWICE DAILY 180 tablet 3    carvedilol (COREG) 6.25 MG tablet TAKE 1 TABLET TWICE DAILY WITH MEALS 180 tablet 3    diphenhydrAMINE (BENADRYL) 25 MG tablet Take 25 mg by mouth nightly as needed for Sleep       Multiple Vitamins-Minerals (MULTIVITAMIN PO) Take by mouth      aspirin 81 MG tablet Take 81 mg by mouth daily      gabapentin (NEURONTIN) 100 MG capsule TAKE 1 CAPSULE TWICE DAILY 180 capsule 3     Current Facility-Administered Medications   Medication Dose Route Frequency Provider Last Rate Last Dose    lidocaine 1 % injection 5 mL  5 mL Intra-articular Once Rai Morris PA-C        lidocaine 1 % injection 5 mL  5 mL Intra-articular Once Linnea Sails, PA-C        betamethasone acetate-betamethasone sodium phosphate (CELESTONE) injection 12 mg  12 mg Intra-articular Once Linnea Sails, PA-C        betamethasone acetate-betamethasone sodium phosphate (CELESTONE) injection 12 mg  12 mg Intra-articular Once Linnea Sails, PA-C         Allergies, social and family histories were reviewed and updated as appropriate. Review of Systems:  Relevant review of systems reviewed and available in the patient's chart under the 'MEDIA' tab. Vital Signs:  Temp 97.9 °F (36.6 °C)   Ht 6' (1.829 m)   Wt 268 lb (121.6 kg)   BMI 36.35 kg/m²     General Exam:   Constitutional: Patient is adequately groomed with no evidence of malnutrition  Mental Status: The patient is oriented to time, place and person. The patient's mood and affect are appropriate. Lymphatic: The lymphatic examination bilaterally reveals all areas to be without enlargement or induration. Neurological: The patient has good coordination and balance. There is no focal weakness or sensory deficit. Bilateral Knee Examination:    Inspection: Normal muscle contours and no significant limb length discrepancy. No gross atrophy in any particular myotome. There is no obvious swelling or joint effusion of the knee. There are no abrasions, lacerations, contusions, hematomas or ecchymosis. There is no erythema, induration or warmth to suggest an infectious process. Palpation: Patient has mild tenderness on palpation of his medial joint lines bilaterally. There is mild patellofemoral crepitus. Range of Motion:    Flexion: 120°   Extension: 0°    Strength:  Quad 5 / 5  ; Hamstrings 5 / 5.   Gross motor to hip and ankle intact    Special Tests:    Lachman (ACL) - negative   Posterior Lachman (PCL) - negative    Anterior Drawer (ACL) - negative   Posterior Drawer (PCL) - negative   Pivot shift (ACL) - negative    Posterior sag (PCL) - negative   Isidoro's Test (Meniscus) - negative   Homans Test (Thrombophlebitis)- negative   Does not open to valgus or varus stress at 0 or 30° (MCL/LCL)    Skin: There are no rashes, ulcerations or lesions. Sensation to light touch intact. Circulation: The limb is warm and well perfused. Distal pulses intact. Capillary refill is intact. Edema: none. Venous stasis changes: none. Gait: Patient has a antalgic gait and is taking short strides. Radiology:  X-rays obtained today and reviewed in office:  Views: 4 view bilateral knee with comparison AP, flexion PA and skyline views  Impression: No evidence for acute fracture or subluxation / dislocation. There are no loose bodies appreciated. There is no evidence of tibiofemoral subluxation. There are severe arthritic changes of the left knee that is worse in the medial compartment with complete loss of joint space, subchondral sclerosis and osteophyte formation. Right knee shows mild to moderate degenerative changes. Impression:  Encounter Diagnoses   Name Primary?     Tear of medial meniscus of right knee, current, unspecified tear type, initial encounter Yes    Primary osteoarthritis of left knee     Chronic pain of both knees        Office Procedures:  Orders Placed This Encounter   Procedures    XR KNEE BILATERAL STANDING     Standing Status:   Future     Number of Occurrences:   1     Standing Expiration Date:   12/6/2020    XR KNEE LEFT (3 VIEWS)     Standing Status:   Future     Number of Occurrences:   1     Standing Expiration Date:   12/6/2020    XR KNEE RIGHT (3 VIEWS)     Standing Status:   Future     Number of Occurrences:   1     Standing Expiration Date:   12/6/2020    03240 - NH DRAIN/INJECT LARGE JOINT/BURSA       Injections:  After discussing the risks and benefits of cortisone injection including increased pain, drug reaction, infection, bleeding, blood glucose elevation, lack of improvement and neurovascular injury he agreed to receive one today. Questions were encouraged and answered. The correct patient, procedure, site and side were identified. Both knees were prepped with Betadine and 2 mL of betamethasone (12mg) mixed with 5 mL 1% lidocaine plain (50mg) were instilled with careful aspiration and injection under aseptic technique. The skin was then cleaned again with alcohol and a sterile adhesive dressing was applied. He tolerated this well and was instructed regarding post-injection care of icing and decreased activity as necessary. Treatment Plan:          Patient is left knee pain is likely coming from his advanced degenerative changes with bone-on-bone in the medial compartment. He states his left knee pain is somewhat stable. He is really here today because of his right knee pain and the fact that is keeping him awake at night. Clinically he has medial meniscal pathology. He has been trying over-the-counter medications, ice, heat and rest without relief of his pain. We discussed other conservative treatment options and he elected to proceed with cortisone injection. These were completed as recorded above in the procedure note. Patient is very active working construction. He states he walks several miles daily. He does not feel formal physical therapy will help. He is advised to call the office in 7 to 10 days if right knee pain has not improved and we will proceed to MRI to further evaluate. Zulema Lesch was informed of the results of any imaging. We discussed treatment options and a time was given to answer questions. A plan was proposed and Zulema Lesch understand and accepts this course of care.           Electronically signed by Prosper Choe PA-C on 37/8/3450  Board Certified [facility]    Please note that portions of this note were completed with a voice recognition program.  Efforts were made to edit the dictations but occasionally words are mis-transcribed.

## 2020-11-11 ENCOUNTER — OFFICE VISIT (OUTPATIENT)
Dept: ORTHOPEDIC SURGERY | Age: 65
End: 2020-11-11

## 2020-11-11 VITALS — BODY MASS INDEX: 36.3 KG/M2 | WEIGHT: 268 LBS | HEIGHT: 72 IN

## 2020-11-11 PROCEDURE — 99024 POSTOP FOLLOW-UP VISIT: CPT | Performed by: ORTHOPAEDIC SURGERY

## 2020-11-11 NOTE — PROGRESS NOTES
Chief Complaint:  Follow-up (Left hand and elbow)      History of Present of Illness:  Mr. Lauryn Landeros is a 62-year-old male presenting his first postoperative visit after his left arm surgery  Date of procedure: 10/27/2020  Procedure: Left carpal tunnel release  Left in situ cubital tunnel release  He is here today approximately 2 weeks status post surgery  He feels that the numbness and tingling in his fingers continues to progressively improve  He is here today mainly for wound check for his left palmar and wrist incision what we had left the sutures in place last week  He denies any other new complaints, no new swelling pain or other symptoms described today  He is also here today to discuss his right hand and fingers.   He has had no new weakness and fairly stable numbness tingling in the small and ring fingers that is occasional.  He does have a history of both carpal tunnel release as well as an ulnar nerve transposition at the right elbow in the past    Examination:  General: Pleasant well-appearing no acute distress    Left hand and elbow/left upper extremity:  Elbow medial incision appears healed with no evidence of swelling or erythema  No evidence of hematoma and no evidence of additional skin changes  Palmar and volar wrist incision from extensile carpal tunnel release appears clean with mild swelling, no erythema no fluctuance drainage no evidence of wound dehiscence with sutures in place    There is appropriate finger tenodesis and resting cascade  Comfortable full composite fist and full active extension of all fingers and active motion of thumb  Gross sensation intact median ulnar radial nerve with subjective diminished sensation in the median and ulnar nerve distribution although improved compared with prior to surgery  Brisk capillary refill all fingers    Right hand/right upper extremity  Well-healed previous incisions at the right elbow and wrist  No evidence of thenar atrophy or intrinsic muscle wasting  5-5 EPL FPL thumb adduction  5 5 FDS FDP EDC interossei  Negative Tinel's at carpal tunnel and mild positive Tinel's around his surgical incision at the medial elbow    Radiology:     X-rays obtained and reviewed in office:  No new images obtained today    No orders of the defined types were placed in this encounter. Impression:  Encounter Diagnoses   Name Primary?  Cubital tunnel syndrome of both upper extremities Yes    Bilateral carpal tunnel syndrome          Treatment Plan:  *For his left hand and left upper extremity the patient is doing well. He does have some mild swelling still around his palmar incision but I think that his wound is healed enough we can plan for suture removal today. We will plan to remove sutures in place Steri-Strips over the wound and continue to protect this wound over the next week from any shear forces and continue with a gel pad brace intermittently for comfort and protection  He has had progressive improvement in his numbness and tingling in all fingers since his surgery and is pleased with this outcome thus far  We also had a discussion today regarding his right hand management including his prior history of carpal tunnel and cubital tunnel transposition  We specifically discussed some of the risks involved particularly with repeat transposition of his ulnar nerve and outcomes which can be unpredictable. For now he feels that his symptoms are stable and would like to continue to monitor the symptoms.   We will plan to see him back for both of his arms in 1 month for repeat evaluation

## 2020-11-25 ENCOUNTER — OFFICE VISIT (OUTPATIENT)
Dept: FAMILY MEDICINE CLINIC | Age: 65
End: 2020-11-25
Payer: COMMERCIAL

## 2020-11-25 VITALS
BODY MASS INDEX: 36.84 KG/M2 | HEART RATE: 74 BPM | HEIGHT: 72 IN | TEMPERATURE: 96.6 F | DIASTOLIC BLOOD PRESSURE: 88 MMHG | SYSTOLIC BLOOD PRESSURE: 138 MMHG | WEIGHT: 272 LBS | OXYGEN SATURATION: 96 %

## 2020-11-25 LAB — HBA1C MFR BLD: 7.9 %

## 2020-11-25 PROCEDURE — 99214 OFFICE O/P EST MOD 30 MIN: CPT | Performed by: FAMILY MEDICINE

## 2020-11-25 PROCEDURE — 3051F HG A1C>EQUAL 7.0%<8.0%: CPT | Performed by: FAMILY MEDICINE

## 2020-11-25 PROCEDURE — 83036 HEMOGLOBIN GLYCOSYLATED A1C: CPT | Performed by: FAMILY MEDICINE

## 2020-11-25 NOTE — PROGRESS NOTES
Todd Ortiz is a 63 y.o. male. HPI:s/p left cts and ulnar surgery 10-27-20, numbness has improved but still persists  Gets 3 500 steps in a day at work but had trouble walking due to pain in his knees  Saw Ortho and got cores injections now his knees feel better  Saw his ophthalmologist  Gets neuropathy in both feet, mainly numbness no pain  Denies any open sores  Meds, vitamins and allergies reviewed with pt    ROS: No TIA's or unusual headaches, no dysphagia. No prolonged cough. No dyspnea or chest pain on exertion. No abdominal pain, change in bowel habits, black or bloody stools. No urinary tract symptoms. No new or unusual musculoskeletal symptoms. Prior to Visit Medications    Medication Sig Taking?  Authorizing Provider   acetaminophen (TYLENOL) 325 MG tablet Take 650 mg by mouth every 6 hours as needed for Pain Yes Historical Provider, MD   amLODIPine (NORVASC) 10 MG tablet TAKE 1 TABLET EVERY DAY Yes Haydee Dutta MD   canagliflozin (INVOKANA) 300 MG TABS tablet TAKE 1 TABLET EVERY MORNING BEFORE BREAKFAST Yes Haydee Dutta MD   metFORMIN (GLUCOPHAGE) 1000 MG tablet TAKE 1 TABLET TWICE DAILY WITH MEALS Yes Haydee Dutta MD   lovastatin (MEVACOR) 40 MG tablet TAKE 1 TABLET EVERY NIGHT Yes Haydee Dutta MD   hydroCHLOROthiazide (HYDRODIURIL) 25 MG tablet TAKE 1 TABLET EVERY DAY Yes Haydee Dutta MD   glimepiride (AMARYL) 4 MG tablet TAKE 1 TABLET TWICE DAILY Yes Haydee Dutta MD   carvedilol (COREG) 6.25 MG tablet TAKE 1 TABLET TWICE DAILY WITH MEALS Yes Haydee Dutta MD   diphenhydrAMINE (BENADRYL) 25 MG tablet Take 25 mg by mouth nightly as needed for Sleep  Yes Historical Provider, MD   Multiple Vitamins-Minerals (MULTIVITAMIN PO) Take by mouth Yes Historical Provider, MD   aspirin 81 MG tablet Take 81 mg by mouth daily Yes Historical Provider, MD   gabapentin (NEURONTIN) 100 MG capsule TAKE 1 CAPSULE TWICE DAILY  Haydee Dutta MD       Past Medical Date    CHOL 148 08/24/2020    CHOL 132 05/16/2019    CHOL 143 07/14/2018     Lab Results   Component Value Date    TRIG 240 (H) 08/24/2020    TRIG 168 (H) 05/16/2019    TRIG 311 (H) 07/14/2018     Lab Results   Component Value Date    HDL 38 (L) 08/24/2020    HDL 37 (L) 05/16/2019    HDL 32 (L) 07/14/2018     Lab Results   Component Value Date    LDLCALC 62 08/24/2020    LDLCALC 61 05/16/2019    LDLCALC 74 07/14/2018     Lab Results   Component Value Date    LABVLDL 48 08/24/2020    LABVLDL 34 05/16/2019    LABVLDL 36 01/03/2017     Lab Results   Component Value Date    CHOLHDLRATIO 4.5 07/14/2018    CHOLHDLRATIO 3.2 11/13/2015    CHOLHDLRATIO 3.9 10/29/2014     Lab Results   Component Value Date     08/24/2020    K 4.5 08/24/2020     08/24/2020    CO2 25 08/24/2020    BUN 24 (H) 08/24/2020    CREATININE 1.2 08/24/2020    GLUCOSE 181 (H) 08/24/2020    CALCIUM 10.3 08/24/2020    PROT 7.1 08/24/2020    LABALBU 4.6 08/24/2020    BILITOT 0.3 08/24/2020    ALKPHOS 51 08/24/2020    AST 25 08/24/2020    ALT 37 08/24/2020    LABGLOM >60 08/24/2020    GFRAA >60 08/24/2020    AGRATIO 1.8 08/24/2020    GLOB 2.5 08/24/2020     Lab Results   Component Value Date    LABA1C 7.8 08/24/2020     Lab Results   Component Value Date    .8 05/16/2019     a1c - 7.9  ASSESSMENT/PLAN:  1. Mixed hyperlipidemia  Stable continue current medication    2. Essential hypertension  Stable continue current medication    3. Coronary artery disease involving native coronary artery of native heart without angina pectoris  Stable continue maximal medical management    4. Obstructive sleep apnea  Not using CPAP  Weight loss    5.  Type 2 diabetes mellitus without complication, without long-term current use of insulin (HCC)  Control is fair on maximal Invokana, Metformin, and glimepiride  Strongly urged diet exercise and 10 pound weight loss  Return to office in 3 months  If A1c continues to drift off he will need to start a fourth medication    6 immunization/health maintenance  All up-to-date    Spent 25 minutes with patient greater than 50% of time reviewing his surgical postop course, reviewing labs, reviewing meds, and emphasizing healthy lifestyle

## 2020-12-02 ENCOUNTER — OFFICE VISIT (OUTPATIENT)
Dept: CARDIOLOGY CLINIC | Age: 65
End: 2020-12-02
Payer: COMMERCIAL

## 2020-12-02 VITALS
WEIGHT: 276.2 LBS | SYSTOLIC BLOOD PRESSURE: 138 MMHG | HEIGHT: 72 IN | HEART RATE: 72 BPM | DIASTOLIC BLOOD PRESSURE: 82 MMHG | OXYGEN SATURATION: 97 % | BODY MASS INDEX: 37.41 KG/M2

## 2020-12-02 PROCEDURE — 99214 OFFICE O/P EST MOD 30 MIN: CPT | Performed by: NURSE PRACTITIONER

## 2020-12-02 NOTE — PROGRESS NOTES
[address]     Outpatient Follow Up Note    Dawson Ramos is 63 y.o. male who presents today with a history of IWMI CAD s/p PTCA prox & distal-RCA '11, HTN and hyperlipidemia. CHIEF COMPLAINT / HPI:  Follow Up secondary to CAD    Subjective:   He denies chest discomfort. There is no SOB/DAMIAN. The patient denies orthopnea/PND. He has sleep apnea but does not use a mask : intolerant described as can't breath. The patient does not have palpitations/dizziness/swelling. He picked up a couple of pounds d/t not being all that active since the pandemic. His angina equivalent was severe left elbow pain (heaviness like a buffalo sitting on his arms; and his eyes were stuttering). He denies recurrence. He's had a carpal tunnel release Lt 10/27 (making the second time for surgery). The first two fingers on his left hand no longer feel numb but the others do and attributes to his ulnar nerve. These symptoms are stable since the OV. His home BPs have been 130/80 126/70    With regard to medication therapy the patient has been compliant with prescribed regimen. They have tolerated therapy to date.      Past Medical History:   Diagnosis Date    CAD (coronary artery disease)     Disc disorder     ED (erectile dysfunction)     Hyperlipidemia     Hyperlipidemia     Hypertension     MI (myocardial infarction) ([address]     Obstructive sleep apnea     Other disorders of kidney and ureter in diseases classified elsewhere     Pneumonia 1970s    Polyp of colon     Renal calculi     Type 2 diabetes mellitus without complication, without long-term current use of insulin ([address] 5/16/2019    Type II or unspecified type diabetes mellitus without mention of complication, not stated as uncontrolled      Social History:    Social History     Tobacco Use   Smoking Status Former Smoker    Packs/day: 0.25    Years: 30.00    Pack years: 7.50    Types: Cigarettes, Cigars    Last attempt to quit: 4/9/2011    Years since quittin.6   Smokeless Tobacco Never Used   Tobacco Comment    cigars every once in a while      Current Medications:  Current Outpatient Medications   Medication Sig Dispense Refill    amLODIPine (NORVASC) 10 MG tablet TAKE 1 TABLET EVERY DAY 90 tablet 2    canagliflozin (INVOKANA) 300 MG TABS tablet TAKE 1 TABLET EVERY MORNING BEFORE BREAKFAST 90 tablet 2    metFORMIN (GLUCOPHAGE) 1000 MG tablet TAKE 1 TABLET TWICE DAILY WITH MEALS 180 tablet 2    lovastatin (MEVACOR) 40 MG tablet TAKE 1 TABLET EVERY NIGHT 90 tablet 2    hydroCHLOROthiazide (HYDRODIURIL) 25 MG tablet TAKE 1 TABLET EVERY DAY 90 tablet 2    glimepiride (AMARYL) 4 MG tablet TAKE 1 TABLET TWICE DAILY 180 tablet 3    carvedilol (COREG) 6.25 MG tablet TAKE 1 TABLET TWICE DAILY WITH MEALS 180 tablet 3    gabapentin (NEURONTIN) 100 MG capsule TAKE 1 CAPSULE TWICE DAILY (Patient taking differently: Take 100 mg by mouth daily. ) 180 capsule 3    diphenhydrAMINE (BENADRYL) 25 MG tablet Take 25 mg by mouth nightly as needed for Sleep       Multiple Vitamins-Minerals (MULTIVITAMIN PO) Take by mouth      aspirin 81 MG tablet Take 81 mg by mouth daily      acetaminophen (TYLENOL) 325 MG tablet Take 650 mg by mouth every 6 hours as needed for Pain       No current facility-administered medications for this visit. REVIEW OF SYSTEMS:    CONSTITUTIONAL: - weight loss; - fatigue, weakness, night sweats or fever. HEENT: No new vision difficulties or ringing in the ears; narrow angle glaucoma. RESPIRATORY: No new SOB, PND, orthopnea or cough. CARDIOVASCULAR: See HPI  GI: No nausea, vomiting, diarrhea, constipation, abdominal pain or changes in bowel habits. : No urinary frequency, urgency, incontinence hematuria or dysuria. SKIN: No cyanosis or skin lesions. MUSCULOSKELETAL: No new muscle or joint pain. NEUROLOGICAL: No syncope or TIA-like symptoms.   PSYCHIATRIC: No anxiety, pain, insomnia or depression    Objective:   PHYSICAL Pertinent images / reports were reviewed as a part of this visit and reveals the following:    Myoview: 1/29/19:      Summary    Normal myocardial perfusion study.    Normal LV size and systolic function. Last Echo: Aug '14:  Summary  -Normal left ventricle size, wall thickness and systolic function with an  estimated ejection fraction of 55%. -The aortic valve appears sclerotic but opens well. -There is mild tricuspid regurgitation with RVSP estimated at 27 mmHg. Last Angiogram: April '11: [facility]  PCI of the RCA with BMS to mid (culprit) and proximal eccentric plaque. Inferior wall, HK; Low normal LVEF at 50%      Assessment:          1. Atherosclerosis of native coronary artery of native heart without angina pectoris  ~stable : offers no c/o angina   ~neg nuclear stress for ischemia Jan '19  ~s/p PTCA '11  ~ ASA / statin / BB    2. Essential hypertension, benign   ~controlled   ~ carvedilol / amlodipine / HCTZ    3. Mixed hyperlipidemia   ~trig elevated ; HDL low  ~last A1c 7.9% in Nov '20  ~lovastatin with diabetic agents (DM followed by PCP)      I had the opportunity to review the clinical symptoms and presentation of Magen Cardenas. Plan:     1. Continue present management   2. F/U in 6 months     Overall the patient is stable from CV standpoint    I have addresed the patient's cardiac risk factors and adjusted pharmacologic treatment as needed. In addition, I have reinforced the need for patient directed risk factor modification. Further evaluation will be based upon the patient's clinical course and testing results. All questions and concerns were addressed to the patient. Alternatives to my treatment were discussed. The patient is not currently smoking: quit April '11. The risks related to smoking were reviewed with the patient. Recommend maintaining a smoke-free lifestyle.      Patient is on a beta-blocker  Patient is not on an ARB / ace : intolerant  Patient is on a statin Antiplatelet therapy has been recommended / prescribed for this patient. Education conducted on adverse reactions including bleeding was discussed. The patient verbalizes understanding not to stop medications without discussing with us. Discussed exercise: 30-60 minutes 7 days/week. 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 SMBG 152 yesterday    Thank you for allowing to us to participate in the care of Dawson Ramos.

## 2020-12-09 ENCOUNTER — OFFICE VISIT (OUTPATIENT)
Dept: ORTHOPEDIC SURGERY | Age: 65
End: 2020-12-09

## 2020-12-09 VITALS — BODY MASS INDEX: 37.38 KG/M2 | WEIGHT: 276 LBS | HEIGHT: 72 IN

## 2020-12-09 PROCEDURE — 99024 POSTOP FOLLOW-UP VISIT: CPT | Performed by: ORTHOPAEDIC SURGERY

## 2020-12-09 NOTE — PROGRESS NOTES
encounter. Impression:  No diagnosis found. Treatment Plan:  I discussed with the patient today his current symptoms. He seems to have had minimal change in his specifically ulnar neuropathy compared with his prior visit. I did discuss the based on severity of the symptoms preoperatively based on electrodiagnostic study and exam with evidence of atrophy and constant numbness and tingling that his symptoms may take several months or even up to a year to demonstrate improvement or may have incomplete improvement in symptoms certainly with some of his  and pinch strength from intrinsic atrophy. He is understanding of this and I would not recommend any further studies at this point. For now we will continue to monitor his progress and I would like to see him back in approximately 6 to 8 weeks. He may continue to do ulnar nerve glide exercises and begin to progress his activity as tolerated. We also discussed his right side he would like to hold off and monitor the symptoms for now as well.   Plan to follow-up in 6 to 8 weeks for repeat evaluation

## 2020-12-29 RX ORDER — GABAPENTIN 100 MG/1
CAPSULE ORAL
Qty: 180 CAPSULE | Refills: 3 | Status: SHIPPED | OUTPATIENT
Start: 2020-12-29 | End: 2021-06-25

## 2020-12-29 NOTE — TELEPHONE ENCOUNTER
Medication:   Requested Prescriptions     Pending Prescriptions Disp Refills    gabapentin (NEURONTIN) 100 MG capsule [Pharmacy Med Name: GABAPENTIN 100 MG Capsule] 180 capsule 3     Sig: TAKE 1 CAPSULE TWICE DAILY        Last Filled:  9/24/19 #180, 3 RF     Patient Phone Number: [phone] (home) [phone] (work)    Last appt: 11/25/2020  DM   Next appt: 2/25/2021

## 2021-02-15 ENCOUNTER — OFFICE VISIT (OUTPATIENT)
Dept: ORTHOPEDIC SURGERY | Age: 66
End: 2021-02-15
Payer: COMMERCIAL

## 2021-02-15 VITALS — TEMPERATURE: 97.2 F | BODY MASS INDEX: 36.98 KG/M2 | HEIGHT: 72 IN | WEIGHT: 273 LBS

## 2021-02-15 DIAGNOSIS — M17.12 PRIMARY OSTEOARTHRITIS OF LEFT KNEE: Primary | ICD-10-CM

## 2021-02-15 DIAGNOSIS — S83.241D TEAR OF MEDIAL MENISCUS OF RIGHT KNEE, CURRENT, UNSPECIFIED TEAR TYPE, SUBSEQUENT ENCOUNTER: ICD-10-CM

## 2021-02-15 DIAGNOSIS — G89.29 CHRONIC PAIN OF BOTH KNEES: ICD-10-CM

## 2021-02-15 DIAGNOSIS — M25.562 CHRONIC PAIN OF BOTH KNEES: ICD-10-CM

## 2021-02-15 DIAGNOSIS — M25.561 CHRONIC PAIN OF BOTH KNEES: ICD-10-CM

## 2021-02-15 PROCEDURE — 20610 DRAIN/INJ JOINT/BURSA W/O US: CPT | Performed by: PHYSICIAN ASSISTANT

## 2021-02-15 PROCEDURE — 99213 OFFICE O/P EST LOW 20 MIN: CPT | Performed by: PHYSICIAN ASSISTANT

## 2021-02-15 RX ORDER — CYCLOBENZAPRINE HCL 10 MG
10 TABLET ORAL 2 TIMES DAILY PRN
Qty: 45 TABLET | Refills: 0 | Status: SHIPPED | OUTPATIENT
Start: 2021-02-15 | End: 2021-06-25

## 2021-02-15 NOTE — PROGRESS NOTES
Patient Name: Janeth Yip  Medical Record Number: [id number]  YOB: 1955  Date of Encounter: 2/15/2021     Chief Complaint   Patient presents with    Follow-up     Bilateral knee cortisone injection; 11/6/20. History of Present Illness:   Mr. Janeth Yip is here in follow up regarding his chronic bilateral knee pain. Patient was first seen on 11/6/2020 complaining of chronic bilateral knee pain, worse on the right. X-rays showed severe arthritic changes of the left knee with only mild arthritic changes of the right knee. He was given bilateral cortisone injections at that visit and presents today stating the cortisone injections worked great up until about last week. 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 His knee pain worsens throughout the day. He denies any recent injury to his knees bilaterally. He states his pain is very symmetric at this point. He does get some knee swelling with activity. He denies redness or warmth. The patient's past medical history, medications, allergies, family history, social history, and review of systems have been reviewed, and dated and are recorded in the chart under the 'MEDIA\" tab. Physical Exam:    Mr. Janeth Yip appears well, he is in no apparent distress, he demonstrates appropriate mood & affect. He is alert and oriented to person, place and time. Temp 97.2 °F (36.2 °C) (Infrared)   Ht 6' (1.829 m)   Wt 273 lb (123.8 kg)   BMI 37.03 kg/m²     On examination of patient's knees bilaterally there is no obvious swelling or joint effusion. He has mild tenderness on palpation of the medial joint lines bilaterally. Range of motion is 0 to 120 degrees with 5/5 motor strength. Special testing is negative. Orders:  Orders Placed This Encounter   Procedures    NE ARTHROCENTESIS ASPIR&/INJ MAJOR JT/BURSA W/O US       Impression:   Diagnosis Orders   1.  Primary osteoarthritis of left knee  hyaluronate (MONOVISC) injection 88 mg    hyaluronate (MONOVISC) injection 88 mg    OH ARTHROCENTESIS ASPIR&/INJ MAJOR JT/BURSA W/O US    cyclobenzaprine (FLEXERIL) 10 MG tablet   2. Tear of medial meniscus of right knee, current, unspecified tear type, subsequent encounter  hyaluronate (MONOVISC) injection 88 mg    hyaluronate (MONOVISC) injection 88 mg    OH ARTHROCENTESIS ASPIR&/INJ MAJOR JT/BURSA W/O US   3. Chronic pain of both knees         Injections:   Discussed Monovisc injections including all risks and benefits including increased pain, drug reaction, infection, bleeding, lack of improvement and neurovascular injury. Questions were encouraged and answered. The correct patient, procedure, site and side were identified. With the patient's permission, both knees were prepped in standard sterile fashion with betadine and alcohol. The prefilled injection of Monovisc was injected into the medial joint space compartment under aseptic technique. The skin was then cleaned again with alcohol and a sterile adhesive dressing was applied. He tolerated this well. Treatment Plan:    Patient presented today with worsening chronic bilateral knee pain stating his cortisone injections started wearing off last week. He has advanced osteoarthritis of the left knee with only mild arthritis of his right knee. We discussed additional conservative treatment options and he elected to trial viscosupplementation. These were completed as recorded above in the procedure note. He will continue resting, icing and elevating his knees when possible. He will avoid any high impact activities. Patient understands he will likely require a left TKA in the future. He is advised to follow-up as needed. Eloina Chavarria was informed of the results of any imaging. We discussed treatment options and a time was given to answer questions. A plan was proposed and Eloina Chavarria understand and accepts this course of care.

## 2021-02-17 DIAGNOSIS — I10 ESSENTIAL HYPERTENSION: ICD-10-CM

## 2021-02-17 RX ORDER — HYDROCHLOROTHIAZIDE 25 MG/1
TABLET ORAL
Qty: 90 TABLET | Refills: 2 | Status: SHIPPED | OUTPATIENT
Start: 2021-02-17 | End: 2021-08-02

## 2021-02-17 RX ORDER — CARVEDILOL 6.25 MG/1
TABLET ORAL
Qty: 180 TABLET | Refills: 2 | Status: SHIPPED | OUTPATIENT
Start: 2021-02-17 | End: 2021-10-13

## 2021-02-17 NOTE — TELEPHONE ENCOUNTER
Medication:   Requested Prescriptions     Pending Prescriptions Disp Refills    hydroCHLOROthiazide (HYDRODIURIL) 25 MG tablet [Pharmacy Med Name: HYDROCHLOROTHIAZIDE 25 MG Tablet] 90 tablet 2     Sig: TAKE 1 TABLET EVERY DAY    carvedilol (COREG) 6.25 MG tablet [Pharmacy Med Name: CARVEDILOL 6.25 MG Tablet] 180 tablet 3     Sig: TAKE 1 TABLET TWICE DAILY WITH MEALS     Last Filled:  Coreg 02/12/2020 #180 3rf  Hydrodiuril 06/04/2020 #90 2rf    Patient Phone Number: [phone] (home) [phone] (work)    Last appt: 11/25/2020   Next appt: 2/25/2021    Lab Results   Component Value Date     08/24/2020    K 4.5 08/24/2020     08/24/2020    CO2 25 08/24/2020    BUN 24 (H) 08/24/2020    CREATININE 1.2 08/24/2020    GLUCOSE 181 (H) 08/24/2020    CALCIUM 10.3 08/24/2020    PROT 7.1 08/24/2020    LABALBU 4.6 08/24/2020    BILITOT 0.3 08/24/2020    ALKPHOS 51 08/24/2020    AST 25 08/24/2020    ALT 37 08/24/2020    LABGLOM >60 08/24/2020    GFRAA >60 08/24/2020    AGRATIO 1.8 08/24/2020    GLOB 2.5 08/24/2020

## 2021-02-25 ENCOUNTER — TELEPHONE (OUTPATIENT)
Dept: FAMILY MEDICINE CLINIC | Age: 66
End: 2021-02-25

## 2021-02-25 NOTE — TELEPHONE ENCOUNTER
Pt asking to rescheduled missed OV from today. Pt said he is out of town during the week and only in town on Fridays. Can 2 same day slots be used on a Friday for DM follow up?

## 2021-02-26 ENCOUNTER — OFFICE VISIT (OUTPATIENT)
Dept: FAMILY MEDICINE CLINIC | Age: 66
End: 2021-02-26
Payer: COMMERCIAL

## 2021-02-26 VITALS
HEIGHT: 72 IN | TEMPERATURE: 97 F | BODY MASS INDEX: 36.7 KG/M2 | HEART RATE: 73 BPM | WEIGHT: 271 LBS | OXYGEN SATURATION: 96 % | DIASTOLIC BLOOD PRESSURE: 82 MMHG | SYSTOLIC BLOOD PRESSURE: 132 MMHG

## 2021-02-26 DIAGNOSIS — E11.9 TYPE 2 DIABETES MELLITUS WITHOUT COMPLICATION, WITHOUT LONG-TERM CURRENT USE OF INSULIN (HCC): Primary | ICD-10-CM

## 2021-02-26 DIAGNOSIS — I10 ESSENTIAL HYPERTENSION: ICD-10-CM

## 2021-02-26 DIAGNOSIS — E78.5 DYSLIPIDEMIA: ICD-10-CM

## 2021-02-26 DIAGNOSIS — G47.33 OBSTRUCTIVE SLEEP APNEA: ICD-10-CM

## 2021-02-26 LAB — HBA1C MFR BLD: 7.4 %

## 2021-02-26 PROCEDURE — 83036 HEMOGLOBIN GLYCOSYLATED A1C: CPT | Performed by: FAMILY MEDICINE

## 2021-02-26 PROCEDURE — 3051F HG A1C>EQUAL 7.0%<8.0%: CPT | Performed by: FAMILY MEDICINE

## 2021-02-26 PROCEDURE — 99214 OFFICE O/P EST MOD 30 MIN: CPT | Performed by: FAMILY MEDICINE

## 2021-02-26 ASSESSMENT — PATIENT HEALTH QUESTIONNAIRE - PHQ9: 2. FEELING DOWN, DEPRESSED OR HOPELESS: 0

## 2021-02-26 NOTE — PROGRESS NOTES
Freddy Leon is a 61 y.o. male. HPI: Here for general medical follow-up and diabetes checkup  Has been traveling a lot lately and therefore he has to eat out and cannot exercise much  Also is having significant knee pain that keeps him from walking like he would like to  He has been working hard to keep his diet reasonable eating salads  His weakness is drinking beer when he goes out with his coworkers  Meds, vitamins and allergies reviewed with pt    ROS: No TIA's or unusual headaches, no dysphagia. No prolonged cough. No dyspnea or chest pain on exertion. No abdominal pain, change in bowel habits, black or bloody stools. No urinary tract symptoms. No new or unusual musculoskeletal symptoms. Prior to Visit Medications    Medication Sig Taking?  Authorizing Provider   hydroCHLOROthiazide (HYDRODIURIL) 25 MG tablet TAKE 1 TABLET EVERY DAY Yes Latonia Carlson MD   carvedilol (COREG) 6.25 MG tablet TAKE 1 TABLET TWICE DAILY WITH MEALS Yes Latonia Carlson MD   cyclobenzaprine (FLEXERIL) 10 MG tablet Take 1 tablet by mouth 2 times daily as needed for Muscle spasms Yes Albert Castillo PA-C   gabapentin (NEURONTIN) 100 MG capsule TAKE 1 CAPSULE TWICE DAILY Yes Latonia Carlson MD   acetaminophen (TYLENOL) 325 MG tablet Take 650 mg by mouth every 6 hours as needed for Pain Yes Historical Provider, MD   amLODIPine (NORVASC) 10 MG tablet TAKE 1 TABLET EVERY DAY Yes Latonia Carlson MD   canagliflozin (INVOKANA) 300 MG TABS tablet TAKE 1 TABLET EVERY MORNING BEFORE BREAKFAST Yes Latonia Carlson MD   metFORMIN (GLUCOPHAGE) 1000 MG tablet TAKE 1 TABLET TWICE DAILY WITH MEALS Yes Latonia Carlson MD   lovastatin (MEVACOR) 40 MG tablet TAKE 1 TABLET EVERY NIGHT Yes Latonia Carlson MD   glimepiride (AMARYL) 4 MG tablet TAKE 1 TABLET TWICE DAILY Yes Latonia Carlson MD   diphenhydrAMINE (BENADRYL) 25 MG tablet Take 25 mg by mouth nightly as needed for Sleep  Yes Historical Provider, MD Multiple Vitamins-Minerals (MULTIVITAMIN PO) Take by mouth Yes Historical Provider, MD   aspirin 81 MG tablet Take 81 mg by mouth daily Yes Historical Provider, MD       Past Medical History:   Diagnosis Date    CAD (coronary artery disease)     Disc disorder     ED (erectile dysfunction)     Hyperlipidemia     Hyperlipidemia     Hypertension     [facility]     Obstructive sleep apnea     Other disorders of kidney and ureter in diseases classified elsewhere     Pneumonia 1970s    Polyp of colon     Renal calculi     Type 2 diabetes mellitus without complication, without long-term current use of insulin ([facility] 2019    Type II or unspecified type diabetes mellitus without mention of complication, not stated as uncontrolled        Social History     Tobacco Use    Smoking status: Former Smoker     Packs/day: 0.25     Years: 30.00     Pack years: 7.50     Types: Cigarettes, Cigars     Quit date: 2011     Years since quittin.8    Smokeless tobacco: Never Used    Tobacco comment: cigars every once in a while    Substance Use Topics    Alcohol use: Yes     Alcohol/week: 1.0 standard drinks     Types: 1 Cans of beer per week     Comment: social    Drug use: No       Family History   Problem Relation Age of Onset    Diabetes Father     Mental Illness Mother     High Blood Pressure Brother     Heart Disease Brother     High Cholesterol Neg Hx        Allergies   Allergen Reactions    Fexofenadine-Pseudoephed Er Other (See Comments)     cough    Lisinopril     Morphine Hives    Zithromax [Azithromycin]      Heart palpitations     Losartan      Dry c ough       OBJECTIVE:  /82   Pulse 73   Temp 97 °F (36.1 °C)   Ht 6' (1.829 m)   Wt 271 lb (122.9 kg)   SpO2 96%   BMI 36.75 kg/m²   GEN:  in NAD obese but weight is down a couple pounds  NECK:  Supple without adenopathy.   No bruits  CV:  Regular rate and rhythm, S1 and S2 normal, no murmurs, clicks

## 2021-03-01 RX ORDER — GLIMEPIRIDE 4 MG/1
TABLET ORAL
Qty: 180 TABLET | Refills: 1 | Status: SHIPPED | OUTPATIENT
Start: 2021-03-01 | End: 2021-07-20

## 2021-03-01 NOTE — TELEPHONE ENCOUNTER
Medication:   Requested Prescriptions     Pending Prescriptions Disp Refills    glimepiride (AMARYL) 4 MG tablet [Pharmacy Med Name: GLIMEPIRIDE 4 MG Tablet] 180 tablet 3     Sig: TAKE 1 TABLET TWICE DAILY       Last Filled:   4/27/20 #180, 3 RF     Patient Phone Number: [phone] (home) [phone] (work)    Last appt: 2/26/2021 DM   Next appt: 6/7/2021    Last Labs DM:   Lab Results   Component Value Date    LABA1C 7.4 02/26/2021

## 2021-03-18 ENCOUNTER — PATIENT MESSAGE (OUTPATIENT)
Dept: FAMILY MEDICINE CLINIC | Age: 66
End: 2021-03-18

## 2021-03-18 RX ORDER — CEFUROXIME AXETIL 250 MG/1
250 TABLET ORAL 2 TIMES DAILY
Qty: 14 TABLET | Refills: 0 | Status: SHIPPED | OUTPATIENT
Start: 2021-03-18 | End: 2021-03-25

## 2021-03-18 NOTE — TELEPHONE ENCOUNTER
From: Filipe Bower  To: Tez Espino MD  Sent: 3/18/2021 7:49 AM EDT  Subject: Non-Urgent Medical Question    Is it possible to get a prescription for a sinus infection called in to [facility].   Thanks

## 2021-04-12 RX ORDER — AMLODIPINE BESYLATE 10 MG/1
TABLET ORAL
Qty: 90 TABLET | Refills: 2 | Status: SHIPPED | OUTPATIENT
Start: 2021-04-12 | End: 2021-12-10 | Stop reason: SDUPTHER

## 2021-04-12 NOTE — TELEPHONE ENCOUNTER
Medication:   Requested Prescriptions     Pending Prescriptions Disp Refills    amLODIPine (NORVASC) 10 MG tablet [Pharmacy Med Name: AMLODIPINE BESYLATE 10 MG Tablet] 90 tablet 2     Sig: TAKE 1 TABLET EVERY DAY       Last Filled:  90 x 2 Rf 6/4/20    Patient Phone Number: [phone] (home) [phone] (work)    Last appt: 2/26/2021   Next appt: 6/7/2021    Lab Results   Component Value Date     08/24/2020    K 4.5 08/24/2020     08/24/2020    CO2 25 08/24/2020    BUN 24 (H) 08/24/2020    CREATININE 1.2 08/24/2020    GLUCOSE 181 (H) 08/24/2020    CALCIUM 10.3 08/24/2020    PROT 7.1 08/24/2020    LABALBU 4.6 08/24/2020    BILITOT 0.3 08/24/2020    ALKPHOS 51 08/24/2020    AST 25 08/24/2020    ALT 37 08/24/2020    LABGLOM >60 08/24/2020    GFRAA >60 08/24/2020    AGRATIO 1.8 08/24/2020    GLOB 2.5 08/24/2020

## 2021-05-05 ENCOUNTER — OFFICE VISIT (OUTPATIENT)
Dept: ORTHOPEDIC SURGERY | Age: 66
End: 2021-05-05
Payer: COMMERCIAL

## 2021-05-05 VITALS — WEIGHT: 269.4 LBS | HEIGHT: 72 IN | BODY MASS INDEX: 36.49 KG/M2

## 2021-05-05 DIAGNOSIS — M17.12 PRIMARY OSTEOARTHRITIS OF LEFT KNEE: ICD-10-CM

## 2021-05-05 DIAGNOSIS — S83.241D TEAR OF MEDIAL MENISCUS OF RIGHT KNEE, CURRENT, UNSPECIFIED TEAR TYPE, SUBSEQUENT ENCOUNTER: Primary | ICD-10-CM

## 2021-05-05 PROCEDURE — 99213 OFFICE O/P EST LOW 20 MIN: CPT | Performed by: PHYSICIAN ASSISTANT

## 2021-05-05 RX ORDER — TRAMADOL HYDROCHLORIDE 50 MG/1
50 TABLET ORAL EVERY 6 HOURS PRN
Qty: 28 TABLET | Refills: 0 | Status: SHIPPED | OUTPATIENT
Start: 2021-05-05 | End: 2021-05-12

## 2021-05-05 NOTE — PROGRESS NOTES
Patient Name: Terri Bernal  Medical Record Number: [id number]  YOB: 1955  Date of Encounter: 5/5/2021     Chief Complaint   Patient presents with    Knee Pain     f/u b/l knee, right > left, monovisc given on 2/15/2021 offered no relief, would like to discuss other options. History of Present Illness:   Mr. Terri Bernal is here in follow up regarding his chronic bilateral knee pain. He presents today stating his right knee pain is much worse than his left. He has known advanced degenerative changes of the left knee and has suspected medial meniscal pathology of the right knee. He states his right knee pain starting to keep him awake at night. He is starting to experience locking, catching and instability of the right knee. He states he has started having to walk on the stairs backwards because of his right knee pain. He received bilateral knee cortisone injections in November 2020 which did provide good pain relief. He had bilateral Monovisc injections on 2/15/2021 which she states provided very little pain relief. The patient's past medical history, medications, allergies, family history, social history, and review of systems have been reviewed, and dated and are recorded in the chart under the 'MEDIA\" tab. Physical Exam:    Mr. Terri Bernal appears well, he is in no apparent distress, he demonstrates appropriate mood & affect. He is alert and oriented to person, place and time. Ht 6' (1.829 m)   Wt 269 lb 6.4 oz (122.2 kg)   BMI 36.54 kg/m²     On examination of patient's knees bilaterally there is really no swelling or joint effusion. He denies tenderness on palpation of the left knee. He has tenderness on palpation over the medial joint line of the right knee. Range of motion is 0 to 120 degrees with 5/5 motor strength. Orders:  Orders Placed This Encounter   Procedures    MRI KNEE RIGHT WO CONTRAST       Impression:   Diagnosis Orders   1.  Tear of medial meniscus of right knee, current, unspecified tear type, subsequent encounter  traMADol (ULTRAM) 50 MG tablet    MRI KNEE RIGHT WO CONTRAST   2. Primary osteoarthritis of left knee         Treatment Plan:    Patient presented today concerned more about his worsening chronic right knee pain. He has started experiencing more locking, catching and instability of the right knee. He has had to start walking down the stairs backwards because of his right knee pain. He states the pain is starting to keep him awake at night. He clinically has medial meniscal pathology. X-rays show only mild arthritic changes. He has advanced degenerative changes of the left knee and states his chronic left knee pain is stable. We will proceed to MRI of the right knee to further evaluate. He is given a short prescription of Ultram as he states he is having difficulty sleeping because of the pain. 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 Patient is advised to follow back after MRI. Cecelia Mcdonald was informed of the results of any imaging. We discussed treatment options and a time was given to answer questions. A plan was proposed and Cecelia Mcdonald understand and accepts this course of care. Electronically signed by Leila Shoemaker PA-C on 0/5/1536  Board Certified [facility]    Please note that portions of this note were completed with a voice recognition program.  Efforts were made to edit the dictations but occasionally words are mis-transcribed.

## 2021-05-11 ENCOUNTER — TELEPHONE (OUTPATIENT)
Dept: ORTHOPEDIC SURGERY | Age: 66
End: 2021-05-11

## 2021-05-18 ENCOUNTER — HOSPITAL ENCOUNTER (OUTPATIENT)
Dept: MRI IMAGING | Age: 66
Discharge: HOME | End: 2021-05-18
Payer: COMMERCIAL

## 2021-05-18 DIAGNOSIS — S83.241D TEAR OF MEDIAL MENISCUS OF RIGHT KNEE, CURRENT, UNSPECIFIED TEAR TYPE, SUBSEQUENT ENCOUNTER: ICD-10-CM

## 2021-05-18 PROCEDURE — 73721 MRI JNT OF LWR EXTRE W/O DYE: CPT

## 2021-05-19 ENCOUNTER — TELEPHONE (OUTPATIENT)
Dept: ORTHOPEDIC SURGERY | Age: 66
End: 2021-05-19

## 2021-06-07 ENCOUNTER — OFFICE VISIT (OUTPATIENT)
Dept: ORTHOPEDIC SURGERY | Age: 66
End: 2021-06-07
Payer: COMMERCIAL

## 2021-06-07 ENCOUNTER — OFFICE VISIT (OUTPATIENT)
Dept: FAMILY MEDICINE CLINIC | Age: 66
End: 2021-06-07
Payer: COMMERCIAL

## 2021-06-07 VITALS
OXYGEN SATURATION: 98 % | BODY MASS INDEX: 37.52 KG/M2 | WEIGHT: 277 LBS | DIASTOLIC BLOOD PRESSURE: 87 MMHG | SYSTOLIC BLOOD PRESSURE: 134 MMHG | HEART RATE: 68 BPM | HEIGHT: 72 IN

## 2021-06-07 VITALS — RESPIRATION RATE: 16 BRPM | WEIGHT: 277 LBS | BODY MASS INDEX: 37.52 KG/M2 | HEIGHT: 72 IN

## 2021-06-07 DIAGNOSIS — I25.10 CORONARY ARTERY DISEASE INVOLVING NATIVE CORONARY ARTERY OF NATIVE HEART WITHOUT ANGINA PECTORIS: ICD-10-CM

## 2021-06-07 DIAGNOSIS — G47.33 OBSTRUCTIVE SLEEP APNEA: ICD-10-CM

## 2021-06-07 DIAGNOSIS — E78.5 DYSLIPIDEMIA: ICD-10-CM

## 2021-06-07 DIAGNOSIS — S83.241A TEAR OF MEDIAL MENISCUS OF RIGHT KNEE, CURRENT, INITIAL ENCOUNTER: Primary | ICD-10-CM

## 2021-06-07 DIAGNOSIS — N20.1 URETERAL CALCULI: ICD-10-CM

## 2021-06-07 DIAGNOSIS — E11.9 TYPE 2 DIABETES MELLITUS WITHOUT COMPLICATION, WITHOUT LONG-TERM CURRENT USE OF INSULIN (HCC): Primary | ICD-10-CM

## 2021-06-07 LAB
CREATININE URINE POCT: 50
HBA1C MFR BLD: 7.2 %
MICROALBUMIN/CREAT 24H UR: 30 MG/G{CREAT}
MICROALBUMIN/CREAT UR-RTO: NORMAL

## 2021-06-07 PROCEDURE — 99213 OFFICE O/P EST LOW 20 MIN: CPT | Performed by: ORTHOPAEDIC SURGERY

## 2021-06-07 PROCEDURE — 82044 UR ALBUMIN SEMIQUANTITATIVE: CPT | Performed by: FAMILY MEDICINE

## 2021-06-07 PROCEDURE — 99214 OFFICE O/P EST MOD 30 MIN: CPT | Performed by: FAMILY MEDICINE

## 2021-06-07 PROCEDURE — 3051F HG A1C>EQUAL 7.0%<8.0%: CPT | Performed by: FAMILY MEDICINE

## 2021-06-07 PROCEDURE — 83036 HEMOGLOBIN GLYCOSYLATED A1C: CPT | Performed by: FAMILY MEDICINE

## 2021-06-07 RX ORDER — CARBAMAZEPINE 200 MG/1
200 TABLET ORAL EVERY EVENING
Qty: 30 TABLET | Refills: 3 | Status: SHIPPED | OUTPATIENT
Start: 2021-06-07 | End: 2021-06-25

## 2021-06-07 SDOH — ECONOMIC STABILITY: FOOD INSECURITY: WITHIN THE PAST 12 MONTHS, THE FOOD YOU BOUGHT JUST DIDN'T LAST AND YOU DIDN'T HAVE MONEY TO GET MORE.: NEVER TRUE

## 2021-06-07 SDOH — ECONOMIC STABILITY: FOOD INSECURITY: WITHIN THE PAST 12 MONTHS, YOU WORRIED THAT YOUR FOOD WOULD RUN OUT BEFORE YOU GOT MONEY TO BUY MORE.: NEVER TRUE

## 2021-06-07 ASSESSMENT — SOCIAL DETERMINANTS OF HEALTH (SDOH): HOW HARD IS IT FOR YOU TO PAY FOR THE VERY BASICS LIKE FOOD, HOUSING, MEDICAL CARE, AND HEATING?: NOT HARD AT ALL

## 2021-06-07 NOTE — LETTER
[facility]  Surgery Scheduling Form:  [facility]    DEMOGRAPHICS:                                                                                                              .    Patient Name:  Christine Somers  Patient :  1955   Patient SS#:      Patient Phone:  [phone] (home) [phone] (work) Alt. Patient Phone:    Patient Address:  [address]    PCP:  Chase Sanders MD  Insurance:  Payor: [email or web address] / Plan: [employer]  / Product Type: *No Product type* /   Insurance ID Number:  Payor/Plan Subscr  Sex Relation Sub. Ins. ID Effective Group Num   1. Roxanna Paci 1948 Female Spouse [id number] 1/1/15 [id number]                                    [address] [id number]       DIAGNOSIS & PROCEDURE:                                                                                            .    Diagnosis:   Right medial meniscus tear S83.241A  Operation:  Right knee arthroscopy partial medial mensicectomy 61010  Location: [facility]  Provider:  LOUIE Sotomayor INFORMATION:                                                                                         .    Requested Date:  7-15-21    OR Time:  8:30                      Patient Arrival Time:  6:30  OR Time Required:  30  Minutes  Anesthesia:  General  Equipment:    Mini C-Arm:  No   Standard C-Arm:  No   Status:  Outpatient  PAT Required:  No  Comments:Allergies: Fexofenadine-pseudoephed er, Lisinopril, Morphine, Zithromax [azithromycin], and Losartan  Body mass index is 37.57 kg/m².        21   BILLING INFORMATION:                                                                                                    .    Procedure:       CPT Code Modifier  Right knee arthroscopy partial medial mensicectomy        ORTHOPAEDIC SURGERY PRE-SURGICAL PHYSICIAN ORDERS    Christine Somers  1955  Date of Surgery: 7-15-21 Right knee arthroscopy partial medial mensicectomy    Fexofenadine-pseudoephed er, Lisinopril, Morphine, Zithromax [azithromycin], and Losartan  History & Physical: [ ] By Surgeon   By PCP [facility] ]  Referrals:  [ ] Medical/Cardiac Clearance by _____________________________  [ ] Joint Replacement Class  [ ] Physical Therapy  [ ] Crutch Walking Instructions   Weight Bearing Status _____________  [ ] Occupational Therapy  [facility] ] Smoking Cessation Instructions  [ ] Other ________________________________________________    Pre Admission Testing:  [ ] Hemoglobin & Hematocrit   [ ] Comprehensive Metabolic Panel  [ ] CBC with differential            [ ] Type & Screen  [ ] CBC without differential       [ ] Type & Crossmatch _____ units  [ ] PT/INR                                   [ ] Autologous Blood _____ units  [ ] PTT                                        [ ]  EKG  [ ] Urinalysis                               [facility] ]  Other Anesthesia guidelines  [ ] Urinalysis with C & S  [ ] Basic Metabolic Panel         [ ] MRSA-nasal    Orders to be initiated upon admission to same day surgery:  [facility] ] Fasting blood sugar by fingerstick [ ] Billee Links  [facility] ] PT/INR (pt takes Warafin/Coumadin)     [ ] Interscalene Right or Left  [facility] ] HCG _X___ Urine _____ Serum              [ ] Femoral Right or Left  [X] Rapid Covid test if patient does not have proof of vaccination  [ ] Other ______________________________________________    IV Fluids and Medications:  1. Place IV catherer for IV access. The RN may use 0.1ml of 1% Lidocaine SQ      Per site for IV starts up to maximum of 0.5ml.  2. Infuse Lactated Ringers IV fluid at 50ml per hour. For diabetic or renal impaired patient, infuse 0.9% Normal Saline at 50ml/hr. 3. Cefazolin 2g IV <119.9kg (264lbs) OR 3g if >120kg (305IDA), given within one hour of incision time. For those allergic to Cephalosporins, give Clindamycin 900mg IV within one hour of incision time.      4. Other medications:

## 2021-06-07 NOTE — PROGRESS NOTES
Love Briones is a 61 y.o. male. HPI: Here for complex medical visit  He is essentially retired so less stressed not eating out on the road and been going to the gym 3 times a week  He is hoping to get off some of his medications  He has had some left temporal facial numbness feeling like bugs are crawling on his scalp in his ear pretty consistently for the last several weeks  Try to chiropractor it did not help  Right knee has been bothersome since he tweaked it and he has been to orthopedics  Left knee is bad and will at some point need an knee replacement because he was told is bone-on-bone  Right knee MRI did show a torn medial meniscus and some pes anserine bursitis  Knee is unstable when he walks down stairs he has to go backwards with the knee will give out and he will fall  Only taking 1 gabapentin a day and does not have any burning or stinging in the feet  Sees cardiology every 6 months and has no chest pain  Meds, vitamins and allergies reviewed with pt    ROS: No TIA's or unusual headaches, no dysphagia. No prolonged cough. No dyspnea or chest pain on exertion. No abdominal pain, change in bowel habits, black or bloody stools. No urinary tract symptoms. No new or unusual musculoskeletal symptoms. Prior to Visit Medications    Medication Sig Taking?  Authorizing Provider   amLODIPine (NORVASC) 10 MG tablet TAKE 1 TABLET EVERY DAY Yes Kenna Gunn MD   glimepiride (AMARYL) 4 MG tablet TAKE 1 TABLET TWICE DAILY Yes Kenna Gunn MD   hydroCHLOROthiazide (HYDRODIURIL) 25 MG tablet TAKE 1 TABLET EVERY DAY Yes Kenna Gunn MD   carvedilol (COREG) 6.25 MG tablet TAKE 1 TABLET TWICE DAILY WITH MEALS Yes Kenna Gunn MD   acetaminophen (TYLENOL) 325 MG tablet Take 650 mg by mouth every 6 hours as needed for Pain Yes Historical Provider, MD   canagliflozin (INVOKANA) 300 MG TABS tablet TAKE 1 TABLET EVERY MORNING BEFORE BREAKFAST Yes Kenna Gunn MD   metFORMIN (GLUCOPHAGE) 1000 MG tablet TAKE 1 TABLET TWICE DAILY WITH MEALS Yes Roman Hyatt MD   lovastatin (MEVACOR) 40 MG tablet TAKE 1 TABLET EVERY NIGHT Yes Roman Hyatt MD   diphenhydrAMINE (BENADRYL) 25 MG tablet Take 25 mg by mouth nightly as needed for Sleep  Yes Historical Provider, MD   Multiple Vitamins-Minerals (MULTIVITAMIN PO) Take by mouth Yes Historical Provider, MD   aspirin 81 MG tablet Take 81 mg by mouth daily Yes Historical Provider, MD   cyclobenzaprine (FLEXERIL) 10 MG tablet Take 1 tablet by mouth 2 times daily as needed for Muscle spasms  Navdeep Block PA-C   gabapentin (NEURONTIN) 100 MG capsule TAKE 1 CAPSULE TWICE DAILY  Roman Hyatt MD       Past Medical History:   Diagnosis Date    CAD (coronary artery disease)     Disc disorder     ED (erectile dysfunction)     Hyperlipidemia     Hyperlipidemia     Hypertension     MI [facility]ocardial infarction) [facility] [address]     Obstructive sleep apnea     Other disorders of kidney and ureter in diseases classified elsewhere     Pneumonia 1970s    Polyp of colon     Renal calculi     Type 2 diabetes mellitus without complication, without long-term current use of insulin ([facility] [address] 5/16/2019    Type 2 diabetes mellitus without complication, without long-term current use of insulin ([address] 5/16/2019    Type II or unspecified type diabetes mellitus without mention of complication, not stated as uncontrolled        Social History     Tobacco Use    Smoking status: Former Smoker     Packs/day: 0.25     Years: 30.00     Pack years: 7.50     Types: Cigarettes, Cigars     Quit date: 4/9/2011     Years since quitting: 10.1    Smokeless tobacco: Never Used    Tobacco comment: cigars every once in a while    Vaping Use    Vaping Use: Never used   Substance Use Topics    Alcohol use:  Yes     Alcohol/week: 1.0 standard drinks     Types: 1 Cans of beer per week     Comment: social    Drug use: No       Family History   Problem Relation Age of Onset  Diabetes Father     Mental Illness Mother     High Blood Pressure Brother     Heart Disease Brother     High Cholesterol Neg Hx        Allergies   Allergen Reactions    Fexofenadine-Pseudoephed Er Other (See Comments)     cough    Lisinopril     Morphine Hives    Zithromax [Azithromycin]      Heart palpitations     Losartan      Dry c ough       OBJECTIVE:  /87   Pulse 68   Ht 6' (1.829 m)   Wt 277 lb (125.6 kg)   SpO2 98%   BMI 37.57 kg/m²   GEN:  in NAD, obese weight up 7 or 8 pounds  HEENT:  NCAT, TMs:normal and throat: clear some wax in the left ear   NECK:  Supple without adenopathy. No bruits  CV:  Regular rate and rhythm, S1 and S2 normal, no murmurs, clicks  PULM:  Chest is clear, no wheezing ,  symmetric air entry throughout both lung fields. EXT: No rash, trace bilateral pitting ankle edema  NEURO: nonfocal  a1c - 7.2  ASSESSMENT/PLAN:  1. Type 2 diabetes mellitus without complication, without long-term current use of insulin ([facility])  stable  -  DIABETES FOOT EXAM  - POCT glycosylated hemoglobin (Hb A1C)  - POCT microalbumin    2. Obstructive sleep apnea  Not using CPAP  Further weight loss suggested    3. Ureteral calculi  No recent stones  Maintain hydration    4. Coronary artery disease involving native coronary artery of native heart without angina pectoris  Stable continue aggressive risk factor modification    5.  Dyslipidemia  Stable, repeat lipid panel in 3 months    6 right torn med meniscus - see ortho today      7 left facial numbness  Trial off gabapentin for 2 weeks    8 immunizations up-to-date

## 2021-06-07 NOTE — PROGRESS NOTES
Balaji Luna MD  [address]  [address]  [address] [facility]  [address]    Bernie White  [id number]  Encounter Date: 6/7/2021  YOB: 1955    Chief Complaint   Patient presents with    Follow-up     right knee - MRI results today       History:Mr. Bernie White is here in follow up regarding his right knee pain. Since his last visit, he has proceeded with a MRI of the right knee on 5/18/2021 and is here today to review the results. He complains of his right knee catching to the point that he ends up tripping and falling when going down steps. He feels that he is so unstable, that he avoids walking. He feels that he is favoring his left knee. His pain keeps him awake at night and is focused on the medial aspect. Occasionally, he expeirneces pain on the lateral aspect. He has proceeded with an arthroscopic surgery on the left side prior. He would like to discuss if he would be able to continue to work out. Exam:  Resp 16   Ht 6' (1.829 m)   Wt 277 lb (125.6 kg)   BMI 37.57 kg/m²   General: Alert and oriented x3, No acute distress, Cooperative and conversant. Obesity Present  Mood and affect are appropriate. Right Knee : On examination of patient's knees bilaterally there is really no swelling or joint effusion. He denies tenderness on palpation of the left knee. He has tenderness on palpation over the medial joint line of the right knee. Range of motion is 0 to 120 degrees with 5/5 motor strength.       Radiology:     MRI of the right knee was obtained on 5/18/21 and reviewed today in office:  Impression   Horizontal tear of the medial meniscus posterior horn with volume loss and   extrusion of the body.       Mild-to-moderate distention of the pes anserine bursa with mild distention of   the deep MCL bursa.  Findings suspicious for bursitis.       Degenerative changes predominantly in the medial tibiofemoral compartment   where there is partial-thickness cartilage loss and subchondral reactive   marrow changes. Assessment:   Diagnosis Orders   1. Tear of medial meniscus of right knee, current, initial encounter         Orders  No orders of the defined types were placed in this encounter. Plan:  I have discussed treatment options with the patient. After reviewing his MRI, it shows degenerative changes consistent with arthritis in the knee, as well as a tear in the medial meniscus. With these results there are different options for treatment. There is the option to proceed with an arthroscopic surgery to remove the torn tissue. However, with the surgery there is the possibility that he could develop more arthritis in his knee afterward. With proceeding with an arthroscopic surgery, he would be able to walk on it the same day, while using crutches. He may need to continue the crutches for a few days after surgery to make sure he is stable. Typically around 4-5 weeks after surgery, he will be able to go back to normal activities. With the arthroscopic surgery, it won't remove his arthritic changes, which typically occurs at night. However, it should improve the locking and catching symptoms when walking. I explained that some patients in his category will go ahead and choose to proceed with total joint arthroplasty. He understands total joint replacement does carry higher risks and a longer recovery. He would like to put this off for now. Radiographically at least his left knee does show moderate arthritic changes as well which on plain films are worse than his symptomatic right knee. Hopefully with arthroscopic intervention he can still function well with his underlying arthritis once the symptomatic meniscal tear is addressed. He still may require injections after the surgery to try and treat his arthritic pain.          We discussed the option of proceeding onto elective right knee arthroscopy. I reviewed with he the nature the procedure as well as the risks and advantages of arthroscopic surgery. We reviewed the risks of surgery and he understands that they include but are not limited to: infection, risk to neurovascular structures, stiffness and pain, potential for further surgery, anesthetic misadventure, osseous complication, medical complications such as heart attacks, strokes, blood clots and pneumonia all of which could threaten his life or limb. We reviewed the projected timeline for recovery and all post-operative restrictions. Unless there are items that we need to address through testing that would delay his surgery, at this point I will see his at the time of surgery and his will follow back. Should additional questions arise prior to surgery, he was asked to call the office for any clarifications that are necessary. He understands and accepts this course of care. By signing my name below, Ness Waters, attest that this documentation has been prepared under the direction and in the presence of Margaret Valdovnios MD.   Electronically Signed: Shoaib Chatman, 6/7/21, 8:22 AM EDT. Donnie Mejia MD, personally performed the services described in this documentation. All medical record entries made by the lisaibe were at my direction and in my presence. I have reviewed the chart and discharge instructions (if applicable) and agree that the record reflects my personal performance and is accurate and complete. Margaret Valdovinos MD, 6/27/21, 12:50 PM EDT. Documentation was done using voice recognition dragon software. Every effort was made to ensure accuracy; however, inadvertent  Unintentional computerized transcription errors may be present.

## 2021-06-16 ENCOUNTER — TELEPHONE (OUTPATIENT)
Dept: FAMILY MEDICINE CLINIC | Age: 66
End: 2021-06-16

## 2021-06-16 NOTE — TELEPHONE ENCOUNTER
----- Message from Katiexavier Ledesma sent at 6/16/2021 11:55 AM EDT -----  Subject: Appointment Request    Reason for Call: Routine Pre-Op    QUESTIONS  Type of Appointment? Established Patient  Reason for appointment request? Available appointments did not meet   patient need  Additional Information for Provider? Patient having surgery July 15. Patient needs to have pre-op appointment before.  ---------------------------------------------------------------------------  --------------  CALL BACK INFO  What is the best way for the office to contact you? OK to leave message on   voicemail  Preferred Call Back Phone Number? [phone]  ---------------------------------------------------------------------------  --------------  SCRIPT ANSWERS  Relationship to Patient? Self  Appointment reason? Symptomatic  Select script based on patient symptoms? Adult Pre-Op  Do you have question for your provider that need to be answered prior to   scheduling your pre-op appointment? No  Have you been diagnosed with, awaiting test results for, or told that you   are suspected of having COVID-19 (Coronavirus)? (If patient has tested   negative or was tested as a requirement for work, school, or travel and   not based on symptoms, answer no)? No  Do you currently have flu-like symptoms including fever or chills, cough,   shortness of breath, difficulty breathing, or new loss of taste or smell? No  Have you had close contact with someone with COVID-19 in the last 14 days? No  (Service Expert  click yes below to proceed with [employer]   Scheduling)?  Yes

## 2021-06-16 NOTE — TELEPHONE ENCOUNTER
----- Message from kely Pascual sent at 6/16/2021 11:55 AM EDT -----  Subject: Appointment Request    Reason for Call: Routine Pre-Op    QUESTIONS  Type of Appointment? Established Patient  Reason for appointment request? Available appointments did not meet   patient need  Additional Information for Provider? Patient having surgery July 15. Patient needs to have pre-op appointment before.  ---------------------------------------------------------------------------  --------------  CALL BACK INFO  What is the best way for the office to contact you? OK to leave message on   voicemail  Preferred Call Back Phone Number? [phone]  ---------------------------------------------------------------------------  --------------  SCRIPT ANSWERS  Relationship to Patient? Self  Appointment reason? Symptomatic  Select script based on patient symptoms? Adult Pre-Op  Do you have question for your provider that need to be answered prior to   scheduling your pre-op appointment? No  Have you been diagnosed with, awaiting test results for, or told that you   are suspected of having COVID-19 (Coronavirus)? (If patient has tested   negative or was tested as a requirement for work, school, or travel and   not based on symptoms, answer no)? No  Do you currently have flu-like symptoms including fever or chills, cough,   shortness of breath, difficulty breathing, or new loss of taste or smell? No  Have you had close contact with someone with COVID-19 in the last 14 days? No  (Service Expert  click yes below to proceed with [employer]   Scheduling)?  Yes

## 2021-06-21 ENCOUNTER — TELEPHONE (OUTPATIENT)
Dept: CARDIOLOGY CLINIC | Age: 66
End: 2021-06-21

## 2021-06-24 ENCOUNTER — TELEPHONE (OUTPATIENT)
Dept: ORTHOPEDIC SURGERY | Age: 66
End: 2021-06-24

## 2021-06-24 NOTE — PROGRESS NOTES
[address]  6/25/21  Referring: Dr. Tom Dhillon CONSULT/CHIEF COMPLAINT/HPI     Reason for visit/ Chief complaint  preop exam  CAD   HPI Terri Bernal is a 61 y.o. seen as a new patient to me, followed by Denisa Henry, for clearance for orthopedic surgery. He has a history of IWMI, with a PTCA  to prox and distal RCA in 2011. Also treated for hypertension, hyperlipidemia. He is diabetic. He quit smoking several years ago. His father and brother both have CAD, brother diagnosed in early 52's. Prior his stenting he had arm pain/fatigue while cutting grass. He has not had any of this since. Today he states he is doing well. He has no chest pain, shortness of breath palpitations dizziness or syncope. He retired, feels well. Able to walk up five flights of stairs without stopping. No chest pain, shortness of breath, palpitations or chest pain. Last time he did this was yesterday. Patient is compliant with medications and is tolerating them well without side effects     HISTORY/ALLERGIES/ROS     MedHx:  has a past medical history of CAD (coronary artery disease), Disc disorder, ED (erectile dysfunction), Hyperlipidemia, Hyperlipidemia, Hypertension, MI (myocardial infarction) ([address] Obstructive sleep apnea, Other disorders of kidney and ureter in diseases classified elsewhere, Pneumonia, Polyp of colon, Renal calculi, Type 2 diabetes mellitus without complication, without long-term current use of insulin ([address] Type 2 diabetes mellitus without complication, without long-term current use of insulin ([address] and Type II or unspecified type diabetes mellitus without mention of complication, not stated as uncontrolled. SurgHx:  has a past surgical history that includes Tonsillectomy and adenoidectomy; Carpal tunnel release; Coronary angioplasty with stent; Colonoscopy (12/2/13); Colonoscopy (12/2/13); Abdomen surgery; fracture surgery; Cystoscopy (Left, 12/12/14);  Carpal tunnel release (Left, 10/27/2020); and Arm Surgery (Left, 10/27/2020). SocHx:  reports that he quit smoking about 10 years ago. His smoking use included cigarettes and cigars. He has a 7.50 pack-year smoking history. He has never used smokeless tobacco. He reports current alcohol use of about 1.0 standard drinks of alcohol per week. He reports that he does not use drugs. FamHx: No family history of premature coronary artery disease, sudden death, or aneurysm  Allergies: Fexofenadine-pseudoephed er, Lisinopril, Morphine, Zithromax [azithromycin], and Losartan   ROS:   Review of Systems   Constitutional: Positive for fatigue. Negative for activity change, diaphoresis and fever. HENT: Negative for congestion and ear discharge. Eyes: Negative for photophobia and visual disturbance. Respiratory: Negative for cough, choking, chest tightness and shortness of breath. Cardiovascular: Negative for chest pain and palpitations. Gastrointestinal: Negative for abdominal distention, abdominal pain, blood in stool and nausea. Endocrine: Negative for cold intolerance and polydipsia. Genitourinary: Negative for difficulty urinating and flank pain. Musculoskeletal: Positive for arthralgias and myalgias. Skin: Negative for rash and wound. Allergic/Immunologic: Negative for environmental allergies and immunocompromised state. Neurological: Negative for dizziness and headaches. Hematological: Negative for adenopathy. Does not bruise/bleed easily. Psychiatric/Behavioral: Negative for confusion. The patient is not hyperactive. MEDICATIONS      Prior to Admission medications    Medication Sig Start Date End Date Taking?  Authorizing Provider   amLODIPine (NORVASC) 10 MG tablet TAKE 1 TABLET EVERY DAY 4/12/21  Yes Jennifer Velasquez MD   glimepiride (AMARYL) 4 MG tablet TAKE 1 TABLET TWICE DAILY 3/1/21  Yes Jennifer Velasquez MD   hydroCHLOROthiazide (HYDRODIURIL) 25 MG tablet TAKE 1 TABLET EVERY DAY 2/17/21  Yes Marcelina Barber MD   carvedilol (COREG) 6.25 MG tablet TAKE 1 TABLET TWICE DAILY WITH MEALS 2/17/21  Yes Marcelina Barber MD   canagliflozin (INVOKANA) 300 MG TABS tablet TAKE 1 TABLET EVERY MORNING BEFORE BREAKFAST 6/4/20  Yes Marcelina Barber MD   metFORMIN (GLUCOPHAGE) 1000 MG tablet TAKE 1 TABLET TWICE DAILY WITH MEALS 6/4/20  Yes Marcelina Barber MD   lovastatin (MEVACOR) 40 MG tablet TAKE 1 TABLET EVERY NIGHT 6/4/20  Yes Marcelina Barber MD   diphenhydrAMINE (BENADRYL) 25 MG tablet Take 25 mg by mouth nightly as needed for Sleep    Yes Historical Provider, MD   Multiple Vitamins-Minerals (MULTIVITAMIN PO) Take by mouth   Yes Historical Provider, MD   aspirin 81 MG tablet Take 81 mg by mouth daily   Yes Historical Provider, MD       PHYSICAL EXAM        Vitals:    06/25/21 0947   BP: 136/78   Pulse: 80   SpO2: 98%    Weight: 271 lb (122.9 kg)     Gen Alert, cooperative, no distress Heart  Regular rate and rhythm, no murmur   Head Normocephalic, atraumatic, no abnormalities Abd  Soft, NT, +BS, no mass, no OM   Eyes PERRLA, conj/corn clear Ext  Ext nl, AT, no C/C, no edema   Nose Nares normal, no drain age, Non-tender Pulse 2+ and symmetric   Throat Lips, mucosa, tongue normal Skin Color/text/turg nl, no rash/lesions   Neck S/S, TM, NT, no bruit Psych Nl mood and affect   Lung CTA-B, unlabored, no DTP     Ch wall NT, no deform       LABS and Imaging     Relevant and available CV data reviewed  Echo/MRI: 2014  Summary   -Normal left ventricle size, wall thickness and systolic function with an   estimated ejection fraction of 55%. -The aortic valve appears sclerotic but opens well. -There is mild tricuspid regurgitation with RVSP estimated at 27 mmHg. Cath: Last Angiogram: April '11: [facility]  PCI of the RCA with BMS to mid (culprit) and proximal eccentric plaque. Inferior wall, HK; Low normal LVEF at 50%  Holter:none  EKG:  NSR.  Personally interpreted  Stress: 1/29/19     Summary    Normal myocardial perfusion study.    Normal LV size and systolic function. moderate Risk  moderate Complexity/Medical Decision Making  Outside records Reviewed  Labs Reviewed  Prior Imaging, ekg, cath, echo reviewed when available  Medications reviewed  Old Notes reviewed  ASSESSMENT AND PLAN      1. preop exam  - RCRI of 1  Plan  - continue aspirin and coreg perioperatively  - patient is capable of greater than 4 mets without active cardiac condition (no chest pain, arrhythmia, chf, or valvular disease). No further testing indicated per acc/aha 2014 perioperative guidelines  - he is at low risk for low to moderate risk surgery July 15 Dr. Kathy Muñoz right knee    2. Knee pain  - new problem  Plan:  - surgery planned    3. CAD  - stable  Plan  - echo to reevaluate EF  - recommend weight loss and exercise 150 minutes a week    4. Hypertension  - stable  Plan:  136/78  - on norvasc    5. Mixed Hyperlipidemia  - on lovastatin  Plan  - change to lipitor 40 as patient should be on high intensity statin with history of cad      Patient counseled on lifestyle modification, diet, and exercise. Follow Up:   6 months    Dr. Nico Calixto:  I, Ivonne Ta, am scribing for and in the presence of Greg Diaz MD.   Speedy Clark 06/25/21 10:32 AM   Physician Attestation  The scribe for and in the presence of jaki Badillo DO). The scribe Ambar Tavares may have prepopulated components of this note with my historical  intellectual property under my direct supervision. Any additions to this intellectual property were performed in my presence and at my direction.   Furthermore, the content and accuracy of this note have been reviewed by me Bryant Badillo DO).  6/25/2021 4:48 PM

## 2021-06-25 ENCOUNTER — OFFICE VISIT (OUTPATIENT)
Dept: CARDIOLOGY CLINIC | Age: 66
End: 2021-06-25
Payer: COMMERCIAL

## 2021-06-25 VITALS
WEIGHT: 271 LBS | SYSTOLIC BLOOD PRESSURE: 136 MMHG | HEIGHT: 72 IN | OXYGEN SATURATION: 98 % | BODY MASS INDEX: 36.7 KG/M2 | HEART RATE: 80 BPM | DIASTOLIC BLOOD PRESSURE: 78 MMHG

## 2021-06-25 DIAGNOSIS — I25.10 CORONARY ARTERY DISEASE INVOLVING NATIVE HEART WITHOUT ANGINA PECTORIS, UNSPECIFIED VESSEL OR LESION TYPE: ICD-10-CM

## 2021-06-25 DIAGNOSIS — Z01.810 PREOPERATIVE CARDIOVASCULAR EXAMINATION: Primary | ICD-10-CM

## 2021-06-25 DIAGNOSIS — E78.2 MIXED HYPERLIPIDEMIA: ICD-10-CM

## 2021-06-25 DIAGNOSIS — I10 ESSENTIAL HYPERTENSION: ICD-10-CM

## 2021-06-25 PROCEDURE — 93000 ELECTROCARDIOGRAM COMPLETE: CPT | Performed by: INTERNAL MEDICINE

## 2021-06-25 PROCEDURE — 99214 OFFICE O/P EST MOD 30 MIN: CPT | Performed by: INTERNAL MEDICINE

## 2021-06-25 RX ORDER — ATORVASTATIN CALCIUM 40 MG/1
40 TABLET, FILM COATED ORAL DAILY
Qty: 90 TABLET | Refills: 3 | Status: SHIPPED | OUTPATIENT
Start: 2021-06-25 | End: 2022-03-10 | Stop reason: SDUPTHER

## 2021-06-25 ASSESSMENT — ENCOUNTER SYMPTOMS
ABDOMINAL DISTENTION: 0
COUGH: 0
SHORTNESS OF BREATH: 0
ABDOMINAL PAIN: 0
CHEST TIGHTNESS: 0
NAUSEA: 0
PHOTOPHOBIA: 0
CHOKING: 0
BLOOD IN STOOL: 0

## 2021-06-25 NOTE — PATIENT INSTRUCTIONS
Echo   Stop lovastatin and start lipitor  Follow up with Casie in 6 months  Call for any changes  preop clearance electronically sent to Dr Carlin Melendez weight loss and 150 minutes of exercise a week

## 2021-06-25 NOTE — Clinical Note
Doing well from heart perspective  Okay for knee surgery.  Will increase intensity of statin given cad and diabetes

## 2021-06-28 ENCOUNTER — OFFICE VISIT (OUTPATIENT)
Dept: FAMILY MEDICINE CLINIC | Age: 66
End: 2021-06-28
Payer: COMMERCIAL

## 2021-06-28 VITALS
WEIGHT: 274 LBS | OXYGEN SATURATION: 98 % | HEIGHT: 72 IN | HEART RATE: 79 BPM | DIASTOLIC BLOOD PRESSURE: 77 MMHG | SYSTOLIC BLOOD PRESSURE: 116 MMHG | BODY MASS INDEX: 37.11 KG/M2

## 2021-06-28 DIAGNOSIS — R20.2 PARESTHESIAS: ICD-10-CM

## 2021-06-28 DIAGNOSIS — Z01.811 PRE-OP CHEST EXAM: Primary | ICD-10-CM

## 2021-06-28 DIAGNOSIS — I25.10 CORONARY ARTERY DISEASE INVOLVING NATIVE CORONARY ARTERY OF NATIVE HEART WITHOUT ANGINA PECTORIS: ICD-10-CM

## 2021-06-28 DIAGNOSIS — G47.33 OBSTRUCTIVE SLEEP APNEA: ICD-10-CM

## 2021-06-28 DIAGNOSIS — E78.5 DYSLIPIDEMIA: ICD-10-CM

## 2021-06-28 DIAGNOSIS — I10 ESSENTIAL HYPERTENSION: ICD-10-CM

## 2021-06-28 DIAGNOSIS — E11.9 TYPE 2 DIABETES MELLITUS WITHOUT COMPLICATION, WITHOUT LONG-TERM CURRENT USE OF INSULIN (HCC): ICD-10-CM

## 2021-06-28 PROCEDURE — 99214 OFFICE O/P EST MOD 30 MIN: CPT | Performed by: FAMILY MEDICINE

## 2021-06-28 PROCEDURE — 3051F HG A1C>EQUAL 7.0%<8.0%: CPT | Performed by: FAMILY MEDICINE

## 2021-06-28 NOTE — PROGRESS NOTES
Chief Complaint:     Meka Maldonado is a 61 y.o. male who presents for a preoperative physical examination. He is scheduled to have right knee arthroscopic surgery done by Dr. Elaine Peralta at [facility] on 7-15-21. History of Present Illness:      Right knee pain  MRi showed torn medial meniscus    Past Medical History:   Diagnosis Date    CAD (coronary artery disease)     Disc disorder     ED (erectile dysfunction)     Hyperlipidemia     Hyperlipidemia     Hypertension     MI (myocardial infarction) ([address]     Obstructive sleep apnea     Other disorders of kidney and ureter in diseases classified elsewhere     Pneumonia 1970s    Polyp of colon     Renal calculi     Type 2 diabetes mellitus without complication, without long-term current use of insulin ([address] 5/16/2019    Type 2 diabetes mellitus without complication, without long-term current use of insulin ([address] 5/16/2019    Type II or unspecified type diabetes mellitus without mention of complication, not stated as uncontrolled         Review of patient's past surgical history indicates:     Past Surgical History:   Procedure Laterality Date    ABDOMEN SURGERY      Exploratory Laparotomy    ARM SURGERY Left 10/27/2020    .  performed by Gorge Villalobos MD at [place] [address]      right and left    CARPAL TUNNEL RELEASE Left 10/27/2020    LEFT CARPAL TUNNEL RELEASE; LEFT INSITU CUBITAL TUNNEL RELEASE performed [facility] 1  12/2/13    COLONOSCOPY  12/2/13    polyps X 2 removed    CORONARY ANGIOPLASTY WITH STENT PLACEMENT      CYSTOSCOPY Left 12/12/14    CYSTOSCOPY, LEFT RETROGRADE PYELOGRAM, PLACEMENT OF LEFT    FRACTURE SURGERY      Ankle & Wrist    TONSILLECTOMY AND ADENOIDECTOMY                                                     Current Outpatient Medications   Medication Sig Dispense Refill    atorvastatin (LIPITOR) 40 MG tablet Take 1 tablet by mouth daily 90 tablet 3    amLODIPine (NORVASC) 10 MG tablet TAKE 1 TABLET EVERY DAY 90 tablet 2    glimepiride (AMARYL) 4 MG tablet TAKE 1 TABLET TWICE DAILY 180 tablet 1    hydroCHLOROthiazide (HYDRODIURIL) 25 MG tablet TAKE 1 TABLET EVERY DAY 90 tablet 2    carvedilol (COREG) 6.25 MG tablet TAKE 1 TABLET TWICE DAILY WITH MEALS 180 tablet 2    canagliflozin (INVOKANA) 300 MG TABS tablet TAKE 1 TABLET EVERY MORNING BEFORE BREAKFAST 90 tablet 2    metFORMIN (GLUCOPHAGE) 1000 MG tablet TAKE 1 TABLET TWICE DAILY WITH MEALS 180 tablet 2    lovastatin (MEVACOR) 40 MG tablet TAKE 1 TABLET EVERY NIGHT 90 tablet 2    diphenhydrAMINE (BENADRYL) 25 MG tablet Take 25 mg by mouth nightly as needed for Sleep       Multiple Vitamins-Minerals (MULTIVITAMIN PO) Take by mouth      aspirin 81 MG tablet Take 81 mg by mouth daily       No current facility-administered medications for this visit. Allergies   Allergen Reactions    Fexofenadine-Pseudoephed Er Other (See Comments)     cough    Lisinopril     Morphine Hives    Zithromax [Azithromycin]      Heart palpitations     Losartan      Dry c ough       Social History     Tobacco Use    Smoking status: Former Smoker     Packs/day: 0.25     Years: 30.00     Pack years: 7.50     Types: Cigarettes, Cigars     Quit date: 4/9/2011     Years since quitting: 10.2    Smokeless tobacco: Never Used    Tobacco comment: cigars every once in a while    Vaping Use    Vaping Use: Never used   Substance Use Topics    Alcohol use:  Yes     Alcohol/week: 1.0 standard drinks     Types: 1 Cans of beer per week     Comment: social    Drug use: No        Family History   Problem Relation Age of Onset    Diabetes Father     Mental Illness Mother     High Blood Pressure Brother     Heart Disease Brother     High Cholesterol Neg Hx         Review Of Systems    Skin: no abnormal pigmentation, rash, scaling, itching, masses, hair or nail changes  Eyes: negative  Ears/Nose/Throat: negative  Respiratory: meniscus  IMM UTD  Left facial paresthesia, bilat ulnar neuropathy  Per encounter diagnoses  He is medically cleared for surgery and anesthesia. Plan:  Has been cleared by cardiology  See neurology, still o becky for surgery  Per operating surgeon. See also orders filed with this encounter, if any.

## 2021-07-06 RX ORDER — CANAGLIFLOZIN 300 MG/1
TABLET, FILM COATED ORAL
Qty: 90 TABLET | Refills: 1 | Status: SHIPPED | OUTPATIENT
Start: 2021-07-06 | End: 2021-11-24 | Stop reason: SDUPTHER

## 2021-07-06 NOTE — TELEPHONE ENCOUNTER
Medication:   Requested Prescriptions     Pending Prescriptions Disp Refills    INVOKANA 300 MG TABS tablet [Pharmacy Med Name: Tonya Shallow 300 MG Tablet] 90 tablet 2     Sig: TAKE 1 TABLET EVERY MORNING BEFORE BREAKFAST    metFORMIN (GLUCOPHAGE) 1000 MG tablet [Pharmacy Med Name: Taylor Mendozasins 1000 MG Tablet] 180 tablet 2     Sig: TAKE 1 TABLET TWICE DAILY WITH MEALS       Last Filled:  6/4/20     Patient Phone Number: [phone] (home) [phone] (work)    Last appt: 6/28/2021 Pre op   Next appt: 9/9/2021    Last Labs DM:   Lab Results   Component Value Date    LABA1C 7.2 06/07/2021

## 2021-07-07 NOTE — PROGRESS NOTES
Name_______________________________________Printed:____________________  Date and time of surgery____7/15/2021   0700____________________Arrival Time:___0545   [facility]_____________   1. The instructions given regarding when and if a patient needs to stop oral intake prior to surgery varies. Follow the specific instructions you were given                  _X__Nothing to eat or to drink after Midnight the night before.                   ____Carbo loading or ERAS instructions will be given to select patients-if you have been given those instructions -please do the following                           The evening before your surgery after dinner before midnight drink 40 ounces of gatorade. If you are diabetic use sugar free. The morning of surgery drink 40 ounces of water. This needs to be finished 3 hours prior to your surgery start time. 2. Take the following pills with a small sip of water on the morning of surgery___AMLODIPINE, COREG________________________________________________                  Do not take blood pressure medications ending in pril or sartan the chin prior to surgery or the morning of surgery_   3. Aspirin, Ibuprofen, Advil, Naproxen, Vitamin E and other Anti-inflammatory products and supplements should be stopped for 5 -7days before surgery or as directed by your physician. 4. Check with your Doctor regarding stopping Plavix, Coumadin,Eliquis, Lovenox,Effient,Pradaxa,Xarelto, Fragmin or other blood thinners and follow their instructions. 5. Do not smoke, and do not drink any alcoholic beverages 24 hours prior to surgery. This includes NA Beer. Refrain from the usage of any recreational drugs. 6. You may brush your teeth and gargle the morning of surgery. DO NOT SWALLOW WATER   7. You MUST make arrangements for a responsible adult to stay on site while you are here and take you home after your surgery. You will not be allowed to leave alone or drive yourself home.   It is strongly suggested someone stay with you the first 24 hrs. Your surgery will be cancelled if you do not have a ride home. 8. A parent/legal guardian must accompany a child scheduled for surgery and plan to stay at the hospital until the child is discharged. Please do not bring other children with you. 9. Please wear simple, loose fitting clothing to the hospital.  Annalise Cotton not bring valuables (money, credit cards, checkbooks, etc.) Do not wear any makeup (including no eye makeup) or nail polish on your fingers or toes. 10. DO NOT wear any jewelry or piercings on day of surgery. All body piercing jewelry must be removed. 11. If you have ___dentures, they will be removed before going to the OR; we will provide you a container. If you wear ___contact lenses or ___glasses, they will be removed; please bring a case for them. 12. Please see your family doctor/pediatrician for a history & physical and/or concerning medications. Bring any test results/reports from your physician's office. PCP__________________Phone___________H&P Appt. Date________             13 If you  have a Living Will and Durable Power of  for Healthcare, please bring in a copy. 15. Notify your Surgeon if you develop any illness between now and surgery  time, cough, cold, fever, sore throat, nausea, vomiting, etc.  Please notify your surgeon if you experience dizziness, shortness of breath or blurred vision between now & the time of your surgery             15. DO NOT shave your operative site 96 hours prior to surgery. For face & neck surgery, men may use an electric razor 48 hours prior to surgery. 16. Shower the night before or morning of surgery using an antibacterial soap or as you have been instructed. 17. To provide excellent care visitors will be limited to one in the room at any given time. 18.  Please bring picture ID and insurance card.              19.  Visit our web site for additional information:  [email or web address]              20.During flu season no children under the age of 15 are permitted in the hospital for the safety of all patients. 21. If you take a long acting insulin in the evening only  take half of your usual  dose the night  before your procedure              22. If you use a c-pap please bring DOS if staying overnight,             23.For your convenience [id number] [facility] has a pharmacy on site to fill your prescriptions. 24. If you use oxygen and have a portable tank please bring it  with you the DOS             25. Bring a complete list of all your medications with name and dose include any supplements. 26. Other__________________________________________   *Please call pre admission testing if you any further questions   Jason Lauren   [address] [place] [address]. Addison Andrade  [phone]   [facility]    __ Done-Where _____  __ Scheduled ___ Where ___   __ Other __________  __X   VACCINATED-instructed to bring card DOS      VISITOR POLICY(subject to change)    There is a one visitor policy at [facility] for all surgeries and endoscopies. Whether the visitor can stay or will be asked to wait in the car will depend on the current policy and if social distancing can be maintained. The policy is subject to change at any time. Please make sure the visitor has a cell phone that is on,charged and able to accept calls, as this may be the way that the staff communicates with them. Pain management is NO VISITOR policyThe patients ride is expected to remain in the car with a cell phone for communication. If the ride is leaving the hospital grounds please make sure they are back in time for pickup. Have the patient inform the staff on arrival what their rides plans are while the patient is in the facility. At the MAIN there is one visitor allowed. Please note that the visitor policy is subject to change. All above information reviewed with patient in person or by phone. Patient verbalizes understanding. All questions and concerns addressed.                                                                                                  Patient/Rep____PATIENT________________                                                                                                                                    PRE OP INSTRUCTIONS

## 2021-07-12 RX ORDER — LOVASTATIN 40 MG/1
TABLET ORAL
Qty: 90 TABLET | Refills: 3 | Status: SHIPPED | OUTPATIENT
Start: 2021-07-12 | End: 2021-07-20

## 2021-07-15 ENCOUNTER — ANESTHESIA (OUTPATIENT)
Dept: OPERATING ROOM | Age: 66
End: 2021-07-15
Payer: COMMERCIAL

## 2021-07-15 ENCOUNTER — HOSPITAL ENCOUNTER (OUTPATIENT)
Age: 66
Setting detail: OUTPATIENT SURGERY
Discharge: HOME | End: 2021-07-15
Attending: ORTHOPAEDIC SURGERY | Admitting: ORTHOPAEDIC SURGERY
Payer: COMMERCIAL

## 2021-07-15 ENCOUNTER — ANESTHESIA EVENT (OUTPATIENT)
Dept: OPERATING ROOM | Age: 66
End: 2021-07-15
Payer: COMMERCIAL

## 2021-07-15 VITALS
DIASTOLIC BLOOD PRESSURE: 57 MMHG | SYSTOLIC BLOOD PRESSURE: 101 MMHG | OXYGEN SATURATION: 99 % | TEMPERATURE: 97.7 F | RESPIRATION RATE: 6 BRPM

## 2021-07-15 VITALS
DIASTOLIC BLOOD PRESSURE: 83 MMHG | TEMPERATURE: 98 F | SYSTOLIC BLOOD PRESSURE: 122 MMHG | WEIGHT: 272 LBS | HEIGHT: 72 IN | RESPIRATION RATE: 15 BRPM | BODY MASS INDEX: 36.84 KG/M2 | OXYGEN SATURATION: 94 % | HEART RATE: 77 BPM

## 2021-07-15 DIAGNOSIS — S83.231A COMPLEX TEAR OF MEDIAL MENISCUS OF RIGHT KNEE AS CURRENT INJURY, INITIAL ENCOUNTER: Primary | ICD-10-CM

## 2021-07-15 LAB
ANION GAP SERPL CALCULATED.3IONS-SCNC: 12 MMOL/L (ref 3–16)
BUN BLDV-MCNC: 23 MG/DL (ref 7–20)
CALCIUM SERPL-MCNC: 9.5 MG/DL (ref 8.3–10.6)
CHLORIDE BLD-SCNC: 103 MMOL/L (ref 99–110)
CO2: 24 MMOL/L (ref 21–32)
CREAT SERPL-MCNC: 1.1 MG/DL (ref 0.8–1.3)
GFR AFRICAN AMERICAN: >60
GFR NON-AFRICAN AMERICAN: >60
GLUCOSE BLD-MCNC: 154 MG/DL (ref 70–99)
GLUCOSE BLD-MCNC: 159 MG/DL (ref 70–99)
HCT VFR BLD CALC: 40.4 % (ref 40.5–52.5)
HEMOGLOBIN: 13.7 G/DL (ref 13.5–17.5)
MCH RBC QN AUTO: 29.4 PG (ref 26–34)
MCHC RBC AUTO-ENTMCNC: 33.8 G/DL (ref 31–36)
MCV RBC AUTO: 86.9 FL (ref 80–100)
PDW BLD-RTO: 14 % (ref 12.4–15.4)
PERFORMED ON: ABNORMAL
PLATELET # BLD: 202 K/UL (ref 135–450)
PMV BLD AUTO: 7.7 FL (ref 5–10.5)
POTASSIUM SERPL-SCNC: 3.9 MMOL/L (ref 3.5–5.1)
RBC # BLD: 4.65 M/UL (ref 4.2–5.9)
SODIUM BLD-SCNC: 139 MMOL/L (ref 136–145)
WBC # BLD: 10.1 K/UL (ref 4–11)

## 2021-07-15 PROCEDURE — 80048 BASIC METABOLIC PNL TOTAL CA: CPT

## 2021-07-15 PROCEDURE — 7100000011 HC PHASE II RECOVERY - ADDTL 15 MIN: Performed by: ORTHOPAEDIC SURGERY

## 2021-07-15 PROCEDURE — 3600000014 HC SURGERY LEVEL 4 ADDTL 15MIN: Performed by: ORTHOPAEDIC SURGERY

## 2021-07-15 PROCEDURE — 7100000010 HC PHASE II RECOVERY - FIRST 15 MIN: Performed by: ORTHOPAEDIC SURGERY

## 2021-07-15 PROCEDURE — 85027 COMPLETE CBC AUTOMATED: CPT

## 2021-07-15 PROCEDURE — 29881 ARTHRS KNE SRG MNISECTMY M/L: CPT | Performed by: ORTHOPAEDIC SURGERY

## 2021-07-15 PROCEDURE — 6360000002 HC RX W HCPCS: Performed by: ORTHOPAEDIC SURGERY

## 2021-07-15 PROCEDURE — 2500000003 HC RX 250 WO HCPCS: Performed by: REGISTERED NURSE

## 2021-07-15 PROCEDURE — 3600000004 HC SURGERY LEVEL 4 BASE: Performed by: ORTHOPAEDIC SURGERY

## 2021-07-15 PROCEDURE — 7100000000 HC PACU RECOVERY - FIRST 15 MIN: Performed by: ORTHOPAEDIC SURGERY

## 2021-07-15 PROCEDURE — 3700000001 HC ADD 15 MINUTES (ANESTHESIA): Performed by: ORTHOPAEDIC SURGERY

## 2021-07-15 PROCEDURE — 7100000001 HC PACU RECOVERY - ADDTL 15 MIN: Performed by: ORTHOPAEDIC SURGERY

## 2021-07-15 PROCEDURE — 2580000003 HC RX 258: Performed by: ORTHOPAEDIC SURGERY

## 2021-07-15 PROCEDURE — 3700000000 HC ANESTHESIA ATTENDED CARE: Performed by: ORTHOPAEDIC SURGERY

## 2021-07-15 PROCEDURE — 6370000000 HC RX 637 (ALT 250 FOR IP): Performed by: ANESTHESIOLOGY

## 2021-07-15 PROCEDURE — 2500000003 HC RX 250 WO HCPCS: Performed by: ORTHOPAEDIC SURGERY

## 2021-07-15 PROCEDURE — 6360000002 HC RX W HCPCS: Performed by: REGISTERED NURSE

## 2021-07-15 PROCEDURE — 2709999900 HC NON-CHARGEABLE SUPPLY: Performed by: ORTHOPAEDIC SURGERY

## 2021-07-15 PROCEDURE — 2580000003 HC RX 258: Performed by: REGISTERED NURSE

## 2021-07-15 RX ORDER — SODIUM CHLORIDE 0.9 % (FLUSH) 0.9 %
10 SYRINGE (ML) INJECTION EVERY 12 HOURS SCHEDULED
Status: DISCONTINUED | OUTPATIENT
Start: 2021-07-15 | End: 2021-07-15 | Stop reason: HOSPADM

## 2021-07-15 RX ORDER — HYDROMORPHONE HCL 110MG/55ML
0.5 PATIENT CONTROLLED ANALGESIA SYRINGE INTRAVENOUS EVERY 5 MIN PRN
Status: DISCONTINUED | OUTPATIENT
Start: 2021-07-15 | End: 2021-07-15 | Stop reason: HOSPADM

## 2021-07-15 RX ORDER — SODIUM CHLORIDE 9 MG/ML
INJECTION, SOLUTION INTRAVENOUS CONTINUOUS PRN
Status: DISCONTINUED | OUTPATIENT
Start: 2021-07-15 | End: 2021-07-15 | Stop reason: SDUPTHER

## 2021-07-15 RX ORDER — LIDOCAINE HYDROCHLORIDE 10 MG/ML
0.5 INJECTION, SOLUTION EPIDURAL; INFILTRATION; INTRACAUDAL; PERINEURAL ONCE
Status: DISCONTINUED | OUTPATIENT
Start: 2021-07-15 | End: 2021-07-15 | Stop reason: HOSPADM

## 2021-07-15 RX ORDER — HYDROCODONE BITARTRATE AND ACETAMINOPHEN 5; 325 MG/1; MG/1
1 TABLET ORAL
Status: COMPLETED | OUTPATIENT
Start: 2021-07-15 | End: 2021-07-15

## 2021-07-15 RX ORDER — ONDANSETRON 2 MG/ML
INJECTION INTRAMUSCULAR; INTRAVENOUS PRN
Status: DISCONTINUED | OUTPATIENT
Start: 2021-07-15 | End: 2021-07-15 | Stop reason: SDUPTHER

## 2021-07-15 RX ORDER — SODIUM CHLORIDE, SODIUM LACTATE, POTASSIUM CHLORIDE, CALCIUM CHLORIDE 600; 310; 30; 20 MG/100ML; MG/100ML; MG/100ML; MG/100ML
INJECTION, SOLUTION INTRAVENOUS CONTINUOUS
Status: DISCONTINUED | OUTPATIENT
Start: 2021-07-15 | End: 2021-07-15 | Stop reason: HOSPADM

## 2021-07-15 RX ORDER — HYDRALAZINE HYDROCHLORIDE 20 MG/ML
5 INJECTION INTRAMUSCULAR; INTRAVENOUS EVERY 10 MIN PRN
Status: DISCONTINUED | OUTPATIENT
Start: 2021-07-15 | End: 2021-07-15 | Stop reason: HOSPADM

## 2021-07-15 RX ORDER — LIDOCAINE HYDROCHLORIDE 10 MG/ML
1 INJECTION, SOLUTION EPIDURAL; INFILTRATION; INTRACAUDAL; PERINEURAL
Status: DISCONTINUED | OUTPATIENT
Start: 2021-07-15 | End: 2021-07-15 | Stop reason: HOSPADM

## 2021-07-15 RX ORDER — ONDANSETRON 2 MG/ML
4 INJECTION INTRAMUSCULAR; INTRAVENOUS
Status: DISCONTINUED | OUTPATIENT
Start: 2021-07-15 | End: 2021-07-15 | Stop reason: HOSPADM

## 2021-07-15 RX ORDER — SODIUM CHLORIDE 9 MG/ML
25 INJECTION, SOLUTION INTRAVENOUS PRN
Status: DISCONTINUED | OUTPATIENT
Start: 2021-07-15 | End: 2021-07-15 | Stop reason: HOSPADM

## 2021-07-15 RX ORDER — MEPERIDINE HYDROCHLORIDE 25 MG/ML
12.5 INJECTION INTRAMUSCULAR; INTRAVENOUS; SUBCUTANEOUS EVERY 5 MIN PRN
Status: DISCONTINUED | OUTPATIENT
Start: 2021-07-15 | End: 2021-07-15 | Stop reason: HOSPADM

## 2021-07-15 RX ORDER — HYDROCODONE BITARTRATE AND ACETAMINOPHEN 5; 325 MG/1; MG/1
1 TABLET ORAL EVERY 6 HOURS PRN
Qty: 12 TABLET | Refills: 0 | Status: SHIPPED | OUTPATIENT
Start: 2021-07-15 | End: 2021-07-18

## 2021-07-15 RX ORDER — LIDOCAINE HYDROCHLORIDE 20 MG/ML
INJECTION, SOLUTION EPIDURAL; INFILTRATION; INTRACAUDAL; PERINEURAL PRN
Status: DISCONTINUED | OUTPATIENT
Start: 2021-07-15 | End: 2021-07-15 | Stop reason: SDUPTHER

## 2021-07-15 RX ORDER — SODIUM CHLORIDE 0.9 % (FLUSH) 0.9 %
10 SYRINGE (ML) INJECTION PRN
Status: DISCONTINUED | OUTPATIENT
Start: 2021-07-15 | End: 2021-07-15 | Stop reason: HOSPADM

## 2021-07-15 RX ORDER — PROPOFOL 10 MG/ML
INJECTION, EMULSION INTRAVENOUS PRN
Status: DISCONTINUED | OUTPATIENT
Start: 2021-07-15 | End: 2021-07-15 | Stop reason: SDUPTHER

## 2021-07-15 RX ORDER — HYDROCODONE BITARTRATE AND ACETAMINOPHEN 5; 325 MG/1; MG/1
TABLET ORAL
Status: DISCONTINUED
Start: 2021-07-15 | End: 2021-07-15 | Stop reason: HOSPADM

## 2021-07-15 RX ORDER — OXYCODONE HYDROCHLORIDE AND ACETAMINOPHEN 5; 325 MG/1; MG/1
1 TABLET ORAL
Status: DISCONTINUED | OUTPATIENT
Start: 2021-07-15 | End: 2021-07-15 | Stop reason: HOSPADM

## 2021-07-15 RX ORDER — KETAMINE HCL IN NACL, ISO-OSM 100MG/10ML
SYRINGE (ML) INJECTION PRN
Status: DISCONTINUED | OUTPATIENT
Start: 2021-07-15 | End: 2021-07-15 | Stop reason: SDUPTHER

## 2021-07-15 RX ORDER — FENTANYL CITRATE 50 UG/ML
INJECTION, SOLUTION INTRAMUSCULAR; INTRAVENOUS PRN
Status: DISCONTINUED | OUTPATIENT
Start: 2021-07-15 | End: 2021-07-15 | Stop reason: SDUPTHER

## 2021-07-15 RX ORDER — DEXAMETHASONE SODIUM PHOSPHATE 4 MG/ML
INJECTION, SOLUTION INTRA-ARTICULAR; INTRALESIONAL; INTRAMUSCULAR; INTRAVENOUS; SOFT TISSUE PRN
Status: DISCONTINUED | OUTPATIENT
Start: 2021-07-15 | End: 2021-07-15 | Stop reason: SDUPTHER

## 2021-07-15 RX ORDER — BUPIVACAINE HYDROCHLORIDE 2.5 MG/ML
INJECTION, SOLUTION EPIDURAL; INFILTRATION; INTRACAUDAL
Status: COMPLETED | OUTPATIENT
Start: 2021-07-15 | End: 2021-07-15

## 2021-07-15 RX ORDER — LABETALOL HYDROCHLORIDE 5 MG/ML
5 INJECTION, SOLUTION INTRAVENOUS EVERY 10 MIN PRN
Status: DISCONTINUED | OUTPATIENT
Start: 2021-07-15 | End: 2021-07-15 | Stop reason: HOSPADM

## 2021-07-15 RX ORDER — SODIUM CHLORIDE 9 MG/ML
INJECTION, SOLUTION INTRAVENOUS CONTINUOUS
Status: DISCONTINUED | OUTPATIENT
Start: 2021-07-15 | End: 2021-07-15 | Stop reason: HOSPADM

## 2021-07-15 RX ORDER — IBUPROFEN 800 MG/1
800 TABLET ORAL EVERY 8 HOURS PRN
Qty: 30 TABLET | Refills: 0 | Status: SHIPPED | OUTPATIENT
Start: 2021-07-15 | End: 2021-08-16

## 2021-07-15 RX ADMIN — HYDROCODONE BITARTRATE AND ACETAMINOPHEN 1 TABLET: 5; 325 TABLET ORAL at 08:17

## 2021-07-15 RX ADMIN — Medication 3000 MG: at 06:51

## 2021-07-15 RX ADMIN — FENTANYL CITRATE 50 MCG: 50 INJECTION, SOLUTION INTRAMUSCULAR; INTRAVENOUS at 07:04

## 2021-07-15 RX ADMIN — LIDOCAINE HYDROCHLORIDE 100 MG: 20 INJECTION, SOLUTION EPIDURAL; INFILTRATION; INTRACAUDAL; PERINEURAL at 07:04

## 2021-07-15 RX ADMIN — ONDANSETRON 4 MG: 2 INJECTION INTRAMUSCULAR; INTRAVENOUS at 07:10

## 2021-07-15 RX ADMIN — FENTANYL CITRATE 50 MCG: 50 INJECTION, SOLUTION INTRAMUSCULAR; INTRAVENOUS at 07:17

## 2021-07-15 RX ADMIN — DEXAMETHASONE SODIUM PHOSPHATE 4 MG: 4 INJECTION, SOLUTION INTRAMUSCULAR; INTRAVENOUS at 07:10

## 2021-07-15 RX ADMIN — SODIUM CHLORIDE: 9 INJECTION, SOLUTION INTRAVENOUS at 06:12

## 2021-07-15 RX ADMIN — SODIUM CHLORIDE: 9 INJECTION, SOLUTION INTRAVENOUS at 07:01

## 2021-07-15 RX ADMIN — Medication 20 MG: at 07:10

## 2021-07-15 RX ADMIN — PROPOFOL 200 MG: 10 INJECTION, EMULSION INTRAVENOUS at 07:04

## 2021-07-15 ASSESSMENT — PULMONARY FUNCTION TESTS
PIF_VALUE: 20
PIF_VALUE: 17
PIF_VALUE: 20
PIF_VALUE: 3
PIF_VALUE: 21
PIF_VALUE: 21
PIF_VALUE: 7
PIF_VALUE: 18
PIF_VALUE: 33
PIF_VALUE: 7
PIF_VALUE: 1
PIF_VALUE: 21
PIF_VALUE: 7
PIF_VALUE: 16
PIF_VALUE: 6
PIF_VALUE: 32
PIF_VALUE: 21
PIF_VALUE: 32
PIF_VALUE: 5
PIF_VALUE: 20
PIF_VALUE: 6
PIF_VALUE: 4
PIF_VALUE: 21
PIF_VALUE: 20
PIF_VALUE: 1
PIF_VALUE: 22
PIF_VALUE: 6
PIF_VALUE: 6
PIF_VALUE: 7
PIF_VALUE: 17
PIF_VALUE: 6
PIF_VALUE: 18
PIF_VALUE: 18
PIF_VALUE: 16
PIF_VALUE: 16
PIF_VALUE: 7
PIF_VALUE: 16
PIF_VALUE: 1
PIF_VALUE: 6
PIF_VALUE: 7
PIF_VALUE: 21
PIF_VALUE: 7
PIF_VALUE: 20
PIF_VALUE: 8
PIF_VALUE: 32
PIF_VALUE: 21

## 2021-07-15 ASSESSMENT — LIFESTYLE VARIABLES: SMOKING_STATUS: 0

## 2021-07-15 ASSESSMENT — PAIN SCALES - GENERAL
PAINLEVEL_OUTOF10: 2
PAINLEVEL_OUTOF10: 3

## 2021-07-15 ASSESSMENT — PAIN DESCRIPTION - PAIN TYPE
TYPE: SURGICAL PAIN
TYPE: SURGICAL PAIN

## 2021-07-15 ASSESSMENT — PAIN - FUNCTIONAL ASSESSMENT: PAIN_FUNCTIONAL_ASSESSMENT: 0-10

## 2021-07-15 NOTE — ANESTHESIA PRE PROCEDURE
Department of Anesthesiology  Preprocedure Note       Name:  Satya Brock   Age:  61 y.o.  :  1955                                          MRN:  [id number]         Date:  7/15/2021      Surgeon: Maryellen Bright):  Vincent Gamble MD    Procedure: Procedure(s):  RIGHT KNEE ARTHROSCOPY PARTIAL MEDIAL MENISCECTOMY    Medications prior to admission:   Prior to Admission medications    Medication Sig Start Date End Date Taking?  Authorizing Provider   lovastatin (MEVACOR) 40 MG tablet TAKE 1 TABLET EVERY NIGHT 21  Yes Fan Zavaleta MD   INVOKANA 300 MG TABS tablet TAKE 1 TABLET EVERY MORNING BEFORE BREAKFAST 21  Yes Fan Zavaleta MD   metFORMIN (GLUCOPHAGE) 1000 MG tablet TAKE 1 TABLET TWICE DAILY WITH MEALS 21  Yes Fan Zavaleta MD   atorvastatin (LIPITOR) 40 MG tablet Take 1 tablet by mouth daily 21  Yes Julia Loja DO   amLODIPine (NORVASC) 10 MG tablet TAKE 1 TABLET EVERY DAY 21  Yes Fan Zavaleta MD   glimepiride (AMARYL) 4 MG tablet TAKE 1 TABLET TWICE DAILY 3/1/21  Yes Fan Zavaleta MD   hydroCHLOROthiazide (HYDRODIURIL) 25 MG tablet TAKE 1 TABLET EVERY DAY 21  Yes Fan Zavaleta MD   carvedilol (COREG) 6.25 MG tablet TAKE 1 TABLET TWICE DAILY WITH MEALS 21  Yes Fan Zavaleta MD   diphenhydrAMINE (BENADRYL) 25 MG tablet Take 25 mg by mouth nightly as needed for Sleep    Yes Historical Provider, MD   Multiple Vitamins-Minerals (MULTIVITAMIN PO) Take by mouth   Yes Historical Provider, MD   aspirin 81 MG tablet Take 81 mg by mouth daily    Historical Provider, MD       Current medications:    Current Facility-Administered Medications   Medication Dose Route Frequency Provider Last Rate Last Admin    0.9 % sodium chloride infusion   Intravenous Continuous Vincent Gamble MD 50 mL/hr at 07/15/21 0612 New Bag at 07/15/21 06    lidocaine PF 1 % injection 0.5 mL  0.5 mL Intradermal Once Vincent Gamble MD        ceFAZolin (ANCEF) 3000 mg in dextrose 5 % 100 mL IVPB  3,000 mg Intravenous On Call to [facility], MD        meperidine (DEMEROL) injection 12.5 mg  12.5 mg Intravenous Q5 Min PRN Jenniffer Gilbert MD        HYDROmorphone (DILAUDID) injection 0.5 mg  0.5 mg Intravenous Q5 Min PRN Jenniffer Gilbert MD        oxyCODONE-acetaminophen (PERCOCET) 5-325 MG per tablet 1 tablet  1 tablet Oral Once PRN Jenniffer Gilbert MD        ondansetron [facility]) injection 4 mg  4 mg Intravenous Once PRN Jenniffer Gilbert MD        labetalol (NORMODYNE;TRANDATE) injection 5 mg  5 mg Intravenous Q10 Min PRN Jenniffer Gilbert MD        hydrALAZINE (APRESOLINE) injection 5 mg  5 mg Intravenous Q10 Min PRN Jenniffer Gilbert MD           Allergies:     Allergies   Allergen Reactions    Fexofenadine-Pseudoephed Er Other (See Comments)     cough    Lisinopril     Morphine Hives    Zithromax [Azithromycin]      Heart palpitations     Losartan      Dry c ough       Problem List:    Patient Active Problem List   Diagnosis Code    Hypertension I10    Disorder of intervertebral disc M51.9    Polyp of colon K63.5    CAD (coronary artery disease) I25.10    Dyslipidemia E78.5    PVD (peripheral vascular disease) ([facility]) I73.9    Cough due to ACE inhibitor R05, T46.4X5A    MI (myocardial infarction) ([facility]) I21.9    ED (erectile dysfunction) N52.9    Ureteral calculi P53.9    Renal colic on left side M40    Hydronephrosis N13.30    Microscopic hematuria R31.29    Chest pain R07.9    Obstructive sleep apnea G47.33    Type 2 diabetes mellitus without complication, without long-term current use of insulin ([facility]) E11.9       Past Medical History:        Diagnosis Date    CAD (coronary artery disease)     Disc disorder     ED (erectile dysfunction)     Hyperlipidemia     Hyperlipidemia     Hypertension     [facility] infarction) [facility] [address]     Obstructive sleep apnea     Other disorders of kidney and ureter in diseases classified elsewhere     Pneumonia 1970s    Polyp of colon     Renal calculi     Type 2 diabetes mellitus without complication, without long-term current use of insulin ([facility] [address] 5/16/2019    Type 2 diabetes mellitus without complication, without long-term current use of insulin ([facility] [address] 5/16/2019    Type II or unspecified type diabetes mellitus without mention of complication, not stated as uncontrolled        Past Surgical History:        Procedure Laterality Date    ABDOMEN SURGERY      Exploratory Laparotomy    ARM SURGERY Left 10/27/2020    . performed by Usama Oliver MD at [place] [address]      right and left    CARPAL TUNNEL RELEASE Left 10/27/2020    LEFT CARPAL TUNNEL RELEASE; LEFT INSITU CUBITAL TUNNEL RELEASE performed by Usama Oliver MD at [address] [place] [address]  12/2/13    COLONOSCOPY  12/2/13    polyps X 2 removed    CORONARY ANGIOPLASTY WITH STENT PLACEMENT      CYSTOSCOPY Left 12/12/14    CYSTOSCOPY, LEFT RETROGRADE PYELOGRAM, PLACEMENT OF LEFT    FRACTURE SURGERY      Ankle & Wrist    TONSILLECTOMY AND ADENOIDECTOMY         Social History:    Social History     Tobacco Use    Smoking status: Former Smoker     Packs/day: 0.25     Years: 30.00     Pack years: 7.50     Types: Cigarettes, Cigars     Quit date: 4/9/2011     Years since quitting: 10.2    Smokeless tobacco: Never Used    Tobacco comment: cigars every once in a while    Substance Use Topics    Alcohol use:  Yes     Alcohol/week: 1.0 standard drinks     Types: 1 Cans of beer per week     Comment: social                                Counseling given: Not Answered  Comment: cigars every once in a while       Vital Signs (Current):   Vitals:    07/07/21 1604 07/15/21 0538 07/15/21 0549 07/15/21 0558   BP:    136/80   Pulse:    74   Resp:   16    Temp:   97.2 °F (36.2 °C)    TempSrc:   Temporal    SpO2:    97%   Weight: 274 lb (124.3 kg) 272 lb (123.4 kg)     Height: 6' (1.829 m) 6' (1.829 m)                                                BP Readings from Last 3 Encounters:   07/15/21 136/80   06/28/21 116/77   06/25/21 136/78       NPO Status: Time of last liquid consumption: 2130                        Time of last solid consumption: 2130                        Date of last liquid consumption: 07/14/21                        Date of last solid food consumption: 07/14/21    BMI:   Wt Readings from Last 3 Encounters:   07/15/21 272 lb (123.4 kg)   06/28/21 274 lb (124.3 kg)   06/25/21 271 lb (122.9 kg)     Body mass index is 36.89 kg/m². CBC:   Lab Results   Component Value Date    WBC 10.1 07/15/2021    RBC 4.65 07/15/2021    HGB 13.7 07/15/2021    HCT 40.4 07/15/2021    MCV 86.9 07/15/2021    RDW 14.0 07/15/2021     07/15/2021       CMP:   Lab Results   Component Value Date     07/15/2021    K 3.9 07/15/2021     07/15/2021    CO2 24 07/15/2021    BUN 23 07/15/2021    CREATININE 1.1 07/15/2021    GFRAA >60 07/15/2021    GFRAA >60 07/15/2011    AGRATIO 1.8 08/24/2020    LABGLOM >60 07/15/2021    LABGLOM 61 07/14/2018    GLUCOSE 154 07/15/2021    PROT 7.1 08/24/2020    PROT 6.8 03/04/2013    PROT 6.8 03/04/2013    CALCIUM 9.5 07/15/2021    BILITOT 0.3 08/24/2020    ALKPHOS 51 08/24/2020    AST 25 08/24/2020    ALT 37 08/24/2020       POC Tests: No results for input(s): POCGLU, POCNA, POCK, POCCL, POCBUN, POCHEMO, POCHCT in the last 72 hours.     Coags:   Lab Results   Component Value Date    PROTIME 10.4 01/02/2017    INR 0.91 01/02/2017    APTT 26.4 04/09/2011       HCG (If Applicable): No results found for: PREGTESTUR, PREGSERUM, HCG, HCGQUANT     ABGs: No results found for: PHART, PO2ART, THF4UCN, FVO3PJA, BEART, H5GDBKOW     Type & Screen (If Applicable):  No results found for: LABABO, LABRH    Drug/Infectious Status (If Applicable):  Lab Results   Component Value Date    HEPCAB NON-REACTIVE 07/14/2018       COVID-19 Screening (If Applicable):   Lab Results Component Value Date    COVID19 Not Detected 10/22/2020           Anesthesia Evaluation  Patient summary reviewed and Nursing notes reviewed no history of anesthetic complications:   Airway: Mallampati: III  TM distance: >3 FB   Neck ROM: full  Mouth opening: > = 3 FB Dental: normal exam         Pulmonary:normal exam  breath sounds clear to auscultation  (+) sleep apnea: on noncompliant,      (-) COPD, asthma and not a current smoker (former)                           Cardiovascular:    (+) hypertension:, past MI:, CAD (neg stress nml Ef 2019; follows cards, no recent issues/symptoms/ntg use):, CABG/stent (history of IWMI, with a PTCA to prox and distal RCA in 2011):, hyperlipidemia    (-) dysrhythmias,  angina and  CHF    ECG reviewed  Rhythm: regular  Rate: normal  Echocardiogram reviewed  Stress test reviewed                Neuro/Psych:   Negative Neuro/Psych ROS     (-) seizures, TIA and CVA           GI/Hepatic/Renal:   (+) renal disease: CRI,      (-) GERD and liver disease       Endo/Other:    (+) Diabetes (a1c 7.2)Type II DM, , .    (-) hypothyroidism, hyperthyroidism               Abdominal:   (+) obese,           Vascular: Other Findings:           Anesthesia Plan      general     ASA 3       Induction: intravenous. MIPS: Postoperative opioids intended and Prophylactic antiemetics administered. Anesthetic plan and risks discussed with patient. Plan discussed with CRNA.                   Bess Williamson MD   7/15/2021

## 2021-07-15 NOTE — PROGRESS NOTES
Received from or - unresponsive,O.A.,simple mask ,right knee/leg dressing dry and intact,circulation good,elevated,ice placed,masood/scd non op leg,vss

## 2021-07-15 NOTE — PROGRESS NOTES
Teaching/ education completed for home care including pain management, wound care,activity,safety precautions,activity and infection control. Patient verbalized understanding.

## 2021-07-15 NOTE — H&P
Patient seen and examined. I have reviewed the history and physical and examined the patient and find no relevant changes. Surgery plan reviewed. /80   Pulse 74   Temp 97.2 °F (36.2 °C) (Temporal)   Resp 16   Ht 6' (1.829 m)   Wt 272 lb (123.4 kg)   SpO2 97%   BMI 36.89 kg/m²       Site marked and consent verified. All questions answered and antibiotic verified. Ready for right knee arthroscopy and partial medial meniscectomy    Awa Luna, [address] [place] [address] and Sports Medicine  07/15/21  6:49 AM

## 2021-07-15 NOTE — OP NOTE
Operative Note      Patient: Demarco Felton  YOB: 1955  MRN: [id number]    Date of Procedure: 7/15/2021    Pre-Op Diagnosis: S80.80A  RIGHT MEDIAL MENISCUS TEAR    Post-Op Diagnosis: Same       Procedure(s):  RIGHT KNEE ARTHROSCOPY PARTIAL MEDIAL MENISCECTOMY    Surgeon(s):  Brie Hunter MD    Assistant:   * No surgical staff found *    Anesthesia: General    Estimated Blood Loss (mL): minimal    Complications: None    Specimens:   * No specimens in log *    Implants:  * No implants in log *      Drains: * No LDAs found *    Findings: degenerative flap tear of the posterior horn of the medial meniscus. Grade 3 - 4 chondral changes medial and patellofemoral compartment. Intact lateral meniscus, ACL and PCL fibers. No major chondral changes to the lateral compartment. Detailed Description of Procedure:     Condition:  Stable    Weight Bearing Status:  Weight bearing as tolerated    Activity:  Activity as tolerated (Patient may move about with assist as indicated or with supervision.)    Operative Report: Indications: Demarco Felton is a 61 y.o. male with history of right knee symptomatic medial meniscal tear. This has been confirmed by MRI and his symptoms have note resolved with conservative treatment. The option of arthroscopic intervention has been presented and he has elected to proceed. I have reviewed with him the risk, benefits, and potential outcomes / complications and he is prepared to proceed. His consent was obtained and all questions answered to his satisfaction. Description:  Demarco Felton was identified in the preoperative holding area and the correct site of the right knee was marked. He was brought to the operating room and placed on the table in supine position. General anesthesia was administered. Surgical time out was called verifying necessary data including administration of preoperative antibiotics and the correct surgical site.   The right lower extremity had a nonsterile tourniquet applied to the right thigh and was secured in a low-profile arthroscopic limb nieto. The left lower extremity was placed in a limb stirrup with care to ensure appropriate padding and the perineal nerve distribution. The operative limb was exsanguinated with an Esmarch bandage and the tourniquet inflated to level of 300 mmHg. The right lower extremity was prepped and draped in standard sterile fashion. #11 blade was used to create an anterior inferior lateral portal.  The trocar and cannula was inserted into the knee and placed into the suprapatellar pouch. Lactated Ringer's fluid flow was initiated via gravity. The camera was inserted and the suprapatellar pouch was visualized. The patella articular cartilage showed grade 3 chondral wear and the trochlea showed central grade 3-4 chondral wear over an area nearly 1.5 cm round. There were no loose bodies noted in the suprapatellar pouch. The camera was directed to the medial compartment and a medial portal established under direct visualization. The femoral articular cartilage showed grade 3 chondral changes in the weight bearing region and the tibial articular cartilage showed grade 3 chondral changes at the medial and posteromedial tibial plateau. The medial meniscus was probed and showed a flap tear from the posterior horn to the mid-body region in zones one and two. It was trimmed to a rim of stable tissue using arthroscopic biters and a 3.5 mm motorized shaver. In total the portion removed was about 20 % of the meniscal volume. The intercondylar notch was examined and showed Intact ACL and PCL fibers. Gilquist maneuver was performed showing no loose bodies posteriorly. The lateral compartment was entered and the meniscus was probed. No significant tear was noted. The lateral femoral condyle and tibial cartilages showed no significant defects.   The medial and lateral gutters were checked for loose bodies and none were noted. The instruments and arthroscope were returned to the suprapatellar pouch. There was a thickened plica band which was impinging on the patella and femoral trochlea which was released with the 3.5 mm arthroscopic shaver    The knee was thoroughly irrigated. Instruments and arthroscope were removed. Portal sites were closed with 4-0 Monocryl and covered with Steri-Strips. 30 mL of 0.25 percent Marcaine plain were instilled for postoperative analgesia. Dry sterile dressings were applied. The limb was wrapped from the foot to the thigh with an Ace bandage. The tourniquet was deflated and the patient was awakened and taken to recovery room in stable condition with toes noted to be warm and pink. All needle and sponge counts matched the initial count per circulating and scrub personnel x2 prior ending this case. Electronically signed by:  Andre Luna, [address] [place] [address] and Sports Medicine  7/15/21  7:42 AM EDT        Electronically signed by Violeta Butler MD on 7/15/2021 at 7:41 AM

## 2021-07-15 NOTE — PROGRESS NOTES
Discharge instructions reviewed with patient/responsible adult. All home medications have been reviewed, questions answered and patient verbalized understanding. Discharge instructions signed and copies given. Patient discharged  Per w/[facility]ww[facility] belongings.

## 2021-07-20 ENCOUNTER — OFFICE VISIT (OUTPATIENT)
Dept: NEUROLOGY | Age: 66
End: 2021-07-20
Payer: COMMERCIAL

## 2021-07-20 VITALS
HEART RATE: 76 BPM | BODY MASS INDEX: 36.84 KG/M2 | HEIGHT: 72 IN | WEIGHT: 272 LBS | SYSTOLIC BLOOD PRESSURE: 176 MMHG | DIASTOLIC BLOOD PRESSURE: 90 MMHG

## 2021-07-20 DIAGNOSIS — G56.23 ENTRAPMENT OF BOTH ULNAR NERVES AT ELBOW: Primary | ICD-10-CM

## 2021-07-20 DIAGNOSIS — M54.81 CERVICO-OCCIPITAL NEURALGIA OF LEFT SIDE: ICD-10-CM

## 2021-07-20 PROCEDURE — 99244 OFF/OP CNSLTJ NEW/EST MOD 40: CPT | Performed by: PSYCHIATRY & NEUROLOGY

## 2021-07-20 NOTE — PROGRESS NOTES
NEUROLOGY CONSULTATION     Chief Complaint   Patient presents with    New Patient     Fransisco Frye MD / numbness        HISTORY OF PRESENT ILLNESS :    Lata Manuel is a 61 y.o. male who is referred by Dr. Alvarez Steele   History was obtained from patient  Patient complains of tingling and numbness in both of his arms. He has had this for a few years. 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 Patient states that he had surgery done but the symptoms did not improve. Actually now the symptoms seem a little worse. He has tingling and numbness in his fourth and fifth digits bilaterally. He also has pain and aching in the arm. Over the last  several weeks patient has noticed some tingling numbness and aching in the left occipital region as well.   Comorbidities include hypertension coronary artery disease obstructive sleep apnea hyperlipidemia and type 2 diabetes    REVIEW OF SYSTEMS    Constitutional:  []   Chills   []  Fatigue   []  Fevers   []  Malaise   []  Weight loss     [x] Denies all of the above    Eyes:  []  Double vision   []  Blurry vision     [x] Denies all of the above    Ears, nose, mouth, throat, and face:   [] Hearing loss    []   Hoarseness      []  Snoring    []  Tinnitus       [x] Denies all of the above     Respiratory:   []  Cough    []  Shortness of breath         [x] Denies all of the above     Cardiovascular:   []  Chest pain    []  Exertional chest pressure/discomfort           [] Palpitations    []  Syncope     [x] Denies all of the above    Gastrointestinal:   []  Abdominal pain   []  Constipation    []  Diarrhea    []   Dysphagia                      [x] Denies all of the above    Genitourinary:      []  Frequency   []  Hematuria     []  Urinary incontinence           [x] Denies all of the above     Hematologic/lymphatic:  []  Bleeding []  Easy bruising   []  Anemia  [x] Denies all of the above     Musculoskeletal:   [] Back pain       []  Myalgias    [x]  Neck pain           [] Denies all of the above    Neurological: As noted in HPI    Behavioral/Psych:   [] Anxiety    []  Depression     []  Mood swings     [x] Denies all of the above     Endocrine:   []  Temperature intolerance     [] Fatigue      [x] Denies all of the above     Allergic/Immunologic:   [] Hay fever    [x] Denies all of the above     Past Medical History:   Diagnosis Date    CAD (coronary artery disease)     Disc disorder     ED (erectile dysfunction)     Hyperlipidemia     Hyperlipidemia     Hypertension     [facility]ocardial infarction) ([facility] [address]     Obstructive sleep apnea     Other disorders of kidney and ureter in diseases classified elsewhere     Pneumonia 1970s    Polyp of colon     Renal calculi     Type 2 diabetes mellitus without complication, without long-term current use of insulin ([facility] [address] 5/16/2019    Type 2 diabetes mellitus without complication, without long-term current use of insulin ([facility] [address] 5/16/2019    Type II or unspecified type diabetes mellitus without mention of complication, not stated as uncontrolled      Family History   Problem Relation Age of Onset    Diabetes Father     Mental Illness Mother     High Blood Pressure Brother     Heart Disease Brother     High Cholesterol Neg Hx      Social History     Socioeconomic History    Marital status:      Spouse name: Not on file    Number of children: Not on file    Years of education: Not on file    Highest education level: Not on file   Occupational History    Not on file   Tobacco Use    Smoking status: Former Smoker     Packs/day: 0.25     Years: 30.00     Pack years: 7.50     Types: Cigarettes, Cigars     Quit date: 4/9/2011     Years since quitting: 10.2    Smokeless tobacco: Never Used    Tobacco comment: cigars every once in a while    Vaping Use    Vaping Use: Never used Substance and Sexual Activity    Alcohol use: Yes     Alcohol/week: 1.0 standard drinks     Types: 1 Cans of beer per week     Comment: social    Drug use: No    Sexual activity: Not on file   Other Topics Concern    Not on file   Social History Narrative    Not on file     Social Determinants of Health     Financial Resource Strain: Low Risk     Difficulty of Paying Living Expenses: Not hard at all   Food Insecurity: No Food Insecurity    Worried About [email or web address] in the Last Year: Never true    [address] [employer] in the Last Year: Never true   Transportation Needs:     Lack of Transportation (Medical):  Lack of Transportation (Non-Medical):    Physical Activity:     Days of Exercise per Week:     Minutes of Exercise per Session:    Stress:     Feeling of Stress :    Social Connections:     Frequency of Communication with Friends and Family:     Frequency of Social Gatherings with Friends and Family:     Attends Christianity Services:     Active Member of Clubs or Organizations:     Attends Club or Organization Meetings:     Marital Status:    Intimate Partner Violence:     Fear of Current or Ex-Partner:     Emotionally Abused:     Physically Abused:     Sexually Abused:        PHYSICAL EXAMINATION:  BP (!) 176/90   Pulse 76   Ht 6' (1.829 m)   Wt 272 lb (123.4 kg)   BMI 36.89 kg/m²   Appearance: Well appearing, well nourished and in no distress  Mental Status Exam: Patient is alert, oriented to person, place and time. Recent and remote memory is normal  Fund of Knowledge is normal  Attention/concentration is normal.   Speech : No dysarthria  Language : No aphasia  Funduscopic Exam: sharp disc margins  Cranial Nerves:   II: Visual fields:  Full to confrontation  III: Pupils:  equal, round, reactive to light  III,IV,VI: Extra Ocular Movements are intact.  No nystagmus  V: Facial sensation is intact to pin prick and light touch  VII: Facial strength and movements: intact and symmetric smile,cheek puffing and eyebrow elevation  VIII: Hearing:  Intact to finger rub bilaterally  IX: Palate  elevation is symmetric  XI: Shoulder shrug is intact  XII: Tongue movements are normal  Motor:  Muscle tone and bulk are normal.   Strength is symmetrical 5/5 in all four extremities. Sensory: Intact to light touch and  pin prick in all four extremities  Coordination:  Normal  Finger to Nose and Heel to Shin bilaterally    . Reflexes:  DTR +2 and symmetric bilaterally  Plantar response: Flexor bilaterally  Gait: Gait and station is normal. 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 Patient has had surgery in the past which looks like was not successful and patient continues to have symptoms. Patient also has symptoms suggestive of left occipital neuralgia. He has tenderness over the left greater occipital nerve. Other possibility would include left cervical radiculopathy. Patient had an MRI of the cervical spine back in 2018 which showed foraminal stenosis and disc disease. Comorbidities include hypertension diabetes and hyperlipidemia      RECOMMENDATIONS :  Discussed with patient  I will see him back and do an EMG and nerve conduction studies of both upper extremities in comparison to the previous study from June 2020  We discussed about treatment for the left occipital neuralgia. We talked about gabapentin but patient states that he was just taken off gabapentin. So he had similar symptoms while on gabapentin. Other option would be a left occipital nerve block. If that does not help the patient would need repeat MRI cervical spine  Thank you for this consultation.         Please note a portion of this chart was generated using dragon dictation software. Although every effort was made to ensure the accuracy of this automated transcription, some errors in transcription may have occurred.

## 2021-07-20 NOTE — LETTER
[facility]  [address] [place] [address] [id number]  [id number]  Phone: [phone]  Fax: [phone]           Gypsy Coronel MD      July 20, 2021     Patient: Karen Pierce   MR Number: [id number]   YOB: 1955   Date of Visit: 7/20/2021       Dear Dr. Linard Gosselin: Thank you for referring Martínez Palomo to me for evaluation/treatment. Below are the relevant portions of my assessment and plan of care. If you have questions, please do not hesitate to call me. I look forward to following Jimmy Fortune along with you.     Sincerely,    MD Gypsy Steward MD    CC providers:  Idell Nyhan, MD  [facility] [address] [address]

## 2021-07-22 ENCOUNTER — PATIENT MESSAGE (OUTPATIENT)
Dept: FAMILY MEDICINE CLINIC | Age: 66
End: 2021-07-22

## 2021-07-22 DIAGNOSIS — G56.03 BILATERAL CARPAL TUNNEL SYNDROME: Primary | ICD-10-CM

## 2021-07-22 DIAGNOSIS — M54.81 CERVICO-OCCIPITAL NEURALGIA OF LEFT SIDE: ICD-10-CM

## 2021-07-22 NOTE — TELEPHONE ENCOUNTER
From: Bradley Bhandari  To: Jason Willard MD  Sent: 7/22/2021 6:28 AM EDT  Subject: Non-Urgent Medical Question    I would like to see another neurologist for a second opinion   Thanks

## 2021-07-28 RX ORDER — GLIMEPIRIDE 4 MG/1
TABLET ORAL
Qty: 180 TABLET | Refills: 1 | Status: SHIPPED | OUTPATIENT
Start: 2021-07-28 | End: 2021-11-24 | Stop reason: SDUPTHER

## 2021-07-28 NOTE — TELEPHONE ENCOUNTER
Medication:   Requested Prescriptions     Pending Prescriptions Disp Refills    glimepiride (AMARYL) 4 MG tablet [Pharmacy Med Name: GLIMEPIRIDE 4 MG Tablet] 180 tablet 1     Sig: TAKE 1 TABLET TWICE DAILY       Last Filled:  03/01/2021 #180 1rf    Patient Phone Number: [phone] (home) [phone] (work)    Last appt: 6/28/2021   Next appt: 9/9/2021    Last Labs DM:   Lab Results   Component Value Date    LABA1C 7.2 06/07/2021

## 2021-07-29 DIAGNOSIS — M54.81 CERVICO-OCCIPITAL NEURALGIA OF LEFT SIDE: Primary | ICD-10-CM

## 2021-07-29 RX ORDER — TIZANIDINE 4 MG/1
TABLET ORAL
Qty: 30 TABLET | Refills: 0 | Status: SHIPPED | OUTPATIENT
Start: 2021-07-29 | End: 2021-08-16

## 2021-07-29 NOTE — PROGRESS NOTES
Encounter Date: 7/30/2021    Subjective:      Patient ID: Chidi Fitzgerald is a 61 y.o. y.o. male. Chief Complaint   Patient presents with    Post-Op Check     f/u right knee surgery, DOS  - 7/15/21, knee is doing well, able to go to the gym       Knee arthroscopy Follow-up  Chidi Fitzgerald is here for 2 week post right knee arthroscopy follow-up. DOS 7/15/21    The patient is not having any pain. He denies  fever, wound drainage, increasing redness, pus, increasing pain, increasing swelling. Post op problems reported:  none    He is performing his home exercise and is not having any problems resuming his activities including going to the gym. Exam:  Ht 6' (1.829 m)   Wt 272 lb 6.4 oz (123.6 kg)   BMI 36.94 kg/m²   Gen: Alert and oriented x3, NAD and appropriately groomed. He is ambulating independently. Right knee portals well approximated, no erythema or drainage. Knee swelling and effusion resolving as expected. Range of motion from 0 to 125 without pain. Quad strength at least 4+/5. No signs or symptoms of infection or DVT noted on exam.        Assessment:     Stable status post right knee arthroscopy from July 15      Plan: At this time, he will continue with self-directed therapy exercises and gradual resumption of activity. He is doing great and is essentially back to almost all his activities at this point. I cautioned him to still be slow with full resumption of activities and if he does have increase in swelling and pain is a signal that he needs to take rest.  He is doing so well, I will leave follow-up to his discretion. He understands and accepts this course of care. By signing my name below, Luzma Scott, attest that this documentation has been prepared under the direction and in the presence of Karma Vance MD.   Electronically Signed: Shoaib Mera, 7/29/21, 11:06 AM EDT.       Eda Gallegos MD, personally performed the services described in this documentation. All medical record entries made by the scribe were at my direction and in my presence. I have reviewed the chart and discharge instructions (if applicable) and agree that the record reflects my personal performance and is accurate and complete. Rhea Nam MD, 8/2/21, 8:31 AM EDT. Documentation was done using voice recognition dragon software. Every effort was made to ensure accuracy; however, inadvertent  Unintentional computerized transcription errors may be present.

## 2021-07-30 ENCOUNTER — OFFICE VISIT (OUTPATIENT)
Dept: ORTHOPEDIC SURGERY | Age: 66
End: 2021-07-30

## 2021-07-30 VITALS — BODY MASS INDEX: 36.9 KG/M2 | HEIGHT: 72 IN | WEIGHT: 272.4 LBS

## 2021-07-30 DIAGNOSIS — S83.241A TEAR OF MEDIAL MENISCUS OF RIGHT KNEE, CURRENT, INITIAL ENCOUNTER: Primary | ICD-10-CM

## 2021-07-30 PROCEDURE — 99024 POSTOP FOLLOW-UP VISIT: CPT | Performed by: ORTHOPAEDIC SURGERY

## 2021-08-02 DIAGNOSIS — I10 ESSENTIAL HYPERTENSION: ICD-10-CM

## 2021-08-02 RX ORDER — HYDROCHLOROTHIAZIDE 25 MG/1
TABLET ORAL
Qty: 90 TABLET | Refills: 3 | Status: SHIPPED | OUTPATIENT
Start: 2021-08-02 | End: 2022-03-10 | Stop reason: SDUPTHER

## 2021-08-02 NOTE — TELEPHONE ENCOUNTER
Medication:   Requested Prescriptions     Pending Prescriptions Disp Refills    hydroCHLOROthiazide (HYDRODIURIL) 25 MG tablet [Pharmacy Med Name: HYDROCHLOROTHIAZIDE 25 MG Tablet] 90 tablet 2     Sig: TAKE 1 TABLET EVERY DAY       Last Filled:   2/17/21 #90, 2 RF     Patient Phone Number: [phone] (home) [phone] (work)    Last appt: 6/28/2021 pre op   Next appt: 9/9/2021    Lab Results   Component Value Date     07/15/2021    K 3.9 07/15/2021     07/15/2021    CO2 24 07/15/2021    BUN 23 (H) 07/15/2021    CREATININE 1.1 07/15/2021    GLUCOSE 154 (H) 07/15/2021    CALCIUM 9.5 07/15/2021    PROT 7.1 08/24/2020    LABALBU 4.6 08/24/2020    BILITOT 0.3 08/24/2020    ALKPHOS 51 08/24/2020    AST 25 08/24/2020    ALT 37 08/24/2020    LABGLOM >60 07/15/2021    GFRAA >60 07/15/2021    AGRATIO 1.8 08/24/2020    GLOB 2.5 08/24/2020

## 2021-08-16 ENCOUNTER — OFFICE VISIT (OUTPATIENT)
Dept: FAMILY MEDICINE CLINIC | Age: 66
End: 2021-08-16
Payer: COMMERCIAL

## 2021-08-16 ENCOUNTER — HOSPITAL ENCOUNTER (OUTPATIENT)
Dept: NON INVASIVE DIAGNOSTICS | Age: 66
Discharge: HOME | End: 2021-08-16
Payer: COMMERCIAL

## 2021-08-16 VITALS
OXYGEN SATURATION: 97 % | SYSTOLIC BLOOD PRESSURE: 138 MMHG | DIASTOLIC BLOOD PRESSURE: 70 MMHG | WEIGHT: 274 LBS | HEART RATE: 66 BPM | BODY MASS INDEX: 37.11 KG/M2 | TEMPERATURE: 97.6 F | HEIGHT: 72 IN

## 2021-08-16 DIAGNOSIS — E78.2 MIXED HYPERLIPIDEMIA: ICD-10-CM

## 2021-08-16 DIAGNOSIS — H61.23 BILATERAL IMPACTED CERUMEN: Primary | ICD-10-CM

## 2021-08-16 DIAGNOSIS — H66.002 ACUTE SUPPURATIVE OTITIS MEDIA OF LEFT EAR WITHOUT SPONTANEOUS RUPTURE OF TYMPANIC MEMBRANE, RECURRENCE NOT SPECIFIED: ICD-10-CM

## 2021-08-16 DIAGNOSIS — I25.10 CORONARY ARTERY DISEASE INVOLVING NATIVE HEART WITHOUT ANGINA PECTORIS, UNSPECIFIED VESSEL OR LESION TYPE: ICD-10-CM

## 2021-08-16 DIAGNOSIS — H92.02 OTALGIA, LEFT: ICD-10-CM

## 2021-08-16 DIAGNOSIS — I10 ESSENTIAL HYPERTENSION: ICD-10-CM

## 2021-08-16 LAB
LV EF: 55 %
LVEF MODALITY: NORMAL

## 2021-08-16 PROCEDURE — 93306 TTE W/DOPPLER COMPLETE: CPT

## 2021-08-16 PROCEDURE — 99214 OFFICE O/P EST MOD 30 MIN: CPT | Performed by: FAMILY MEDICINE

## 2021-08-16 PROCEDURE — 69209 REMOVE IMPACTED EAR WAX UNI: CPT | Performed by: FAMILY MEDICINE

## 2021-08-16 RX ORDER — AMOXICILLIN AND CLAVULANATE POTASSIUM 875; 125 MG/1; MG/1
1 TABLET, FILM COATED ORAL 2 TIMES DAILY
Qty: 20 TABLET | Refills: 0 | Status: SHIPPED | OUTPATIENT
Start: 2021-08-16 | End: 2021-08-26

## 2021-08-16 NOTE — PATIENT INSTRUCTIONS
Patient Education        Earwax Blockage: Care Instructions  Your Care Instructions     Earwax is a natural substance that protects the ear canal. Normally, earwax drains from the ears and does not cause problems. Sometimes earwax builds up and hardens. Earwax blockage (also called cerumen impaction) can cause some loss of hearing and pain. When wax is tightly packed, you will need to have your doctor remove it. Follow-up care is a key part of your treatment and safety. Be sure to make and go to all appointments, and call your doctor if you are having problems. It's also a good idea to know your test results and keep a list of the medicines you take. How can you care for yourself at home? · Do not try to remove earwax with cotton swabs, fingers, or other objects. This can make the blockage worse and damage the eardrum. · If your doctor recommends that you try to remove earwax at home:  ? Soften and loosen the earwax with warm mineral oil. You also can try hydrogen peroxide mixed with an equal amount of room temperature water. Place 2 drops of the fluid, warmed to body temperature, in the ear two times a day for up to 5 days. ? Once the wax is loose and soft, all that is usually needed to remove it from the ear canal is a gentle, warm shower. Direct the water into the ear, then tip your head to let the earwax drain out. Dry your ear thoroughly with a hair dryer set on low. Hold the dryer several inches from your ear. ? If the warm mineral oil and shower do not work, use an over-the-counter wax softener. Read and follow all instructions on the label. After using the wax softener, use an ear syringe to gently flush the ear. Make sure the flushing solution is body temperature. Cool or hot fluids in the ear can cause dizziness. When should you call for help?    Call your doctor now or seek immediate medical care if:    · Pus or blood drains from your ear.     · Your ears are ringing or feel full.     · You have a loss of hearing. Watch closely for changes in your health, and be sure to contact your doctor if:    · You have pain or reduced hearing after 1 week of home treatment.     · You have any new symptoms, such as nausea or balance problems. Where can you learn more? Go to https://chpecmeb.Contur. org and sign in to your LaunchPointt account. Enter D206 in the Filmaka box to learn more about \"Earwax Blockage: Care Instructions. \"     If you do not have an account, please click on the \"Sign Up Now\" link. Current as of: October 19, 2020               Content Version: 12.9  © 7969-1636 Healthwise, [employer]apps. Care instructions adapted under license by [facility] ([facility]). If you have questions about a medical condition or this instruction, always ask your healthcare professional. [address] any warranty or liability for your use of this information.

## 2021-08-16 NOTE — PROGRESS NOTES
[address] Note    Date: 8/16/2021                                               Subjective/Objective:     Chief Complaint   Patient presents with    Otitis Media     Left ear        HPI   L ear pain starting 2 weeks ago. Stable in severity. +hearing loss. Radiates toward jaw. No runny nose or cough. No fever.          Patient Active Problem List    Diagnosis Date Noted    CAD (coronary artery disease) 05/06/2011    Dyslipidemia 05/06/2011    Hypertension     Complex tear of medial meniscus of right knee as current injury 07/15/2021    Type 2 diabetes mellitus without complication, without long-term current use of insulin ([address] 06/07/2021    Obstructive sleep apnea 08/19/2019    Chest pain 01/03/2017    Ureteral calculi 19/11/7099    Renal colic on left side 21/56/6308    Hydronephrosis 12/12/2014    Microscopic hematuria 12/12/2014    [facility]     ED (erectile dysfunction)     Cough due to ACE inhibitor 12/21/2012    PVD (peripheral vascular disease) ([address] 11/22/2011    Disorder of intervertebral disc     Polyp of colon        Past Medical History:   Diagnosis Date    CAD (coronary artery disease)     Disc disorder     ED (erectile dysfunction)     Hyperlipidemia     Hyperlipidemia     Hypertension     MI (myocardial infarction) ([address]     Obstructive sleep apnea     Other disorders of kidney and ureter in diseases classified elsewhere     Pneumonia 1970s    Polyp of colon     Renal calculi     Type 2 diabetes mellitus without complication, without long-term current use of insulin ([address] 5/16/2019    Type 2 diabetes mellitus without complication, without long-term current use of insulin ([address] 5/16/2019    Type II or unspecified type diabetes mellitus without mention of complication, not stated as uncontrolled        Current Outpatient Medications   Medication Sig Dispense Refill    amoxicillin-clavulanate (AUGMENTIN) 875-125 MG per tablet Take 1 tablet by mouth 2 times daily for 10 days 20 tablet 0    carbamide peroxide (DEBROX) 6.5 % otic solution Place 5 drops into the right ear 2 times daily 15 mL 0    hydroCHLOROthiazide (HYDRODIURIL) 25 MG tablet TAKE 1 TABLET EVERY DAY 90 tablet 3    glimepiride (AMARYL) 4 MG tablet TAKE 1 TABLET TWICE DAILY 180 tablet 1    INVOKANA 300 MG TABS tablet TAKE 1 TABLET EVERY MORNING BEFORE BREAKFAST 90 tablet 1    metFORMIN (GLUCOPHAGE) 1000 MG tablet TAKE 1 TABLET TWICE DAILY WITH MEALS 180 tablet 1    atorvastatin (LIPITOR) 40 MG tablet Take 1 tablet by mouth daily 90 tablet 3    amLODIPine (NORVASC) 10 MG tablet TAKE 1 TABLET EVERY DAY 90 tablet 2    carvedilol (COREG) 6.25 MG tablet TAKE 1 TABLET TWICE DAILY WITH MEALS 180 tablet 2    diphenhydrAMINE (BENADRYL) 25 MG tablet Take 25 mg by mouth nightly as needed for Sleep       Multiple Vitamins-Minerals (MULTIVITAMIN PO) Take by mouth      aspirin 81 MG tablet Take 81 mg by mouth daily       No current facility-administered medications for this visit. Allergies   Allergen Reactions    Fexofenadine-Pseudoephed Er Other (See Comments)     cough    Lisinopril     Morphine Hives    Zithromax [Azithromycin]      Heart palpitations     Losartan      Dry c ough       Review of Systems   No vomiting    Vitals:  /70   Pulse 66   Temp 97.6 °F (36.4 °C)   Ht 6' (1.829 m)   Wt 274 lb (124.3 kg)   SpO2 97%   BMI 37.16 kg/m²     Physical Exam   General:  Well-appearing, NAD, alert, non-toxic  HEENT:  Normocephalic, atraumatic.  + Bilateral TMs obscured by cerumen. CHEST/LUNGS: No dyspnea  EXTREMETIES: Normal movement of all extremities. No edema. No joint swelling.   SKIN:  No rash, no cellulitis, no bruising, no petechiae/purpura/vesicles/pustules/abscess  PSYCH:  A+O x 3; normal affect  NEURO:  GCS 15, CN2-12 grossly intact, no focal motor/sensory deficits, normal gait, normal speech      Assessment/Plan     62-year-old male with impacted cerumen of bilateral ears. Using irrigation, the left ear canal successfully cleared. This revealed an erythematous and opacified left TM, concerning for otitis media. Will treat with Augmentin. We are unable to completely clear the right ear canal.  Will prescribe Debrox drops to further disimpacted. Follow-up in 1 week for recheck. Or he can see ENT for further treatment. Discussed with patient medication/s dosage, instructions, potential S/E, A/R and Drug Interaction  Tylenol or Motrin as needed for pain or fever  Advise to return here if worse or go to nearest ER  Encourage fluids  Pt discharged in stable condition at 11:40      1. Bilateral impacted cerumen    - carbamide peroxide (DEBROX) 6.5 % otic solution; Place 5 drops into the right ear 2 times daily  Dispense: 15 mL; Refill: 0  - Caleb Huang MD, Otolaryngology, [facility]  - [place] REMOVAL IMPACTED CERUMEN IRRIGATION/LVG UNILAT    2. Otalgia, left      3. Acute suppurative otitis media of left ear without spontaneous rupture of tympanic membrane, recurrence not specified  - amoxicillin-clavulanate (AUGMENTIN) 875-125 MG per tablet; Take 1 tablet by mouth 2 times daily for 10 days  Dispense: 20 tablet; Refill: 0         Orders Placed This Encounter   Procedures   Caleb Huang MD, Otolaryngology, [facility]     Referral Priority:   Routine     Referral Type:   Eval and Treat     Referral Reason:   Specialty Services Required     Referred to Provider:   Timbo Galan MD     Requested Specialty:   Otolaryngology     Number of Visits Requested:   1    [place] REMOVAL IMPACTED CERUMEN IRRIGATION/LVG UNILAT       Return if symptoms worsen or fail to improve.     Sterling Neumann MD, MD    8/16/2021  12:07 PM

## 2021-08-20 ENCOUNTER — PROCEDURE VISIT (OUTPATIENT)
Dept: NEUROLOGY | Age: 66
End: 2021-08-20
Payer: COMMERCIAL

## 2021-08-20 ENCOUNTER — OFFICE VISIT (OUTPATIENT)
Dept: NEUROLOGY | Age: 66
End: 2021-08-20
Payer: COMMERCIAL

## 2021-08-20 VITALS
SYSTOLIC BLOOD PRESSURE: 120 MMHG | HEART RATE: 76 BPM | BODY MASS INDEX: 37.11 KG/M2 | DIASTOLIC BLOOD PRESSURE: 77 MMHG | HEIGHT: 72 IN | WEIGHT: 274 LBS

## 2021-08-20 DIAGNOSIS — G56.23 ENTRAPMENT OF BOTH ULNAR NERVES AT ELBOW: Primary | ICD-10-CM

## 2021-08-20 DIAGNOSIS — G95.20 CERVICAL CORD COMPRESSION WITH MYELOPATHY (HCC): ICD-10-CM

## 2021-08-20 DIAGNOSIS — R20.2 BILATERAL ARM NUMBNESS AND TINGLING WHILE SLEEPING: ICD-10-CM

## 2021-08-20 DIAGNOSIS — G95.9 CERVICAL MYELOPATHY (HCC): Primary | ICD-10-CM

## 2021-08-20 DIAGNOSIS — R20.0 BILATERAL ARM NUMBNESS AND TINGLING WHILE SLEEPING: ICD-10-CM

## 2021-08-20 PROCEDURE — 95886 MUSC TEST DONE W/N TEST COMP: CPT | Performed by: PSYCHIATRY & NEUROLOGY

## 2021-08-20 PROCEDURE — 99213 OFFICE O/P EST LOW 20 MIN: CPT | Performed by: PSYCHIATRY & NEUROLOGY

## 2021-08-20 PROCEDURE — 95911 NRV CNDJ TEST 9-10 STUDIES: CPT | Performed by: PSYCHIATRY & NEUROLOGY

## 2021-08-20 NOTE — PROGRESS NOTES
Magen Cardenas   Neurology followup    Subjective:   CC/HP  History was obtained from the patient. Patient is here for a follow-up visit and EMG studies. Patient still complains of neck pain. She has numbness tingling pain and weakness in both arms. Detailed history:  Patient complains of tingling and numbness in both of his arms. He has had this for a few years. 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 Patient states that he had surgery done but the symptoms did not improve. Actually now the symptoms seem a little worse. He has tingling and numbness in his fourth and fifth digits bilaterally. He also has pain and aching in the arm. Over the last  several weeks patient has noticed some tingling numbness and aching in the left occipital region as well.   Comorbidities include hypertension coronary artery disease obstructive sleep apnea hyperlipidemia and type 2 diabetes    REVIEW OF SYSTEMS    Constitutional:  []   Chills   []  Fatigue   []  Fevers   []  Malaise   []  Weight loss     [x] Denies all of the above    Respiratory:   []  Cough    []  Shortness of breath         [x] Denies all of the above     Cardiovascular:   []  Chest pain    []  Exertional chest pressure/discomfort           [] Palpitations    []  Syncope     [x] Denies all of the above        Past Medical History:   Diagnosis Date    CAD (coronary artery disease)     Disc disorder     ED (erectile dysfunction)     Hyperlipidemia     Hyperlipidemia     Hypertension     MI (myocardial infarction) ([address]     Obstructive sleep apnea     Other disorders of kidney and ureter in diseases classified elsewhere     Pneumonia 1970s    Polyp of colon     Renal calculi     Type 2 diabetes mellitus without complication, without long-term current use of insulin ([address] 5/16/2019    Type 2 diabetes mellitus without complication, without long-term current use of insulin ([address] Marital Status:    Intimate Partner Violence:     Fear of Current or Ex-Partner:     Emotionally Abused:     Physically Abused:     Sexually Abused:         Objective:  Exam:  /77   Pulse 76   Ht 6' (1.829 m)   Wt 274 lb (124.3 kg)   BMI 37.16 kg/m²   This is a well-nourished patient in no acute distress  Patient is awake, alert and oriented x3. Speech is normal.  Pupils are equal round reacting to light. Extraocular movements intact. Face symmetrical. Tongue midline. Motor exam shows normal symmetrical strength. There was mild wasting of the first dorsal interosseous muscles bilaterally left worse than right. Deep tendon reflexes normal. Plantar reflexes downgoing. Sensory exam shows decreased light touch and pinprick in the fourth and fifth digits bilaterally in the arms. Coordination normal. Gait normal. No carotid bruit. No neck stiffness. Data :  LABS:  General Labs:    CBC:   Lab Results   Component Value Date    WBC 10.1 07/15/2021    RBC 4.65 07/15/2021    HGB 13.7 07/15/2021    HCT 40.4 07/15/2021    MCV 86.9 07/15/2021    MCH 29.4 07/15/2021    MCHC 33.8 07/15/2021    RDW 14.0 07/15/2021     07/15/2021    MPV 7.7 07/15/2021     BMP:    Lab Results   Component Value Date     07/15/2021    K 3.9 07/15/2021     07/15/2021    CO2 24 07/15/2021    BUN 23 07/15/2021    LABALBU 4.6 08/24/2020    CREATININE 1.1 07/15/2021    CALCIUM 9.5 07/15/2021    GFRAA >60 07/15/2021    GFRAA >60 07/15/2011    LABGLOM >60 07/15/2021    LABGLOM 61 07/14/2018    GLUCOSE 154 07/15/2021     RADIOLOGY REVIEW:  I have reviewed radiology report(s) of: MRI cervical spine from 2018    Impression :  Repeat EMG studies done in the office today showed 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 Left side is worse than the right. Both sides are worse compared to the previous study done last year.   Patient also has bilateral carpal tunnel syndrome unchanged from the previous study. Patient's MRI cervical spine 2018 showed the multilevel cord compression but most severe at C5-C6 level. Patient apparently was referred to neurosurgery but never followed up. .    Plan :  Discussed at length with patient and his wife. Explained EMG studies  I will repeat the MRI cervical spine. If there is continued evidence of spinal cord compression patient will need to see the neurosurgeon to consider surgical treatment. I will see him back after the MRI cervical spine        Please note a portion of  this chart was generated using dragon dictation software. Although every effort was made to ensure the accuracy of this automated transcription, some errors in transcription may have occurred.

## 2021-08-20 NOTE — PROGRESS NOTES
Rosy Delcid M.D. [facility]) [facility]  Board Certified in [address] [facility] [address] [facility], [address]    EMG / NERVE CONDUCTION STUDY      PATIENT:  Mindi Olszewski       DATE OF EM21     YOB: 1955       REASON FOR EMG:   Bilateral arm numbness      REFERRING PHYSICIAN:  Rosy Delcid MD  [address] [place] [address], [address] [place] [address],  [address]     SUMMARY:   Bilateral median motor and sensory nerve studies with prolonged distal latencies. Bilateral ulnar motor nerve studies had low amplitudes and moderately severe slowing of conduction velocities across the elbow. The left ulnar sensory was not recordable. The right ulnar sensory nerve study had a slightly prolonged distal latency  The left radial sensory nerve study was normal  Needle EMG of several muscles in both upper extremity showed denervation in the first dorsal interosseous muscles bilaterally, left worse than right      CLINICAL DIAGNOSIS:  Ulnar mononeuropathy        EMG RESULTS:   1. This patient continues to have moderately severe bilateral ulnar nerve lesions at the elbow. Both sides are worse when compared to the previous study from   The left side is still relatively more severely involved when compared to the right side    2. Patient also has moderately severe bilateral median nerve lesions at the wrist.  Carpal tunnel syndrome. This shows no significant change compared to the previous study. ---------------------------------------------  Rosy Delcid M.D.   Electromyographer / Neurologist

## 2021-08-23 ENCOUNTER — TELEPHONE (OUTPATIENT)
Dept: NEUROLOGY | Age: 66
End: 2021-08-23

## 2021-08-23 DIAGNOSIS — F40.240 CLAUSTROPHOBIA: Primary | ICD-10-CM

## 2021-08-23 RX ORDER — DIAZEPAM 5 MG/1
TABLET ORAL
Qty: 2 TABLET | Refills: 0 | OUTPATIENT
Start: 2021-08-23 | End: 2021-09-01

## 2021-08-23 NOTE — TELEPHONE ENCOUNTER
Pt called and states he scheduled his MRI on 8-31-21 and is claustrophobic. He will need a pill called into pharmacy to relax him before his MRI.  [employer] in [place]  CB# for pt [phone]

## 2021-08-31 ENCOUNTER — HOSPITAL ENCOUNTER (OUTPATIENT)
Dept: MRI IMAGING | Age: 66
Discharge: HOME | End: 2021-08-31
Payer: COMMERCIAL

## 2021-08-31 DIAGNOSIS — R20.2 BILATERAL ARM NUMBNESS AND TINGLING WHILE SLEEPING: ICD-10-CM

## 2021-08-31 DIAGNOSIS — G95.20 CERVICAL CORD COMPRESSION WITH MYELOPATHY (HCC): ICD-10-CM

## 2021-08-31 DIAGNOSIS — G95.9 CERVICAL MYELOPATHY (HCC): ICD-10-CM

## 2021-08-31 DIAGNOSIS — R20.0 BILATERAL ARM NUMBNESS AND TINGLING WHILE SLEEPING: ICD-10-CM

## 2021-08-31 PROCEDURE — 72141 MRI NECK SPINE W/O DYE: CPT

## 2021-09-08 DIAGNOSIS — G95.89 MYELOMALACIA OF CERVICAL CORD (HCC): ICD-10-CM

## 2021-09-08 DIAGNOSIS — G95.20 CERVICAL CORD COMPRESSION WITH MYELOPATHY (HCC): Primary | ICD-10-CM

## 2021-09-09 ENCOUNTER — OFFICE VISIT (OUTPATIENT)
Dept: FAMILY MEDICINE CLINIC | Age: 66
End: 2021-09-09
Payer: COMMERCIAL

## 2021-09-09 VITALS
WEIGHT: 276 LBS | BODY MASS INDEX: 37.38 KG/M2 | OXYGEN SATURATION: 96 % | DIASTOLIC BLOOD PRESSURE: 83 MMHG | SYSTOLIC BLOOD PRESSURE: 128 MMHG | HEART RATE: 70 BPM | HEIGHT: 72 IN

## 2021-09-09 DIAGNOSIS — E78.5 DYSLIPIDEMIA: ICD-10-CM

## 2021-09-09 DIAGNOSIS — I10 ESSENTIAL HYPERTENSION: ICD-10-CM

## 2021-09-09 DIAGNOSIS — E11.9 TYPE 2 DIABETES MELLITUS WITHOUT COMPLICATION, WITHOUT LONG-TERM CURRENT USE OF INSULIN (HCC): Primary | ICD-10-CM

## 2021-09-09 DIAGNOSIS — G47.33 OBSTRUCTIVE SLEEP APNEA: ICD-10-CM

## 2021-09-09 DIAGNOSIS — I25.10 CORONARY ARTERY DISEASE INVOLVING NATIVE CORONARY ARTERY OF NATIVE HEART WITHOUT ANGINA PECTORIS: ICD-10-CM

## 2021-09-09 LAB — HBA1C MFR BLD: 8.2 %

## 2021-09-09 PROCEDURE — 3052F HG A1C>EQUAL 8.0%<EQUAL 9.0%: CPT | Performed by: FAMILY MEDICINE

## 2021-09-09 PROCEDURE — 83036 HEMOGLOBIN GLYCOSYLATED A1C: CPT | Performed by: FAMILY MEDICINE

## 2021-09-09 PROCEDURE — 99214 OFFICE O/P EST MOD 30 MIN: CPT | Performed by: FAMILY MEDICINE

## 2021-09-09 NOTE — PROGRESS NOTES
Karlee Retana is a 61 y.o. male. HPI: Here for complex medical visit  Now that he is retired but staying busier than ever with his grandkids  Has had a knee procedure that went well with Dr. Rob Cano still having bilateral hand pain and neck pain underwent an EMG by Dr. Martínez Dean  Bilateral ulnar nerve and bilateral carpal tunnel are present left worse than right even though he has had prior left ulnar and carpal tunnel surgery very unhappy with . Diana Pizano  Has neck pain as well, recent MRI of the C-spine was abnormal and is awaiting a consultation with [facility]  Diabetic diet has been poorly adhered to,  Was here 2 weeks ago for bilateral earwax and once the earwax was removed from the left ear it looked red so he was put on Augmentin  The right earwax did not get removed entirely  Now he is hearing better but he still has some pain behind his left ear, no drainage, no fever  Meds, vitamins and allergies reviewed with pt    ROS: No TIA's or unusual headaches, no dysphagia. No prolonged cough. No dyspnea or chest pain on exertion. No abdominal pain, change in bowel habits, black or bloody stools. No urinary tract symptoms. No new or unusual musculoskeletal symptoms. Prior to Visit Medications    Medication Sig Taking?  Authorizing Provider   carbamide peroxide (DEBROX) 6.5 % otic solution Place 5 drops into the right ear 2 times daily Yes Armida Calle MD   hydroCHLOROthiazide (HYDRODIURIL) 25 MG tablet TAKE 1 TABLET EVERY DAY Yes Víctor Cook MD   glimepiride (AMARYL) 4 MG tablet TAKE 1 TABLET TWICE DAILY Yes Víctor Cook MD   INVOKANA 300 MG TABS tablet TAKE 1 TABLET EVERY MORNING BEFORE BREAKFAST Yes Víctor Cook MD   metFORMIN (GLUCOPHAGE) 1000 MG tablet TAKE 1 TABLET TWICE DAILY WITH MEALS Yes Víctor Cook MD   atorvastatin (LIPITOR) 40 MG tablet Take 1 tablet by mouth daily Yes Kandi Sims DO   amLODIPine (NORVASC) 10 MG tablet TAKE 1 TABLET EVERY DAY Yes Nancie Hernandez MD   carvedilol (COREG) 6.25 MG tablet TAKE 1 TABLET TWICE DAILY WITH MEALS Yes Nancie Hernandez MD   diphenhydrAMINE (BENADRYL) 25 MG tablet Take 25 mg by mouth nightly as needed for Sleep  Yes Historical Provider, MD   Multiple Vitamins-Minerals (MULTIVITAMIN PO) Take by mouth Yes Historical Provider, MD   aspirin 81 MG tablet Take 81 mg by mouth daily Yes Historical Provider, MD       Past Medical History:   Diagnosis Date    CAD (coronary artery disease)     Disc disorder     ED (erectile dysfunction)     Hyperlipidemia     Hyperlipidemia     Hypertension     [facility]     Obstructive sleep apnea     Other disorders of kidney and ureter in diseases classified elsewhere     Pneumonia 1970s    Polyp of colon     Renal calculi     Type 2 diabetes mellitus without complication, without long-term current use of insulin ([facility] 5/16/2019    Type 2 diabetes mellitus without complication, without long-term current use of insulin ([facility] 5/16/2019    Type 2 diabetes mellitus without complication, without long-term current use of insulin ([facility] 6/7/2021    Type II or unspecified type diabetes mellitus without mention of complication, not stated as uncontrolled        Social History     Tobacco Use    Smoking status: Former Smoker     Packs/day: 0.25     Years: 30.00     Pack years: 7.50     Types: Cigarettes, Cigars     Quit date: 4/9/2011     Years since quitting: 10.4    Smokeless tobacco: Never Used    Tobacco comment: cigars every once in a while    Vaping Use    Vaping Use: Never used   Substance Use Topics    Alcohol use:  Yes     Alcohol/week: 1.0 standard drinks     Types: 1 Cans of beer per week     Comment: social    Drug use: No       Family History   Problem Relation Age of Onset    Diabetes Father     Mental Illness Mother     High Blood Pressure Brother     Heart Disease Brother     High Cholesterol Neg Hx        Allergies   Allergen Reactions    Fexofenadine-Pseudoephed Er Other (See Comments)     cough    Lisinopril     Morphine Hives    Zithromax [Azithromycin]      Heart palpitations     Losartan      Dry c ough       OBJECTIVE:  /83   Pulse 70   Ht 6' (1.829 m)   Wt 276 lb (125.2 kg)   SpO2 96%   BMI 37.43 kg/m²   GEN:  in NAD obese, weight is drifting up slightly  HEENT:  NCAT, TMs:normal and throat: clear, left canal clear, tm normal, no tmj tenderness  NECK:  Supple without adenopathy. no bruits slightly tender in the left posterior paraspinal neck area  CV:  Regular rate and rhythm, S1 and S2 normal, no murmurs, clicks  PULM:  Chest is clear, no wheezing ,  symmetric air entry throughout both lung fields. EXT: No rash or edema  NEURO: nonfocal  a1c - 8.2 ( was 7.2)  Lab Results   Component Value Date    CHOL 148 08/24/2020    CHOL 132 05/16/2019    CHOL 111 07/14/2018    CHOL 143 07/14/2018     Lab Results   Component Value Date    TRIG 240 (H) 08/24/2020    TRIG 168 (H) 05/16/2019    TRIG 311 (H) 07/14/2018     Lab Results   Component Value Date    HDL 38 (L) 08/24/2020    HDL 37 (L) 05/16/2019    HDL 32 (L) 07/14/2018     Lab Results   Component Value Date    LDLCALC 62 08/24/2020    LDLCALC 61 05/16/2019    LDLCALC 74 07/14/2018     Lab Results   Component Value Date    LABVLDL 48 08/24/2020    LABVLDL 34 05/16/2019    LABVLDL 36 01/03/2017     Lab Results   Component Value Date    CHOLHDLRATIO 4.5 07/14/2018    CHOLHDLRATIO 3.2 11/13/2015    CHOLHDLRATIO 3.9 10/29/2014     ASSESSMENT/PLAN:  1. Type 2 diabetes mellitus without complication, without long-term current use of insulin (HCC)  sli worse, vows to do better with diet and exercise  Continue Metformin, glimepiride, and Invokana at maximum doses  - POCT glycosylated hemoglobin (Hb A1C)    2. Coronary artery disease involving native coronary artery of native heart without angina pectoris  Stable  Continue aggressive risk factor management    3.  Dyslipidemia  Stay on statin  Fasting lipid panel at next visit    4. Essential hypertension  stable, stay on same medication    5.  Obstructive sleep apnea  Not using cpap  Not snoring    6 left Otalgia-suspect this is coming from his neck  bilat ulnar neuropathy  bilat cts  Neck disc  Follow-up with [facility]

## 2021-10-06 ENCOUNTER — HOSPITAL ENCOUNTER (OUTPATIENT)
Age: 66
Discharge: HOME | End: 2021-10-06
Payer: COMMERCIAL

## 2021-10-06 ENCOUNTER — HOSPITAL ENCOUNTER (OUTPATIENT)
Dept: GENERAL RADIOLOGY | Age: 66
Discharge: HOME | End: 2021-10-06
Payer: COMMERCIAL

## 2021-10-06 DIAGNOSIS — R52 PAIN: ICD-10-CM

## 2021-10-06 PROCEDURE — 72050 X-RAY EXAM NECK SPINE 4/5VWS: CPT

## 2021-10-13 RX ORDER — CARVEDILOL 6.25 MG/1
TABLET ORAL
Qty: 180 TABLET | Refills: 3 | Status: SHIPPED | OUTPATIENT
Start: 2021-10-13 | End: 2022-03-10 | Stop reason: SDUPTHER

## 2021-11-03 ENCOUNTER — TELEPHONE (OUTPATIENT)
Dept: CARDIOLOGY CLINIC | Age: 66
End: 2021-11-03

## 2021-11-24 ENCOUNTER — TELEPHONE (OUTPATIENT)
Dept: FAMILY MEDICINE CLINIC | Age: 66
End: 2021-11-24

## 2021-11-24 ENCOUNTER — OFFICE VISIT (OUTPATIENT)
Dept: FAMILY MEDICINE CLINIC | Age: 66
End: 2021-11-24
Payer: COMMERCIAL

## 2021-11-24 VITALS
HEART RATE: 73 BPM | DIASTOLIC BLOOD PRESSURE: 81 MMHG | OXYGEN SATURATION: 97 % | BODY MASS INDEX: 36.16 KG/M2 | WEIGHT: 267 LBS | SYSTOLIC BLOOD PRESSURE: 128 MMHG | HEIGHT: 72 IN

## 2021-11-24 DIAGNOSIS — E11.9 TYPE 2 DIABETES MELLITUS WITHOUT COMPLICATION, WITHOUT LONG-TERM CURRENT USE OF INSULIN (HCC): ICD-10-CM

## 2021-11-24 DIAGNOSIS — I10 PRIMARY HYPERTENSION: ICD-10-CM

## 2021-11-24 DIAGNOSIS — Z01.818 PREOP EXAMINATION: ICD-10-CM

## 2021-11-24 DIAGNOSIS — R35.0 URINATION FREQUENCY: ICD-10-CM

## 2021-11-24 DIAGNOSIS — I25.10 CORONARY ARTERY DISEASE INVOLVING NATIVE CORONARY ARTERY OF NATIVE HEART WITHOUT ANGINA PECTORIS: ICD-10-CM

## 2021-11-24 DIAGNOSIS — M50.30 DDD (DEGENERATIVE DISC DISEASE), CERVICAL: Primary | ICD-10-CM

## 2021-11-24 PROCEDURE — 99243 OFF/OP CNSLTJ NEW/EST LOW 30: CPT | Performed by: FAMILY MEDICINE

## 2021-11-24 RX ORDER — GLIMEPIRIDE 4 MG/1
4 TABLET ORAL 2 TIMES DAILY WITH MEALS
Qty: 180 TABLET | Refills: 3 | Status: SHIPPED | OUTPATIENT
Start: 2021-11-24 | End: 2022-03-10 | Stop reason: SDUPTHER

## 2021-11-24 RX ORDER — CANAGLIFLOZIN 300 MG/1
TABLET, FILM COATED ORAL
Qty: 90 TABLET | Refills: 3 | Status: SHIPPED | OUTPATIENT
Start: 2021-11-24 | End: 2022-03-30

## 2021-11-24 NOTE — PROGRESS NOTES
[facility] Medicine Preoperative Evaluation       Akilah Can M.D.     [address] [facility], [address]     (phone) [phone]       (fax) [phone]    Dear Dr. Denise Sheehan,         Thank you for referring Andrea Ochoa to me for Preoperative Evaluation for cervical decompression surgery by Dr. Denise Sheehan . Below are the relevant portions of my assessment and plan of care.     Andrea Ochoa    66 y.o.   1955    [address]    Vitals:    11/24/21 1121   BP: 128/81   Pulse: 73   SpO2: 97%   Weight: 267 lb (121.1 kg)   Height: 6' (1.829 m)      Wt Readings from Last 2 Encounters:   11/24/21 267 lb (121.1 kg)   09/09/21 276 lb (125.2 kg)     BP Readings from Last 3 Encounters:   11/24/21 128/81   09/09/21 128/83   08/20/21 120/77        Allergies   Allergen Reactions    Fexofenadine-Pseudoephed Er Other (See Comments)     cough    Lisinopril      cough    Morphine Hives    Zithromax [Azithromycin]      Heart palpitations     Losartan      Dry c ough     Current Outpatient Medications   Medication Sig Dispense Refill    glimepiride (AMARYL) 4 MG tablet Take 1 tablet by mouth 2 times daily (with meals) 180 tablet 3    canagliflozin (INVOKANA) 300 MG TABS tablet 1 tablet po daily 90 tablet 3    metFORMIN (GLUCOPHAGE) 1000 MG tablet 1 tablet po bid with food 180 tablet 3    carvedilol (COREG) 6.25 MG tablet TAKE 1 TABLET TWICE DAILY WITH MEALS 180 tablet 3    hydroCHLOROthiazide (HYDRODIURIL) 25 MG tablet TAKE 1 TABLET EVERY DAY 90 tablet 3    atorvastatin (LIPITOR) 40 MG tablet Take 1 tablet by mouth daily 90 tablet 3    amLODIPine (NORVASC) 10 MG tablet TAKE 1 TABLET EVERY DAY 90 tablet 2    diphenhydrAMINE (BENADRYL) 25 MG tablet Take 25 mg by mouth nightly as needed for Sleep       Multiple Vitamins-Minerals (MULTIVITAMIN PO) Take by mouth      aspirin 81 MG tablet Take 81 mg by mouth daily       No current facility-administered medications for this visit. He presents to the office today for a preoperative consultation at the request of surgeon, Dr. Uvaldo Tanner  who plans on performing surgery on December 6 ,2021. The current problem began 4 years ago and has worsened over the last 2 yrs. Planned anesthesia is General.  The patient has no known anesthesia issues. Patient has no bleeding risk. No loose teeth or dentures       Past Medical History:   Diagnosis Date    CAD (coronary artery disease)     DDD (degenerative disc disease), cervical 11/24/2021    Disc disorder     ED (erectile dysfunction)     Hyperlipidemia     Hyperlipidemia     Hypertension     MI (myocardial infarction) ([address]     Obstructive sleep apnea     Other disorders of kidney and ureter in diseases classified elsewhere     Pneumonia 1970s    Polyp of colon     Renal calculi     Type 2 diabetes mellitus without complication, without long-term current use of insulin ([address] 5/16/2019    Type 2 diabetes mellitus without complication, without long-term current use of insulin ([address] 5/16/2019    Type 2 diabetes mellitus without complication, without long-term current use of insulin ([address] 6/7/2021    Type II or unspecified type diabetes mellitus without mention of complication, not stated as uncontrolled      Past Surgical History:   Procedure Laterality Date    ABDOMEN SURGERY      Exploratory Laparotomy    ARM SURGERY Left 10/27/2020    .  performed by Eder Jackson MD at [place] [address]      right and left    CARPAL TUNNEL RELEASE Left 10/27/2020    LEFT CARPAL TUNNEL RELEASE; LEFT INSITU CUBITAL TUNNEL RELEASE performed by Eder Jackson MD at [address] [place] [address] COLONOSCOPY  12/2/13    COLONOSCOPY  12/2/13    polyps X 2 removed    CORONARY ANGIOPLASTY WITH STENT PLACEMENT      CYSTOSCOPY Left 12/12/14    CYSTOSCOPY, LEFT RETROGRADE PYELOGRAM, PLACEMENT OF LEFT    FRACTURE SURGERY      Ankle & Wrist    KNEE ARTHROSCOPY Right 7/15/2021    RIGHT KNEE ARTHROSCOPY PARTIAL MEDIAL MENISCECTOMY performed by Suresh Prater MD at [address] [place] [address]       Family History is not significant for reactions to anesthesia  Social History     Socioeconomic History    Marital status:      Spouse name: Not on file    Number of children: Not on file    Years of education: Not on file    Highest education level: Not on file   Occupational History    Not on file   Tobacco Use    Smoking status: Former Smoker     Packs/day: 0.25     Years: 30.00     Pack years: 7.50     Types: Cigarettes, Cigars     Quit date: 4/9/2011     Years since quitting: 10.6    Smokeless tobacco: Never Used    Tobacco comment: cigars every once in a while    Vaping Use    Vaping Use: Never used   Substance and Sexual Activity    Alcohol use: Yes     Alcohol/week: 1.0 standard drink     Types: 1 Cans of beer per week     Comment: social    Drug use: No    Sexual activity: Not on file   Other Topics Concern    Not on file   Social History Narrative    Not on file     Social Determinants of Health     Financial Resource Strain: Low Risk     Difficulty of Paying Living Expenses: Not hard at all   Food Insecurity: No Food Insecurity    Worried About [facility] in the Last Year: Never true    [facility] in the Last Year: Never true   Transportation Needs:     Lack of Transportation (Medical): Not on file    Lack of Transportation (Non-Medical):  Not on file   Physical Activity:     Days of Exercise per Week: Not on file    Minutes of Exercise per Session: Not on file   Stress:     Feeling of Stress : Not on file   Social Connections:     Frequency of Communication with Friends and Family: Not on file    Frequency of Social Gatherings with Friends and Family: Not on file    Attends [employer] Services: Not on file    Active Member of Clubs or Organizations: Not on file    Attends Club or Organization Meetings: Not on file    Marital Status: Not on file   Intimate Partner Violence:     Fear of Current or Ex-Partner: Not on file    Emotionally Abused: Not on file    Physically Abused: Not on file    Sexually Abused: Not on file   Housing Stability:     Unable to Pay for Housing in the Last Year: Not on file    Number of Jiobimodes in the Last Year: Not on file    Unstable Housing in the Last Year: Not on file       REVIEW OF SYSTEMS:   CONSTITUTIONAL: No major weight gain or loss, fatigue, weakness, night sweats or fever. HEENT: No new vision difficulties or ringing in the ears. Chronic neck pain   RESPIRATORY: No new SOB, PND, orthopnea or cough. CARDIOVASCULAR: no CP, palpitations or SOB with exertion  GI: No nausea, vomiting, diarrhea, constipation, abdominal pain or changes in bowel habits. : No urinary frequency, urgency, incontinence hematuria or dysuria. SKIN: No cyanosis or skin lesions. MUSCULOSKELETAL: No new muscle or joint pain. NEUROLOGICAL: No syncope or TIA-like symptoms. PSYCHIATRIC: No anxiety, insomnia or depression     Physical Exam   /81   Pulse 73   Ht 6' (1.829 m)   Wt 267 lb (121.1 kg)   SpO2 97%   BMI 36.21 kg/m²   Constitutional: Patient is oriented to person, place, and time. He appears in no distress. Head: Normocephalic and atraumatic. Right Ear: Tympanic membrane, external ear and ear canal normal.   Left Ear: Tympanic membrane, external ear and ear canal normal.   Nose: Nose normal.   Mouth/Throat: Not examined due to COVID/mask on, there is no cervical adenopathy. There are no loose teeth per patient, decreased range of motion of neck due to pain  Eyes: Conjunctivae and extraocular motions are normal. Pupils are equal, round, and reactive to light bilaterally. Neck: Neck supple. No JVD present. No mass and no thyromegaly present. Cardiovascular: Normal rate, regular rhythm, normal heart sounds and intact distal pulses.   Exam reveals no regimen before surgery: discontinue ASA 7d before surgery. 3. Prophylaxis for cardiac events with perioperative beta-blockers: on coreg. 4. Invasive hemodynamic monitoring perioperatively: not indicated. 5. Patient is cleared for upcoming surgery, labs pending , cardiac clearance  in near future with cardiology      If you have questions, please do not hesitate to call me. Sincerely,        Tammy Potter.  Kylah Ashraf M.D.

## 2021-11-26 ENCOUNTER — NURSE ONLY (OUTPATIENT)
Dept: FAMILY MEDICINE CLINIC | Age: 66
End: 2021-11-26
Payer: COMMERCIAL

## 2021-11-26 PROCEDURE — 90674 CCIIV4 VAC NO PRSV 0.5 ML IM: CPT | Performed by: FAMILY MEDICINE

## 2021-11-26 PROCEDURE — 90471 IMMUNIZATION ADMIN: CPT | Performed by: FAMILY MEDICINE

## 2021-11-30 ENCOUNTER — OFFICE VISIT (OUTPATIENT)
Dept: CARDIOLOGY CLINIC | Age: 66
End: 2021-11-30
Payer: COMMERCIAL

## 2021-11-30 ENCOUNTER — HOSPITAL ENCOUNTER (OUTPATIENT)
Age: 66
Discharge: HOME | End: 2021-11-30
Payer: COMMERCIAL

## 2021-11-30 VITALS
WEIGHT: 265.8 LBS | DIASTOLIC BLOOD PRESSURE: 70 MMHG | BODY MASS INDEX: 36 KG/M2 | HEIGHT: 72 IN | OXYGEN SATURATION: 97 % | HEART RATE: 69 BPM | SYSTOLIC BLOOD PRESSURE: 130 MMHG

## 2021-11-30 DIAGNOSIS — I25.10 CORONARY ARTERY DISEASE INVOLVING NATIVE HEART WITHOUT ANGINA PECTORIS, UNSPECIFIED VESSEL OR LESION TYPE: ICD-10-CM

## 2021-11-30 DIAGNOSIS — I25.10 CORONARY ARTERY DISEASE INVOLVING NATIVE CORONARY ARTERY OF NATIVE HEART WITHOUT ANGINA PECTORIS: ICD-10-CM

## 2021-11-30 DIAGNOSIS — R35.0 URINATION FREQUENCY: ICD-10-CM

## 2021-11-30 DIAGNOSIS — Z01.818 PREOP TESTING: ICD-10-CM

## 2021-11-30 DIAGNOSIS — Z01.810 PREOPERATIVE CARDIOVASCULAR EXAMINATION: Primary | ICD-10-CM

## 2021-11-30 DIAGNOSIS — E78.2 MIXED HYPERLIPIDEMIA: ICD-10-CM

## 2021-11-30 DIAGNOSIS — I10 ESSENTIAL HYPERTENSION: ICD-10-CM

## 2021-11-30 DIAGNOSIS — E11.9 TYPE 2 DIABETES MELLITUS WITHOUT COMPLICATION, WITHOUT LONG-TERM CURRENT USE OF INSULIN (HCC): ICD-10-CM

## 2021-11-30 LAB
ABO/RH: NORMAL
ANION GAP SERPL CALCULATED.3IONS-SCNC: 15 MMOL/L (ref 3–16)
ANTIBODY SCREEN: NORMAL
APTT: 31.4 SEC (ref 26.2–38.6)
BUN BLDV-MCNC: 20 MG/DL (ref 7–20)
CALCIUM SERPL-MCNC: 9.7 MG/DL (ref 8.3–10.6)
CHLORIDE BLD-SCNC: 100 MMOL/L (ref 99–110)
CHOLESTEROL, TOTAL: 108 MG/DL (ref 0–199)
CO2: 26 MMOL/L (ref 21–32)
CREAT SERPL-MCNC: 1 MG/DL (ref 0.8–1.3)
GFR AFRICAN AMERICAN: >60
GFR NON-AFRICAN AMERICAN: >60
GLUCOSE BLD-MCNC: 143 MG/DL (ref 70–99)
HCT VFR BLD CALC: 43.8 % (ref 40.5–52.5)
HDLC SERPL-MCNC: 32 MG/DL (ref 40–60)
HEMOGLOBIN: 14.1 G/DL (ref 13.5–17.5)
INR BLD: 0.93 (ref 0.88–1.12)
LDL CHOLESTEROL CALCULATED: 48 MG/DL
MCH RBC QN AUTO: 28.1 PG (ref 26–34)
MCHC RBC AUTO-ENTMCNC: 32.3 G/DL (ref 31–36)
MCV RBC AUTO: 87.1 FL (ref 80–100)
PDW BLD-RTO: 13.7 % (ref 12.4–15.4)
PLATELET # BLD: 230 K/UL (ref 135–450)
PMV BLD AUTO: 8.7 FL (ref 5–10.5)
POTASSIUM SERPL-SCNC: 4.3 MMOL/L (ref 3.5–5.1)
PROSTATE SPECIFIC ANTIGEN: 0.8 NG/ML (ref 0–4)
PROTHROMBIN TIME: 10.5 SEC (ref 9.9–12.7)
RBC # BLD: 5.03 M/UL (ref 4.2–5.9)
SODIUM BLD-SCNC: 141 MMOL/L (ref 136–145)
TRIGL SERPL-MCNC: 139 MG/DL (ref 0–150)
VLDLC SERPL CALC-MCNC: 28 MG/DL
WBC # BLD: 11.2 K/UL (ref 4–11)

## 2021-11-30 PROCEDURE — 84153 ASSAY OF PSA TOTAL: CPT

## 2021-11-30 PROCEDURE — 83036 HEMOGLOBIN GLYCOSYLATED A1C: CPT

## 2021-11-30 PROCEDURE — 86850 RBC ANTIBODY SCREEN: CPT

## 2021-11-30 PROCEDURE — U0003 INFECTIOUS AGENT DETECTION BY NUCLEIC ACID (DNA OR RNA); SEVERE ACUTE RESPIRATORY SYNDROME CORONAVIRUS 2 (SARS-COV-2) (CORONAVIRUS DISEASE [COVID-19]), AMPLIFIED PROBE TECHNIQUE, MAKING USE OF HIGH THROUGHPUT TECHNOLOGIES AS DESCRIBED BY CMS-2020-01-R: HCPCS

## 2021-11-30 PROCEDURE — 85730 THROMBOPLASTIN TIME PARTIAL: CPT

## 2021-11-30 PROCEDURE — 85027 COMPLETE CBC AUTOMATED: CPT

## 2021-11-30 PROCEDURE — 36415 COLL VENOUS BLD VENIPUNCTURE: CPT

## 2021-11-30 PROCEDURE — 80048 BASIC METABOLIC PNL TOTAL CA: CPT

## 2021-11-30 PROCEDURE — U0005 INFEC AGEN DETEC AMPLI PROBE: HCPCS

## 2021-11-30 PROCEDURE — 80061 LIPID PANEL: CPT

## 2021-11-30 PROCEDURE — 86900 BLOOD TYPING SEROLOGIC ABO: CPT

## 2021-11-30 PROCEDURE — 99214 OFFICE O/P EST MOD 30 MIN: CPT | Performed by: NURSE PRACTITIONER

## 2021-11-30 PROCEDURE — 85610 PROTHROMBIN TIME: CPT

## 2021-11-30 PROCEDURE — 86901 BLOOD TYPING SEROLOGIC RH(D): CPT

## 2021-11-30 NOTE — PROGRESS NOTES
[facility]     Outpatient Follow Up Note    Sam Vernon is 61 y.o. male who presents today with a history of IWMI CAD s/p PTCA prox & distal-RCA '11, HTN and hyperlipidemia. CHIEF COMPLAINT / HPI:  Follow Up secondary to CAD. He is hoping to be released for surgery in December: posterior cervical decompression and fusion C4-7    Subjective:     He's nervous about his impending surgery scheduled for 12/6 with Dr. Colette Montaño. He's had neck problems for about 4 years with associated arm and leg numbness (spinal cord looks like a pinched garden hose on MRI). He denies chest discomfort. There is no SOB/DAMIAN. The patient denies orthopnea/PND. He has sleep apnea but does not use a mask : intolerant described as can't breath. The patient does not have palpitations/dizziness/swelling. His angina equivalent was severe left elbow pain (heaviness like a buffalo sitting on his arms; and his eyes were stuttering). He denies recurrence. His wt is going down by office scales ~ 6#/6 months  These symptoms are stable since the OV. His home BPs run ~ 125/80    With regard to medication therapy the patient has been compliant with prescribed regimen. They have tolerated therapy to date.      Past Medical History:   Diagnosis Date    CAD (coronary artery disease)     DDD (degenerative disc disease), cervical 11/24/2021    Disc disorder     ED (erectile dysfunction)     Hyperlipidemia     Hyperlipidemia     Hypertension     MI (myocardial infarction) ([address]     Obstructive sleep apnea     Other disorders of kidney and ureter in diseases classified elsewhere     Pneumonia 1970s    Polyp of colon     Renal calculi     Type 2 diabetes mellitus without complication, without long-term current use of insulin ([address] 5/16/2019    Type 2 diabetes mellitus without complication, without long-term current use of insulin ([address] 5/16/2019    Type 2 diabetes mellitus without complication, without long-term current use of insulin ([facility] 6/7/2021    Type II or unspecified type diabetes mellitus without mention of complication, not stated as uncontrolled      Social History:    Social History     Tobacco Use   Smoking Status Former Smoker    Packs/day: 0.25    Years: 30.00    Pack years: 7.50    Types: Cigarettes, Cigars    Quit date: 4/9/2011    Years since quitting: 10.6   Smokeless Tobacco Never Used   Tobacco Comment    cigars every once in a while      Current Medications:  Current Outpatient Medications   Medication Sig Dispense Refill    glimepiride (AMARYL) 4 MG tablet Take 1 tablet by mouth 2 times daily (with meals) 180 tablet 3    canagliflozin (INVOKANA) 300 MG TABS tablet 1 tablet po daily 90 tablet 3    metFORMIN (GLUCOPHAGE) 1000 MG tablet 1 tablet po bid with food 180 tablet 3    carvedilol (COREG) 6.25 MG tablet TAKE 1 TABLET TWICE DAILY WITH MEALS 180 tablet 3    hydroCHLOROthiazide (HYDRODIURIL) 25 MG tablet TAKE 1 TABLET EVERY DAY 90 tablet 3    atorvastatin (LIPITOR) 40 MG tablet Take 1 tablet by mouth daily 90 tablet 3    amLODIPine (NORVASC) 10 MG tablet TAKE 1 TABLET EVERY DAY 90 tablet 2    diphenhydrAMINE (BENADRYL) 25 MG tablet Take 25 mg by mouth nightly as needed for Allergies       Multiple Vitamins-Minerals (MULTIVITAMIN PO) Take by mouth (Patient not taking: Reported on 11/30/2021)      aspirin 81 MG tablet Take 81 mg by mouth daily (Patient not taking: Reported on 11/30/2021)       No current facility-administered medications for this visit. REVIEW OF SYSTEMS:    CONSTITUTIONAL: + weight loss; - fatigue, weakness, night sweats or fever. HEENT: No new vision difficulties or ringing in the ears; narrow angle glaucoma. RESPIRATORY: No new SOB, PND, orthopnea or cough. CARDIOVASCULAR: See HPI  GI: No nausea, vomiting, diarrhea, constipation, abdominal pain or changes in bowel habits. : No urinary frequency, urgency, incontinence hematuria or dysuria.   SKIN: No cyanosis or skin lesions. MUSCULOSKELETAL: No new muscle or joint pain. NEUROLOGICAL: No syncope or TIA-like symptoms. PSYCHIATRIC: No anxiety, pain, insomnia or depression    Objective:   PHYSICAL EXAM:    Vitals:    11/30/21 0903 11/30/21 0921   BP: 110/60 130/70   Site: Right Upper Arm Left Upper Arm   Position: Sitting    Cuff Size: Large Adult Large Adult   Pulse: 69    SpO2: 97%    Weight: 265 lb 12.8 oz (120.6 kg)    Height: 6' (1.829 m)        CONSTITUTIONAL: Cooperative, no apparent distress, and appears well nourished / over weight  NEUROLOGIC:  Awake and orientated to person, place and time. PSYCH: Calm affect. SKIN: Warm and dry. HEENT: Sclera non-icteric, normocephalic, neck supple, no elevation of JVP, normal carotid pulses with no bruits and thyroid normal size. LUNGS:  No increased work of breathing and clear to auscultation, no crackles or wheezing  CARDIOVASCULAR:  Regular rate 72 and rhythm with no murmurs, gallops, rubs, or abnormal heart sounds, normal PMI. The apical impulses not displaced  JVP less than 8 cm H2O  Heart tones are crisp and normal  Cervical veins are not engorged  The carotid upstroke is normal in amplitude and contour without delay or bruit  JVP is not elevated  ABDOMEN:  Normal bowel sounds, non-distended and non-tender to palpation  EXT: trace elsa LE edema, no calf tenderness. Pulses are present bilaterally.     DATA:    Lab Results   Component Value Date    ALT 37 08/24/2020    AST 25 08/24/2020    ALKPHOS 51 08/24/2020    BILITOT 0.3 08/24/2020     Lab Results   Component Value Date    CREATININE 1.1 07/15/2021    BUN 23 (H) 07/15/2021     07/15/2021    K 3.9 07/15/2021     07/15/2021    CO2 24 07/15/2021       Lab Results   Component Value Date    TRIG 240 (H) 08/24/2020    TRIG 168 (H) 05/16/2019    TRIG 311 (H) 07/14/2018     Lab Results   Component Value Date    HDL 38 (L) 08/24/2020    HDL 37 (L) 05/16/2019    HDL 32 (L) 07/14/2018     Lab Results   Component Value Date    LDLCALC 62 08/24/2020    [address] 61 05/16/2019    [address] 74 07/14/2018     Radiology Review:  Pertinent images / reports were reviewed as a part of this visit and reveals the following:    Echo: Aug '21   Summary   Technically limited due to lung interface. No IV access. Normal left ventricle size and systolic function with an estimated ejection   fraction of 55%. No regional wall motion abnormalities are noted. There is mild concentric left ventricular hypertrophy. Normal diastolic function. Aortic valve appears sclerotic but opens adequately. Myoview: 1/29/19:      Summary    Normal myocardial perfusion study.    Normal LV size and systolic function. Last Angiogram: April '11: [facility]  PCI of the RCA with BMS to mid (culprit) and proximal eccentric plaque. Inferior wall, HK; Low normal LVEF at 50%      Assessment:     1. Preoperative cardiovascular examination   ~unremarkable exam  ~has been holding ASA as recommended : verbalize importance of stent thrombosis off drug   ~normal LVEF by echo Aug '21    2. Atherosclerosis of native coronary artery of native heart without angina pectoris  ~stable : denies angina  ~continues to exercise 3 x / week  ~normal LVF by echo Aug '21  ~neg nuclear stress for ischemia Jan '19  ~s/p PTCA '11  ~ ASA / statin / BB    3. Essential hypertension, benign   ~controlled   ~ carvedilol / amlodipine / HCTZ    4. Mixed hyperlipidemia   ~routine labs planned for today  ~trig elevated ; HDL low on last profile   ~last A1c 8.2% in Sept '21  ~atorvastatin with diabetic agents (DM followed by PCP)      I had the opportunity to review the clinical symptoms and presentation of Arabella Santillan. Plan:     1. Ok to proceed with surgery at low risk for moderate-risk surgery (d/w DW)  2. F/U in 4 months     Overall the patient is stable from CV standpoint    I have addresed the patient's cardiac risk factors and adjusted pharmacologic treatment as needed.  In addition, I have reinforced the need for patient directed risk factor modification. Further evaluation will be based upon the patient's clinical course and testing results. All questions and concerns were addressed to the patient. Alternatives to my treatment were discussed. The patient is not currently smoking: quit April '11. The risks related to smoking were reviewed with the patient. Recommend maintaining a smoke-free lifestyle. Patient is on a beta-blocker  Patient is not on an ARB / ace : intolerant  Patient is on a statin     Antiplatelet therapy has been recommended / prescribed for this patient. Education conducted on adverse reactions including bleeding was discussed. The patient verbalizes understanding not to stop medications without discussing with us. Discussed exercise: 30-60 minutes 7 days/week. Gym 3 x / week lifting wts and uses stationary bike. He retired in May '21  Discussed Low saturated fat/NCC diet.     Thank you for allowing to us to participate in the care of Elsa Voter.

## 2021-12-01 LAB
ESTIMATED AVERAGE GLUCOSE: 177.2 MG/DL
HBA1C MFR BLD: 7.8 %
SARS-COV-2: NOT DETECTED

## 2021-12-06 ENCOUNTER — ANESTHESIA (OUTPATIENT)
Dept: OPERATING ROOM | Age: 66
DRG: 472 | End: 2021-12-06
Payer: COMMERCIAL

## 2021-12-06 ENCOUNTER — ANESTHESIA EVENT (OUTPATIENT)
Dept: OPERATING ROOM | Age: 66
DRG: 472 | End: 2021-12-06
Payer: COMMERCIAL

## 2021-12-06 ENCOUNTER — APPOINTMENT (OUTPATIENT)
Dept: GENERAL RADIOLOGY | Age: 66
DRG: 472 | End: 2021-12-06
Attending: NEUROLOGICAL SURGERY
Payer: COMMERCIAL

## 2021-12-06 ENCOUNTER — HOSPITAL ENCOUNTER (INPATIENT)
Age: 66
LOS: 2 days | Discharge: HOME | DRG: 472 | End: 2021-12-08
Attending: NEUROLOGICAL SURGERY | Admitting: NEUROLOGICAL SURGERY
Payer: COMMERCIAL

## 2021-12-06 VITALS
RESPIRATION RATE: 21 BRPM | TEMPERATURE: 96.3 F | DIASTOLIC BLOOD PRESSURE: 75 MMHG | SYSTOLIC BLOOD PRESSURE: 110 MMHG | OXYGEN SATURATION: 100 %

## 2021-12-06 DIAGNOSIS — M48.02 CERVICAL SPINAL STENOSIS: ICD-10-CM

## 2021-12-06 DIAGNOSIS — Z01.818 PREOP TESTING: Primary | ICD-10-CM

## 2021-12-06 LAB
ABO/RH: NORMAL
ANTIBODY SCREEN: NORMAL
GLUCOSE BLD-MCNC: 157 MG/DL (ref 70–99)
GLUCOSE BLD-MCNC: 176 MG/DL (ref 70–99)
GLUCOSE BLD-MCNC: 215 MG/DL (ref 70–99)
PERFORMED ON: ABNORMAL

## 2021-12-06 PROCEDURE — 2580000003 HC RX 258: Performed by: NEUROLOGICAL SURGERY

## 2021-12-06 PROCEDURE — 6370000000 HC RX 637 (ALT 250 FOR IP): Performed by: INTERNAL MEDICINE

## 2021-12-06 PROCEDURE — 2720000010 HC SURG SUPPLY STERILE: Performed by: NEUROLOGICAL SURGERY

## 2021-12-06 PROCEDURE — 2500000003 HC RX 250 WO HCPCS: Performed by: NEUROLOGICAL SURGERY

## 2021-12-06 PROCEDURE — 86850 RBC ANTIBODY SCREEN: CPT

## 2021-12-06 PROCEDURE — 3600000014 HC SURGERY LEVEL 4 ADDTL 15MIN: Performed by: NEUROLOGICAL SURGERY

## 2021-12-06 PROCEDURE — 6360000002 HC RX W HCPCS: Performed by: NEUROLOGICAL SURGERY

## 2021-12-06 PROCEDURE — 01N10ZZ RELEASE CERVICAL NERVE, OPEN APPROACH: ICD-10-PCS | Performed by: NEUROLOGICAL SURGERY

## 2021-12-06 PROCEDURE — P9045 ALBUMIN (HUMAN), 5%, 250 ML: HCPCS | Performed by: NURSE ANESTHETIST, CERTIFIED REGISTERED

## 2021-12-06 PROCEDURE — 6370000000 HC RX 637 (ALT 250 FOR IP): Performed by: NEUROLOGICAL SURGERY

## 2021-12-06 PROCEDURE — 1200000000 HC SEMI PRIVATE

## 2021-12-06 PROCEDURE — 3700000000 HC ANESTHESIA ATTENDED CARE: Performed by: NEUROLOGICAL SURGERY

## 2021-12-06 PROCEDURE — 6370000000 HC RX 637 (ALT 250 FOR IP): Performed by: NURSE ANESTHETIST, CERTIFIED REGISTERED

## 2021-12-06 PROCEDURE — 86900 BLOOD TYPING SEROLOGIC ABO: CPT

## 2021-12-06 PROCEDURE — 2580000003 HC RX 258: Performed by: NURSE ANESTHETIST, CERTIFIED REGISTERED

## 2021-12-06 PROCEDURE — 6360000002 HC RX W HCPCS: Performed by: ANESTHESIOLOGY

## 2021-12-06 PROCEDURE — 2500000003 HC RX 250 WO HCPCS

## 2021-12-06 PROCEDURE — 2700000000 HC OXYGEN THERAPY PER DAY

## 2021-12-06 PROCEDURE — 6360000002 HC RX W HCPCS: Performed by: NURSE ANESTHETIST, CERTIFIED REGISTERED

## 2021-12-06 PROCEDURE — 2500000003 HC RX 250 WO HCPCS: Performed by: NURSE ANESTHETIST, CERTIFIED REGISTERED

## 2021-12-06 PROCEDURE — 00NW0ZZ RELEASE CERVICAL SPINAL CORD, OPEN APPROACH: ICD-10-PCS | Performed by: NEUROLOGICAL SURGERY

## 2021-12-06 PROCEDURE — 7100000000 HC PACU RECOVERY - FIRST 15 MIN: Performed by: NEUROLOGICAL SURGERY

## 2021-12-06 PROCEDURE — 36415 COLL VENOUS BLD VENIPUNCTURE: CPT

## 2021-12-06 PROCEDURE — 86901 BLOOD TYPING SEROLOGIC RH(D): CPT

## 2021-12-06 PROCEDURE — 3700000001 HC ADD 15 MINUTES (ANESTHESIA): Performed by: NEUROLOGICAL SURGERY

## 2021-12-06 PROCEDURE — 2580000003 HC RX 258: Performed by: ANESTHESIOLOGY

## 2021-12-06 PROCEDURE — C1713 ANCHOR/SCREW BN/BN,TIS/BN: HCPCS | Performed by: NEUROLOGICAL SURGERY

## 2021-12-06 PROCEDURE — 6370000000 HC RX 637 (ALT 250 FOR IP): Performed by: ANESTHESIOLOGY

## 2021-12-06 PROCEDURE — 3600000004 HC SURGERY LEVEL 4 BASE: Performed by: NEUROLOGICAL SURGERY

## 2021-12-06 PROCEDURE — 0RG20K1 FUSION OF 2 OR MORE CERVICAL VERTEBRAL JOINTS WITH NONAUTOLOGOUS TISSUE SUBSTITUTE, POSTERIOR APPROACH, POSTERIOR COLUMN, OPEN APPROACH: ICD-10-PCS | Performed by: NEUROLOGICAL SURGERY

## 2021-12-06 PROCEDURE — 72020 X-RAY EXAM OF SPINE 1 VIEW: CPT

## 2021-12-06 PROCEDURE — 3209999900 FLUORO FOR SURGICAL PROCEDURES

## 2021-12-06 PROCEDURE — 2709999900 HC NON-CHARGEABLE SUPPLY: Performed by: NEUROLOGICAL SURGERY

## 2021-12-06 PROCEDURE — 7100000001 HC PACU RECOVERY - ADDTL 15 MIN: Performed by: NEUROLOGICAL SURGERY

## 2021-12-06 DEVICE — OD 3603760 PRE-CUT 3.5MM X 60MM
Type: IMPLANTABLE DEVICE | Site: SPINE CERVICAL | Status: FUNCTIONAL
Brand: INFINITY™ OCCIPITOCERVICAL UPPER THORACIC SYSTEM

## 2021-12-06 DEVICE — GRAFT BONE SUB H2MM FACET SHIM FEE: Type: IMPLANTABLE DEVICE | Site: SPINE CERVICAL | Status: FUNCTIONAL

## 2021-12-06 RX ORDER — REMIFENTANIL HYDROCHLORIDE 1 MG/ML
INJECTION, POWDER, LYOPHILIZED, FOR SOLUTION INTRAVENOUS PRN
Status: DISCONTINUED | OUTPATIENT
Start: 2021-12-06 | End: 2021-12-06

## 2021-12-06 RX ORDER — HYDROCHLOROTHIAZIDE 25 MG/1
25 TABLET ORAL DAILY
Status: DISCONTINUED | OUTPATIENT
Start: 2021-12-07 | End: 2021-12-08 | Stop reason: HOSPADM

## 2021-12-06 RX ORDER — METHOCARBAMOL 500 MG/1
750 TABLET, FILM COATED ORAL 4 TIMES DAILY
Status: DISCONTINUED | OUTPATIENT
Start: 2021-12-06 | End: 2021-12-08 | Stop reason: HOSPADM

## 2021-12-06 RX ORDER — FENTANYL CITRATE 50 UG/ML
INJECTION, SOLUTION INTRAMUSCULAR; INTRAVENOUS PRN
Status: DISCONTINUED | OUTPATIENT
Start: 2021-12-06 | End: 2021-12-06 | Stop reason: SDUPTHER

## 2021-12-06 RX ORDER — SODIUM CHLORIDE 9 MG/ML
25 INJECTION, SOLUTION INTRAVENOUS PRN
Status: DISCONTINUED | OUTPATIENT
Start: 2021-12-06 | End: 2021-12-08 | Stop reason: HOSPADM

## 2021-12-06 RX ORDER — PROPOFOL 10 MG/ML
INJECTION, EMULSION INTRAVENOUS PRN
Status: DISCONTINUED | OUTPATIENT
Start: 2021-12-06 | End: 2021-12-06 | Stop reason: SDUPTHER

## 2021-12-06 RX ORDER — SODIUM CHLORIDE 0.9 % (FLUSH) 0.9 %
5-40 SYRINGE (ML) INJECTION PRN
Status: DISCONTINUED | OUTPATIENT
Start: 2021-12-06 | End: 2021-12-08 | Stop reason: HOSPADM

## 2021-12-06 RX ORDER — EPHEDRINE SULFATE/0.9% NACL/PF 50 MG/5 ML
SYRINGE (ML) INTRAVENOUS PRN
Status: DISCONTINUED | OUTPATIENT
Start: 2021-12-06 | End: 2021-12-06 | Stop reason: SDUPTHER

## 2021-12-06 RX ORDER — DEXTROSE MONOHYDRATE 50 MG/ML
100 INJECTION, SOLUTION INTRAVENOUS PRN
Status: DISCONTINUED | OUTPATIENT
Start: 2021-12-06 | End: 2021-12-06 | Stop reason: SDUPTHER

## 2021-12-06 RX ORDER — HYDROCODONE BITARTRATE AND ACETAMINOPHEN 5; 325 MG/1; MG/1
1 TABLET ORAL ONCE
Status: DISCONTINUED | OUTPATIENT
Start: 2021-12-06 | End: 2021-12-06 | Stop reason: HOSPADM

## 2021-12-06 RX ORDER — GLIMEPIRIDE 2 MG/1
4 TABLET ORAL 2 TIMES DAILY WITH MEALS
Status: DISCONTINUED | OUTPATIENT
Start: 2021-12-07 | End: 2021-12-08 | Stop reason: HOSPADM

## 2021-12-06 RX ORDER — METHOCARBAMOL 100 MG/ML
INJECTION, SOLUTION INTRAMUSCULAR; INTRAVENOUS PRN
Status: DISCONTINUED | OUTPATIENT
Start: 2021-12-06 | End: 2021-12-06 | Stop reason: SDUPTHER

## 2021-12-06 RX ORDER — NICOTINE POLACRILEX 4 MG
15 LOZENGE BUCCAL PRN
Status: DISCONTINUED | OUTPATIENT
Start: 2021-12-06 | End: 2021-12-06 | Stop reason: SDUPTHER

## 2021-12-06 RX ORDER — CARVEDILOL 6.25 MG/1
6.25 TABLET ORAL 2 TIMES DAILY WITH MEALS
Status: DISCONTINUED | OUTPATIENT
Start: 2021-12-07 | End: 2021-12-08 | Stop reason: HOSPADM

## 2021-12-06 RX ORDER — HYDROCODONE BITARTRATE AND ACETAMINOPHEN 5; 325 MG/1; MG/1
TABLET ORAL
Status: DISPENSED
Start: 2021-12-06 | End: 2021-12-07

## 2021-12-06 RX ORDER — ONDANSETRON 2 MG/ML
4 INJECTION INTRAMUSCULAR; INTRAVENOUS
Status: DISCONTINUED | OUTPATIENT
Start: 2021-12-06 | End: 2021-12-06 | Stop reason: HOSPADM

## 2021-12-06 RX ORDER — HYDROCODONE BITARTRATE AND ACETAMINOPHEN 5; 325 MG/1; MG/1
2 TABLET ORAL PRN
Status: COMPLETED | OUTPATIENT
Start: 2021-12-06 | End: 2021-12-06

## 2021-12-06 RX ORDER — MAGNESIUM HYDROXIDE 1200 MG/15ML
LIQUID ORAL CONTINUOUS PRN
Status: DISCONTINUED | OUTPATIENT
Start: 2021-12-06 | End: 2021-12-06 | Stop reason: HOSPADM

## 2021-12-06 RX ORDER — ONDANSETRON 4 MG/1
4 TABLET, ORALLY DISINTEGRATING ORAL EVERY 8 HOURS PRN
Status: DISCONTINUED | OUTPATIENT
Start: 2021-12-06 | End: 2021-12-08 | Stop reason: HOSPADM

## 2021-12-06 RX ORDER — HYDROCODONE BITARTRATE AND ACETAMINOPHEN 5; 325 MG/1; MG/1
1 TABLET ORAL PRN
Status: COMPLETED | OUTPATIENT
Start: 2021-12-06 | End: 2021-12-06

## 2021-12-06 RX ORDER — HYDROMORPHONE HCL 110MG/55ML
0.25 PATIENT CONTROLLED ANALGESIA SYRINGE INTRAVENOUS EVERY 5 MIN PRN
Status: DISCONTINUED | OUTPATIENT
Start: 2021-12-06 | End: 2021-12-06 | Stop reason: HOSPADM

## 2021-12-06 RX ORDER — INSULIN LISPRO 100 [IU]/ML
0-12 INJECTION, SOLUTION INTRAVENOUS; SUBCUTANEOUS
Status: DISCONTINUED | OUTPATIENT
Start: 2021-12-06 | End: 2021-12-08 | Stop reason: HOSPADM

## 2021-12-06 RX ORDER — GLYCOPYRROLATE 0.2 MG/ML
INJECTION INTRAMUSCULAR; INTRAVENOUS PRN
Status: DISCONTINUED | OUTPATIENT
Start: 2021-12-06 | End: 2021-12-06 | Stop reason: SDUPTHER

## 2021-12-06 RX ORDER — DIPHENHYDRAMINE HYDROCHLORIDE 50 MG/ML
12.5 INJECTION INTRAMUSCULAR; INTRAVENOUS
Status: DISCONTINUED | OUTPATIENT
Start: 2021-12-06 | End: 2021-12-06 | Stop reason: HOSPADM

## 2021-12-06 RX ORDER — FENTANYL CITRATE 50 UG/ML
50 INJECTION, SOLUTION INTRAMUSCULAR; INTRAVENOUS EVERY 5 MIN PRN
Status: COMPLETED | OUTPATIENT
Start: 2021-12-06 | End: 2021-12-06

## 2021-12-06 RX ORDER — ATORVASTATIN CALCIUM 40 MG/1
40 TABLET, FILM COATED ORAL NIGHTLY
Status: DISCONTINUED | OUTPATIENT
Start: 2021-12-06 | End: 2021-12-08 | Stop reason: HOSPADM

## 2021-12-06 RX ORDER — INSULIN LISPRO 100 [IU]/ML
0-6 INJECTION, SOLUTION INTRAVENOUS; SUBCUTANEOUS NIGHTLY
Status: DISCONTINUED | OUTPATIENT
Start: 2021-12-06 | End: 2021-12-08 | Stop reason: HOSPADM

## 2021-12-06 RX ORDER — DEXTROSE, SODIUM CHLORIDE, AND POTASSIUM CHLORIDE 5; .45; .15 G/100ML; G/100ML; G/100ML
INJECTION INTRAVENOUS
Status: COMPLETED
Start: 2021-12-06 | End: 2021-12-06

## 2021-12-06 RX ORDER — ROCURONIUM BROMIDE 10 MG/ML
INJECTION, SOLUTION INTRAVENOUS PRN
Status: DISCONTINUED | OUTPATIENT
Start: 2021-12-06 | End: 2021-12-06 | Stop reason: SDUPTHER

## 2021-12-06 RX ORDER — BUPIVACAINE HYDROCHLORIDE 5 MG/ML
INJECTION, SOLUTION EPIDURAL; INTRACAUDAL
Status: COMPLETED | OUTPATIENT
Start: 2021-12-06 | End: 2021-12-06

## 2021-12-06 RX ORDER — POLYETHYLENE GLYCOL 3350 17 G/17G
17 POWDER, FOR SOLUTION ORAL DAILY
Status: DISCONTINUED | OUTPATIENT
Start: 2021-12-07 | End: 2021-12-08 | Stop reason: HOSPADM

## 2021-12-06 RX ORDER — SODIUM CHLORIDE 9 MG/ML
INJECTION, SOLUTION INTRAVENOUS CONTINUOUS PRN
Status: DISCONTINUED | OUTPATIENT
Start: 2021-12-06 | End: 2021-12-06 | Stop reason: SDUPTHER

## 2021-12-06 RX ORDER — MIDAZOLAM HYDROCHLORIDE 1 MG/ML
INJECTION INTRAMUSCULAR; INTRAVENOUS PRN
Status: DISCONTINUED | OUTPATIENT
Start: 2021-12-06 | End: 2021-12-06 | Stop reason: SDUPTHER

## 2021-12-06 RX ORDER — NICOTINE POLACRILEX 4 MG
15 LOZENGE BUCCAL PRN
Status: DISCONTINUED | OUTPATIENT
Start: 2021-12-06 | End: 2021-12-08 | Stop reason: HOSPADM

## 2021-12-06 RX ORDER — ONDANSETRON 2 MG/ML
INJECTION INTRAMUSCULAR; INTRAVENOUS PRN
Status: DISCONTINUED | OUTPATIENT
Start: 2021-12-06 | End: 2021-12-06 | Stop reason: SDUPTHER

## 2021-12-06 RX ORDER — DEXTROSE MONOHYDRATE 50 MG/ML
100 INJECTION, SOLUTION INTRAVENOUS PRN
Status: DISCONTINUED | OUTPATIENT
Start: 2021-12-06 | End: 2021-12-08 | Stop reason: HOSPADM

## 2021-12-06 RX ORDER — OXYCODONE HYDROCHLORIDE 5 MG/1
10 TABLET ORAL EVERY 4 HOURS PRN
Status: DISCONTINUED | OUTPATIENT
Start: 2021-12-06 | End: 2021-12-08 | Stop reason: HOSPADM

## 2021-12-06 RX ORDER — LIDOCAINE HYDROCHLORIDE 40 MG/ML
SOLUTION TOPICAL PRN
Status: DISCONTINUED | OUTPATIENT
Start: 2021-12-06 | End: 2021-12-06 | Stop reason: SDUPTHER

## 2021-12-06 RX ORDER — MEPERIDINE HYDROCHLORIDE 25 MG/ML
12.5 INJECTION INTRAMUSCULAR; INTRAVENOUS; SUBCUTANEOUS EVERY 5 MIN PRN
Status: DISCONTINUED | OUTPATIENT
Start: 2021-12-06 | End: 2021-12-06 | Stop reason: HOSPADM

## 2021-12-06 RX ORDER — PROMETHAZINE HYDROCHLORIDE 25 MG/ML
6.25 INJECTION, SOLUTION INTRAMUSCULAR; INTRAVENOUS
Status: DISCONTINUED | OUTPATIENT
Start: 2021-12-06 | End: 2021-12-06 | Stop reason: HOSPADM

## 2021-12-06 RX ORDER — HYDRALAZINE HYDROCHLORIDE 20 MG/ML
10 INJECTION INTRAMUSCULAR; INTRAVENOUS EVERY 6 HOURS PRN
Status: DISCONTINUED | OUTPATIENT
Start: 2021-12-06 | End: 2021-12-08 | Stop reason: HOSPADM

## 2021-12-06 RX ORDER — POTASSIUM CHLORIDE 20 MEQ/1
40 TABLET, EXTENDED RELEASE ORAL PRN
Status: DISCONTINUED | OUTPATIENT
Start: 2021-12-06 | End: 2021-12-08 | Stop reason: HOSPADM

## 2021-12-06 RX ORDER — SODIUM CHLORIDE 0.9 % (FLUSH) 0.9 %
5-40 SYRINGE (ML) INJECTION EVERY 12 HOURS SCHEDULED
Status: DISCONTINUED | OUTPATIENT
Start: 2021-12-06 | End: 2021-12-08 | Stop reason: HOSPADM

## 2021-12-06 RX ORDER — LIDOCAINE HYDROCHLORIDE 10 MG/ML
0.5 INJECTION, SOLUTION EPIDURAL; INFILTRATION; INTRACAUDAL; PERINEURAL ONCE
Status: DISCONTINUED | OUTPATIENT
Start: 2021-12-06 | End: 2021-12-06 | Stop reason: HOSPADM

## 2021-12-06 RX ORDER — FENTANYL CITRATE 50 UG/ML
25 INJECTION, SOLUTION INTRAMUSCULAR; INTRAVENOUS EVERY 5 MIN PRN
Status: DISCONTINUED | OUTPATIENT
Start: 2021-12-06 | End: 2021-12-06 | Stop reason: HOSPADM

## 2021-12-06 RX ORDER — SODIUM CHLORIDE 9 MG/ML
INJECTION, SOLUTION INTRAVENOUS CONTINUOUS
Status: DISCONTINUED | OUTPATIENT
Start: 2021-12-06 | End: 2021-12-06

## 2021-12-06 RX ORDER — LIDOCAINE HYDROCHLORIDE 20 MG/ML
INJECTION, SOLUTION INFILTRATION; PERINEURAL PRN
Status: DISCONTINUED | OUTPATIENT
Start: 2021-12-06 | End: 2021-12-06 | Stop reason: SDUPTHER

## 2021-12-06 RX ORDER — DEXAMETHASONE SODIUM PHOSPHATE 4 MG/ML
INJECTION, SOLUTION INTRA-ARTICULAR; INTRALESIONAL; INTRAMUSCULAR; INTRAVENOUS; SOFT TISSUE PRN
Status: DISCONTINUED | OUTPATIENT
Start: 2021-12-06 | End: 2021-12-06 | Stop reason: SDUPTHER

## 2021-12-06 RX ORDER — 0.9 % SODIUM CHLORIDE 0.9 %
500 INTRAVENOUS SOLUTION INTRAVENOUS PRN
Status: DISCONTINUED | OUTPATIENT
Start: 2021-12-06 | End: 2021-12-08 | Stop reason: HOSPADM

## 2021-12-06 RX ORDER — LABETALOL HYDROCHLORIDE 5 MG/ML
5 INJECTION, SOLUTION INTRAVENOUS EVERY 10 MIN PRN
Status: DISCONTINUED | OUTPATIENT
Start: 2021-12-06 | End: 2021-12-06 | Stop reason: HOSPADM

## 2021-12-06 RX ORDER — AMLODIPINE BESYLATE 5 MG/1
10 TABLET ORAL DAILY
Status: DISCONTINUED | OUTPATIENT
Start: 2021-12-07 | End: 2021-12-08 | Stop reason: HOSPADM

## 2021-12-06 RX ORDER — POTASSIUM CHLORIDE 7.45 MG/ML
10 INJECTION INTRAVENOUS PRN
Status: DISCONTINUED | OUTPATIENT
Start: 2021-12-06 | End: 2021-12-08 | Stop reason: HOSPADM

## 2021-12-06 RX ORDER — PROPOFOL 10 MG/ML
INJECTION, EMULSION INTRAVENOUS CONTINUOUS PRN
Status: DISCONTINUED | OUTPATIENT
Start: 2021-12-06 | End: 2021-12-06 | Stop reason: SDUPTHER

## 2021-12-06 RX ORDER — MAGNESIUM SULFATE HEPTAHYDRATE 500 MG/ML
INJECTION, SOLUTION INTRAMUSCULAR; INTRAVENOUS PRN
Status: DISCONTINUED | OUTPATIENT
Start: 2021-12-06 | End: 2021-12-06 | Stop reason: SDUPTHER

## 2021-12-06 RX ORDER — DEXTROSE MONOHYDRATE 25 G/50ML
12.5 INJECTION, SOLUTION INTRAVENOUS PRN
Status: DISCONTINUED | OUTPATIENT
Start: 2021-12-06 | End: 2021-12-06 | Stop reason: SDUPTHER

## 2021-12-06 RX ORDER — OXYCODONE HYDROCHLORIDE 5 MG/1
5 TABLET ORAL EVERY 4 HOURS PRN
Status: DISCONTINUED | OUTPATIENT
Start: 2021-12-06 | End: 2021-12-08 | Stop reason: HOSPADM

## 2021-12-06 RX ORDER — DEXTROSE, SODIUM CHLORIDE, AND POTASSIUM CHLORIDE 5; .45; .15 G/100ML; G/100ML; G/100ML
1000 INJECTION INTRAVENOUS CONTINUOUS
Status: DISCONTINUED | OUTPATIENT
Start: 2021-12-06 | End: 2021-12-07

## 2021-12-06 RX ORDER — ALBUMIN, HUMAN INJ 5% 5 %
SOLUTION INTRAVENOUS PRN
Status: DISCONTINUED | OUTPATIENT
Start: 2021-12-06 | End: 2021-12-06 | Stop reason: SDUPTHER

## 2021-12-06 RX ORDER — DEXAMETHASONE SODIUM PHOSPHATE 4 MG/ML
INJECTION, SOLUTION INTRA-ARTICULAR; INTRALESIONAL; INTRAMUSCULAR; INTRAVENOUS; SOFT TISSUE PRN
Status: DISCONTINUED | OUTPATIENT
Start: 2021-12-06 | End: 2021-12-06

## 2021-12-06 RX ORDER — SUCCINYLCHOLINE CHLORIDE 20 MG/ML
INJECTION INTRAMUSCULAR; INTRAVENOUS PRN
Status: DISCONTINUED | OUTPATIENT
Start: 2021-12-06 | End: 2021-12-06 | Stop reason: SDUPTHER

## 2021-12-06 RX ORDER — DIPHENHYDRAMINE HCL 25 MG
25 TABLET ORAL NIGHTLY PRN
Status: DISCONTINUED | OUTPATIENT
Start: 2021-12-06 | End: 2021-12-08 | Stop reason: HOSPADM

## 2021-12-06 RX ORDER — DEXTROSE MONOHYDRATE 25 G/50ML
12.5 INJECTION, SOLUTION INTRAVENOUS PRN
Status: DISCONTINUED | OUTPATIENT
Start: 2021-12-06 | End: 2021-12-08 | Stop reason: HOSPADM

## 2021-12-06 RX ORDER — KETAMINE HYDROCHLORIDE 10 MG/ML
INJECTION, SOLUTION INTRAMUSCULAR; INTRAVENOUS PRN
Status: DISCONTINUED | OUTPATIENT
Start: 2021-12-06 | End: 2021-12-06 | Stop reason: SDUPTHER

## 2021-12-06 RX ORDER — HYDROMORPHONE HYDROCHLORIDE 1 MG/ML
0.5 INJECTION, SOLUTION INTRAMUSCULAR; INTRAVENOUS; SUBCUTANEOUS
Status: DISCONTINUED | OUTPATIENT
Start: 2021-12-06 | End: 2021-12-08 | Stop reason: HOSPADM

## 2021-12-06 RX ORDER — HYDROMORPHONE HCL 110MG/55ML
0.5 PATIENT CONTROLLED ANALGESIA SYRINGE INTRAVENOUS EVERY 5 MIN PRN
Status: COMPLETED | OUTPATIENT
Start: 2021-12-06 | End: 2021-12-06

## 2021-12-06 RX ORDER — ONDANSETRON 2 MG/ML
4 INJECTION INTRAMUSCULAR; INTRAVENOUS EVERY 6 HOURS PRN
Status: DISCONTINUED | OUTPATIENT
Start: 2021-12-06 | End: 2021-12-08 | Stop reason: HOSPADM

## 2021-12-06 RX ADMIN — FENTANYL CITRATE 50 MCG: 50 INJECTION, SOLUTION INTRAMUSCULAR; INTRAVENOUS at 10:08

## 2021-12-06 RX ADMIN — Medication 5 MG: at 10:39

## 2021-12-06 RX ADMIN — PHENYLEPHRINE HYDROCHLORIDE 100 MCG: 10 INJECTION INTRAVENOUS at 11:45

## 2021-12-06 RX ADMIN — PHENYLEPHRINE HYDROCHLORIDE 100 MCG: 10 INJECTION INTRAVENOUS at 12:17

## 2021-12-06 RX ADMIN — PHENYLEPHRINE HYDROCHLORIDE 50 MCG: 10 INJECTION INTRAVENOUS at 10:07

## 2021-12-06 RX ADMIN — KETAMINE HYDROCHLORIDE 10 MG: 10 INJECTION, SOLUTION INTRAMUSCULAR; INTRAVENOUS at 10:32

## 2021-12-06 RX ADMIN — GLYCOPYRROLATE 0.2 MG: 0.2 INJECTION, SOLUTION INTRAMUSCULAR; INTRAVENOUS at 10:20

## 2021-12-06 RX ADMIN — PHENYLEPHRINE HYDROCHLORIDE 100 MCG: 10 INJECTION INTRAVENOUS at 10:40

## 2021-12-06 RX ADMIN — Medication 5 MG: at 11:50

## 2021-12-06 RX ADMIN — ATORVASTATIN CALCIUM 40 MG: 40 TABLET, FILM COATED ORAL at 22:51

## 2021-12-06 RX ADMIN — Medication 5 MG: at 10:29

## 2021-12-06 RX ADMIN — GLYCOPYRROLATE 0.2 MG: 0.2 INJECTION, SOLUTION INTRAMUSCULAR; INTRAVENOUS at 10:08

## 2021-12-06 RX ADMIN — PHENYLEPHRINE HYDROCHLORIDE 100 MCG: 10 INJECTION INTRAVENOUS at 11:02

## 2021-12-06 RX ADMIN — PHENYLEPHRINE HYDROCHLORIDE 100 MCG: 10 INJECTION INTRAVENOUS at 12:22

## 2021-12-06 RX ADMIN — PHENYLEPHRINE HYDROCHLORIDE 100 MCG: 10 INJECTION INTRAVENOUS at 11:55

## 2021-12-06 RX ADMIN — HYDROMORPHONE HYDROCHLORIDE 0.5 MG: 1 INJECTION, SOLUTION INTRAMUSCULAR; INTRAVENOUS; SUBCUTANEOUS at 22:53

## 2021-12-06 RX ADMIN — PHENYLEPHRINE HYDROCHLORIDE 200 MCG: 10 INJECTION INTRAVENOUS at 10:50

## 2021-12-06 RX ADMIN — POTASSIUM CHLORIDE, DEXTROSE MONOHYDRATE AND SODIUM CHLORIDE 1000 ML: 150; 5; 450 INJECTION, SOLUTION INTRAVENOUS at 18:42

## 2021-12-06 RX ADMIN — Medication 5 MG: at 10:43

## 2021-12-06 RX ADMIN — PHENYLEPHRINE HYDROCHLORIDE 100 MCG: 10 INJECTION INTRAVENOUS at 12:11

## 2021-12-06 RX ADMIN — Medication 5 MG: at 10:58

## 2021-12-06 RX ADMIN — PHENYLEPHRINE HYDROCHLORIDE 100 MCG: 10 INJECTION INTRAVENOUS at 12:32

## 2021-12-06 RX ADMIN — ROCURONIUM BROMIDE 5 MG: 10 INJECTION, SOLUTION INTRAVENOUS at 10:08

## 2021-12-06 RX ADMIN — PROPOFOL 120 MCG/KG/MIN: 10 INJECTION, EMULSION INTRAVENOUS at 10:28

## 2021-12-06 RX ADMIN — ALBUMIN (HUMAN) 250 ML: 12.5 INJECTION, SOLUTION INTRAVENOUS at 10:55

## 2021-12-06 RX ADMIN — PHENYLEPHRINE HYDROCHLORIDE 100 MCG: 10 INJECTION INTRAVENOUS at 12:27

## 2021-12-06 RX ADMIN — INSULIN LISPRO 2 UNITS: 100 INJECTION, SOLUTION INTRAVENOUS; SUBCUTANEOUS at 23:06

## 2021-12-06 RX ADMIN — SODIUM CHLORIDE: 9 INJECTION, SOLUTION INTRAVENOUS at 10:13

## 2021-12-06 RX ADMIN — LIDOCAINE HYDROCHLORIDE 100 MG: 20 INJECTION, SOLUTION INFILTRATION; PERINEURAL at 10:08

## 2021-12-06 RX ADMIN — MIDAZOLAM 2 MG: 1 INJECTION INTRAMUSCULAR; INTRAVENOUS at 11:03

## 2021-12-06 RX ADMIN — PHENYLEPHRINE HYDROCHLORIDE 100 MCG: 10 INJECTION INTRAVENOUS at 10:13

## 2021-12-06 RX ADMIN — PHENYLEPHRINE HYDROCHLORIDE 100 MCG: 10 INJECTION INTRAVENOUS at 11:29

## 2021-12-06 RX ADMIN — PHENYLEPHRINE HYDROCHLORIDE 100 MCG: 10 INJECTION INTRAVENOUS at 10:21

## 2021-12-06 RX ADMIN — PHENYLEPHRINE HYDROCHLORIDE 100 MCG: 10 INJECTION INTRAVENOUS at 10:58

## 2021-12-06 RX ADMIN — CEFAZOLIN SODIUM 2000 MG: 10 INJECTION, POWDER, FOR SOLUTION INTRAVENOUS at 22:57

## 2021-12-06 RX ADMIN — Medication 5 MG: at 10:48

## 2021-12-06 RX ADMIN — PROPOFOL 20 MG: 10 INJECTION, EMULSION INTRAVENOUS at 10:21

## 2021-12-06 RX ADMIN — ALBUMIN (HUMAN) 250 ML: 12.5 INJECTION, SOLUTION INTRAVENOUS at 11:24

## 2021-12-06 RX ADMIN — Medication 5 MG: at 11:06

## 2021-12-06 RX ADMIN — Medication 5 MG: at 10:20

## 2021-12-06 RX ADMIN — FENTANYL CITRATE 50 MCG: 50 INJECTION, SOLUTION INTRAMUSCULAR; INTRAVENOUS at 18:19

## 2021-12-06 RX ADMIN — CEFAZOLIN 2000 MG: 10 INJECTION, POWDER, FOR SOLUTION INTRAVENOUS at 10:03

## 2021-12-06 RX ADMIN — HYDROMORPHONE HYDROCHLORIDE 0.5 MG: 2 INJECTION, SOLUTION INTRAMUSCULAR; INTRAVENOUS; SUBCUTANEOUS at 16:48

## 2021-12-06 RX ADMIN — POTASSIUM CHLORIDE, DEXTROSE MONOHYDRATE AND SODIUM CHLORIDE 1000 ML: 150; 5; 450 INJECTION, SOLUTION INTRAVENOUS at 22:14

## 2021-12-06 RX ADMIN — KETAMINE HYDROCHLORIDE 10 MG: 10 INJECTION, SOLUTION INTRAMUSCULAR; INTRAVENOUS at 10:56

## 2021-12-06 RX ADMIN — PROPOFOL 30 MG: 10 INJECTION, EMULSION INTRAVENOUS at 10:31

## 2021-12-06 RX ADMIN — PHENYLEPHRINE HYDROCHLORIDE 100 MCG: 10 INJECTION INTRAVENOUS at 12:05

## 2021-12-06 RX ADMIN — PROPOFOL 150 MG: 10 INJECTION, EMULSION INTRAVENOUS at 10:09

## 2021-12-06 RX ADMIN — METHOCARBAMOL 1000 MG: 100 INJECTION INTRAMUSCULAR; INTRAVENOUS at 10:40

## 2021-12-06 RX ADMIN — HYDROMORPHONE HYDROCHLORIDE 0.5 MG: 2 INJECTION, SOLUTION INTRAMUSCULAR; INTRAVENOUS; SUBCUTANEOUS at 14:12

## 2021-12-06 RX ADMIN — PHENYLEPHRINE HYDROCHLORIDE 100 MCG: 10 INJECTION INTRAVENOUS at 10:33

## 2021-12-06 RX ADMIN — FENTANYL CITRATE 50 MCG: 50 INJECTION, SOLUTION INTRAMUSCULAR; INTRAVENOUS at 16:24

## 2021-12-06 RX ADMIN — DEXAMETHASONE SODIUM PHOSPHATE 8 MG: 4 INJECTION, SOLUTION INTRAMUSCULAR; INTRAVENOUS at 10:18

## 2021-12-06 RX ADMIN — ONDANSETRON 4 MG: 2 INJECTION INTRAMUSCULAR; INTRAVENOUS at 10:21

## 2021-12-06 RX ADMIN — HYDROCODONE BITARTRATE AND ACETAMINOPHEN 1 TABLET: 5; 325 TABLET ORAL at 20:20

## 2021-12-06 RX ADMIN — FENTANYL CITRATE 50 MCG: 50 INJECTION, SOLUTION INTRAMUSCULAR; INTRAVENOUS at 19:53

## 2021-12-06 RX ADMIN — PROPOFOL 30 MG: 10 INJECTION, EMULSION INTRAVENOUS at 10:15

## 2021-12-06 RX ADMIN — HYDROMORPHONE HYDROCHLORIDE 0.5 MG: 2 INJECTION, SOLUTION INTRAMUSCULAR; INTRAVENOUS; SUBCUTANEOUS at 13:46

## 2021-12-06 RX ADMIN — LIDOCAINE HYDROCHLORIDE 4 ML: 40 SOLUTION TOPICAL at 10:11

## 2021-12-06 RX ADMIN — PHENYLEPHRINE HYDROCHLORIDE 100 MCG: 10 INJECTION INTRAVENOUS at 10:17

## 2021-12-06 RX ADMIN — SODIUM CHLORIDE: 9 INJECTION, SOLUTION INTRAVENOUS at 09:00

## 2021-12-06 RX ADMIN — PHENYLEPHRINE HYDROCHLORIDE 100 MCG: 10 INJECTION INTRAVENOUS at 10:45

## 2021-12-06 RX ADMIN — METHOCARBAMOL 750 MG: 500 TABLET ORAL at 22:51

## 2021-12-06 RX ADMIN — Medication 5 MG: at 10:24

## 2021-12-06 RX ADMIN — MAGNESIUM SULFATE HEPTAHYDRATE 1 G: 500 INJECTION, SOLUTION INTRAMUSCULAR; INTRAVENOUS at 10:13

## 2021-12-06 RX ADMIN — Medication 5 MG: at 11:46

## 2021-12-06 RX ADMIN — HYDROMORPHONE HYDROCHLORIDE 0.5 MG: 2 INJECTION, SOLUTION INTRAMUSCULAR; INTRAVENOUS; SUBCUTANEOUS at 13:59

## 2021-12-06 RX ADMIN — PROPOFOL 20 MG: 10 INJECTION, EMULSION INTRAVENOUS at 10:26

## 2021-12-06 RX ADMIN — PHENYLEPHRINE HYDROCHLORIDE 100 MCG: 10 INJECTION INTRAVENOUS at 11:06

## 2021-12-06 RX ADMIN — MIDAZOLAM 2 MG: 1 INJECTION INTRAMUSCULAR; INTRAVENOUS at 10:36

## 2021-12-06 RX ADMIN — SUCCINYLCHOLINE CHLORIDE 200 MG: 20 INJECTION, SOLUTION INTRAMUSCULAR; INTRAVENOUS at 10:10

## 2021-12-06 RX ADMIN — Medication 10 ML: at 22:54

## 2021-12-06 RX ADMIN — FENTANYL CITRATE 50 MCG: 50 INJECTION, SOLUTION INTRAMUSCULAR; INTRAVENOUS at 14:57

## 2021-12-06 RX ADMIN — KETAMINE HYDROCHLORIDE 10 MG: 10 INJECTION, SOLUTION INTRAMUSCULAR; INTRAVENOUS at 10:20

## 2021-12-06 ASSESSMENT — PULMONARY FUNCTION TESTS
PIF_VALUE: 30
PIF_VALUE: 28
PIF_VALUE: 1
PIF_VALUE: 4
PIF_VALUE: 30
PIF_VALUE: 11
PIF_VALUE: 28
PIF_VALUE: 28
PIF_VALUE: 4
PIF_VALUE: 30
PIF_VALUE: 4
PIF_VALUE: 30
PIF_VALUE: 30
PIF_VALUE: 1
PIF_VALUE: 27
PIF_VALUE: 3
PIF_VALUE: 4
PIF_VALUE: 30
PIF_VALUE: 25
PIF_VALUE: 10
PIF_VALUE: 28
PIF_VALUE: 5
PIF_VALUE: 4
PIF_VALUE: 28
PIF_VALUE: 23
PIF_VALUE: 28
PIF_VALUE: 1
PIF_VALUE: 30
PIF_VALUE: 17
PIF_VALUE: 4
PIF_VALUE: 30
PIF_VALUE: 4
PIF_VALUE: 4
PIF_VALUE: 33
PIF_VALUE: 30
PIF_VALUE: 28
PIF_VALUE: 30
PIF_VALUE: 30
PIF_VALUE: 29
PIF_VALUE: 30
PIF_VALUE: 6
PIF_VALUE: 28
PIF_VALUE: 18
PIF_VALUE: 29
PIF_VALUE: 32
PIF_VALUE: 4
PIF_VALUE: 28
PIF_VALUE: 17
PIF_VALUE: 3
PIF_VALUE: 35
PIF_VALUE: 28
PIF_VALUE: 30
PIF_VALUE: 4
PIF_VALUE: 28
PIF_VALUE: 3
PIF_VALUE: 30
PIF_VALUE: 30
PIF_VALUE: 33
PIF_VALUE: 17
PIF_VALUE: 5
PIF_VALUE: 29
PIF_VALUE: 30
PIF_VALUE: 30
PIF_VALUE: 20
PIF_VALUE: 4
PIF_VALUE: 28
PIF_VALUE: 28
PIF_VALUE: 29
PIF_VALUE: 30
PIF_VALUE: 30
PIF_VALUE: 28
PIF_VALUE: 30
PIF_VALUE: 28
PIF_VALUE: 9
PIF_VALUE: 28
PIF_VALUE: 2
PIF_VALUE: 28
PIF_VALUE: 30
PIF_VALUE: 31
PIF_VALUE: 4
PIF_VALUE: 30
PIF_VALUE: 29
PIF_VALUE: 30
PIF_VALUE: 6
PIF_VALUE: 5
PIF_VALUE: 28
PIF_VALUE: 17
PIF_VALUE: 28
PIF_VALUE: 17
PIF_VALUE: 30
PIF_VALUE: 29
PIF_VALUE: 28
PIF_VALUE: 30
PIF_VALUE: 29
PIF_VALUE: 30
PIF_VALUE: 28
PIF_VALUE: 27
PIF_VALUE: 30
PIF_VALUE: 30
PIF_VALUE: 17
PIF_VALUE: 29
PIF_VALUE: 28
PIF_VALUE: 29
PIF_VALUE: 34
PIF_VALUE: 28
PIF_VALUE: 28
PIF_VALUE: 35
PIF_VALUE: 28
PIF_VALUE: 27
PIF_VALUE: 34
PIF_VALUE: 2
PIF_VALUE: 28
PIF_VALUE: 6
PIF_VALUE: 29
PIF_VALUE: 30
PIF_VALUE: 21
PIF_VALUE: 17
PIF_VALUE: 30
PIF_VALUE: 31
PIF_VALUE: 28
PIF_VALUE: 28
PIF_VALUE: 31
PIF_VALUE: 30
PIF_VALUE: 1
PIF_VALUE: 28
PIF_VALUE: 30
PIF_VALUE: 30
PIF_VALUE: 28
PIF_VALUE: 18
PIF_VALUE: 1
PIF_VALUE: 30
PIF_VALUE: 33
PIF_VALUE: 28
PIF_VALUE: 28
PIF_VALUE: 30
PIF_VALUE: 29
PIF_VALUE: 30
PIF_VALUE: 22
PIF_VALUE: 30
PIF_VALUE: 22
PIF_VALUE: 30
PIF_VALUE: 3
PIF_VALUE: 23
PIF_VALUE: 28
PIF_VALUE: 30
PIF_VALUE: 28
PIF_VALUE: 30
PIF_VALUE: 29
PIF_VALUE: 30
PIF_VALUE: 30
PIF_VALUE: 23
PIF_VALUE: 31
PIF_VALUE: 12
PIF_VALUE: 28
PIF_VALUE: 28
PIF_VALUE: 30
PIF_VALUE: 28
PIF_VALUE: 4
PIF_VALUE: 29
PIF_VALUE: 4
PIF_VALUE: 30
PIF_VALUE: 34
PIF_VALUE: 11
PIF_VALUE: 30
PIF_VALUE: 4
PIF_VALUE: 27
PIF_VALUE: 28
PIF_VALUE: 4
PIF_VALUE: 30
PIF_VALUE: 29
PIF_VALUE: 30
PIF_VALUE: 29
PIF_VALUE: 17
PIF_VALUE: 4
PIF_VALUE: 10
PIF_VALUE: 29
PIF_VALUE: 30
PIF_VALUE: 35
PIF_VALUE: 3
PIF_VALUE: 30
PIF_VALUE: 17
PIF_VALUE: 30
PIF_VALUE: 30
PIF_VALUE: 35
PIF_VALUE: 1
PIF_VALUE: 30
PIF_VALUE: 22
PIF_VALUE: 18
PIF_VALUE: 29
PIF_VALUE: 29
PIF_VALUE: 22
PIF_VALUE: 30
PIF_VALUE: 28
PIF_VALUE: 30
PIF_VALUE: 2
PIF_VALUE: 30
PIF_VALUE: 30
PIF_VALUE: 1

## 2021-12-06 ASSESSMENT — PAIN DESCRIPTION - DESCRIPTORS
DESCRIPTORS: DISCOMFORT
DESCRIPTORS: DISCOMFORT

## 2021-12-06 ASSESSMENT — PAIN SCALES - GENERAL
PAINLEVEL_OUTOF10: 9
PAINLEVEL_OUTOF10: 7
PAINLEVEL_OUTOF10: 7
PAINLEVEL_OUTOF10: 8
PAINLEVEL_OUTOF10: 10
PAINLEVEL_OUTOF10: 7
PAINLEVEL_OUTOF10: 8
PAINLEVEL_OUTOF10: 3
PAINLEVEL_OUTOF10: 7
PAINLEVEL_OUTOF10: 6
PAINLEVEL_OUTOF10: 10
PAINLEVEL_OUTOF10: 6
PAINLEVEL_OUTOF10: 10
PAINLEVEL_OUTOF10: 9

## 2021-12-06 ASSESSMENT — PAIN DESCRIPTION - PROGRESSION: CLINICAL_PROGRESSION: NOT CHANGED

## 2021-12-06 ASSESSMENT — LIFESTYLE VARIABLES: SMOKING_STATUS: 0

## 2021-12-06 ASSESSMENT — PAIN DESCRIPTION - ORIENTATION
ORIENTATION: POSTERIOR
ORIENTATION: POSTERIOR

## 2021-12-06 ASSESSMENT — PAIN DESCRIPTION - FREQUENCY
FREQUENCY: CONTINUOUS
FREQUENCY: CONTINUOUS

## 2021-12-06 ASSESSMENT — PAIN DESCRIPTION - ONSET: ONSET: ON-GOING

## 2021-12-06 ASSESSMENT — PAIN DESCRIPTION - LOCATION
LOCATION: NECK
LOCATION: NECK

## 2021-12-06 NOTE — ANESTHESIA POSTPROCEDURE EVALUATION
Department of Anesthesiology  Postprocedure Note    Patient: Maritza Kelly  MRN: [id number]  YOB: 1955  Date of evaluation: 12/6/2021  Time:  1:43 PM     Procedure Summary     Date: 12/06/21 Room / Location: [address]    Anesthesia Start: 1006 Anesthesia Stop: 9692    Procedure: [address] [facility], [facility], [address] [facility] [place] ([id number], [id number], [facility], [address] [facility], [address] [facility], [address], [address], [phone], [id number]) [employer], [employer] (N/A Spine Cervical) Diagnosis: (M50.021 M50.123 [address] [place] [address], C5C6, C6C7)    Surgeons: Sofy Pradhan MD Responsible Provider: Leno Negron MD    Anesthesia Type: general ASA Status: 3          Anesthesia Type: general    Cuong Phase I: Cuong Score: 10    Cuong Phase II:      Last vitals: Reviewed and per EMR flowsheets.        Anesthesia Post Evaluation    Patient location during evaluation: PACU  Patient participation: complete - patient participated  Level of consciousness: awake and alert  Airway patency: patent  Nausea & Vomiting: no vomiting and no nausea  Complications: no  Cardiovascular status: hemodynamically stable  Respiratory status: acceptable  Hydration status: stable

## 2021-12-06 NOTE — PROGRESS NOTES
Phase 1 complete, pt seen by anesthesiologist. VSS, pt resting comfortably. Incision sites are CDI, ice applied. Will transfer to 4T when bed is available, family updated.

## 2021-12-06 NOTE — CONSULTS
Consult -Internal Medicine  Dr. Jessica Can  12/6/2021      PCP: Griselda Bermudez MD  Referring Physician: Vitor Law MD    Code Status: Prior  Current Diet: ADULT DIET; Regular; 4 carb choices (60 gm/meal)      Cc: Ghazala Vail is a61 y.o. male who presents with Cervical spinal stenosis. Problem list of hospitalization thus far: Active Hospital Problems    Diagnosis Date Noted    Dyslipidemia [E78.5] 05/06/2011     Priority: High    Hypertension [I10]      Priority: High    Cervical spinal stenosis [M48.02] 12/06/2021    Type 2 diabetes mellitus without complication, without long-term current use of insulin ([facility] [address] [address] [E11.9] 06/07/2021     HPI: Ghazala Vail has been admitted by Vitor Law MD with neck pain with radiculopathy. Pt presents with with above complaint. Pt has has severe pain to the nack, arms  Duration - going on for at least 1 year(s)  It is described as a persistent pain that is sharp, shooting in quality. Severity rated as 8 out of 10 at its worst  Pain is so severe that it limits usual activities  No improvement with medications, therapy or injections  As it is not improved with conservative measures, surgery has been recommended    Pt has undergone Posterior decompressive cervical laminectomy and medial facetectomy C4-5, C5-6 and C6-7 with foraminotomies at those levels without any apparent complications. Pt is doing well post operatively. Pain is controlled at this time.   I have been asked to see the patient for evaluation of his internal medicine issues:  has a past medical history of CAD (coronary artery disease), DDD (degenerative disc disease), cervical, Disc disorder, ED (erectile dysfunction), Hyperlipidemia, Hyperlipidemia, Hypertension, MI (myocardial infarction) ([address] Obstructive sleep apnea, Other disorders of kidney and ureter in diseases classified elsewhere, Pneumonia, Polyp of colon, Renal calculi, Type 2 diabetes mellitus without complication, without long-term current use of insulin ([address] Type 2 diabetes mellitus without complication, without long-term current use of insulin ([address] Type 2 diabetes mellitus without complication, without long-term current use of insulin ([address] and Type II or unspecified type diabetes mellitus without mention of complication, not stated as uncontrolled. .  Home meds have been reveiwed and restartedas indicated. Pt denies having other complaints at this time. Pt has been evaluated post operatively  Pain does appear to be controlled - on iv meds  Patient has not worked with therapy at this time      Electronic chart reviewed  Home meds reviewed and restarted as indicated  Op note, anesthesia flowsheet reviewed  Case d/w nursing    Seen in recovery  No issues noted  All questions answered for patient and family      Review of Systems: (1 system for EPF, 2-9 for detailed, 10+ for comprehensive)  Constitutional: Negative for chills and fever. HENT: Negative for ear pain and mouth sores. Eyes: Negative for pain, redness and visual disturbance. Respiratory: Negative for cough, shortness of breath and wheezing. Cardiovascular: Negative for chest pain and leg swelling. Gastrointestinal: Negative for diarrhea, nausea and vomiting. Endocrine: Negative for polydipsia and polyphagia. Genitourinary: Negative for frequency and urgency. Musculoskeletal: Negative for back pain and positive for neck pain. Skin: Negative for color change. Allergic/Immunologic: Negative for food allergies. Neurological: Negative for seizures and syncope. Hematological: Does not bruise/bleed easily. Psychiatric/Behavioral: Negative for confusion. The patient is not nervous/anxious.              Past Medical History:   Past Medical History:   Diagnosis Date    CAD (coronary artery disease)     DDD (degenerative disc disease), cervical 11/24/2021    Disc disorder     ED (erectile dysfunction)     Hyperlipidemia     Hyperlipidemia     Hypertension     MI (myocardial infarction) ([address]     Obstructive sleep apnea     Other disorders of kidney and ureter in diseases classified elsewhere     Pneumonia 1970s    Polyp of colon     Renal calculi     Type 2 diabetes mellitus without complication, without long-term current use of insulin ([address] 5/16/2019    Type 2 diabetes mellitus without complication, without long-term current use of insulin ([address] 5/16/2019    Type 2 diabetes mellitus without complication, without long-term current use of insulin ([address] 6/7/2021    Type II or unspecified type diabetes mellitus without mention of complication, not stated as uncontrolled        Past Surgical History:   Past Surgical History:   Procedure Laterality Date    ABDOMEN SURGERY      Exploratory Laparotomy    ARM SURGERY Left 10/27/2020    .  performed by Natty Meléndez MD at [place] [address]      right and left    CARPAL TUNNEL RELEASE Left 10/27/2020    LEFT CARPAL TUNNEL RELEASE; LEFT INSITU CUBITAL TUNNEL RELEASE performed by Natty Meléndez MD at [address] [place] [address]  12/2/13    COLONOSCOPY  12/2/13    polyps X 2 removed    CORONARY ANGIOPLASTY WITH STENT PLACEMENT      CYSTOSCOPY Left 12/12/14    CYSTOSCOPY, LEFT RETROGRADE PYELOGRAM, PLACEMENT OF LEFT    FRACTURE SURGERY      Ankle & Wrist    KNEE ARTHROSCOPY Right 7/15/2021    RIGHT KNEE ARTHROSCOPY PARTIAL MEDIAL MENISCECTOMY performed by Norma Peterson MD at [address] [place] [address]         Social History:   Social History     Tobacco History     Smoking Status  Former Smoker Quit date  4/9/2011 Smoking Frequency  0.25 packs/day for 30 years (7.5 pk yrs) Smoking Tobacco Type  Cigarettes, Cigars    Smokeless Tobacco Use  Never Used    Tobacco Comment  cigars every once in a while           Alcohol History     Alcohol Use Status  Yes Drinks/Week  1 Cans of beer per week Amount  1.0 standard drink of alcohol/wk Comment  social          Drug Use     Drug Use Status  No          Sexual Activity     Sexually Active  Not Asked                Fam History:   Family History   Problem Relation Age of Onset    Diabetes Father     Mental Illness Mother     High Blood Pressure Brother     Heart Disease Brother     High Cholesterol Neg Hx        PFSH: The above PMHx, PSHx, SocHx, FamHx has been reviewed by myself. (1 area for detailed, 2-3 for comprehensive)      Code Status: Prior    Meds  following list ofhome medications from electronic chart has been reviewed by myself  Prior to Admission medications    Medication Sig Start Date End Date Taking?  Authorizing Provider   glimepiride (AMARYL) 4 MG tablet Take 1 tablet by mouth 2 times daily (with meals) 11/24/21  Yes Summer Breen MD   canagliflozin (INVOKANA) 300 MG TABS tablet 1 tablet po daily 11/24/21  Yes Summer Breen MD   metFORMIN (GLUCOPHAGE) 1000 MG tablet 1 tablet po bid with food 11/24/21  Yes Summer Breen MD   carvedilol (COREG) 6.25 MG tablet TAKE 1 TABLET TWICE DAILY WITH MEALS 10/13/21  Yes Indiana Cruz MD   hydroCHLOROthiazide (HYDRODIURIL) 25 MG tablet TAKE 1 TABLET EVERY DAY 8/2/21  Yes Indiana Cruz MD   atorvastatin (LIPITOR) 40 MG tablet Take 1 tablet by mouth daily 6/25/21  Yes Christos Navarro DO   amLODIPine (NORVASC) 10 MG tablet TAKE 1 TABLET EVERY DAY 4/12/21  Yes Indiana Cruz MD   diphenhydrAMINE (BENADRYL) 25 MG tablet Take 25 mg by mouth nightly as needed for Allergies    Yes Historical Provider, MD   Multiple Vitamins-Minerals (MULTIVITAMIN PO) Take by mouth    Yes Historical Provider, MD   aspirin 81 MG tablet Take 81 mg by mouth daily    Yes Historical Provider, MD         Allergies   Allergen Reactions    Fexofenadine-Pseudoephed Er Other (See Comments)     cough    Lisinopril      cough    Morphine Hives    Zithromax [Azithromycin]      Heart palpitations     Losartan      Dry c ough EXAM: (2-7 system forEPF/Detailed, ?8 for Comprehensive)  BP (!) 156/94   Pulse 93   Temp 96.8 °F (36 °C) (Temporal)   Resp 10   Ht 6' (1.829 m)   Wt 262 lb (118.8 kg)   SpO2 94%   BMI 35.53 kg/m²   Constitutional: vitals as above: alert, appears stated age and cooperative    Psychiatric: normal insight and judgment, oriented to person, place, time, and general circumstances    Head: Normocephalic, without obvious abnormality, atraumatic    Eyes:lids and lashes normal, conjunctivae and sclerae normal and pupils equal, round, reactive to light and accomodation    EMNT: external ears normal, nares midline    Neck: no carotid bruit, supple, symmetrical, trachea midline and thyroid not enlarged, symmetric, no tenderness/mass/nodules  ; dressing CDI, +GENEVIEVE drain  Respiratory: clear to auscultation and percussion bilaterally with normal respiratory effort    Cardiovascular: normal rate, regular rhythm, normal S1 and S2 and no murmurs    Gastrointestinal: soft, non-tender, non-distended, normal bowel sounds, no masses or organomegaly    Extremities: no clubbing, no edema     Skin:No rashes or nodules noted.     Neurologic:negative           LABS:  Labs Reviewed   POCT GLUCOSE - Abnormal; Notable for the following components:       Result Value    POC Glucose 157 (*)     All other components within normal limits    Narrative:     Performed at:  [facility] [address], [facility], [address]   Phone [phone]   POCT GLUCOSE - Abnormal; Notable for the following components:    POC Glucose 176 (*)     All other components within normal limits    Narrative:     Performed at:  [facility] [address], [facility], [address]   Phone [phone]   HEMOGLOBIN A1C   POCT GLUCOSE   POCT GLUCOSE   TYPE AND SCREEN    Narrative:     Performed at:  [facility] [address], [facility], [address]   Phone [phone]         IMAGING:  Imaging has been reviewed in the computerized chart  XR CERVICAL SPINE 1 VW    Result Date: 12/6/2021  Radiology exam is complete. No Radiologist dictation. Please follow up with ordering provider. FLUORO FOR SURGICAL PROCEDURES    Result Date: 12/6/2021  Radiology exam is complete. No Radiologist dictation. Please follow up with ordering provider. EKG: reviewed if available      Lab Results   Component Value Date    GLUCOSE 143 11/30/2021     Lab Results   Component Value Date    POCGLU 176 12/06/2021     BP (!) 156/94   Pulse 93   Temp 96.8 °F (36 °C) (Temporal)   Resp 10   Ht 6' (1.829 m)   Wt 262 lb (118.8 kg)   SpO2 94%   BMI 35.53 kg/m²     MEDICAL DECISION MAKING:    Principal Problem:    Cervical spinal stenosis -New Problem to me. Doing ok post op  Plan: Continue on post-operative pathway. PT/OT to see patient. Continue pain control - on IV pain meds acutely post op. Work on transitioning to oral pain meds when possible. Will follow serial h/h to monitor for acute blood loss anemia - labs ordered for tomorrow. Active Problems:    Hypertension - Established problem. Elevated. 156/94  Plan: Pt home BP meds reviewed and will be continued. IV Hydralazine ordered for control of extremely high blood pressures. Will monitor labs to assess Creat/K for possible complications of medications. Continue narcotics as adequate pain control can assist with adequate BP control. Dyslipidemia -Established problem. Stable. Plan: cont on statin    Type 2 diabetes mellitus without complication, without long-term current use of insulin ([address] -Established problem. Stable. Sugars ok post op  Plan: Continue on post-operative pathway. PT/OT to see patient. Continue pain control - on IV pain meds acutely post op. Work on transitioning to oral pain meds when possible. Will follow serial h/h to monitor for acute blood loss anemia - labs ordered for tomorrow. Diagnoses as listed above, designated as new or established and plan outlined for each. Data Reviewed:   (1) Lab tests were reviewed or ordered. (1) Radiology tests were reviewed or ordered. (1) Medical test (Echo, EKG, PFT/dennise) were not ordered. (1) History was not obtained from someone other than patient  (1) Old records  were reviewed - see HPI/MDM for pertinent details if review done. (2) Case was discussed with another health care provider: Dr Denise Sheehan  (2) Imaging was viewed by myself. mri  (2) EKG  was not viewed by myself. Thanks for the consult! Will follow along daily while patient is in house.       (Please note that portions of this note were completed with a voice recognition program.  Efforts were made to edit the dictations but occasionally words are mis-transcribed.)      Jordan Allred MD  12/6/2021

## 2021-12-06 NOTE — H&P
Date of Surgery Update:  Hilary Cabral was seen, history and physical examination reviewed, and patient examined by me today. There have been no significant clinical changes since the completion of the previous history and physical.    The risk, benefits, and alternatives of the proposed procedure have been explained to the patient (or appropriate guardian) and understanding verbalized. All questions answered. Patient wishes to proceed.     Electronically signed by: Ivan Marin MD,12/6/2021,9:20 AM
Never smoker

## 2021-12-06 NOTE — ANESTHESIA PRE PROCEDURE
Department of Anesthesiology  Preprocedure Note       Name:  Arnol Farley   Age:  61 y.o.  :  1955                                          MRN:  [id number]         Date:  2021      Surgeon: Lyric Barnett):  Fredick Hatchet, MD    Procedure: Procedure(s):  POSTERIOR CERVICAL DECOMPRESSION AND FUSION [facility], [facility], [address] [facility] ([facility], [id number], [facility], [id number], [address] [place] [address], [phone], [phone], [phone]) [employer] [place]    Medications prior to admission:   Prior to Admission medications    Medication Sig Start Date End Date Taking? Authorizing Provider   glimepiride (AMARYL) 4 MG tablet Take 1 tablet by mouth 2 times daily (with meals) 21   Daisy Collins MD   canagliflozin (INVOKANA) 300 MG TABS tablet 1 tablet po daily 21   Daisy Collins MD   metFORMIN (GLUCOPHAGE) 1000 MG tablet 1 tablet po bid with food 21   Daisy Collins MD   carvedilol (COREG) 6.25 MG tablet TAKE 1 TABLET TWICE DAILY WITH MEALS 10/13/21   Ivelisse Underwood MD   hydroCHLOROthiazide (HYDRODIURIL) 25 MG tablet TAKE 1 TABLET EVERY DAY 21   Ivelisse Underwood MD   atorvastatin (LIPITOR) 40 MG tablet Take 1 tablet by mouth daily 21   Mikaela Randall DO   amLODIPine (NORVASC) 10 MG tablet TAKE 1 TABLET EVERY DAY 21   Ivelisse Underwood MD   diphenhydrAMINE (BENADRYL) 25 MG tablet Take 25 mg by mouth nightly as needed for Allergies     Historical Provider, MD   Multiple Vitamins-Minerals (MULTIVITAMIN PO) Take by mouth  Patient not taking: Reported on 2021    Historical Provider, MD   aspirin 81 MG tablet Take 81 mg by mouth daily  Patient not taking: Reported on 2021    Historical Provider, MD       Current medications:    No current facility-administered medications for this visit. No current outpatient medications on file.      Facility-Administered Medications Ordered in Other Visits   Medication Dose Route Frequency Provider Last Rate Last Admin    0.9 % sodium chloride infusion   IntraVENous Continuous Elsi Draper MD        lidocaine PF 1 % injection 0.5 mL  0.5 mL IntraDERmal Once Elsi Draper MD        ceFAZolin (ANCEF) 3000 mg in dextrose 5 % 100 mL IVPB  3,000 mg IntraVENous On Call to 464 Ben Briceno MD           Allergies:     Allergies   Allergen Reactions    Fexofenadine-Pseudoephed Er Other (See Comments)     cough    Lisinopril      cough    Morphine Hives    Zithromax [Azithromycin]      Heart palpitations     Losartan      Dry c ough       Problem List:    Patient Active Problem List   Diagnosis Code    Hypertension I10    Disorder of intervertebral disc M51.9    Polyp of colon K63.5    CAD (coronary artery disease) I25.10    Dyslipidemia E78.5    PVD (peripheral vascular disease) ([facility] I73.9    Cough due to ACE inhibitor R05.8, T46.4X5A    MI (myocardial infarction) ([facility]) I21.9    ED (erectile dysfunction) N52.9    Ureteral calculi F40.5    Renal colic on left side S96    Hydronephrosis N13.30    Microscopic hematuria R31.29    Chest pain R07.9    Obstructive sleep apnea G47.33    Type 2 diabetes mellitus without complication, without long-term current use of insulin ([facility]) E11.9    Complex tear of medial meniscus of right knee as current injury S83.231A    DDD (degenerative disc disease), cervical M50.30       Past Medical History:        Diagnosis Date    CAD (coronary artery disease)     DDD (degenerative disc disease), cervical 11/24/2021    Disc disorder     ED (erectile dysfunction)     Hyperlipidemia     Hyperlipidemia     Hypertension     MI ([facility] infarction) ([facility]     Obstructive sleep apnea     Other disorders of kidney and ureter in diseases classified elsewhere     Pneumonia 1970s    Polyp of colon     Renal calculi     Type 2 diabetes mellitus without complication, without long-term current use of insulin ([facility] 5/16/2019    Type 2 diabetes mellitus without complication, without long-term current use of insulin ([facility] [address] 5/16/2019    Type 2 diabetes mellitus without complication, without long-term current use of insulin ([facility] 6/7/2021    Type II or unspecified type diabetes mellitus without mention of complication, not stated as uncontrolled        Past Surgical History:        Procedure Laterality Date    ABDOMEN SURGERY      Exploratory Laparotomy    ARM SURGERY Left 10/27/2020    . performed by Eder Jackson MD at [place] [address]      right and left    CARPAL TUNNEL RELEASE Left 10/27/2020    LEFT CARPAL TUNNEL RELEASE; LEFT INSITU CUBITAL TUNNEL RELEASE performed by Eder Jackson MD at [facility] [address]  12/2/13    COLONOSCOPY  12/2/13    polyps X 2 removed    CORONARY ANGIOPLASTY WITH STENT PLACEMENT      CYSTOSCOPY Left 12/12/14    CYSTOSCOPY, LEFT RETROGRADE PYELOGRAM, PLACEMENT OF LEFT    FRACTURE SURGERY      Ankle & Wrist    KNEE ARTHROSCOPY Right 7/15/2021    RIGHT KNEE ARTHROSCOPY PARTIAL MEDIAL MENISCECTOMY performed by Nakita Foy MD at [address] [place] [address]         Social History:    Social History     Tobacco Use    Smoking status: Former Smoker     Packs/day: 0.25     Years: 30.00     Pack years: 7.50     Types: Cigarettes, Cigars     Quit date: 4/9/2011     Years since quitting: 10.6    Smokeless tobacco: Never Used    Tobacco comment: cigars every once in a while    Substance Use Topics    Alcohol use: Yes     Alcohol/week: 1.0 standard drink     Types: 1 Cans of beer per week     Comment: social                                Counseling given: Not Answered  Comment: cigars every once in a while       Vital Signs (Current): There were no vitals filed for this visit.                                            BP Readings from Last 3 Encounters:   11/30/21 130/70   11/24/21 128/81   09/09/21 128/83       NPO Status: BMI:   Wt Readings from Last 3 Encounters:   11/22/21 262 lb (118.8 kg)   11/30/21 265 lb 12.8 oz (120.6 kg)   11/24/21 267 lb (121.1 kg)     There is no height or weight on file to calculate BMI.    CBC:   Lab Results   Component Value Date    WBC 11.2 11/30/2021    RBC 5.03 11/30/2021    HGB 14.1 11/30/2021    HCT 43.8 11/30/2021    MCV 87.1 11/30/2021    RDW 13.7 11/30/2021     11/30/2021       CMP:   Lab Results   Component Value Date     11/30/2021    K 4.3 11/30/2021     11/30/2021    CO2 26 11/30/2021    BUN 20 11/30/2021    CREATININE 1.0 11/30/2021    GFRAA >60 11/30/2021    GFRAA >60 07/15/2011    AGRATIO 1.8 08/24/2020    LABGLOM >60 11/30/2021    LABGLOM 61 07/14/2018    GLUCOSE 143 11/30/2021    PROT 7.1 08/24/2020    PROT 6.8 03/04/2013    PROT 6.8 03/04/2013    CALCIUM 9.7 11/30/2021    BILITOT 0.3 08/24/2020    ALKPHOS 51 08/24/2020    AST 25 08/24/2020    ALT 37 08/24/2020       POC Tests: No results for input(s): POCGLU, POCNA, POCK, POCCL, POCBUN, POCHEMO, POCHCT in the last 72 hours.     Coags:   Lab Results   Component Value Date    PROTIME 10.5 11/30/2021    INR 0.93 11/30/2021    APTT 31.4 11/30/2021       HCG (If Applicable): No results found for: PREGTESTUR, PREGSERUM, HCG, HCGQUANT     ABGs: No results found for: PHART, PO2ART, QQS1YWJ, CBW8VUT, BEART, C5MPQIBH     Type & Screen (If Applicable):  No results found for: LABABO, LABRH    Drug/Infectious Status (If Applicable):  Lab Results   Component Value Date    HEPCAB NON-REACTIVE 07/14/2018       COVID-19 Screening (If Applicable):   Lab Results   Component Value Date    COVID19 Not Detected 11/30/2021    COVID19 Not Detected 10/22/2020           Anesthesia Evaluation  Patient summary reviewed and Nursing notes reviewed no history of anesthetic complications:   Airway: Mallampati: III  TM distance: >3 FB   Neck ROM: full  Mouth opening: > = 3 FB Dental: normal exam         Pulmonary:normal exam breath sounds clear to auscultation  (+) sleep apnea: on noncompliant,      (-) COPD, asthma and not a current smoker (former)                           Cardiovascular:  Exercise tolerance: good (>4 METS),   (+) hypertension:, past MI: > 6 months, CAD (neg stress nml Ef 2019; follows cards, no recent issues/symptoms/ntg use):, CABG/stent (history of IWMI, with a PTCA to prox and distal RCA in 2011):, hyperlipidemia    (-) dysrhythmias,  angina and  CHF    ECG reviewed  Rhythm: regular  Rate: normal  Echocardiogram reviewed  Stress test reviewed             ROS comment: ECHO 2021  Technically limited due to lung interface. No IV access. Normal left ventricle size and systolic function with an estimated ejection   fraction of 55%. No regional wall motion abnormalities are noted. There is mild concentric left ventricular hypertrophy. Normal diastolic function. Aortic valve appears sclerotic but opens adequately. Neuro/Psych:   Negative Neuro/Psych ROS     (-) seizures, TIA and CVA            ROS comment: Numbness both upper extremities GI/Hepatic/Renal:   (+) renal disease: CRI,      (-) GERD and liver disease       Endo/Other:    (+) Diabetes (a1c 7.2)Type II DM, , .    (-) hypothyroidism, hyperthyroidism               Abdominal:   (+) obese,           Vascular: Other Findings:             Anesthesia Plan      general     ASA 3       Induction: intravenous and rapid sequence. MIPS: Postoperative opioids intended, Prophylactic antiemetics administered and Postoperative trial extubation. Anesthetic plan and risks discussed with patient. Plan discussed with CRNA.                   Melissa Rojas MD   12/6/2021

## 2021-12-06 NOTE — PROGRESS NOTES
Pt arrived to PACU from OR, VSS, pt arouses to voice. Posterior neck incision is CDI. GENEVIEVE drain notes serous drainage. Will continue to monitor.

## 2021-12-06 NOTE — PROGRESS NOTES
Seen in pacu  Consult noted  See orders  Full note to follow    Active Hospital Problems    Diagnosis Date Noted    Dyslipidemia [E78.5] 05/06/2011     Priority: High    Hypertension [I10]      Priority: High    Cervical spinal stenosis [M48.02] 12/06/2021    Type 2 diabetes mellitus without complication, without long-term current use of insulin [facility] [E11.9] 06/07/2021

## 2021-12-07 PROBLEM — D62 ACUTE BLOOD LOSS AS CAUSE OF POSTOPERATIVE ANEMIA: Status: ACTIVE | Noted: 2021-12-07

## 2021-12-07 LAB
BASOPHILS ABSOLUTE: 0 K/UL (ref 0–0.2)
BASOPHILS RELATIVE PERCENT: 0.1 %
BUN BLDV-MCNC: 19 MG/DL (ref 7–20)
CALCIUM SERPL-MCNC: 9.2 MG/DL (ref 8.3–10.6)
CHLORIDE BLD-SCNC: 102 MMOL/L (ref 99–110)
CREAT SERPL-MCNC: 1 MG/DL (ref 0.8–1.3)
EOSINOPHILS ABSOLUTE: 0 K/UL (ref 0–0.6)
EOSINOPHILS RELATIVE PERCENT: 0.1 %
GFR AFRICAN AMERICAN: >60
GFR NON-AFRICAN AMERICAN: >60
GLUCOSE BLD-MCNC: 182 MG/DL (ref 70–99)
GLUCOSE BLD-MCNC: 185 MG/DL (ref 70–99)
GLUCOSE BLD-MCNC: 190 MG/DL (ref 70–99)
GLUCOSE BLD-MCNC: 196 MG/DL (ref 70–99)
GLUCOSE BLD-MCNC: 200 MG/DL (ref 70–99)
HCT VFR BLD CALC: 39.4 % (ref 40.5–52.5)
HEMOGLOBIN: 13 G/DL (ref 13.5–17.5)
LYMPHOCYTES ABSOLUTE: 1.8 K/UL (ref 1–5.1)
LYMPHOCYTES RELATIVE PERCENT: 12.9 %
MCH RBC QN AUTO: 28.5 PG (ref 26–34)
MCHC RBC AUTO-ENTMCNC: 33 G/DL (ref 31–36)
MCV RBC AUTO: 86.2 FL (ref 80–100)
MONOCYTES ABSOLUTE: 1 K/UL (ref 0–1.3)
MONOCYTES RELATIVE PERCENT: 7.3 %
NEUTROPHILS ABSOLUTE: 11.1 K/UL (ref 1.7–7.7)
NEUTROPHILS RELATIVE PERCENT: 79.6 %
PDW BLD-RTO: 13.5 % (ref 12.4–15.4)
PERFORMED ON: ABNORMAL
PLATELET # BLD: 222 K/UL (ref 135–450)
PMV BLD AUTO: 7.8 FL (ref 5–10.5)
POTASSIUM SERPL-SCNC: 5.2 MMOL/L (ref 3.5–5.1)
RBC # BLD: 4.57 M/UL (ref 4.2–5.9)
SODIUM BLD-SCNC: 138 MMOL/L (ref 136–145)
WBC # BLD: 14 K/UL (ref 4–11)

## 2021-12-07 PROCEDURE — 2580000003 HC RX 258: Performed by: NEUROLOGICAL SURGERY

## 2021-12-07 PROCEDURE — 85025 COMPLETE CBC W/AUTO DIFF WBC: CPT

## 2021-12-07 PROCEDURE — 6370000000 HC RX 637 (ALT 250 FOR IP): Performed by: INTERNAL MEDICINE

## 2021-12-07 PROCEDURE — 80048 BASIC METABOLIC PNL TOTAL CA: CPT

## 2021-12-07 PROCEDURE — 6370000000 HC RX 637 (ALT 250 FOR IP): Performed by: NEUROLOGICAL SURGERY

## 2021-12-07 PROCEDURE — 97165 OT EVAL LOW COMPLEX 30 MIN: CPT

## 2021-12-07 PROCEDURE — 2500000003 HC RX 250 WO HCPCS: Performed by: NEUROLOGICAL SURGERY

## 2021-12-07 PROCEDURE — 6360000002 HC RX W HCPCS: Performed by: NEUROLOGICAL SURGERY

## 2021-12-07 PROCEDURE — 97161 PT EVAL LOW COMPLEX 20 MIN: CPT

## 2021-12-07 PROCEDURE — 94760 N-INVAS EAR/PLS OXIMETRY 1: CPT

## 2021-12-07 PROCEDURE — 1200000000 HC SEMI PRIVATE

## 2021-12-07 PROCEDURE — 97530 THERAPEUTIC ACTIVITIES: CPT

## 2021-12-07 PROCEDURE — 36415 COLL VENOUS BLD VENIPUNCTURE: CPT

## 2021-12-07 PROCEDURE — 97116 GAIT TRAINING THERAPY: CPT

## 2021-12-07 RX ORDER — SODIUM CHLORIDE 9 MG/ML
INJECTION, SOLUTION INTRAVENOUS CONTINUOUS
Status: DISCONTINUED | OUTPATIENT
Start: 2021-12-07 | End: 2021-12-07

## 2021-12-07 RX ADMIN — HYDROMORPHONE HYDROCHLORIDE 0.5 MG: 1 INJECTION, SOLUTION INTRAMUSCULAR; INTRAVENOUS; SUBCUTANEOUS at 16:31

## 2021-12-07 RX ADMIN — HYDROCHLOROTHIAZIDE 25 MG: 25 TABLET ORAL at 08:28

## 2021-12-07 RX ADMIN — HYDROMORPHONE HYDROCHLORIDE 0.5 MG: 1 INJECTION, SOLUTION INTRAMUSCULAR; INTRAVENOUS; SUBCUTANEOUS at 23:48

## 2021-12-07 RX ADMIN — GLIMEPIRIDE 4 MG: 2 TABLET ORAL at 16:32

## 2021-12-07 RX ADMIN — INSULIN LISPRO 4 UNITS: 100 INJECTION, SOLUTION INTRAVENOUS; SUBCUTANEOUS at 12:48

## 2021-12-07 RX ADMIN — OXYCODONE 10 MG: 5 TABLET ORAL at 03:45

## 2021-12-07 RX ADMIN — CARVEDILOL 6.25 MG: 6.25 TABLET, FILM COATED ORAL at 08:28

## 2021-12-07 RX ADMIN — AMLODIPINE BESYLATE 10 MG: 5 TABLET ORAL at 08:27

## 2021-12-07 RX ADMIN — METHOCARBAMOL 750 MG: 500 TABLET ORAL at 08:28

## 2021-12-07 RX ADMIN — CARVEDILOL 6.25 MG: 6.25 TABLET, FILM COATED ORAL at 16:32

## 2021-12-07 RX ADMIN — METHOCARBAMOL 750 MG: 500 TABLET ORAL at 16:32

## 2021-12-07 RX ADMIN — HYDROMORPHONE HYDROCHLORIDE 0.5 MG: 1 INJECTION, SOLUTION INTRAMUSCULAR; INTRAVENOUS; SUBCUTANEOUS at 12:46

## 2021-12-07 RX ADMIN — POLYETHYLENE GLYCOL 3350 17 G: 17 POWDER, FOR SOLUTION ORAL at 08:28

## 2021-12-07 RX ADMIN — POTASSIUM CHLORIDE, DEXTROSE MONOHYDRATE AND SODIUM CHLORIDE 1000 ML: 150; 5; 450 INJECTION, SOLUTION INTRAVENOUS at 06:24

## 2021-12-07 RX ADMIN — SODIUM CHLORIDE: 9 INJECTION, SOLUTION INTRAVENOUS at 06:52

## 2021-12-07 RX ADMIN — OXYCODONE 10 MG: 5 TABLET ORAL at 08:27

## 2021-12-07 RX ADMIN — Medication 10 ML: at 21:53

## 2021-12-07 RX ADMIN — METFORMIN HYDROCHLORIDE 1000 MG: 500 TABLET ORAL at 16:31

## 2021-12-07 RX ADMIN — ATORVASTATIN CALCIUM 40 MG: 40 TABLET, FILM COATED ORAL at 21:48

## 2021-12-07 RX ADMIN — INSULIN LISPRO 2 UNITS: 100 INJECTION, SOLUTION INTRAVENOUS; SUBCUTANEOUS at 17:41

## 2021-12-07 RX ADMIN — Medication 10 ML: at 09:59

## 2021-12-07 RX ADMIN — METHOCARBAMOL 750 MG: 500 TABLET ORAL at 12:43

## 2021-12-07 RX ADMIN — GLIMEPIRIDE 4 MG: 2 TABLET ORAL at 08:28

## 2021-12-07 RX ADMIN — METFORMIN HYDROCHLORIDE 1000 MG: 500 TABLET ORAL at 08:27

## 2021-12-07 RX ADMIN — INSULIN LISPRO 1 UNITS: 100 INJECTION, SOLUTION INTRAVENOUS; SUBCUTANEOUS at 21:50

## 2021-12-07 RX ADMIN — HYDROMORPHONE HYDROCHLORIDE 0.5 MG: 1 INJECTION, SOLUTION INTRAMUSCULAR; INTRAVENOUS; SUBCUTANEOUS at 06:24

## 2021-12-07 RX ADMIN — CEFAZOLIN SODIUM 2000 MG: 10 INJECTION, POWDER, FOR SOLUTION INTRAVENOUS at 10:15

## 2021-12-07 RX ADMIN — METHOCARBAMOL 750 MG: 500 TABLET ORAL at 21:48

## 2021-12-07 RX ADMIN — INSULIN LISPRO 2 UNITS: 100 INJECTION, SOLUTION INTRAVENOUS; SUBCUTANEOUS at 08:32

## 2021-12-07 ASSESSMENT — PAIN DESCRIPTION - LOCATION: LOCATION: NECK

## 2021-12-07 ASSESSMENT — PAIN SCALES - GENERAL
PAINLEVEL_OUTOF10: 9
PAINLEVEL_OUTOF10: 9
PAINLEVEL_OUTOF10: 8
PAINLEVEL_OUTOF10: 8

## 2021-12-07 ASSESSMENT — PAIN DESCRIPTION - ORIENTATION: ORIENTATION: POSTERIOR

## 2021-12-07 NOTE — PROGRESS NOTES
Progress Note - Dr. Aga Dobbins - Internal Medicine  PCP: Fran Goldmann, MD [address] [place] [address]. / Ples Postin [phone]    Hospital Day: 1  Code Status: Full Code  Current Diet: ADULT DIET; Regular; 4 carb choices (60 gm/meal)        CC: follow up on medical issues    Subjective:   Cyndi Jalloh is a 61 y.o. male. Pt seen and examined  Chart reviewed since last visit, labs and imaging below      Doing ok  Some incisional pain, just got dose of pain meds  No other issues  Case d/w mel, neurosurg NP  Drain in, serosanguinous drainage, needed to be emptied overnight    He denies chest pain, denies shortness of breath, denies nausea,  denies emesis. 10 system Review of Systems is reviewed with patient, and pertinent positives are noted in HPI above . Otherwise, Review of systems is negative. I have reviewed the patient's medical and social history in detail and updated the computerized patient record. To recap: He  has a past medical history of CAD (coronary artery disease), DDD (degenerative disc disease), cervical, Disc disorder, ED (erectile dysfunction), Hyperlipidemia, Hyperlipidemia, Hypertension, MI (myocardial infarction) ([address] Obstructive sleep apnea, Other disorders of kidney and ureter in diseases classified elsewhere, Pneumonia, Polyp of colon, Renal calculi, Type 2 diabetes mellitus without complication, without long-term current use of insulin ([address] Type 2 diabetes mellitus without complication, without long-term current use of insulin ([address] Type 2 diabetes mellitus without complication, without long-term current use of insulin ([address] and Type II or unspecified type diabetes mellitus without mention of complication, not stated as uncontrolled. . He  has a past surgical history that includes Tonsillectomy and adenoidectomy; Carpal tunnel release; Coronary angioplasty with stent; Colonoscopy (12/2/13); Colonoscopy (12/2/13); Abdomen surgery; fracture surgery;  Cystoscopy (Left, 12/12/14); Carpal tunnel release (Left, 10/27/2020); Arm Surgery (Left, 10/27/2020); Cardiac surgery; and Knee arthroscopy (Right, 7/15/2021). Kimmysamir Potters He  reports that he quit smoking about 10 years ago. His smoking use included cigarettes and cigars. He has a 7.50 pack-year smoking history. He has never used smokeless tobacco. He reports current alcohol use of about 1.0 standard drink of alcohol per week. He reports that he does not use drugs. .        Active Hospital Problems    Diagnosis Date Noted    Dyslipidemia [E78.5] 05/06/2011     Priority: High    Hypertension [I10]      Priority: High    Acute blood loss as cause of postoperative anemia [D62] 12/07/2021    Cervical spinal stenosis [M48.02] 12/06/2021    Type 2 diabetes mellitus without complication, without long-term current use of insulin ([facility]) [E11.9] 06/07/2021       Current Facility-Administered Medications: metFORMIN (GLUCOPHAGE) tablet 1,000 mg, 1,000 mg, Oral, BID WC  hydroCHLOROthiazide (HYDRODIURIL) tablet 25 mg, 25 mg, Oral, Daily  diphenhydrAMINE (BENADRYL) tablet 25 mg, 25 mg, Oral, Nightly PRN  glimepiride (AMARYL) tablet 4 mg, 4 mg, Oral, BID WC  carvedilol (COREG) tablet 6.25 mg, 6.25 mg, Oral, BID WC  atorvastatin (LIPITOR) tablet 40 mg, 40 mg, Oral, Nightly  amLODIPine (NORVASC) tablet 10 mg, 10 mg, Oral, Daily  sodium chloride flush 0.9 % injection 5-40 mL, 5-40 mL, IntraVENous, 2 times per day  sodium chloride flush 0.9 % injection 5-40 mL, 5-40 mL, IntraVENous, PRN  0.9 % sodium chloride infusion, 25 mL, IntraVENous, PRN  ondansetron (ZOFRAN-ODT) disintegrating tablet 4 mg, 4 mg, Oral, Q8H PRN **OR** ondansetron (ZOFRAN) injection 4 mg, 4 mg, IntraVENous, Q6H PRN  ceFAZolin (ANCEF) 2000 mg in dextrose 5 % 50 mL IVPB, 2,000 mg, IntraVENous, Q8H  oxyCODONE (ROXICODONE) immediate release tablet 5 mg, 5 mg, Oral, Q4H PRN **OR** oxyCODONE (ROXICODONE) immediate release tablet 10 mg, 10 mg, Oral, Q4H PRN  HYDROmorphone HCl PF (DILAUDID) injection 0.5 mg, 0.5 mg, IntraVENous, Q3H PRN  methocarbamol (ROBAXIN) tablet 750 mg, 750 mg, Oral, 4x Daily  polyethylene glycol (GLYCOLAX) packet 17 g, 17 g, Oral, Daily  bisacodyl (DULCOLAX) EC tablet 5 mg, 5 mg, Oral, Daily PRN  glucose (GLUTOSE) 40 % oral gel 15 g, 15 g, Oral, PRN  dextrose 50 % IV solution, 12.5 g, IntraVENous, PRN  glucagon (rDNA) injection 1 mg, 1 mg, IntraMUSCular, PRN  dextrose 5 % solution, 100 mL/hr, IntraVENous, PRN  hydrALAZINE (APRESOLINE) injection 10 mg, 10 mg, IntraVENous, Q6H PRN  0.9 % sodium chloride bolus, 500 mL, IntraVENous, PRN  potassium chloride (KLOR-CON M) extended release tablet 40 mEq, 40 mEq, Oral, PRN **OR** potassium bicarb-citric acid (EFFER-K) effervescent tablet 40 mEq, 40 mEq, Oral, PRN **OR** potassium chloride 10 mEq/100 mL IVPB (Peripheral Line), 10 mEq, IntraVENous, PRN  insulin lispro (1 Unit Dial) 0-12 Units, 0-12 Units, SubCUTAneous, TID WC  insulin lispro (1 Unit Dial) 0-6 Units, 0-6 Units, SubCUTAneous, Nightly  HYDROcodone-acetaminophen (NORCO) 5-325 MG per tablet, , ,          Objective:  /82   Pulse 71   Temp 98.2 °F (36.8 °C) (Oral)   Resp 16   Ht 6' (1.829 m)   Wt 266 lb (120.7 kg)   SpO2 95%   BMI 36.08 kg/m²      Patient Vitals for the past 24 hrs:   BP Temp Temp src Pulse Resp SpO2 Height Weight   12/07/21 0815 131/82 98.2 °F (36.8 °C) Oral 71 16 95 %     12/07/21 0530 139/76 98.3 °F (36.8 °C) Oral 82 16 95 %     12/07/21 0145 (!) 153/83 98.6 °F (37 °C) Oral 88 18 95 %     12/06/21 2159 135/79 97.9 °F (36.6 °C) Oral 92 16 95 %     12/06/21 2158       6' (1.829 m) 266 lb (120.7 kg)   12/06/21 1815 (!) 151/70   101 16 97 %     12/06/21 1526 (!) 156/94   93 10 94 %     12/06/21 1445 (!) 164/110   88 14 94 %     12/06/21 1430    90 12 94 %     12/06/21 1415 139/77   86 15 99 %     12/06/21 1400 114/83   87 13 95 %     12/06/21 1350 (!) 158/89   86 15      12/06/21 1345 138/71   87 15 100 %     12/06/21 1340 (!) 142/71   87 15 100 %     12/06/21 1335    88 12 100 %     12/06/21 1333 118/72 96.8 °F (36 °C) Temporal 87 16 99 %     12/06/21 0926 130/70 97.7 °F (36.5 °C) Temporal 74 20 96 %       Patient Vitals for the past 96 hrs (Last 3 readings):   Weight   12/06/21 2158 266 lb (120.7 kg)           Intake/Output Summary (Last 24 hours) at 12/7/2021 0908  Last data filed at 12/7/2021 4577  Gross per 24 hour   Intake 3616 ml   Output 3732 ml   Net -116 ml         Physical Exam:   Vitals as above  General appearance: alert, appears stated age and cooperative    Head: Normocephalic, without obvious abnormality, atraumatic  Lungs: clear to auscultation bilaterally    Heart: regular rate and rhythm, S1, S2 normal, no murmur    Abdomen: soft, non-tender; bowel sounds normal; no masses, no organomegaly    Extremities: extremities normal, atraumatic, no cyanosis, no edema      Labs:  Lab Results   Component Value Date    WBC 14.0 (H) 12/07/2021    HGB 13.0 (L) 12/07/2021    HCT 39.4 (L) 12/07/2021     12/07/2021    CHOL 108 11/30/2021    TRIG 139 11/30/2021    HDL 32 (L) 11/30/2021    LDLDIRECT 117 (H) 04/09/2011    ALT 37 08/24/2020    AST 25 08/24/2020     12/07/2021    K 5.2 (H) 12/07/2021     12/07/2021    CREATININE 1.0 12/07/2021    BUN 19 12/07/2021    CO2 26 11/30/2021    TSH 2.64 05/16/2019    PSA 0.80 11/30/2021    INR 0.93 11/30/2021    LABA1C 7.8 11/30/2021    LABMICR YES 01/02/2017     Lab Results   Component Value Date    CKTOTAL 315 (H) 04/09/2011    TROPONINI <0.01 01/02/2017       Recent Imaging Results are Reviewed:  XR CERVICAL SPINE 1 VW    Result Date: 12/6/2021  Radiology exam is complete. No Radiologist dictation. Please follow up with ordering provider. FLUORO FOR SURGICAL PROCEDURES    Result Date: 12/6/2021  Radiology exam is complete. No Radiologist dictation. Please follow up with ordering provider.        Lab Results   Component Value Date    GLUCOSE 190

## 2021-12-07 NOTE — PROGRESS NOTES
Physical Therapy    Facility/Department: [address] ORTHO/NEURO NURSING  Initial Assessment    NAME: Donnie Arellano  : 1955  MRN: [id number]    Date of Service: 2021    Discharge Recommendations:Jeramie Whittaker scored a 19/24 on the AM-PAC short mobility form. Current research shows that an AM-PAC score of 18 or greater is typically associated with a discharge to the patient's home setting. Based on the patient's AM-PAC score and their current functional mobility deficits, it is recommended that the patient have 2-3 sessions per week of Physical Therapy at d/c to increase the patient's independence. At this time, this patient demonstrates the endurance and safety to discharge home with (home services) and a follow up treatment frequency of 2-3x/wk. Please see assessment section for further patient specific details. If patient discharges prior to next session this note will serve as a discharge summary. Please see below for the latest assessment towards goals. HOME HEALTH CARE: LEVEL 1 STANDARD    - Initial home health evaluation to occur within 24-48 hours, in patient home   - Therapy to evaluate with goal of regaining prior level of functioning   - Therapy to evaluate if patient has [address] needs for personal care          PT Equipment Recommendations  Equipment Needed: No    Assessment   Body structures, Functions, Activity limitations: Decreased functional mobility ; Decreased ADL status; Decreased endurance; Decreased balance  Assessment: Pt presents as below his baseline function and would benefit from skilled PT services to promote safe return to PLOF. Prognosis: Good  Decision Making: Low Complexity  PT Education: Goals; PT Role; Plan of Care; General Safety; Precautions;  Adaptive Device Training  Patient Education: D/C recommendations--pt verbalized understanding  REQUIRES PT FOLLOW UP: Yes  Activity Tolerance  Activity Tolerance: Patient Tolerated treatment well Patient Diagnosis(es): The encounter diagnosis was Preop testing. has a past medical history of CAD (coronary artery disease), DDD (degenerative disc disease), cervical, Disc disorder, ED (erectile dysfunction), Hyperlipidemia, Hyperlipidemia, Hypertension, MI (myocardial infarction) ([address] Obstructive sleep apnea, Other disorders of kidney and ureter in diseases classified elsewhere, Pneumonia, Polyp of colon, Renal calculi, Type 2 diabetes mellitus without complication, without long-term current use of insulin ([address] Type 2 diabetes mellitus without complication, without long-term current use of insulin ([address] Type 2 diabetes mellitus without complication, without long-term current use of insulin ([address] and Type II or unspecified type diabetes mellitus without mention of complication, not stated as uncontrolled. has a past surgical history that includes Tonsillectomy and adenoidectomy; Carpal tunnel release; Coronary angioplasty with stent; Colonoscopy (12/2/13); Colonoscopy (12/2/13); Abdomen surgery; fracture surgery; Cystoscopy (Left, 12/12/14); Carpal tunnel release (Left, 10/27/2020); Arm Surgery (Left, 10/27/2020); Cardiac surgery; and Knee arthroscopy (Right, 7/15/2021).     Restrictions  Restrictions/Precautions  Restrictions/Precautions: Fall Risk (high fall risk)  Position Activity Restriction  Spinal Precautions: No Bending, No Lifting, No Twisting  Other position/activity restrictions: POD # 1 S/P posterior cervical laminectomy/ and fusion C4-7  Vision/Hearing  Vision: Impaired  Vision Exceptions: Wears glasses for reading  Hearing: Within functional limits     Subjective  General  Chart Reviewed: Yes  Response To Previous Treatment: Not applicable  Family / Caregiver Present: No  Diagnosis: Posterior cervical decompression and fusion  Follows Commands: Within Functional Limits  General Comment  Comments: Pt supine in bed upon arrival.  Subjective  Subjective: Pt agreeable to PT/OT eval.  Pain Screening  Patient Currently in Pain: Denies  Vital Signs  Patient Currently in Pain: Denies       Orientation  Orientation  Overall Orientation Status: Within Normal Limits  Social/Functional History  Social/Functional History  Lives With: Spouse  Type of Home:  (Townhouse, 1st floor)  Home Layout: One level, Performs ADL's on one level, Able to Live on Main level with bedroom/bathroom  Home Access: Stairs to enter with rails  Entrance Stairs - Number of Steps: 4 WISAM through garage; 3 WISAM through side  Entrance Stairs - Rails: Left  Bathroom Shower/Tub: Walk-in shower  Bathroom Toilet: Handicap height  Bathroom Equipment: Grab bars in shower  Home Equipment: Cane, Rolling walker  ADL Assistance: Independent  Homemaking Assistance: Independent  Homemaking Responsibilities: Yes  Ambulation Assistance: Independent  Transfer Assistance: Independent  Active : Yes  Mode of Transportation: SUV, Motorcycle  Additional Comments: Pt reports 2 falls resulting from stumbling \"My balance was off big time\"  Cognition        Objective     Observation/Palpation  Posture: Fair  Observation: slightly guarded, shoulders elevated, GENEVIEVE drain present    AROM RLE (degrees)  RLE AROM: WFL  AROM LLE (degrees)  LLE AROM : WFL  Strength RLE  Strength RLE: WFL  Strength LLE  Strength LLE: WFL  Tone RLE  RLE Tone: Normotonic  Tone LLE  LLE Tone: Normotonic  Motor Control  Gross Motor?: WFL  Sensation  Overall Sensation Status: WFL (reports numbness in digits 3, 4, and 5th prior to sx.  reports this numbness in B hands has improved post sx)  Bed mobility  Supine to Sit: Stand by assistance  Scooting: Stand by assistance  Comment: modified log roll  Transfers  Sit to Stand: Stand by assistance  Stand to sit: Stand by assistance  Ambulation  Ambulation?: Yes  Ambulation 1  Surface: level tile  Device: No Device  Assistance: Contact guard assistance  Quality of Gait: Pt ambulates with decreased step length, slow verna, mild staggering gait, no LOB. Distance: ~100 ft  Comments: Pt reports feeling \"wobbly\" though reports likely due to medication  Stairs/Curb  Stairs?: No     Balance  Posture: Fair  Sitting - Static: Good  Sitting - Dynamic: Good  Standing - Static: Good; -  Standing - Dynamic: Fair; +        Plan   Plan  Times per week: 7x  Times per day: Daily  Current Treatment Recommendations: Strengthening, ROM, Balance Training, Functional Mobility Training, Transfer Training, Endurance Training, Gait Training, Stair training, Home Exercise Program, Safety Education & Training, Patient/Caregiver Education & Training  Safety Devices  Type of devices: All fall risk precautions in place, Call light within reach, Patient at risk for falls, Gait belt, Left in chair, Nurse notified  Restraints  Initially in place: No    G-Code       OutComes Score                                                  AM-PAC Score  AM-PAC Inpatient Mobility Raw Score : 19 (12/07/21 1054)  AM-PAC Inpatient T-Scale Score : 45.44 (12/07/21 1054)  Mobility Inpatient CMS 0-100% Score: 41.77 (12/07/21 1054)  Mobility Inpatient CMS G-Code Modifier : CK (12/07/21 1054)          Goals  Short term goals  Time Frame for Short term goals:  To be met prior to discharge  Short term goal 1: Pt will complete bed mobility with mod I  Short term goal 2: Pt will complete sit to/from stand with mod I  Short term goal 3: Pt will ambulate 150 ft with LRAD and mod I  Short term goal 4: Pt will navigate up/down 4 steps with HR and supervision       Therapy Time   Individual Concurrent Group Co-treatment   Time In 0926         Time Out 0957         Minutes 31         Timed Code Treatment Minutes: [address] [facility] Ambrosio King, DPT, [phone]  Lanny Solorzano PT

## 2021-12-07 NOTE — PROGRESS NOTES
Post-op Progress Note - Neurosurgery    Subjective:  Elsa Ayala is a 61 y.o. male. Pain is controlled. Patient complains of incisional pain, denies leg/arm pain, denies headache, hoarseness of voice, difficulty swallowing, N/V. Objective:  Blood pressure 131/82, pulse 71, temperature 98.2 °F (36.8 °C), temperature source Oral, resp. rate 16, height 6' (1.829 m), weight 266 lb (120.7 kg), SpO2 95 %. DRAIN/TUBE OUTPUT:  Closed/Suction Drain Posterior Neck Bulb -Output (ml): 42 ml    In: 0426 [P.O.:1200; I.V.:416]  Out: 3672 [Urine:3500; Drains:172]    Physical Exam:  Incision:healing well  Motor:Motor exam is symmetrical 5 out of 5 all extremities bilaterally  Activity: sitting on edge of bed    Labs:  BMP:   Lab Results   Component Value Date    GLUCOSE 190 12/07/2021    CO2 26 11/30/2021    BUN 19 12/07/2021    CREATININE 1.0 12/07/2021    CALCIUM 9.2 12/07/2021     WBC/Hgb/Hct/Plts:  14.0/13.0/39.4/222 (12/07 0444)     Imaging:  Xrays: none    Assessment and Plan:  Principal Problem:    Cervical spinal stenosis  Active Problems:    Hypertension    Dyslipidemia    Type 2 diabetes mellitus without complication, without long-term current use of insulin ([facility])    POD # 1 S/P posterior cervical laminectomy/ and fusion C4-7   OT/PT: Continue to advance activity. Disposition: Home likely tomorrow  Internal Medicine consult per Dr. Rivera Jackson for medical management.     KWAKU Scherer - CNP  12/7/2021

## 2021-12-07 NOTE — OP NOTE
position and the [place]  head nieto used to attach to the head of the bed in a neutral position. He was placed on chest rolls and all bony prominences padded. The head  was slightly elevated and then the posterior cervical region was shaved,  prepped and draped in the routine sterile fashion. A linear skin  incision was made from C4 to C7 using a #10 blade. Bovie divided  subcutaneous fat and fascia down to the spine and a subperiosteal  dissection was performed beneath the paraspinous muscles bilaterally. Self-retaining retractor system was placed after x-ray localization of  level was achieved. Then, using various size punches, curettes, and  rongeurs, decompressive laminectomy was performed at C4-5, C5-6 and  C6-7. This resulted in excellent decompression of the thecal sac and  exiting nerve roots. Once that was completed, the lateral facet joints  were decorticated. This was in preparation for placement of facet  grafts at each level. Once those intrafacet grafts were placed at C4-5,  C5-6, and C6-7 for posterolateral arthrodesis, the lateral masses were  then identified and an Infinity 14-mm screw fixation was placed at C4,  5, 6 and 7 bilaterally. Once those screws were in position, they were  connected to 60 mm rods and locked into position. It should be noted  that before surgery began, we placed evoked potential monitoring and  that was continued throughout the case. Motor evoked potentials and  sensory were followed. Those were stable throughout the case. At the  conclusion of the procedure, a 7 mm Carol Michel drain was placed in  the epidural space and exited through a separate stab wound. The wound  was then washed out copiously with antibiotic irrigation and closed in  layers using interrupted 0 Vicryl stitches in the fascia, inverted 2-0  Vicryl stitches in the subcutaneous layer, and staples in the skin. A  dry sterile dressing was applied.   Sponge and needle counts were correct  x2. The patient was transported to the recovery room in stable  condition.         Sneha Barrientos MD    D: 12/06/2021 13:26:00       T: 12/07/2021 1:03:49     [email or web address]  Job#: [id number]     Doc#: [id number]    CC:

## 2021-12-07 NOTE — PROGRESS NOTES
Incentive Spirometry education and demonstration completed by Respiratory Therapy No and patient asleep      Response to education: not done     Teaching Time: 0 minutes    Minimum Predicted Vital Capacity - 750 mL. Patient's Actual Vital Capacity - 0 mL.  Turning over to Nursing for routine follow-up No.    Comments:     Electronically signed by Renan Vaughan RCP on 12/7/2021 at 4:09 AM

## 2021-12-08 VITALS
TEMPERATURE: 98.5 F | HEIGHT: 72 IN | BODY MASS INDEX: 36.03 KG/M2 | OXYGEN SATURATION: 96 % | RESPIRATION RATE: 16 BRPM | HEART RATE: 77 BPM | SYSTOLIC BLOOD PRESSURE: 123 MMHG | DIASTOLIC BLOOD PRESSURE: 77 MMHG | WEIGHT: 266 LBS

## 2021-12-08 LAB
A/G RATIO: 1.6 (ref 1.1–2.2)
ALBUMIN SERPL-MCNC: 4.4 G/DL (ref 3.4–5)
ALP BLD-CCNC: 64 U/L (ref 40–129)
ALT SERPL-CCNC: 24 U/L (ref 10–40)
ANION GAP SERPL CALCULATED.3IONS-SCNC: 14 MMOL/L (ref 3–16)
AST SERPL-CCNC: 26 U/L (ref 15–37)
BASOPHILS ABSOLUTE: 0.1 K/UL (ref 0–0.2)
BASOPHILS RELATIVE PERCENT: 0.7 %
BILIRUB SERPL-MCNC: 0.6 MG/DL (ref 0–1)
BUN BLDV-MCNC: 15 MG/DL (ref 7–20)
CALCIUM SERPL-MCNC: 9.3 MG/DL (ref 8.3–10.6)
CHLORIDE BLD-SCNC: 99 MMOL/L (ref 99–110)
CO2: 24 MMOL/L (ref 21–32)
CREAT SERPL-MCNC: 0.9 MG/DL (ref 0.8–1.3)
EOSINOPHILS ABSOLUTE: 0.2 K/UL (ref 0–0.6)
EOSINOPHILS RELATIVE PERCENT: 1.6 %
GFR AFRICAN AMERICAN: >60
GFR NON-AFRICAN AMERICAN: >60
GLUCOSE BLD-MCNC: 128 MG/DL (ref 70–99)
GLUCOSE BLD-MCNC: 143 MG/DL (ref 70–99)
GLUCOSE BLD-MCNC: 174 MG/DL (ref 70–99)
HCT VFR BLD CALC: 39.8 % (ref 40.5–52.5)
HEMOGLOBIN: 13.4 G/DL (ref 13.5–17.5)
LYMPHOCYTES ABSOLUTE: 2.2 K/UL (ref 1–5.1)
LYMPHOCYTES RELATIVE PERCENT: 16.3 %
MCH RBC QN AUTO: 28.7 PG (ref 26–34)
MCHC RBC AUTO-ENTMCNC: 33.6 G/DL (ref 31–36)
MCV RBC AUTO: 85.6 FL (ref 80–100)
MONOCYTES ABSOLUTE: 1.2 K/UL (ref 0–1.3)
MONOCYTES RELATIVE PERCENT: 8.5 %
NEUTROPHILS ABSOLUTE: 9.9 K/UL (ref 1.7–7.7)
NEUTROPHILS RELATIVE PERCENT: 72.9 %
PDW BLD-RTO: 13.7 % (ref 12.4–15.4)
PERFORMED ON: ABNORMAL
PERFORMED ON: ABNORMAL
PLATELET # BLD: 230 K/UL (ref 135–450)
PMV BLD AUTO: 7.8 FL (ref 5–10.5)
POTASSIUM REFLEX MAGNESIUM: 3.8 MMOL/L (ref 3.5–5.1)
RBC # BLD: 4.65 M/UL (ref 4.2–5.9)
SODIUM BLD-SCNC: 137 MMOL/L (ref 136–145)
TOTAL PROTEIN: 7.2 G/DL (ref 6.4–8.2)
WBC # BLD: 13.6 K/UL (ref 4–11)

## 2021-12-08 PROCEDURE — 97535 SELF CARE MNGMENT TRAINING: CPT

## 2021-12-08 PROCEDURE — 94150 VITAL CAPACITY TEST: CPT

## 2021-12-08 PROCEDURE — 2580000003 HC RX 258: Performed by: NEUROLOGICAL SURGERY

## 2021-12-08 PROCEDURE — 85025 COMPLETE CBC W/AUTO DIFF WBC: CPT

## 2021-12-08 PROCEDURE — 80053 COMPREHEN METABOLIC PANEL: CPT

## 2021-12-08 PROCEDURE — 83036 HEMOGLOBIN GLYCOSYLATED A1C: CPT

## 2021-12-08 PROCEDURE — 6370000000 HC RX 637 (ALT 250 FOR IP): Performed by: NEUROLOGICAL SURGERY

## 2021-12-08 PROCEDURE — 97116 GAIT TRAINING THERAPY: CPT

## 2021-12-08 PROCEDURE — 97530 THERAPEUTIC ACTIVITIES: CPT

## 2021-12-08 PROCEDURE — 36415 COLL VENOUS BLD VENIPUNCTURE: CPT

## 2021-12-08 RX ORDER — METHOCARBAMOL 750 MG/1
750 TABLET, FILM COATED ORAL 4 TIMES DAILY PRN
Qty: 80 TABLET | Refills: 0 | Status: SHIPPED | OUTPATIENT
Start: 2021-12-08 | End: 2021-12-28

## 2021-12-08 RX ORDER — OXYCODONE HYDROCHLORIDE 5 MG/1
5 TABLET ORAL EVERY 4 HOURS PRN
Qty: 42 TABLET | Refills: 0 | Status: SHIPPED | OUTPATIENT
Start: 2021-12-08 | End: 2021-12-15

## 2021-12-08 RX ADMIN — OXYCODONE 10 MG: 5 TABLET ORAL at 10:00

## 2021-12-08 RX ADMIN — OXYCODONE 5 MG: 5 TABLET ORAL at 06:46

## 2021-12-08 RX ADMIN — HYDROCHLOROTHIAZIDE 25 MG: 25 TABLET ORAL at 07:50

## 2021-12-08 RX ADMIN — GLIMEPIRIDE 4 MG: 2 TABLET ORAL at 07:49

## 2021-12-08 RX ADMIN — INSULIN LISPRO 2 UNITS: 100 INJECTION, SOLUTION INTRAVENOUS; SUBCUTANEOUS at 12:12

## 2021-12-08 RX ADMIN — METFORMIN HYDROCHLORIDE 1000 MG: 500 TABLET ORAL at 07:50

## 2021-12-08 RX ADMIN — AMLODIPINE BESYLATE 10 MG: 5 TABLET ORAL at 07:50

## 2021-12-08 RX ADMIN — Medication 10 ML: at 07:50

## 2021-12-08 RX ADMIN — POLYETHYLENE GLYCOL 3350 17 G: 17 POWDER, FOR SOLUTION ORAL at 07:49

## 2021-12-08 RX ADMIN — METHOCARBAMOL 750 MG: 500 TABLET ORAL at 07:49

## 2021-12-08 RX ADMIN — CARVEDILOL 6.25 MG: 6.25 TABLET, FILM COATED ORAL at 07:49

## 2021-12-08 RX ADMIN — OXYCODONE 10 MG: 5 TABLET ORAL at 14:47

## 2021-12-08 RX ADMIN — METHOCARBAMOL 750 MG: 500 TABLET ORAL at 12:52

## 2021-12-08 ASSESSMENT — PAIN SCALES - GENERAL
PAINLEVEL_OUTOF10: 8
PAINLEVEL_OUTOF10: 5
PAINLEVEL_OUTOF10: 0
PAINLEVEL_OUTOF10: 10
PAINLEVEL_OUTOF10: 10
PAINLEVEL_OUTOF10: 0
PAINLEVEL_OUTOF10: 9
PAINLEVEL_OUTOF10: 10

## 2021-12-08 ASSESSMENT — PAIN DESCRIPTION - PAIN TYPE
TYPE: SURGICAL PAIN

## 2021-12-08 ASSESSMENT — PAIN DESCRIPTION - LOCATION
LOCATION: NECK

## 2021-12-08 ASSESSMENT — PAIN DESCRIPTION - ORIENTATION
ORIENTATION: POSTERIOR

## 2021-12-08 NOTE — PROGRESS NOTES
7080: Shift assessment completed, morning medications given per MAR. VSS, alert and oriented. Call light within reach. The care plan and education has been reviewed and mutually agreed upon with the patient.

## 2021-12-08 NOTE — ADT AUTH CERT
Utilization Reviews         Cervical Fusion, Posterior - Care Day 2 (12/7/2021) by Lloyd Hernandez RN       Review Status Review Entered   Completed 12/8/2021 11:31      Criteria Review      Care Day: 2 Care Date: 12/7/2021 Level of Care: Inpatient Floor    Guideline Day 2    Level Of Care    (X) Floor    12/8/2021 11:30 AM EST by Gerardo Nolan      m/s floor    Clinical Status    (X) * Procedure completed    12/8/2021 11:30 AM EST by Gerardo Nolan      on 12/6    ( ) * Hemodynamic stability    12/8/2021 11:31 AM EST by Gerardo Nolan      pulse 71, bp 131/82    ( ) * Pain absent or managed    12/8/2021 11:30 AM EST by Gerardo Nolan      oxycodone 10 mg po x 2, iv dilaudid 0.5 mg x 4    Activity    ( ) * Ambulatory    Routes    (X) * Diet as tolerated    12/8/2021 11:30 AM EST by Gerardo Nolan      carb control diet    Interventions    (X) Soft or rigid cervical orthosis    * Milestone   Additional Notes   DATE: 12/7         Vitals:   98.2 (36.8) 16 71 131/82   97% on ra            Physical Exam:   Incision:healing well   Motor:Motor exam is symmetrical 5 out of 5 all extremities bilaterally   Activity: sitting on edge of bed          Abnl/Pertinent Labs/Radiology/Diagnostic Studies:   Wbc 14.0   H/h 13/39.4         Medications:   Scheduled Meds:metFORMIN, 1,000 mg, Oral, BID WC   hydroCHLOROthiazide, 25 mg, Oral, Daily   glimepiride, 4 mg, Oral, BID WC   carvedilol, 6.25 mg, Oral, BID WC   atorvastatin, 40 mg, Oral, Nightly   amLODIPine, 10 mg, Oral, Daily   sodium chloride flush, 5-40 mL, IntraVENous, 2 times per day   methocarbamol, 750 mg, Oral, 4x Daily   polyethylene glycol, 17 g, Oral, Daily   insulin lispro, 0-12 Units, SubCUTAneous, TID WC   insulin lispro, 0-6 Units, SubCUTAneous, Nightly            Orders:    Iv ancef x 1 then d/c, d/c ivf, carb control diet, oxycodone 10 mg po x 2, iv dilaudid 0.5 mg x 4, above meds, hemovac         MD Consults/Assessments & Plans:   Neuro surg:    Cervical spinal stenosis   Active Problems:     Hypertension     Dyslipidemia     Type 2 diabetes mellitus without complication, without long-term current use of insulin ([facility])       POD # 1 S/P posterior cervical laminectomy/ and fusion C4-7               OT/PT: Continue to advance activity. Int med:   Cervical spinal stenosis -Established problem. Stable.     Plan: stay on post op pathway. To work with pt/ot today. Transition to oral pain meds. Cont to follow h/h to assess post op anemia. Active Problems:     Hypertension -Established problem. Stable.  131/82   Plan:stay on same meds     Dyslipidemia - Established problem. Stable.     Plan: cont statin     Type 2 diabetes mellitus without complication, without long-term current use of insulin ([address] -Established problem. Stable.  FSBS 182   Plan: Continue present orders/plan.      Acute blood loss as cause of postoperative anemia -New Problem to me.  hgb 13   Plan: No indication for transfusion. Cont to monitor h/h to assess progression of anemia.  Recommend ferrous sulfate or MVI as outpatient.             PT/OT/SLP/CM Assessments or Notes:                  Cervical Fusion, Posterior - Care Day 1 (12/6/2021) by New Velasco RN       Review Status Review Entered   Completed 12/8/2021 11:21      Criteria Review      Care Day: 1 Care Date: 12/6/2021 Level of Care: Inpatient Floor    Guideline Day 1    Level Of Care    (X) OR to floor    12/8/2021 11:21 AM EST by Xin James      m/s floor    Clinical Status    (X) * Clinical Indications met    12/8/2021 11:21 AM EST by Ari Shay for procedure    Routes    (X) IV fluids    12/8/2021 11:21 AM EST by Xin James      ivf at 100/hr    (X) IV medications    12/8/2021 11:21 AM EST by Xin James      iv dilaudid 0.5 mg x 1 and prn    Interventions    (X) Soft or rigid cervical orthosis    Medications    (X) Prophylactic antibiotics    12/8/2021 11:21 AM EST by Xin James      iv ancef q 8 hrs    * Milestone   Additional Notes   DATE: 12/6 oriented to person, place, time, and general circumstances     Head: Normocephalic, without obvious abnormality, atraumatic     Eyes:lids and lashes normal, conjunctivae and sclerae normal and pupils equal, round, reactive to light and accomodation     EMNT: external ears normal, nares midline     Neck: no carotid bruit, supple, symmetrical, trachea midline and thyroid not enlarged, symmetric, no tenderness/mass/nodules  ; dressing CDI, +GENEVIEVE drain   Respiratory: clear to auscultation and percussion bilaterally with normal respiratory effort     Cardiovascular: normal rate, regular rhythm, normal S1 and S2 and no murmurs     Gastrointestinal: soft, non-tender, non-distended, normal bowel sounds, no masses or organomegaly     Extremities: no clubbing, no edema      Skin:No rashes or nodules noted.     Neurologic:negative           Medications:   Scheduled Meds:metFORMIN, 1,000 mg, Oral, BID WC   hydroCHLOROthiazide, 25 mg, Oral, Daily   glimepiride, 4 mg, Oral, BID WC   carvedilol, 6.25 mg, Oral, BID WC   atorvastatin, 40 mg, Oral, Nightly   amLODIPine, 10 mg, Oral, Daily   sodium chloride flush, 5-40 mL, IntraVENous, 2 times per day   methocarbamol, 750 mg, Oral, 4x Daily   polyethylene glycol, 17 g, Oral, Daily   insulin lispro, 0-12 Units, SubCUTAneous, TID WC   insulin lispro, 0-6 Units, SubCUTAneous, Nightly   ceFAZolin (ANCEF) 2000 mg in dextrose 5 % 50 mL IVPB  Q 8 hrs      Continuous Infusions: D51/2 sodium chloride iv at 125/hr         Orders:   Post op orders, c/s int med, ivf at 125/hr, norco 5-325 mg po x 1 and prn, iv dilaudid 0.5 mg x 1 and prn, above meds, hemovac, o2 tx, neuro vasc checks q 4 hrs, pt/ot matty RIVERA Consults/Assessments & Plans:   Int med:   Cervical spinal stenosis -New Problem to me.  Doing ok post op   Plan: Continue on post-operative pathway.  PT/OT to see patient.  Continue pain control - on IV pain meds acutely post op. Work on transitioning to oral pain meds when possible.  Will follow serial h/h to monitor for acute blood loss anemia - labs ordered for tomorrow. Active Problems:     Hypertension - Established problem. Elevated. 156/94   Plan: Pt home BP meds reviewed and will be continued. IV Hydralazine ordered for control of extremely high blood pressures.   Will monitor labs to assess Creat/K for possible complications of medications.  Continue narcotics as adequate pain control can assist with adequate BP control.      Dyslipidemia -Established problem. Stable.     Plan: cont on statin     Type 2 diabetes mellitus without complication, without long-term current use of insulin ([address] -Established problem. Stable.  Sugars ok post op   Plan: Continue on post-operative pathway.  PT/OT to see patient.  Continue pain control - on IV pain meds acutely post op.  Work on transitioning to oral pain meds when possible.  Will follow serial h/h to monitor for acute blood loss anemia - labs ordered for tomorrow.

## 2021-12-08 NOTE — PROGRESS NOTES
Incentive Spirometry education and demonstration completed by Respiratory Therapy Yes      Response to education: Very Good     Teaching Time: 5 minutes    Minimum Predicted Vital Capacity - 776 mL. Patient's Actual Vital Capacity - 2000 mL. Turning over to Nursing for routine follow-up Yes.     Electronically signed by Theresa Fitzpatrick RCP on 12/8/2021 at 1:31 PM

## 2021-12-08 NOTE — PROGRESS NOTES
Progress Note - Dr. Lopez Nose - Internal Medicine  PCP: Griselda Bermudez MD [address] [place] [address]. / Marge Barber [phone]    Hospital Day: 2  Code Status: Full Code  Current Diet: ADULT DIET; Regular; 4 carb choices (60 gm/meal)        CC: follow up on medical issues    Subjective:   Ghazala Vail is a 61 y.o. male. Pt seen and examined  Chart reviewed since last visit, labs and imaging below      Doing ok  Pain much better  Pt hoping to go home today  Able to ambulate around room by self      He denies chest pain, denies shortness of breath, denies nausea,  denies emesis. 10 system Review of Systems is reviewed with patient, and pertinent positives are noted in HPI above . Otherwise, Review of systems is negative. I have reviewed the patient's medical and social history in detail and updated the computerized patient record. To recap: He  has a past medical history of CAD (coronary artery disease), DDD (degenerative disc disease), cervical, Disc disorder, ED (erectile dysfunction), Hyperlipidemia, Hyperlipidemia, Hypertension, MI (myocardial infarction) ([address] Obstructive sleep apnea, Other disorders of kidney and ureter in diseases classified elsewhere, Pneumonia, Polyp of colon, Renal calculi, Type 2 diabetes mellitus without complication, without long-term current use of insulin ([address] Type 2 diabetes mellitus without complication, without long-term current use of insulin ([address] Type 2 diabetes mellitus without complication, without long-term current use of insulin ([address] and Type II or unspecified type diabetes mellitus without mention of complication, not stated as uncontrolled. . He  has a past surgical history that includes Tonsillectomy and adenoidectomy; Carpal tunnel release; Coronary angioplasty with stent; Colonoscopy (12/2/13); Colonoscopy (12/2/13); Abdomen surgery; fracture surgery; Cystoscopy (Left, 12/12/14); Carpal tunnel release (Left, 10/27/2020);  Arm Surgery (Left, 10/27/2020); Cardiac surgery; and Knee arthroscopy (Right, 7/15/2021). Pinky Angles He  reports that he quit smoking about 10 years ago. His smoking use included cigarettes and cigars. He has a 7.50 pack-year smoking history. He has never used smokeless tobacco. He reports current alcohol use of about 1.0 standard drink of alcohol per week. He reports that he does not use drugs. .        Active Hospital Problems    Diagnosis Date Noted    Dyslipidemia [E78.5] 05/06/2011     Priority: High    Hypertension [I10]      Priority: High    Acute blood loss as cause of postoperative anemia [D62] 12/07/2021    Cervical spinal stenosis [M48.02] 12/06/2021    Type 2 diabetes mellitus without complication, without long-term current use of insulin (HCC) [E11.9] 06/07/2021       Current Facility-Administered Medications: metFORMIN (GLUCOPHAGE) tablet 1,000 mg, 1,000 mg, Oral, BID WC  hydroCHLOROthiazide (HYDRODIURIL) tablet 25 mg, 25 mg, Oral, Daily  diphenhydrAMINE (BENADRYL) tablet 25 mg, 25 mg, Oral, Nightly PRN  glimepiride (AMARYL) tablet 4 mg, 4 mg, Oral, BID WC  carvedilol (COREG) tablet 6.25 mg, 6.25 mg, Oral, BID WC  atorvastatin (LIPITOR) tablet 40 mg, 40 mg, Oral, Nightly  amLODIPine (NORVASC) tablet 10 mg, 10 mg, Oral, Daily  sodium chloride flush 0.9 % injection 5-40 mL, 5-40 mL, IntraVENous, 2 times per day  sodium chloride flush 0.9 % injection 5-40 mL, 5-40 mL, IntraVENous, PRN  0.9 % sodium chloride infusion, 25 mL, IntraVENous, PRN  ondansetron (ZOFRAN-ODT) disintegrating tablet 4 mg, 4 mg, Oral, Q8H PRN **OR** ondansetron (ZOFRAN) injection 4 mg, 4 mg, IntraVENous, Q6H PRN  oxyCODONE (ROXICODONE) immediate release tablet 5 mg, 5 mg, Oral, Q4H PRN **OR** oxyCODONE (ROXICODONE) immediate release tablet 10 mg, 10 mg, Oral, Q4H PRN  HYDROmorphone HCl PF (DILAUDID) injection 0.5 mg, 0.5 mg, IntraVENous, Q3H PRN  methocarbamol (ROBAXIN) tablet 750 mg, 750 mg, Oral, 4x Daily  polyethylene glycol (GLYCOLAX) packet 17 g, 17 g, Oral, Daily  bisacodyl (DULCOLAX) EC tablet 5 mg, 5 mg, Oral, Daily PRN  glucose (GLUTOSE) 40 % oral gel 15 g, 15 g, Oral, PRN  dextrose 50 % IV solution, 12.5 g, IntraVENous, PRN  glucagon (rDNA) injection 1 mg, 1 mg, IntraMUSCular, PRN  dextrose 5 % solution, 100 mL/hr, IntraVENous, PRN  hydrALAZINE (APRESOLINE) injection 10 mg, 10 mg, IntraVENous, Q6H PRN  0.9 % sodium chloride bolus, 500 mL, IntraVENous, PRN  potassium chloride (KLOR-CON M) extended release tablet 40 mEq, 40 mEq, Oral, PRN **OR** potassium bicarb-citric acid (EFFER-K) effervescent tablet 40 mEq, 40 mEq, Oral, PRN **OR** potassium chloride 10 mEq/100 mL IVPB (Peripheral Line), 10 mEq, IntraVENous, PRN  insulin lispro (1 Unit Dial) 0-12 Units, 0-12 Units, SubCUTAneous, TID WC  insulin lispro (1 Unit Dial) 0-6 Units, 0-6 Units, SubCUTAneous, Nightly         Objective:  BP (!) 166/78   Pulse 90   Temp 98.5 °F (36.9 °C) (Oral)   Resp 16   Ht 6' (1.829 m)   Wt 266 lb (120.7 kg)   SpO2 95%   BMI 36.08 kg/m²      Patient Vitals for the past 24 hrs:   BP Temp Temp src Pulse Resp SpO2   12/08/21 0746 (!) 166/78 98.5 °F (36.9 °C) Oral 90 16 95 %   12/08/21 0100 135/79 98 °F (36.7 °C) Oral 80 14 96 %   12/07/21 2130 132/81 98.4 °F (36.9 °C) Oral 81 14 96 %   12/07/21 1449      95 %   12/07/21 1142 134/72 98.3 °F (36.8 °C) Oral 73 16 95 %     Patient Vitals for the past 96 hrs (Last 3 readings):   Weight   12/06/21 2158 266 lb (120.7 kg)           Intake/Output Summary (Last 24 hours) at 12/8/2021 0843  Last data filed at 12/7/2021 2130  Gross per 24 hour   Intake    Output 710 ml   Net -710 ml         Physical Exam:   Vitals as above  General appearance: alert, appears stated age and cooperative    Head: Normocephalic, without obvious abnormality, atraumatic  Lungs: clear to auscultation bilaterally    Heart: regular rate and rhythm, S1, S2 normal, no murmur    Abdomen: soft, non-tender; bowel sounds normal; no masses, no organomegaly    Extremities: extremities normal, atraumatic, no cyanosis, no edema      Labs:  Lab Results   Component Value Date    WBC 14.0 (H) 12/07/2021    HGB 13.0 (L) 12/07/2021    HCT 39.4 (L) 12/07/2021     12/07/2021    CHOL 108 11/30/2021    TRIG 139 11/30/2021    HDL 32 (L) 11/30/2021    LDLDIRECT 117 (H) 04/09/2011    ALT 37 08/24/2020    AST 25 08/24/2020     12/07/2021    K 5.2 (H) 12/07/2021     12/07/2021    CREATININE 1.0 12/07/2021    BUN 19 12/07/2021    CO2 26 11/30/2021    TSH 2.64 05/16/2019    PSA 0.80 11/30/2021    INR 0.93 11/30/2021    LABA1C 7.8 11/30/2021    LABMICR YES 01/02/2017     Lab Results   Component Value Date    CKTOTAL 315 (H) 04/09/2011    TROPONINI <0.01 01/02/2017       Recent Imaging Results are Reviewed:  XR CERVICAL SPINE 1 VW    Result Date: 12/6/2021  Radiology exam is complete. No Radiologist dictation. Please follow up with ordering provider. FLUORO FOR SURGICAL PROCEDURES    Result Date: 12/6/2021  Radiology exam is complete. No Radiologist dictation. Please follow up with ordering provider. Lab Results   Component Value Date    GLUCOSE 190 12/07/2021     Lab Results   Component Value Date    POCGLU 128 12/08/2021     BP (!) 166/78   Pulse 90   Temp 98.5 °F (36.9 °C) (Oral)   Resp 16   Ht 6' (1.829 m)   Wt 266 lb (120.7 kg)   SpO2 95%   BMI 36.08 kg/m²     Assessment and Plan:  Principal Problem:    Cervical spinal stenosis -Established problem. Stable. Plan: stay on post op pathway. To work with pt/ot today. Transition to oral pain meds. Cont to follow h/h to assess post op anemia. Active Problems:    Hypertension -Established problem. Stable. 166/78  Plan:stay on same meds; see if improves with am meds    Dyslipidemia - Established problem. Stable. Plan: cont statin    Type 2 diabetes mellitus without complication, without long-term current use of insulin ([facility] -Established problem. Stable. FSBS 128  Plan: Continue present orders/plan.      Acute blood loss as cause of postoperative anemia -New Problem to me.  hgb 13.0  Plan: No indication for transfusion. Cont to monitor h/h to assess progression of anemia. Recommend ferrous sulfate or MVI as outpatient.        Disp - medically stable    (Please note that portions of this note were completed with a voice recognition program.  Efforts were made to edit the dictations but occasionally words are mis-transcribed.)        Jose Gant MD  12/8/2021

## 2021-12-08 NOTE — PROGRESS NOTES
Shift assessment complete see flowsheets. Meds given per eMAR and patient resting comfortably. Voiding through night. Up with walker to bathroom.    Electronically signed by Abby De Luna RN on 12/8/2021 at 2:26 AM

## 2021-12-08 NOTE — DISCHARGE SUMMARY
Discharge Summary    Date of Admission: 12/6/2021  8:16 AM  Date of Discharge: 12/8/2021  Admission Diagnosis:   Patient Active Problem List   Diagnosis    Hypertension    Disorder of intervertebral disc    Polyp of colon    CAD (coronary artery disease)    Dyslipidemia    [facility]VD [facility]peripheral vascular disease) ([facility])    Cough due to ACE inhibitor    MI (myocardial infarction) ([facility]    ED (erectile dysfunction)    Ureteral calculi    Renal colic on left side    Hydronephrosis    Microscopic hematuria    Chest pain    Obstructive sleep apnea    Type 2 diabetes mellitus without complication, without long-term current use of insulin ([facility])    Complex tear of medial meniscus of right knee as current injury    DDD (degenerative disc disease), cervical    Cervical spinal stenosis    Acute blood loss as cause of postoperative anemia     Discharge Diagnosis: Same   Condition on Discharge: good  Attending for Admission: Dr. Tee Hilario  Procedures: 1. Posterior decompressive cervical laminectomy and medial facetectomy  C4-5, C5-6 and C6-7 with foraminotomies at those levels. 2.  Posterolateral intrafacet fusion with facet allografts from  [employer] C4-5, C5-6, C6-7 and posterior Infinity lateral mass screw  fixation C4, C5, C6 and C7 bilaterally. 3.  Intraoperative fluoroscopy. 4.  Intraoperative evoked potential monitoring. Hospital Course: Marietta Hazel is a 61 y.o. male patient with intolerable symptoms of progressive cervical myelopathy. Preoperative  imaging with MRI scan demonstrated severe cervical stenosis at C4-5,  C5-6, C6-7 with high signal myelomalacia in the cord at those levels. Because of symptoms of myelopathy and MR imaging of cord compression He underwent the procedure listed above on date of admission. After surgery, His pre-operative radicular pain was improved. He complained of incisional pain. The pain was well-controlled on oral medications. The dressing was clean, dry and intact. There was no erythema or edema around the surgical site. Prior to discharge He was eating well, urinating and ambulating with a steady gait with PT/OT.      Discharge Vitals/Labs: /77   Pulse 77   Temp 98.5 °F (36.9 °C) (Oral)   Resp 16   Ht 6' (1.829 m)   Wt 266 lb (120.7 kg)   SpO2 96%   BMI 36.08 kg/m²   CBC with Differential:    Lab Results   Component Value Date    WBC 13.6 12/08/2021    RBC 4.65 12/08/2021    HGB 13.4 12/08/2021    HCT 39.8 12/08/2021     12/08/2021    MCV 85.6 12/08/2021    MCH 28.7 12/08/2021    MCHC 33.6 12/08/2021    RDW 13.7 12/08/2021    NRBC CANCELED 05/02/2015    NRBC CANCELED 05/02/2015    NRBC CANCELED 05/02/2015    NRBC CANCELED 05/02/2015    SEGSPCT 59.5 05/02/2015    SEGSPCT 59.5 05/02/2015    BANDSPCT CANCELED 10/29/2014    BLASTSPCT CANCELED 05/02/2015    BLASTSPCT CANCELED 05/02/2015    METASPCT CANCELED 05/02/2015    METASPCT CANCELED 05/02/2015    LYMPHOPCT 16.3 12/08/2021    PROMYELOPCT CANCELED 05/02/2015    PROMYELOPCT CANCELED 05/02/2015    MONOPCT 8.5 12/08/2021    MYELOPCT CANCELED 05/02/2015    MYELOPCT CANCELED 05/02/2015    EOSPCT 4.4 04/09/2011    BASOPCT 0.7 12/08/2021    MONOSABS 1.2 12/08/2021    LYMPHSABS 2.2 12/08/2021    EOSABS 0.2 12/08/2021    BASOSABS 0.1 12/08/2021    DIFFTYPE Auto 04/09/2011     CMP:    Lab Results   Component Value Date     12/08/2021    K 3.8 12/08/2021    CL 99 12/08/2021    CO2 24 12/08/2021    BUN 15 12/08/2021    CREATININE 0.9 12/08/2021    GFRAA >60 12/08/2021    GFRAA >60 07/15/2011    AGRATIO 1.6 12/08/2021    LABGLOM >60 12/08/2021    LABGLOM 61 07/14/2018    GLUCOSE 174 12/08/2021    PROT 7.2 12/08/2021    PROT 6.8 03/04/2013    PROT 6.8 03/04/2013    LABALBU 4.4 12/08/2021    CALCIUM 9.3 12/08/2021    BILITOT 0.6 12/08/2021    ALKPHOS 64 12/08/2021    AST 26 12/08/2021    ALT 24 12/08/2021      Discharge Medications:      Medication List      START taking these medications    methocarbamol 750 MG tablet  Commonly known as: ROBAXIN  Take 1 tablet by mouth 4 times daily as needed (spasm, neck pain)     oxyCODONE 5 MG immediate release tablet  Commonly known as: ROXICODONE  Take 1 tablet by mouth every 4 hours as needed for Pain for up to 7 days. CONTINUE taking these medications    amLODIPine 10 MG tablet  Commonly known as: NORVASC  TAKE 1 TABLET EVERY DAY     atorvastatin 40 MG tablet  Commonly known as: LIPITOR  Take 1 tablet by mouth daily     carvedilol 6.25 MG tablet  Commonly known as: COREG  TAKE 1 TABLET TWICE DAILY WITH MEALS     diphenhydrAMINE 25 MG tablet  Commonly known as: BENADRYL     glimepiride 4 MG tablet  Commonly known as: AMARYL  Take 1 tablet by mouth 2 times daily (with meals)     hydroCHLOROthiazide 25 MG tablet  Commonly known as: HYDRODIURIL  TAKE 1 TABLET EVERY DAY     Invokana 300 MG Tabs tablet  Generic drug: canagliflozin  1 tablet po daily     metFORMIN 1000 MG tablet  Commonly known as: GLUCOPHAGE  1 tablet po bid with food     MULTIVITAMIN PO        ASK your doctor about these medications    * aspirin 325 MG EC tablet  Take 1 tablet by mouth daily. Ask about: Which instructions should I use? * aspirin 81 MG tablet  Take 1 tablet by mouth daily  Start taking on: December 13, 2021  Ask about: Which instructions should I use? * This list has 2 medication(s) that are the same as other medications prescribed for you. Read the directions carefully, and ask your doctor or other care provider to review them with you.                Where to Get Your Medications      These medications were sent to [facility], [address]  [address] [place] [address]    Phone: [phone]   · methocarbamol 750 MG tablet  · oxyCODONE 5 MG immediate release tablet     You can get these medications from any pharmacy    Bring a paper prescription for each of these medications  · aspirin 325 MG EC tablet Information about where to get these medications is not yet available    Ask your nurse or doctor about these medications  · aspirin 81 MG tablet       Discharge Destination: The patient was discharged to Home. Follow-up: The patient is to follow-up with our office in the office in 2-3 weeks. Discharge Instructions: Verbal and written discharge instructions as well as dressing change instructions were given to the patient at the time of consent. No anticoagulation for 1 week post-operatively. No driving. No lifting or bending.       Court KWAKU Fleming CNP  12/8/2021

## 2021-12-08 NOTE — PLAN OF CARE
Problem: Falls - Risk of:  Goal: Will remain free from falls  Description: Will remain free from falls  12/8/2021 0847 by Johnathan Pennington RN  Outcome: Ongoing  12/8/2021 0221 by Darius Eli RN  Outcome: Ongoing  Goal: Absence of physical injury  Description: Absence of physical injury  12/8/2021 0847 by Johnathan Pennington RN  Outcome: Ongoing  12/8/2021 0221 by Darius Eli RN  Outcome: Ongoing     Problem: Pain:  Goal: Pain level will decrease  Description: Pain level will decrease  12/8/2021 0847 by Johnathan Pennington RN  Outcome: Ongoing  12/8/2021 0221 by Darius Eli RN  Outcome: Ongoing  Goal: Control of acute pain  Description: Control of acute pain  12/8/2021 0847 by Johnathan Pennington RN  Outcome: Ongoing  12/8/2021 0221 by Darius Eli RN  Outcome: Ongoing  Goal: Control of chronic pain  Description: Control of chronic pain  12/8/2021 0847 by Johnathan Pennington RN  Outcome: Ongoing  12/8/2021 0221 by Darius Eli RN  Outcome: Ongoing     Problem: Musculor/Skeletal Functional Status  Goal: Highest potential functional level  12/8/2021 0847 by Johnathan Pennington RN  Outcome: Ongoing  12/8/2021 0221 by Darius Eli RN  Outcome: Ongoing  Goal: Absence of falls  12/8/2021 0847 by Johnathan Pennington RN  Outcome: Ongoing  12/8/2021 0221 by Darius Eli RN  Outcome: Ongoing

## 2021-12-08 NOTE — PROGRESS NOTES
Physical Therapy  Facility/Department: [address] ORTHO/NEURO NURSING  Daily Treatment Note  NAME: Arabella Santillan  : 1955  MRN: [id number]    Date of Service: 2021    Discharge Recommendations: Arabella Santillan scored a 19/24 on the AM-PAC short mobility form. Current research shows that an AM-PAC score of 18 or greater is typically associated with a discharge to the patient's home setting. Based on the patient's AM-PAC score and their current functional mobility deficits, it is recommended that the patient have 2-3 sessions per week of Physical Therapy at d/c to increase the patient's independence. At this time, this patient demonstrates the endurance and safety to discharge home with HHPT and a follow up treatment frequency of 2-3x/wk. Please see assessment section for further patient specific details. HOME HEALTH CARE: LEVEL 1 STANDARD    - Initial home health evaluation to occur within 24-48 hours, in patient home   - Therapy to evaluate with goal of regaining prior level of functioning   - Therapy to evaluate if patient has [address] needs for personal care    If patient discharges prior to next session this note will serve as a discharge summary. Please see below for the latest assessment towards goals. PT Equipment Recommendations  Equipment Needed: Yes, recommend RW - patient reports he will borrow one from his son. Assessment   Body structures, Functions, Activity limitations: Decreased functional mobility ; Decreased ADL status; Decreased endurance; Decreased balance  Assessment: Pt is doing well with mobility s/p cervical sx. Pt reports feeling weaker than normal and will be using a RW at home for period of time. Pt is not used to ambulating with a device and is still requiring assistance with stairs/gait, so HHPT would be benficial for pt to work on functional tasks to move about his house safely.   Prognosis: Good  PT Education: Goals; PT Role; Plan of Care; General Safety; Precautions; Adaptive Device Training; Family Education  Patient Education: D/C recommendations--pt verbalized understanding  Barriers to Learning: none  REQUIRES PT FOLLOW UP: Yes  Activity Tolerance  Activity Tolerance: Patient Tolerated treatment well  Activity Tolerance: pt initially felt dizzy when he sat up from supine. BP had dropped from 166/73 to 123/77. After sitting for a period and symptoms recovering for a period, BP was back up to 145/76 and pt was not symptomatic for the rest of the session. Patient Diagnosis(es): The encounter diagnosis was Preop testing. has a past medical history of CAD (coronary artery disease), DDD (degenerative disc disease), cervical, Disc disorder, ED (erectile dysfunction), Hyperlipidemia, Hyperlipidemia, Hypertension, MI (myocardial infarction) ([address] Obstructive sleep apnea, Other disorders of kidney and ureter in diseases classified elsewhere, Pneumonia, Polyp of colon, Renal calculi, Type 2 diabetes mellitus without complication, without long-term current use of insulin ([address] Type 2 diabetes mellitus without complication, without long-term current use of insulin ([address] Type 2 diabetes mellitus without complication, without long-term current use of insulin ([address] and Type II or unspecified type diabetes mellitus without mention of complication, not stated as uncontrolled. has a past surgical history that includes Tonsillectomy and adenoidectomy; Carpal tunnel release; Coronary angioplasty with stent; Colonoscopy (12/2/13); Colonoscopy (12/2/13); Abdomen surgery; fracture surgery; Cystoscopy (Left, 12/12/14); Carpal tunnel release (Left, 10/27/2020); Arm Surgery (Left, 10/27/2020); Cardiac surgery; Knee arthroscopy (Right, 7/15/2021); and cervical fusion (N/A, 12/6/2021).     Restrictions  Restrictions/Precautions  Restrictions/Precautions: Fall Risk (high fall risk)  Position Activity Restriction  Spinal Precautions: No Bending, No Lifting, No Twisting  Other position/activity restrictions: POD # 1 S/P posterior cervical laminectomy/ and fusion C4-7  Subjective   General  Chart Reviewed: Yes  Response To Previous Treatment: Patient with no complaints from previous session.   Family / Caregiver Present: Yes (wife)  General Comment  Comments: pt supine in bed upon arrival and agreeable to PT  Pain Screening  Patient Currently in Pain: Yes  Pain Assessment  Pain Level: 8  Pain Location: Neck  Pain Orientation: Posterior  Vital Signs  Patient Currently in Pain: Yes       Orientation  Orientation  Overall Orientation Status: Within Normal Limits  Cognition   Cognition  Overall Cognitive Status: WFL  Objective   Bed mobility  Supine to Sit: Supervision (HOB elevated)  Scooting: Supervision  Transfers  Sit to Stand: Supervision  Stand to sit: Supervision  Car Transfer: Supervision  Ambulation  Ambulation?: Yes  More Ambulation?: Yes  Ambulation 1  Surface: level tile  Device: Rolling Walker  Assistance: Supervision  Quality of Gait: pt ambulates with decreased step length and slow verna, but is steady  Gait Deviations: Slow Verna; Decreased step length  Distance: ~250 ft  Comments: pt reports he feels slightly weaker in the knees than normal from being in the hospital bed for so long  Ambulation 2  Surface - 2: level tile  Device 2: No device  Assistance 2: Contact guard assistance  Quality of Gait 2: decreased step length and slow verna, pt slightly unsteady but no staggering or LOB  Gait Deviations: Slow Verna; Decreased step length  Distance: 50 ft  Comments: pt educated on safety and that he can walk with no AD short distances around the house with the assistance of his wife, but for community ambulation a RW should be utilized  Stairs/Curb  Stairs?: Yes  Stairs  # Steps : 4  Stairs Height: 6\" (pt states he steps at home are much shorter than the steps here)  Rails: None (hands hovered over B HRs but pt did not utilize them)  Device: No Device  Assistance: Stand by assistance  Comment: pt reports his wife will be with him going up and down stairs at home     Balance  Posture: Fair  Sitting - Static: Good (IND)  Sitting - Dynamic: Good (IND)  Standing - Static: Good (IND with RW)  Standing - Dynamic: Good; - (svn with ambulation with RW)  Comments: pt able to sit EOB without support for an extended period while checking vitals                  AM-PAC Score  AM-PAC Inpatient Mobility Raw Score : 19 (12/08/21 1223)  AM-PAC Inpatient T-Scale Score : 45.44 (12/08/21 1223)  Mobility Inpatient CMS 0-100% Score: 41.77 (12/08/21 1223)  Mobility Inpatient CMS G-Code Modifier : CK (12/08/21 1223)          Goals  Short term goals  Time Frame for Short term goals: To be met prior to discharge  Short term goal 1: Pt will complete bed mobility with mod I  Short term goal 2: Pt will complete sit to/from stand with mod I  Short term goal 3: Pt will ambulate 150 ft with LRAD and mod I  Short term goal 4: Pt will navigate up/down 4 steps with HR and supervision  No goals met this treatment. Plan    Plan  Times per week: 7x  Times per day: Daily  Current Treatment Recommendations: Strengthening, ROM, Balance Training, Functional Mobility Training, Transfer Training, Endurance Training, Gait Training, Stair training, Home Exercise Program, Safety Education & Training, Patient/Caregiver Education & Training  Safety Devices  Type of devices: All fall risk precautions in place, Call light within reach, Patient at risk for falls, Gait belt, Nurse notified, Left in bed (pt left sitting EOB to eat)  Restraints  Initially in place: No     Therapy Time   Individual Concurrent Group Co-treatment   Time In 1144         Time Out 1208         Minutes 24         Timed Code Treatment Minutes: 25 Minutes     Janie Francis, SPT  Mayi Covarrubias, PT     Therapist observed and directed the above evaluation.  Thanks, Ambrosio Nobles, DPT [id number]

## 2021-12-09 LAB
ESTIMATED AVERAGE GLUCOSE: 168.6 MG/DL
HBA1C MFR BLD: 7.5 %

## 2021-12-10 RX ORDER — AMLODIPINE BESYLATE 10 MG/1
TABLET ORAL
Qty: 90 TABLET | Refills: 3 | Status: CANCELLED | OUTPATIENT
Start: 2021-12-10

## 2021-12-10 RX ORDER — AMLODIPINE BESYLATE 10 MG/1
TABLET ORAL
Qty: 90 TABLET | Refills: 3 | Status: SHIPPED | OUTPATIENT
Start: 2021-12-10 | End: 2022-03-10 | Stop reason: SDUPTHER

## 2021-12-10 NOTE — TELEPHONE ENCOUNTER
Medication:   Requested Prescriptions     Pending Prescriptions Disp Refills    amLODIPine (NORVASC) 10 MG tablet 90 tablet 3     Sig: TAKE 1 TABLET EVERY DAY       Last Filled:  04/12/2021 #90 2rf     Patient Phone Number: [phone] (home) [phone] (work)    Last appt: 11/24/2021   Next appt: 3/10/2022    Lab Results   Component Value Date     12/08/2021    K 3.8 12/08/2021    CL 99 12/08/2021    CO2 24 12/08/2021    BUN 15 12/08/2021    CREATININE 0.9 12/08/2021    GLUCOSE 174 (H) 12/08/2021    CALCIUM 9.3 12/08/2021    PROT 7.2 12/08/2021    LABALBU 4.4 12/08/2021    BILITOT 0.6 12/08/2021    ALKPHOS 64 12/08/2021    AST 26 12/08/2021    ALT 24 12/08/2021    LABGLOM >60 12/08/2021    GFRAA >60 12/08/2021    AGRATIO 1.6 12/08/2021    GLOB 2.5 08/24/2020

## 2021-12-11 DIAGNOSIS — K59.03 DRUG-INDUCED CONSTIPATION: Primary | ICD-10-CM

## 2021-12-11 RX ORDER — DOCUSATE SODIUM 100 MG/1
100 CAPSULE, LIQUID FILLED ORAL 2 TIMES DAILY
Qty: 60 CAPSULE | Refills: 0 | Status: SHIPPED | OUTPATIENT
Start: 2021-12-11 | End: 2022-01-10

## 2022-02-17 ENCOUNTER — HOSPITAL ENCOUNTER (OUTPATIENT)
Age: 67
Discharge: HOME | End: 2022-02-17
Payer: COMMERCIAL

## 2022-02-17 ENCOUNTER — HOSPITAL ENCOUNTER (OUTPATIENT)
Dept: GENERAL RADIOLOGY | Age: 67
Discharge: HOME | End: 2022-02-17
Payer: COMMERCIAL

## 2022-02-17 ENCOUNTER — OFFICE VISIT (OUTPATIENT)
Dept: ORTHOPEDIC SURGERY | Age: 67
End: 2022-02-17
Payer: MEDICARE

## 2022-02-17 VITALS — WEIGHT: 267.9 LBS | HEIGHT: 72 IN | BODY MASS INDEX: 36.29 KG/M2

## 2022-02-17 DIAGNOSIS — Z98.1 ARTHRODESIS STATUS: ICD-10-CM

## 2022-02-17 DIAGNOSIS — M23.303 DEGENERATION OF MEDIAL MENISCUS OF BOTH KNEES: ICD-10-CM

## 2022-02-17 DIAGNOSIS — M23.304 DEGENERATION OF MEDIAL MENISCUS OF BOTH KNEES: ICD-10-CM

## 2022-02-17 DIAGNOSIS — M50.021 CERVICAL DISC DISORDER AT C4-C5 LEVEL WITH MYELOPATHY: ICD-10-CM

## 2022-02-17 DIAGNOSIS — M17.0 BILATERAL PRIMARY OSTEOARTHRITIS OF KNEE: Primary | ICD-10-CM

## 2022-02-17 PROCEDURE — 20610 DRAIN/INJ JOINT/BURSA W/O US: CPT | Performed by: PHYSICIAN ASSISTANT

## 2022-02-17 PROCEDURE — 99213 OFFICE O/P EST LOW 20 MIN: CPT | Performed by: PHYSICIAN ASSISTANT

## 2022-02-17 PROCEDURE — 72040 X-RAY EXAM NECK SPINE 2-3 VW: CPT

## 2022-02-17 RX ORDER — LIDOCAINE HYDROCHLORIDE 10 MG/ML
5 INJECTION, SOLUTION INFILTRATION; PERINEURAL ONCE
Status: COMPLETED | OUTPATIENT
Start: 2022-02-17 | End: 2022-02-17

## 2022-02-17 RX ORDER — BETAMETHASONE SODIUM PHOSPHATE AND BETAMETHASONE ACETATE 3; 3 MG/ML; MG/ML
12 INJECTION, SUSPENSION INTRA-ARTICULAR; INTRALESIONAL; INTRAMUSCULAR; SOFT TISSUE ONCE
Status: COMPLETED | OUTPATIENT
Start: 2022-02-17 | End: 2022-02-17

## 2022-02-17 RX ADMIN — BETAMETHASONE SODIUM PHOSPHATE AND BETAMETHASONE ACETATE 12 MG: 3; 3 INJECTION, SUSPENSION INTRA-ARTICULAR; INTRALESIONAL; INTRAMUSCULAR; SOFT TISSUE at 11:00

## 2022-02-17 RX ADMIN — LIDOCAINE HYDROCHLORIDE 5 ML: 10 INJECTION, SOLUTION INFILTRATION; PERINEURAL at 11:00

## 2022-02-17 NOTE — PROGRESS NOTES
Patient Name: Corona Rios  Medical Record Number: [id number]  YOB: 1955  Date of Encounter: 2/17/2022     Chief Complaint   Patient presents with    New Patient     Chronic bilateral knee pain, has been worsening. No improvment in right knee after scope       History of Present Illness:   Mr. Corona Rios is here in follow up regarding his chronic bilateral knee pain. Patient has known advanced osteoarthritis of the left knee. He had a right knee arthroscopy with partial meniscectomy with Dr. Burton Stanley last year on 7/15/2021. Patient states he did not get much pain relief after the surgery. He has had no recent injury to his knees bilaterally. He states he does get some swelling with activity. He states his knees feel very weak. The patient's past medical history, medications, allergies, family history, social history, and review of systems have been reviewed, and dated and are recorded in the chart under the 'MEDIA\" tab. Physical Exam:    Mr. Corona Rios appears well, he is in no apparent distress, he demonstrates appropriate mood & affect. He is alert and oriented to person, place and time. Ht 6' (1.829 m)   Wt 267 lb 14.4 oz (121.5 kg)   BMI 36.33 kg/m²     On examination of patient's knees bilaterally there is no obvious swelling or joint effusion. He has tenderness on palpation of the medial joint lines bilaterally. Range of motion is 0 to 120 degrees. Strength is 5/5. Special testing is negative      Radiology:  X-rays obtained and reviewed in office:   Views: 4 view bilateral knee including AP, tunnel, lateral and sunrise views  Impression: There are no acute fractures. There is no soft tissue swelling. Left knee shows moderate tricompartmental osteoarthritis that is worse in the medial compartment. Right knee shows more mild tricompartmental osteoarthritis.     Orders  Orders Placed This Encounter   Procedures    XR KNEE BILATERAL STANDING Standing Status:   Future     Number of Occurrences:   1     Standing Expiration Date:   3/17/2022    XR KNEE LEFT (3 VIEWS)     Standing Status:   Future     Number of Occurrences:   1     Standing Expiration Date:   3/17/2022    XR KNEE RIGHT (3 VIEWS)     Standing Status:   Future     Number of Occurrences:   1     Standing Expiration Date:   3/17/2022    VT ARTHROCENTESIS ASPIR&/INJ MAJOR JT/BURSA W/O US       Impression:   Diagnosis Orders   1. Bilateral primary osteoarthritis of knee  XR KNEE BILATERAL STANDING    XR KNEE LEFT (3 VIEWS)    XR KNEE RIGHT (3 VIEWS)    betamethasone acetate-betamethasone sodium phosphate (CELESTONE) injection 12 mg    betamethasone acetate-betamethasone sodium phosphate (CELESTONE) injection 12 mg    lidocaine 1 % injection 5 mL    lidocaine 1 % injection 5 mL    VT ARTHROCENTESIS ASPIR&/INJ MAJOR JT/BURSA W/O US   2. 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 Questions were encouraged and answered. The correct patient, procedure, site and side were identified. Both knees were prepped with Betadine and 2 mL of betamethasone (12mg) mixed with 5 mL 1% lidocaine plain (50mg) were instilled with careful aspiration and injection under aseptic technique. The skin was then cleaned again with alcohol and a sterile adhesive dressing was applied.  He tolerated this well and was instructed regarding post-injection care of icing and decreased activity as necessary. Treatment Plan:    Patient presented today regarding his chronic bilateral knee pain. He has had no recent injury. He had a right knee arthroscopy last year on 7/15/2021 and states the surgery really did not help with his pain. He has known arthritis that is worse in the left knee. Patient recently had cervical spine surgery and still has restrictions. He states he is unable to do additional physical therapy at this time. We discussed other conservative treatment options and patient elected to proceed with cortisone injections. These were completed as recorded above in the procedure note. He is given postinjection precautions. He is given a handout of home exercises and stretches. He will plan on following back in 4 weeks or before that time with any concerns. I feel he would benefit from working with physical therapy. We could consider joint fluid injections in the future also    Flower Guardado was informed of the results of any imaging. We discussed treatment options and a time was given to answer questions. A plan was proposed and Flower Guardado understand and accepts this course of care. Electronically signed by Tahir Ozuna PA-C on 4/51/7793  Board Certified [facility]    Please note that portions of this note were completed with a voice recognition program.  Efforts were made to edit the dictations but occasionally words are mis-transcribed.

## 2022-02-19 ENCOUNTER — APPOINTMENT (OUTPATIENT)
Dept: GENERAL RADIOLOGY | Age: 67
End: 2022-02-19
Payer: MEDICARE

## 2022-02-19 ENCOUNTER — HOSPITAL ENCOUNTER (OUTPATIENT)
Age: 67
Setting detail: OBSERVATION
Discharge: HOME | End: 2022-02-21
Attending: FAMILY MEDICINE | Admitting: FAMILY MEDICINE
Payer: MEDICARE

## 2022-02-19 DIAGNOSIS — R07.9 ACUTE CHEST PAIN: Primary | ICD-10-CM

## 2022-02-19 DIAGNOSIS — R77.8 ELEVATED TROPONIN: ICD-10-CM

## 2022-02-19 LAB
A/G RATIO: 1.6 (ref 1.1–2.2)
ALBUMIN SERPL-MCNC: 4.7 G/DL (ref 3.4–5)
ALP BLD-CCNC: 62 U/L (ref 40–129)
ALT SERPL-CCNC: 36 U/L (ref 10–40)
ANION GAP SERPL CALCULATED.3IONS-SCNC: 16 MMOL/L (ref 3–16)
AST SERPL-CCNC: 30 U/L (ref 15–37)
BASOPHILS ABSOLUTE: 0.1 K/UL (ref 0–0.2)
BASOPHILS RELATIVE PERCENT: 0.8 %
BILIRUB SERPL-MCNC: 0.3 MG/DL (ref 0–1)
BUN BLDV-MCNC: 30 MG/DL (ref 7–20)
CALCIUM SERPL-MCNC: 10.1 MG/DL (ref 8.3–10.6)
CHLORIDE BLD-SCNC: 99 MMOL/L (ref 99–110)
CO2: 24 MMOL/L (ref 21–32)
CREAT SERPL-MCNC: 1 MG/DL (ref 0.8–1.3)
EOSINOPHILS ABSOLUTE: 0.2 K/UL (ref 0–0.6)
EOSINOPHILS RELATIVE PERCENT: 1.2 %
GFR AFRICAN AMERICAN: >60
GFR NON-AFRICAN AMERICAN: >60
GLUCOSE BLD-MCNC: 152 MG/DL (ref 70–99)
GLUCOSE BLD-MCNC: 201 MG/DL (ref 70–99)
HCT VFR BLD CALC: 41.8 % (ref 40.5–52.5)
HEMOGLOBIN: 13.6 G/DL (ref 13.5–17.5)
LIPASE: 74 U/L (ref 13–60)
LYMPHOCYTES ABSOLUTE: 4.3 K/UL (ref 1–5.1)
LYMPHOCYTES RELATIVE PERCENT: 24.4 %
MCH RBC QN AUTO: 28.2 PG (ref 26–34)
MCHC RBC AUTO-ENTMCNC: 32.6 G/DL (ref 31–36)
MCV RBC AUTO: 86.3 FL (ref 80–100)
MONOCYTES ABSOLUTE: 1.2 K/UL (ref 0–1.3)
MONOCYTES RELATIVE PERCENT: 6.6 %
NEUTROPHILS ABSOLUTE: 11.7 K/UL (ref 1.7–7.7)
NEUTROPHILS RELATIVE PERCENT: 67 %
PDW BLD-RTO: 15.3 % (ref 12.4–15.4)
PERFORMED ON: ABNORMAL
PLATELET # BLD: 307 K/UL (ref 135–450)
PMV BLD AUTO: 7.9 FL (ref 5–10.5)
POTASSIUM SERPL-SCNC: 4 MMOL/L (ref 3.5–5.1)
PRO-BNP: 263 PG/ML (ref 0–124)
RBC # BLD: 4.84 M/UL (ref 4.2–5.9)
SODIUM BLD-SCNC: 139 MMOL/L (ref 136–145)
TOTAL PROTEIN: 7.7 G/DL (ref 6.4–8.2)
TROPONIN: 0.02 NG/ML
TROPONIN: <0.01 NG/ML
WBC # BLD: 17.5 K/UL (ref 4–11)

## 2022-02-19 PROCEDURE — 6360000002 HC RX W HCPCS: Performed by: FAMILY MEDICINE

## 2022-02-19 PROCEDURE — 85025 COMPLETE CBC W/AUTO DIFF WBC: CPT

## 2022-02-19 PROCEDURE — 2580000003 HC RX 258: Performed by: FAMILY MEDICINE

## 2022-02-19 PROCEDURE — 93005 ELECTROCARDIOGRAM TRACING: CPT | Performed by: FAMILY MEDICINE

## 2022-02-19 PROCEDURE — 83036 HEMOGLOBIN GLYCOSYLATED A1C: CPT

## 2022-02-19 PROCEDURE — 71045 X-RAY EXAM CHEST 1 VIEW: CPT

## 2022-02-19 PROCEDURE — 96374 THER/PROPH/DIAG INJ IV PUSH: CPT

## 2022-02-19 PROCEDURE — 84484 ASSAY OF TROPONIN QUANT: CPT

## 2022-02-19 PROCEDURE — G0378 HOSPITAL OBSERVATION PER HR: HCPCS

## 2022-02-19 PROCEDURE — 6370000000 HC RX 637 (ALT 250 FOR IP): Performed by: PHYSICIAN ASSISTANT

## 2022-02-19 PROCEDURE — 80053 COMPREHEN METABOLIC PANEL: CPT

## 2022-02-19 PROCEDURE — 36415 COLL VENOUS BLD VENIPUNCTURE: CPT

## 2022-02-19 PROCEDURE — 96372 THER/PROPH/DIAG INJ SC/IM: CPT

## 2022-02-19 PROCEDURE — 6370000000 HC RX 637 (ALT 250 FOR IP): Performed by: INTERNAL MEDICINE

## 2022-02-19 PROCEDURE — 99284 EMERGENCY DEPT VISIT MOD MDM: CPT

## 2022-02-19 PROCEDURE — 83880 ASSAY OF NATRIURETIC PEPTIDE: CPT

## 2022-02-19 PROCEDURE — 83690 ASSAY OF LIPASE: CPT

## 2022-02-19 PROCEDURE — 6370000000 HC RX 637 (ALT 250 FOR IP): Performed by: FAMILY MEDICINE

## 2022-02-19 RX ORDER — AMLODIPINE BESYLATE 5 MG/1
5 TABLET ORAL NIGHTLY
Status: DISCONTINUED | OUTPATIENT
Start: 2022-02-20 | End: 2022-02-21 | Stop reason: HOSPADM

## 2022-02-19 RX ORDER — ASPIRIN 81 MG/1
TABLET, CHEWABLE ORAL
Status: DISPENSED
Start: 2022-02-19 | End: 2022-02-20

## 2022-02-19 RX ORDER — DIPHENHYDRAMINE HCL 25 MG
25 TABLET ORAL NIGHTLY PRN
Status: DISCONTINUED | OUTPATIENT
Start: 2022-02-20 | End: 2022-02-19

## 2022-02-19 RX ORDER — SODIUM CHLORIDE 9 MG/ML
25 INJECTION, SOLUTION INTRAVENOUS PRN
Status: DISCONTINUED | OUTPATIENT
Start: 2022-02-19 | End: 2022-02-21 | Stop reason: HOSPADM

## 2022-02-19 RX ORDER — NICOTINE POLACRILEX 4 MG
15 LOZENGE BUCCAL PRN
Status: DISCONTINUED | OUTPATIENT
Start: 2022-02-19 | End: 2022-02-21 | Stop reason: HOSPADM

## 2022-02-19 RX ORDER — POLYETHYLENE GLYCOL 3350 17 G/17G
17 POWDER, FOR SOLUTION ORAL DAILY PRN
Status: DISCONTINUED | OUTPATIENT
Start: 2022-02-19 | End: 2022-02-21 | Stop reason: HOSPADM

## 2022-02-19 RX ORDER — CARVEDILOL 6.25 MG/1
6.25 TABLET ORAL 2 TIMES DAILY WITH MEALS
Status: DISCONTINUED | OUTPATIENT
Start: 2022-02-19 | End: 2022-02-21 | Stop reason: HOSPADM

## 2022-02-19 RX ORDER — INSULIN LISPRO 100 [IU]/ML
0-12 INJECTION, SOLUTION INTRAVENOUS; SUBCUTANEOUS
Status: DISCONTINUED | OUTPATIENT
Start: 2022-02-20 | End: 2022-02-21 | Stop reason: HOSPADM

## 2022-02-19 RX ORDER — ASPIRIN 81 MG/1
81 TABLET, CHEWABLE ORAL DAILY
Status: DISCONTINUED | OUTPATIENT
Start: 2022-02-19 | End: 2022-02-21 | Stop reason: HOSPADM

## 2022-02-19 RX ORDER — ONDANSETRON 4 MG/1
4 TABLET, ORALLY DISINTEGRATING ORAL EVERY 8 HOURS PRN
Status: DISCONTINUED | OUTPATIENT
Start: 2022-02-19 | End: 2022-02-21 | Stop reason: HOSPADM

## 2022-02-19 RX ORDER — SODIUM CHLORIDE 0.9 % (FLUSH) 0.9 %
5-40 SYRINGE (ML) INJECTION PRN
Status: DISCONTINUED | OUTPATIENT
Start: 2022-02-19 | End: 2022-02-21 | Stop reason: HOSPADM

## 2022-02-19 RX ORDER — INSULIN LISPRO 100 [IU]/ML
0-6 INJECTION, SOLUTION INTRAVENOUS; SUBCUTANEOUS NIGHTLY
Status: DISCONTINUED | OUTPATIENT
Start: 2022-02-19 | End: 2022-02-21 | Stop reason: HOSPADM

## 2022-02-19 RX ORDER — ASPIRIN 81 MG/1
243 TABLET, CHEWABLE ORAL ONCE
Status: COMPLETED | OUTPATIENT
Start: 2022-02-19 | End: 2022-02-19

## 2022-02-19 RX ORDER — DEXTROSE MONOHYDRATE 50 MG/ML
100 INJECTION, SOLUTION INTRAVENOUS PRN
Status: DISCONTINUED | OUTPATIENT
Start: 2022-02-19 | End: 2022-02-21 | Stop reason: HOSPADM

## 2022-02-19 RX ORDER — ASPIRIN 81 MG/1
324 TABLET, CHEWABLE ORAL ONCE
Status: DISCONTINUED | OUTPATIENT
Start: 2022-02-19 | End: 2022-02-19

## 2022-02-19 RX ORDER — SODIUM CHLORIDE 0.9 % (FLUSH) 0.9 %
5-40 SYRINGE (ML) INJECTION EVERY 12 HOURS SCHEDULED
Status: DISCONTINUED | OUTPATIENT
Start: 2022-02-19 | End: 2022-02-21 | Stop reason: HOSPADM

## 2022-02-19 RX ORDER — DIPHENHYDRAMINE HCL 25 MG
25 TABLET ORAL NIGHTLY PRN
Status: DISCONTINUED | OUTPATIENT
Start: 2022-02-19 | End: 2022-02-21 | Stop reason: HOSPADM

## 2022-02-19 RX ORDER — ACETAMINOPHEN 650 MG/1
650 SUPPOSITORY RECTAL EVERY 6 HOURS PRN
Status: DISCONTINUED | OUTPATIENT
Start: 2022-02-19 | End: 2022-02-21 | Stop reason: HOSPADM

## 2022-02-19 RX ORDER — DEXTROSE MONOHYDRATE 25 G/50ML
12.5 INJECTION, SOLUTION INTRAVENOUS PRN
Status: DISCONTINUED | OUTPATIENT
Start: 2022-02-19 | End: 2022-02-21 | Stop reason: HOSPADM

## 2022-02-19 RX ORDER — ACETAMINOPHEN 325 MG/1
650 TABLET ORAL EVERY 6 HOURS PRN
Status: DISCONTINUED | OUTPATIENT
Start: 2022-02-19 | End: 2022-02-21 | Stop reason: HOSPADM

## 2022-02-19 RX ORDER — ATORVASTATIN CALCIUM 40 MG/1
40 TABLET, FILM COATED ORAL NIGHTLY
Status: DISCONTINUED | OUTPATIENT
Start: 2022-02-19 | End: 2022-02-21 | Stop reason: HOSPADM

## 2022-02-19 RX ORDER — ONDANSETRON 2 MG/ML
4 INJECTION INTRAMUSCULAR; INTRAVENOUS EVERY 6 HOURS PRN
Status: DISCONTINUED | OUTPATIENT
Start: 2022-02-19 | End: 2022-02-21 | Stop reason: HOSPADM

## 2022-02-19 RX ADMIN — NITROGLYCERIN 1 INCH: 20 OINTMENT TOPICAL at 17:20

## 2022-02-19 RX ADMIN — Medication 1000 MG: at 22:36

## 2022-02-19 RX ADMIN — ENOXAPARIN SODIUM 40 MG: 40 INJECTION SUBCUTANEOUS at 21:33

## 2022-02-19 RX ADMIN — CARVEDILOL 6.25 MG: 6.25 TABLET, FILM COATED ORAL at 21:33

## 2022-02-19 RX ADMIN — ATORVASTATIN CALCIUM 40 MG: 40 TABLET, FILM COATED ORAL at 21:33

## 2022-02-19 RX ADMIN — ACETAMINOPHEN 650 MG: 325 TABLET ORAL at 22:36

## 2022-02-19 RX ADMIN — INSULIN LISPRO 2 UNITS: 100 INJECTION, SOLUTION INTRAVENOUS; SUBCUTANEOUS at 21:34

## 2022-02-19 RX ADMIN — ASPIRIN 81 MG 243 MG: 81 TABLET ORAL at 17:20

## 2022-02-19 RX ADMIN — Medication 10 ML: at 22:40

## 2022-02-19 RX ADMIN — DIPHENHYDRAMINE HYDROCHLORIDE 25 MG: 25 TABLET ORAL at 22:36

## 2022-02-19 ASSESSMENT — ENCOUNTER SYMPTOMS
BACK PAIN: 0
CONSTIPATION: 0
DIARRHEA: 0
STRIDOR: 0
VOMITING: 0
NAUSEA: 0
COUGH: 0
WHEEZING: 0
COLOR CHANGE: 0
ABDOMINAL PAIN: 0
SHORTNESS OF BREATH: 1

## 2022-02-19 ASSESSMENT — PAIN SCALES - GENERAL: PAINLEVEL_OUTOF10: 1

## 2022-02-19 NOTE — ED NOTES
Bed: 22  Expected date:   Expected time:   Means of arrival:   Comments:  Maryanne Pang, RN  02/19/22 [id number]

## 2022-02-19 NOTE — ED PROVIDER NOTES
Negative for palpitations and leg swelling. Gastrointestinal: Negative for abdominal pain, constipation, diarrhea, nausea and vomiting. Genitourinary: Negative. Musculoskeletal: Negative for back pain, neck pain and neck stiffness. Skin: Negative for color change, pallor, rash and wound. Neurological: Negative. Psychiatric/Behavioral: Negative for confusion. All other systems reviewed and are negative. Positives and Pertinent negatives as per HPI. Except as noted above in the ROS, all other systems were reviewed and negative. PAST MEDICAL HISTORY     Past Medical History:   Diagnosis Date    CAD (coronary artery disease)     DDD (degenerative disc disease), cervical 11/24/2021    Disc disorder     ED (erectile dysfunction)     Hyperlipidemia     Hyperlipidemia     Hypertension     MI (myocardial infarction) ([address]     Obstructive sleep apnea     Other disorders of kidney and ureter in diseases classified elsewhere     Pneumonia 1970s    Polyp of colon     Renal calculi     Type 2 diabetes mellitus without complication, without long-term current use of insulin ([address] 5/16/2019    Type 2 diabetes mellitus without complication, without long-term current use of insulin ([address] 5/16/2019    Type 2 diabetes mellitus without complication, without long-term current use of insulin ([address] 6/7/2021    Type II or unspecified type diabetes mellitus without mention of complication, not stated as uncontrolled          SURGICAL HISTORY     Past Surgical History:   Procedure Laterality Date    ABDOMEN SURGERY      Exploratory Laparotomy    ARM SURGERY Left 10/27/2020    .  performed by Jose Frost MD at [place] [address]      right and left    CARPAL TUNNEL RELEASE Left 10/27/2020    LEFT CARPAL TUNNEL RELEASE; LEFT INSITU CUBITAL TUNNEL RELEASE performed by Jose Frost MD at [address] [place] [address] 12/6/2021    POSTERIOR CERVICAL DECOMPRESSION AND FUSION [facility] 23, [facility], [address] ([id number], [id number], [facility], [id number], [address], [address], [address], [facility] 58, [phone]) [employer], [place] performed by Hosea Esqueda MD at [facility] COLONOSCOPY  12/2/13    COLONOSCOPY  12/2/13    polyps X 2 removed    CORONARY ANGIOPLASTY WITH STENT PLACEMENT      CYSTOSCOPY Left 12/12/14    CYSTOSCOPY, LEFT RETROGRADE PYELOGRAM, PLACEMENT OF LEFT    FRACTURE SURGERY      Ankle & Wrist    KNEE ARTHROSCOPY Right 7/15/2021    RIGHT KNEE ARTHROSCOPY PARTIAL MEDIAL MENISCECTOMY performed by Ashley Tinoco MD at [address]       Previous Medications    AMLODIPINE (NORVASC) 10 MG TABLET    TAKE 1 TABLET EVERY DAY    ASPIRIN 81 MG TABLET    Take 1 tablet by mouth daily    ATORVASTATIN (LIPITOR) 40 MG TABLET    Take 1 tablet by mouth daily    CANAGLIFLOZIN (INVOKANA) 300 MG TABS TABLET    1 tablet po daily    CARVEDILOL (COREG) 6.25 MG TABLET    TAKE 1 TABLET TWICE DAILY WITH MEALS    DIPHENHYDRAMINE (BENADRYL) 25 MG TABLET    Take 25 mg by mouth nightly as needed for Allergies     GLIMEPIRIDE (AMARYL) 4 MG TABLET    Take 1 tablet by mouth 2 times daily (with meals)    HYDROCHLOROTHIAZIDE (HYDRODIURIL) 25 MG TABLET    TAKE 1 TABLET EVERY DAY    MAGNESIUM CITRATE SOLUTION    Take 296 mLs by mouth once for 1 dose    METFORMIN (GLUCOPHAGE) 1000 MG TABLET    1 tablet po bid with food    MULTIPLE VITAMINS-MINERALS (MULTIVITAMIN PO)    Take by mouth          ALLERGIES     Fexofenadine-pseudoephed er, Lisinopril, Morphine, Zithromax [azithromycin], and Losartan    FAMILYHISTORY       Family History   Problem Relation Age of Onset    Diabetes Father     Mental Illness Mother     High Blood Pressure Brother     Heart Disease Brother     High Cholesterol Neg Hx           SOCIAL HISTORY       Social History     Tobacco Use    Smoking status: Former Smoker     Packs/day: 0.25     Years: 30.00     Pack years: 7.50     Types: Cigarettes, Cigars     Quit date: 4/9/2011     Years since quitting: 10.8    Smokeless tobacco: Never Used    Tobacco comment: cigars every once in a while    Vaping Use    Vaping Use: Never used   Substance Use Topics    Alcohol use: Yes     Alcohol/week: 1.0 standard drink     Types: 1 Cans of beer per week     Comment: social    Drug use: No       SCREENINGS             PHYSICAL EXAM    (up to 7 for level 4, 8 or more for level 5)     ED Triage Vitals [02/19/22 1620]   BP Temp Temp Source Pulse Resp SpO2 Height Weight   (!) 167/86 97.8 °F (36.6 °C) Oral 84 18 98 % 6' (1.829 m) 267 lb (121.1 kg)       Physical Exam  Vitals and nursing note reviewed. Constitutional:       Appearance: Normal appearance. He is well-developed. He is obese. He is not toxic-appearing or diaphoretic. HENT:      Head: Normocephalic and atraumatic. Right Ear: External ear normal.      Left Ear: External ear normal.      Nose: Nose normal.      Mouth/Throat:      Mouth: Mucous membranes are moist.      Pharynx: Oropharynx is clear. Eyes:      General: No scleral icterus. Right eye: No discharge. Left eye: No discharge. Extraocular Movements: Extraocular movements intact. Conjunctiva/sclera: Conjunctivae normal.      Pupils: Pupils are equal, round, and reactive to light. Cardiovascular:      Rate and Rhythm: Normal rate. Pulmonary:      Effort: Pulmonary effort is normal.      Breath sounds: Normal breath sounds. Abdominal:      General: Bowel sounds are normal.      Palpations: Abdomen is soft. Tenderness: There is no abdominal tenderness. There is no right CVA tenderness or left CVA tenderness. Musculoskeletal:         General: Normal range of motion. Cervical back: Normal range of motion. Comments: No acute extremity edema, posterior calf or thigh tenderness, palpable cord, discoloration. Negative homans.     Skin:     General: Skin is of this dictation. EKG: When ordered, EKG's are interpreted by the Emergency Department Physician in the absence of a cardiologist.  Please see their note for interpretation of EKG. RADIOLOGY:   Non-plain film images such as CT, Ultrasound and MRI are read by the radiologist. Plain radiographic images are visualized and preliminarily interpreted by the ED Provider with the below findings:        Interpretation per the Radiologist below, if available at the time of this note:    XR CHEST PORTABLE   Final Result   No acute cardiopulmonary disease. XR CERVICAL SPINE (2-3 VIEWS)    Result Date: 2/17/2022  EXAMINATION: 2 XRAY VIEWS OF THE CERVICAL SPINE 2/17/2022 11:27 am COMPARISON: 10/06/2021 HISTORY: ORDERING SYSTEM PROVIDED HISTORY: Cervical disc disorder at C4-C5 level with myelopathy FINDINGS: Since the prior study there has been laminectomy and posterior metallic fusion of C4 through C7. Hardware is intact. Alignment appears appropriate. No abnormal prevertebral soft tissue swelling detected. Lung apices appear clear. Incidental note is made of calcifications in both carotid bulbs. 1.  Laminectomy and posterior metallic fusion of C4 through C7 without evidence of complication. XR KNEE LEFT (3 VIEWS)    Result Date: 2/17/2022  Radiology exam is complete. No Radiologist dictation. Please follow up with ordering provider. XR KNEE RIGHT (3 VIEWS)    Result Date: 2/17/2022  Radiology exam is complete. No Radiologist dictation. Please follow up with ordering provider. XR KNEE BILATERAL STANDING    Result Date: 2/17/2022  Radiology exam is complete. No Radiologist dictation. Please follow up with ordering provider. PROCEDURES   Unless otherwise noted below, none     Procedures    CRITICAL CARE TIME   n/a    CONSULTS:  IP CONSULT TO HOSPITALIST  hospitalist requested Dr Yancy Goltz to admit however Dr Nicholas Tavera is the PCP and Dr Yancy Goltz does not cover his patients.  I spoke with Dr Jaron Rodríguez who is on call for Dr Derek Ulloa and he would like for the hospitalist to admit. hospitalist informed ([id number])    EMERGENCY DEPARTMENT COURSE and DIFFERENTIAL DIAGNOSIS/MDM:   Vitals:    Vitals:    02/19/22 1620 02/19/22 1700 02/19/22 1715   BP: (!) 167/86 (!) 142/79 (!) 147/89   Pulse: 84 78 75   Resp: 18 15 29   Temp: 97.8 °F (36.6 °C)     TempSrc: Oral     SpO2: 98% 96% 94%   Weight: 267 lb (121.1 kg)     Height: 6' (1.829 m)         Patient was given the following medications:  Medications   aspirin 81 MG chewable tablet (has no administration in time range)   nitroglycerin (NITRO-BID) 2 % ointment 1 inch (1 inch Topical Given 2/19/22 1720)   aspirin chewable tablet 243 mg (243 mg Oral Given 2/19/22 1720)         This patient presents to the emergency department complaining of intermittent chest pain and shortness of breath for 3 days. He denies any chest pain at this time. EKG morphology appears stable from prior EKG. Troponin is minimally elevated. Despite minimal lipase elevation, abdomen is soft and nontender in all 4 quadrants without pulsatile mass or CVA tenderness. Denies nausea, vomiting or diarrhea. Given his cardiac history and minimally elevated troponin, I do feel admission is warranted for further evaluation. Although he does have a leukocytosis, he did recently get a steroid injection in his knee. I have low suspicion for infection. Patient understands and agrees with plan. FINAL IMPRESSION      1. Acute chest pain    2. Elevated troponin          DISPOSITION/PLAN   DISPOSITION Decision To Admit 02/19/2022 05:28:26 PM      PATIENT REFERRED TO:  No follow-up provider specified.     DISCHARGE MEDICATIONS:  New Prescriptions    No medications on file       DISCONTINUED MEDICATIONS:  Discontinued Medications    No medications on file              (Please note that portions of this note were completed with a voice recognition program.  Efforts were made to edit the dictations but occasionally words are mis-transcribed.)    Sotero Sy PA-C (electronically signed)           Sotero Sy PA-C  02/19/22 25 June Leland, KAYE  02/19/22 4830

## 2022-02-20 LAB
ANION GAP SERPL CALCULATED.3IONS-SCNC: 11 MMOL/L (ref 3–16)
BILIRUBIN URINE: NEGATIVE
BLOOD, URINE: NEGATIVE
BUN BLDV-MCNC: 26 MG/DL (ref 7–20)
C-REACTIVE PROTEIN: <3 MG/L (ref 0–5.1)
CALCIUM SERPL-MCNC: 9.1 MG/DL (ref 8.3–10.6)
CHLORIDE BLD-SCNC: 103 MMOL/L (ref 99–110)
CLARITY: CLEAR
CO2: 23 MMOL/L (ref 21–32)
COLOR: YELLOW
CREAT SERPL-MCNC: 0.9 MG/DL (ref 0.8–1.3)
EKG ATRIAL RATE: 82 BPM
EKG DIAGNOSIS: NORMAL
EKG P AXIS: 46 DEGREES
EKG P-R INTERVAL: 154 MS
EKG Q-T INTERVAL: 390 MS
EKG QRS DURATION: 86 MS
EKG QTC CALCULATION (BAZETT): 455 MS
EKG R AXIS: 1 DEGREES
EKG T AXIS: 27 DEGREES
EKG VENTRICULAR RATE: 82 BPM
ESTIMATED AVERAGE GLUCOSE: 168.6 MG/DL
GFR AFRICAN AMERICAN: >60
GFR NON-AFRICAN AMERICAN: >60
GLUCOSE BLD-MCNC: 143 MG/DL (ref 70–99)
GLUCOSE BLD-MCNC: 228 MG/DL (ref 70–99)
GLUCOSE URINE: >=1000 MG/DL
HBA1C MFR BLD: 7.5 %
HCT VFR BLD CALC: 37.6 % (ref 40.5–52.5)
HEMOGLOBIN: 12.4 G/DL (ref 13.5–17.5)
KETONES, URINE: NEGATIVE MG/DL
LEUKOCYTE ESTERASE, URINE: NEGATIVE
MCH RBC QN AUTO: 28.4 PG (ref 26–34)
MCHC RBC AUTO-ENTMCNC: 32.9 G/DL (ref 31–36)
MCV RBC AUTO: 86.6 FL (ref 80–100)
MICROSCOPIC EXAMINATION: ABNORMAL
NITRITE, URINE: NEGATIVE
PDW BLD-RTO: 15 % (ref 12.4–15.4)
PERFORMED ON: ABNORMAL
PH UA: 6.5 (ref 5–8)
PLATELET # BLD: 218 K/UL (ref 135–450)
PMV BLD AUTO: 7.7 FL (ref 5–10.5)
POTASSIUM SERPL-SCNC: 3.6 MMOL/L (ref 3.5–5.1)
PROCALCITONIN: 0.07 NG/ML (ref 0–0.15)
PROTEIN UA: NEGATIVE MG/DL
RBC # BLD: 4.34 M/UL (ref 4.2–5.9)
REASON FOR REJECTION: NORMAL
REJECTED TEST: NORMAL
SODIUM BLD-SCNC: 137 MMOL/L (ref 136–145)
SPECIFIC GRAVITY UA: >1.03 (ref 1–1.03)
TROPONIN: 0.02 NG/ML
TROPONIN: 0.02 NG/ML
URINE TYPE: ABNORMAL
UROBILINOGEN, URINE: 0.2 E.U./DL
WBC # BLD: 12.3 K/UL (ref 4–11)

## 2022-02-20 PROCEDURE — 6370000000 HC RX 637 (ALT 250 FOR IP): Performed by: FAMILY MEDICINE

## 2022-02-20 PROCEDURE — 96372 THER/PROPH/DIAG INJ SC/IM: CPT

## 2022-02-20 PROCEDURE — 94760 N-INVAS EAR/PLS OXIMETRY 1: CPT

## 2022-02-20 PROCEDURE — 84145 PROCALCITONIN (PCT): CPT

## 2022-02-20 PROCEDURE — 6360000002 HC RX W HCPCS: Performed by: FAMILY MEDICINE

## 2022-02-20 PROCEDURE — 2580000003 HC RX 258: Performed by: FAMILY MEDICINE

## 2022-02-20 PROCEDURE — 80048 BASIC METABOLIC PNL TOTAL CA: CPT

## 2022-02-20 PROCEDURE — G0378 HOSPITAL OBSERVATION PER HR: HCPCS

## 2022-02-20 PROCEDURE — 84484 ASSAY OF TROPONIN QUANT: CPT

## 2022-02-20 PROCEDURE — 93010 ELECTROCARDIOGRAM REPORT: CPT | Performed by: INTERNAL MEDICINE

## 2022-02-20 PROCEDURE — 85027 COMPLETE CBC AUTOMATED: CPT

## 2022-02-20 PROCEDURE — 86140 C-REACTIVE PROTEIN: CPT

## 2022-02-20 PROCEDURE — 96376 TX/PRO/DX INJ SAME DRUG ADON: CPT

## 2022-02-20 PROCEDURE — 36415 COLL VENOUS BLD VENIPUNCTURE: CPT

## 2022-02-20 PROCEDURE — 81003 URINALYSIS AUTO W/O SCOPE: CPT

## 2022-02-20 RX ORDER — CALCIUM CARBONATE 200(500)MG
500 TABLET,CHEWABLE ORAL 3 TIMES DAILY PRN
Status: DISCONTINUED | OUTPATIENT
Start: 2022-02-20 | End: 2022-02-21 | Stop reason: HOSPADM

## 2022-02-20 RX ORDER — GLIPIZIDE 5 MG/1
5 TABLET ORAL
Status: DISCONTINUED | OUTPATIENT
Start: 2022-02-21 | End: 2022-02-21 | Stop reason: HOSPADM

## 2022-02-20 RX ADMIN — Medication 10 ML: at 11:30

## 2022-02-20 RX ADMIN — ENOXAPARIN SODIUM 40 MG: 40 INJECTION SUBCUTANEOUS at 11:32

## 2022-02-20 RX ADMIN — INSULIN LISPRO 2 UNITS: 100 INJECTION, SOLUTION INTRAVENOUS; SUBCUTANEOUS at 20:42

## 2022-02-20 RX ADMIN — ATORVASTATIN CALCIUM 40 MG: 40 TABLET, FILM COATED ORAL at 20:39

## 2022-02-20 RX ADMIN — Medication 10 ML: at 20:40

## 2022-02-20 RX ADMIN — AMLODIPINE BESYLATE 5 MG: 5 TABLET ORAL at 20:39

## 2022-02-20 RX ADMIN — CARVEDILOL 6.25 MG: 6.25 TABLET, FILM COATED ORAL at 11:29

## 2022-02-20 RX ADMIN — CARVEDILOL 6.25 MG: 6.25 TABLET, FILM COATED ORAL at 18:32

## 2022-02-20 RX ADMIN — ASPIRIN 81 MG 81 MG: 81 TABLET ORAL at 11:30

## 2022-02-20 RX ADMIN — INSULIN LISPRO 4 UNITS: 100 INJECTION, SOLUTION INTRAVENOUS; SUBCUTANEOUS at 18:34

## 2022-02-20 RX ADMIN — SODIUM CHLORIDE, PRESERVATIVE FREE 10 ML: 5 INJECTION INTRAVENOUS at 22:00

## 2022-02-20 RX ADMIN — Medication 1000 MG: at 22:00

## 2022-02-20 ASSESSMENT — PAIN SCALES - GENERAL
PAINLEVEL_OUTOF10: 0

## 2022-02-20 NOTE — H&P
HOSPITALISTS HISTORY AND PHYSICAL    2/19/2022 9:38 PM    Patient Information:  Ruben Bradshaw is a 61 y.o. male [id number]  PCP:  Michaela Watkins MD (Tel: [phone] )    Chief complaint:    Chief Complaint   Patient presents with    Chest Pain     Shortness of breath and intermittent chest pain x3days. Hx of stents placed. History of Present Illness:  Matt Bocanegra is a 61 y.o. male who presented with h/o CAD, HTn , MI, PVD, GREGORIO, DM , presented with c/o cehst pain associated with exertional dyspnea. Symptoms ongoing for couple of days. Has no recent stress test or angiogram . EKG showed NSR with no new st/t Chagnes  . Troponin < 0.01      History obtained from patient and   Old medical records reviewed        REVIEW OF SYSTEMS:   Constitutional: Negative for fever,chills or night sweats  ENT: Negative for rhinorrhea, epistaxis, hoarseness, sore throat. Respiratory: +ve for shortness of breath,wheezing  Cardiovascular: +Ve for chest pain, palpitations   Gastrointestinal: Negative for nausea, vomiting, diarrhea  Genitourinary: Negative for polyuria, dysuria   Hematologic/Lymphatic: Negative for bleeding tendency, easy bruising  Musculoskeletal: Negative for myalgias and arthralgias  Neurologic: Negative for confusion,dysarthria. Skin: Negative for itching,rash  Psychiatric: Negative for depression,anxiety, agitation. Endocrine: Negative for polydipsia,polyuria,heat /cold intolerance.     Past Medical History:   has a past medical history of CAD (coronary artery disease), DDD (degenerative disc disease), cervical, Disc disorder, ED (erectile dysfunction), Hyperlipidemia, Hyperlipidemia, Hypertension, MI (myocardial infarction) ([facility] [address] Obstructive sleep apnea, Other disorders of kidney and ureter in diseases classified elsewhere, Pneumonia, Polyp of colon, Renal calculi, Type 2 diabetes mellitus without complication, without long-term current use of insulin ([facility] [address] Type 2 diabetes mellitus without complication, without long-term current use of insulin ([facility] [address] Type 2 diabetes mellitus without complication, without long-term current use of insulin ([facility] [address] and Type II or unspecified type diabetes mellitus without mention of complication, not stated as uncontrolled. Past Surgical History:   has a past surgical history that includes Tonsillectomy and adenoidectomy; Carpal tunnel release; Coronary angioplasty with stent; Colonoscopy (12/2/13); Colonoscopy (12/2/13); Abdomen surgery; fracture surgery; Cystoscopy (Left, 12/12/14); Carpal tunnel release (Left, 10/27/2020); Arm Surgery (Left, 10/27/2020); Cardiac surgery; Knee arthroscopy (Right, 7/15/2021); and cervical fusion (N/A, 12/6/2021). Medications:  No current facility-administered medications on file prior to encounter.      Current Outpatient Medications on File Prior to Encounter   Medication Sig Dispense Refill    amLODIPine (NORVASC) 10 MG tablet TAKE 1 TABLET EVERY DAY 90 tablet 3    aspirin 81 MG tablet Take 1 tablet by mouth daily 1 tablet 0    glimepiride (AMARYL) 4 MG tablet Take 1 tablet by mouth 2 times daily (with meals) 180 tablet 3    canagliflozin (INVOKANA) 300 MG TABS tablet 1 tablet po daily 90 tablet 3    metFORMIN (GLUCOPHAGE) 1000 MG tablet 1 tablet po bid with food 180 tablet 3    carvedilol (COREG) 6.25 MG tablet TAKE 1 TABLET TWICE DAILY WITH MEALS 180 tablet 3    hydroCHLOROthiazide (HYDRODIURIL) 25 MG tablet TAKE 1 TABLET EVERY DAY 90 tablet 3    atorvastatin (LIPITOR) 40 MG tablet Take 1 tablet by mouth daily 90 tablet 3    diphenhydrAMINE (BENADRYL) 25 MG tablet Take 25 mg by mouth nightly as needed for Allergies       magnesium citrate solution Take 296 mLs by mouth once for 1 dose 296 mL 0    Multiple Vitamins-Minerals (MULTIVITAMIN PO) Take by mouth          Allergies: Allergies   Allergen Reactions    Fexofenadine-Pseudoephed Er Other (See Comments)     cough    Lisinopril      cough    Morphine Hives    Zithromax [Azithromycin]      Heart palpitations     Losartan      Dry c ough        Social History:  Patient Lives    reports that he quit smoking about 10 years ago. His smoking use included cigarettes and cigars. He has a 7.50 pack-year smoking history. He has never used smokeless tobacco. He reports current alcohol use of about 1.0 standard drink of alcohol per week. He reports that he does not use drugs. Family History:  family history includes Diabetes in his father; Heart Disease in his brother; High Blood Pressure in his brother; Mental Illness in his mother. ,     Physical Exam:  BP (!) 144/80   Pulse 73   Temp 97.4 °F (36.3 °C) (Temporal)   Resp 18   Ht 6' (1.829 m)   Wt 266 lb 15.6 oz (121.1 kg)   SpO2 98%   BMI 36.21 kg/m²     General appearance:  Appears comfortable. Well nourished  Eyes: Sclera clear, pupils equal  ENT: Moist mucus membranes, no thrush. Trachea midline. Cardiovascular: Regular rhythm, normal S1, S2. No murmur, gallop, rub. No edema in lower extremities  Respiratory: Clear to auscultation bilaterally, no wheeze, good inspiratory effort  Gastrointestinal: Abdomen soft, non-tender, not distended, normal bowel sounds  Musculoskeletal: No cyanosis in digits, neck supple  Neurology: Cranial nerves grossly intact. Alert and oriented in time, place and person. No speech or motor deficits  Psychiatry: Appropriate affect.  Not agitated  Skin: Warm, dry, normal turgor, no rash  Brisk capillary refill, peripheral pulses palpable   Labs:  CBC:   Lab Results   Component Value Date    WBC 17.5 02/19/2022    RBC 4.84 02/19/2022    HGB 13.6 02/19/2022    HCT 41.8 02/19/2022    MCV 86.3 02/19/2022    MCH 28.2 02/19/2022    MCHC 32.6 02/19/2022    RDW 15.3 02/19/2022     02/19/2022    MPV 7.9 02/19/2022     BMP:    Lab Results   Component Value Date     02/19/2022    K 4.0 02/19/2022    K 3.8 12/08/2021    CL 99 02/19/2022    CO2 24 02/19/2022    BUN 30 02/19/2022    CREATININE 1.0 02/19/2022    CALCIUM 10.1 02/19/2022    GFRAA >60 02/19/2022    GFRAA >60 07/15/2011    LABGLOM >60 02/19/2022    LABGLOM 61 07/14/2018    GLUCOSE 152 02/19/2022     XR CHEST PORTABLE   Final Result   No acute cardiopulmonary disease. NM MYOCARDIAL SPECT REST EXERCISE OR RX    (Results Pending)     Chest Xray:   EKG:    I visualized CXR images and EKG strips    Discussed case  with     Problem List  Principal Problem:    Chest pain  Resolved Problems:    * No resolved hospital problems. *        Assessment/Plan:   Chest pain   Admit to r/out ACS  Cont to trend troponin   Cont ASA and nitro  Stress test and ECHO  ordered     Pt has leucocytosis WBC 17.5 K   With chronic elevation of lymphocytes. Chest xray is neg for pneumonia   UA is pending   Will treat with Ceftriaxone   Follow cultures         DVT prophylaxis   Code status   Diet   IV access   Morrison Catheter    Admit as obs. I anticipate hospitalization spanning les than two midnights for investigation and treatment of the above medically necessary diagnoses. Please note that some part of this chart was generated using Dragon dictation software. Although every effort was made to ensure the accuracy of this automated transcription, some errors in transcription may have occurred inadvertently. If you may need any clarification, please do not hesitate to contact me through [facility].        Naeem Lamb MD    2/19/2022 9:38 PM

## 2022-02-20 NOTE — PROGRESS NOTES
Admitted to 2914 from ED. Currently denies any chest pain or SOB. Admission assessment completed. Oriented to room and nurses call light. S/P C4-C5,C5-C6, C6-C7 laminectomy fusion, 12/6/21 by Dr. Arron Stahl and is currently on 5# weight restrictions.

## 2022-02-20 NOTE — PLAN OF CARE
Problem: SAFETY  Goal: Free from accidental physical injury  Outcome: Ongoing     Problem: SAFETY  Goal: Free from accidental physical injury  Outcome: Ongoing     Problem: PAIN  Goal: Patient's pain/discomfort is manageable  Outcome: Ongoing     Problem: Mobility - Impaired:  Intervention: Activity progression plan  Note: Pt.  Is not able to lift anything > 5# d/t recent cervical spine fusion 12/6/21     Problem: Venous Thromboembolism:  Goal: Will show no signs or symptoms of venous thromboembolism  Description: Will show no signs or symptoms of venous thromboembolism  Outcome: Ongoing  Goal: Absence of signs or symptoms of impaired coagulation  Description: Absence of signs or symptoms of impaired coagulation  Outcome: Ongoing

## 2022-02-20 NOTE — PROGRESS NOTES
[facility] PROGRESS NOTE    2/20/2022 7:24 AM        Name: Eliazar Mirza . Admitted: 2/19/2022  Primary Care Provider: Lyndon Franco MD (Tel: [phone])      Subjective: Greg Jernigan Pt seen this am with wife at bedside   Sought care in the ED due to progressive shortness of breath for the past week  Reports yesterday he was unable to climb a flight of stairs     Hx of CAD with stents in 2011,  Follows with cardiology. Troponin mildly elevated   No fevers or cough.    He is fully vaccinated X 3 for covid   Had C spine fusion in December 2021 with Dr Adelaida Gutierres   Reviewed interval ancillary notes    Current Medications  amLODIPine (NORVASC) tablet 5 mg, Nightly  aspirin chewable tablet 81 mg, Daily  atorvastatin (LIPITOR) tablet 40 mg, Nightly  carvedilol (COREG) tablet 6.25 mg, BID WC  glucose (GLUTOSE) 40 % oral gel 15 g, PRN  dextrose 50 % IV solution, PRN  glucagon (rDNA) injection 1 mg, PRN  dextrose 5 % solution, PRN  sodium chloride flush 0.9 % injection 5-40 mL, 2 times per day  sodium chloride flush 0.9 % injection 5-40 mL, PRN  0.9 % sodium chloride infusion, PRN  enoxaparin (LOVENOX) injection 40 mg, Daily  ondansetron (ZOFRAN-ODT) disintegrating tablet 4 mg, Q8H PRN   Or  ondansetron (ZOFRAN) injection 4 mg, Q6H PRN  polyethylene glycol (GLYCOLAX) packet 17 g, Daily PRN  acetaminophen (TYLENOL) tablet 650 mg, Q6H PRN   Or  acetaminophen (TYLENOL) suppository 650 mg, Q6H PRN  insulin lispro (1 Unit Dial) 0-12 Units, TID WC  insulin lispro (1 Unit Dial) 0-6 Units, Nightly  cefTRIAXone (ROCEPHIN) 1000 mg in sterile water 10 mL IV syringe, Q24H  diphenhydrAMINE (BENADRYL) tablet 25 mg, Nightly PRN        Objective:  /87   Pulse 67   Temp 97 °F (36.1 °C) (Temporal)   Resp 18   Ht 6' (1.829 m)   Wt 266 lb 15.6 oz (121.1 kg)   SpO2 97%   BMI 36.21 kg/m²   No intake or output data in the 24 hours ending 02/20/22 0724   Wt Readings from Last 3 Encounters:   02/20/22 266 lb 15.6 oz (121.1 kg)   02/17/22 267 lb 14.4 oz (121.5 kg)   12/06/21 266 lb (120.7 kg)       General appearance:  Appears comfortable while up in the chair. Alert and pleasant   Eyes: Sclera clear. Pupils equal.  ENT: Moist oral mucosa. Trachea midline, no adenopathy. Cardiovascular: Regular rhythm, normal S1, S2. No murmur. trace edema in lower extremities  Respiratory: Not using accessory muscles. Good inspiratory effort. Clear to auscultation bilaterally, no wheeze or crackles. GI: Abdomen soft, no tenderness, not distended, normal bowel sounds  Musculoskeletal: No cyanosis in digits, neck supple  Neurology: CN 2-12 grossly intact. No speech or motor deficits  Psych: Normal affect. Alert and oriented in time, place and person  Skin: Warm, dry, normal turgor    Labs and Tests:  CBC:   Recent Labs     02/19/22  1645 02/20/22  0520   WBC 17.5* 12.3*   HGB 13.6 12.4*    218     BMP:    Recent Labs     02/19/22  1645 02/20/22  0520    137   K 4.0 3.6   CL 99 103   CO2 24 23   BUN 30* 26*   CREATININE 1.0 0.9   GLUCOSE 152* 143*     Hepatic:   Recent Labs     02/19/22  1645   AST 30   ALT 36   BILITOT 0.3   ALKPHOS 62     Chest xray :    No acute cardiopulmonary disease.      Summary  Echo  Aug 2021    Technically limited due to lung interface. No IV access. Normal left ventricle size and systolic function with an estimated ejection   fraction of 55%. No regional wall motion abnormalities are noted. There is mild concentric left ventricular hypertrophy. Normal diastolic function. Aortic valve appears sclerotic but opens adequately. Problem List  Principal Problem:    Chest pain  Resolved Problems:    * No resolved hospital problems. *       Assessment & Plan:   1. Shortness of breath/ chest pain with known CAD:  He is not hypoxic or tachycardic  , troponin mildly elevated.   Anticipate probable stress test in the am.  Will ask cardiology to evaluate. Historically he has unusual presentations of angina . EKG reviewed and did not show any acute findings   2. CAD with hx of stents 2011,  Currently on ASA, statin and BB  3. HTN:  In range this am on CCB, BB/ HCTZ  4. T2DM:  AIC 7.5. Will resume home suf, add low dose correction. Hold home dose metformin   5. Leukocytosis noted:   Pt received one dose of rocephin. I added pro calcitonin and it was low/ normal.  CRP normal. Will likely stop antibiotics   6. D dimer added,  If elevated then consider CTPA  7. NPO after MN for stress test in am       Diet: ADULT DIET;  Regular; 4 carb choices (60 gm/meal)  Code:Full Code  DVT PPX      KWAKU Kern - CNP   2/20/2022 7:24 AM

## 2022-02-20 NOTE — ED NOTES
Report given to CVICU RN. No questions or concerns at this time.      Rochelle Garrett, RN  02/19/22 0814

## 2022-02-21 VITALS
DIASTOLIC BLOOD PRESSURE: 74 MMHG | OXYGEN SATURATION: 96 % | SYSTOLIC BLOOD PRESSURE: 135 MMHG | HEIGHT: 72 IN | BODY MASS INDEX: 35.5 KG/M2 | RESPIRATION RATE: 16 BRPM | TEMPERATURE: 97.4 F | HEART RATE: 77 BPM | WEIGHT: 262.13 LBS

## 2022-02-21 PROBLEM — R77.8 ELEVATED TROPONIN: Status: ACTIVE | Noted: 2022-02-21

## 2022-02-21 PROBLEM — R06.02 SHORTNESS OF BREATH: Status: ACTIVE | Noted: 2022-02-21

## 2022-02-21 PROBLEM — R79.89 ELEVATED TROPONIN: Status: ACTIVE | Noted: 2022-02-21

## 2022-02-21 LAB
ANION GAP SERPL CALCULATED.3IONS-SCNC: 10 MMOL/L (ref 3–16)
BUN BLDV-MCNC: 26 MG/DL (ref 7–20)
CALCIUM SERPL-MCNC: 9.1 MG/DL (ref 8.3–10.6)
CHLORIDE BLD-SCNC: 104 MMOL/L (ref 99–110)
CO2: 25 MMOL/L (ref 21–32)
CREAT SERPL-MCNC: 1 MG/DL (ref 0.8–1.3)
D DIMER: <200 NG/ML DDU (ref 0–229)
GFR AFRICAN AMERICAN: >60
GFR NON-AFRICAN AMERICAN: >60
GLUCOSE BLD-MCNC: 168 MG/DL (ref 70–99)
GLUCOSE BLD-MCNC: 200 MG/DL (ref 70–99)
GLUCOSE BLD-MCNC: 231 MG/DL (ref 70–99)
HCT VFR BLD CALC: 39 % (ref 40.5–52.5)
HEMOGLOBIN: 13.1 G/DL (ref 13.5–17.5)
LV EF: 58 %
LV EF: 66 %
LVEF MODALITY: NORMAL
LVEF MODALITY: NORMAL
MCH RBC QN AUTO: 28.8 PG (ref 26–34)
MCHC RBC AUTO-ENTMCNC: 33.6 G/DL (ref 31–36)
MCV RBC AUTO: 85.9 FL (ref 80–100)
PDW BLD-RTO: 15.2 % (ref 12.4–15.4)
PERFORMED ON: ABNORMAL
PERFORMED ON: ABNORMAL
PLATELET # BLD: 206 K/UL (ref 135–450)
PMV BLD AUTO: 8 FL (ref 5–10.5)
POTASSIUM SERPL-SCNC: 4.2 MMOL/L (ref 3.5–5.1)
RBC # BLD: 4.55 M/UL (ref 4.2–5.9)
SODIUM BLD-SCNC: 139 MMOL/L (ref 136–145)
WBC # BLD: 10.1 K/UL (ref 4–11)

## 2022-02-21 PROCEDURE — 6370000000 HC RX 637 (ALT 250 FOR IP): Performed by: FAMILY MEDICINE

## 2022-02-21 PROCEDURE — 6370000000 HC RX 637 (ALT 250 FOR IP): Performed by: NURSE PRACTITIONER

## 2022-02-21 PROCEDURE — 85379 FIBRIN DEGRADATION QUANT: CPT

## 2022-02-21 PROCEDURE — 78452 HT MUSCLE IMAGE SPECT MULT: CPT | Performed by: INTERNAL MEDICINE

## 2022-02-21 PROCEDURE — 2580000003 HC RX 258: Performed by: FAMILY MEDICINE

## 2022-02-21 PROCEDURE — 85027 COMPLETE CBC AUTOMATED: CPT

## 2022-02-21 PROCEDURE — 93306 TTE W/DOPPLER COMPLETE: CPT

## 2022-02-21 PROCEDURE — 93017 CV STRESS TEST TRACING ONLY: CPT | Performed by: INTERNAL MEDICINE

## 2022-02-21 PROCEDURE — 94760 N-INVAS EAR/PLS OXIMETRY 1: CPT

## 2022-02-21 PROCEDURE — A9502 TC99M TETROFOSMIN: HCPCS | Performed by: NURSE PRACTITIONER

## 2022-02-21 PROCEDURE — 6360000002 HC RX W HCPCS: Performed by: INTERNAL MEDICINE

## 2022-02-21 PROCEDURE — G0378 HOSPITAL OBSERVATION PER HR: HCPCS

## 2022-02-21 PROCEDURE — 99244 OFF/OP CNSLTJ NEW/EST MOD 40: CPT | Performed by: INTERNAL MEDICINE

## 2022-02-21 PROCEDURE — 3430000000 HC RX DIAGNOSTIC RADIOPHARMACEUTICAL: Performed by: NURSE PRACTITIONER

## 2022-02-21 PROCEDURE — 80048 BASIC METABOLIC PNL TOTAL CA: CPT

## 2022-02-21 PROCEDURE — 36415 COLL VENOUS BLD VENIPUNCTURE: CPT

## 2022-02-21 RX ADMIN — TETROFOSMIN 10 MILLICURIE: 1.38 INJECTION, POWDER, LYOPHILIZED, FOR SOLUTION INTRAVENOUS at 11:59

## 2022-02-21 RX ADMIN — TETROFOSMIN 30 MILLICURIE: 1.38 INJECTION, POWDER, LYOPHILIZED, FOR SOLUTION INTRAVENOUS at 13:05

## 2022-02-21 RX ADMIN — INSULIN LISPRO 4 UNITS: 100 INJECTION, SOLUTION INTRAVENOUS; SUBCUTANEOUS at 16:57

## 2022-02-21 RX ADMIN — REGADENOSON 0.4 MG: 0.08 INJECTION, SOLUTION INTRAVENOUS at 13:00

## 2022-02-21 RX ADMIN — ASPIRIN 81 MG 81 MG: 81 TABLET ORAL at 08:44

## 2022-02-21 RX ADMIN — INSULIN LISPRO 2 UNITS: 100 INJECTION, SOLUTION INTRAVENOUS; SUBCUTANEOUS at 09:19

## 2022-02-21 RX ADMIN — GLIPIZIDE 5 MG: 5 TABLET ORAL at 08:45

## 2022-02-21 RX ADMIN — CARVEDILOL 6.25 MG: 6.25 TABLET, FILM COATED ORAL at 17:12

## 2022-02-21 RX ADMIN — Medication 10 ML: at 08:50

## 2022-02-21 ASSESSMENT — PAIN SCALES - GENERAL
PAINLEVEL_OUTOF10: 0

## 2022-02-21 NOTE — CARE COORDINATION
CM received consult for insurance question, patient has [employer] primary to Medicare. CM requested financial counselor verify insurance with family. Patient concerned about cost of medications, aware that he cannot use prescription coupons. Discussed Good Rx with patient and spouse.     JAMES GarciaN, CCM, RN  [facility]  [phone]

## 2022-02-21 NOTE — DISCHARGE SUMMARY
[facility]    [facility] Demographics    Patient. Cecelia Mcdonald  Date of Birth. 1955  MRN. [id number]     Primary care provider. Calvin Justin MD  (Tel: [phone])    Admit date: 2/19/2022    Discharge date 2/21/2022  Note Date: 2/21/2022     Reason for Hospitalization. Chief Complaint   Patient presents with    Chest Pain     Shortness of breath and intermittent chest pain x3days. Hx of stents placed. Significant Findings. Principal Problem:    Chest pain  Active Problems:    Hypertension    CAD (coronary artery disease)    Dyslipidemia    Shortness of breath    Elevated troponin  Resolved Problems:    * No resolved hospital problems. *       Problems and results from this hospitalization that need follow up. AIC 7.5%    Significant test results and incidental findings. Summary   -Normal global systolic function with an ejection fraction estimated at   55-60%. -No obvious regional wall motion abnormalities noted. -Mild septal hypertrophy.   -Normal diastolic function. Avg. E/e'=9.2   -Nodular sclerotic trileaflet aortic valve without evidence of significant   stenosis or regurgitation. Stress test    Summary    Normal myocardial perfusion study.    Normal myocardial perfusion.             Invasive procedures and treatments. [facility] Course. The patient sought care in the ED due to shortness of breath. He was admitted and followed by cardiology due to his known CAD history with previous stenting in 2011. Upon admission he was noted to have an elevated WBC of 17 then 12.   He was placed on rocephin , however pro calcitonin was low and chest xray did not suggest any PNA therefore antibiotics were discontinued ( he had normal WBC on the day of d/c.  Prior to admission he had recently had steroid injections in his knees)    D dimer was low and he was never hypoxic  ECHO noted above  Stress test was performed and was normal    Consults. IP CONSULT TO HOSPITALIST  IP CONSULT TO CARDIOLOGY  IP CONSULT TO SOCIAL WORK  IP CONSULT TO FINANCIAL COUNSELOR    Physical examination on discharge day. /87   Pulse 76   Temp 96.5 °F (35.8 °C) (Temporal)   Resp 16   Ht 6' (1.829 m)   Wt 262 lb 2 oz (118.9 kg)   SpO2 96%   BMI 35.55 kg/m²   General appearance. Alert. Looks comfortable. HEENT. Sclera clear. Moist mucus membranes. Cardiovascular. Regular rate and rhythm, normal S1, S2. No murmur. Respiratory. Not using accessory muscles. Clear to auscultation bilaterally, no wheeze. Gastrointestinal. Abdomen soft, non-tender, not distended, normal bowel sounds  Neurology. Facial symmetry. No speech deficits. Moving all extremities equally. Extremities. No edema in lower extremities. Skin. Warm, dry, normal turgor    Condition at time of discharge stable    Medication instructions provided to patient at discharge.      Medication List      CONTINUE taking these medications    amLODIPine 10 MG tablet  Commonly known as: NORVASC  TAKE 1 TABLET EVERY DAY     aspirin 81 MG tablet  Take 1 tablet by mouth daily     atorvastatin 40 MG tablet  Commonly known as: LIPITOR  Take 1 tablet by mouth daily     carvedilol 6.25 MG tablet  Commonly known as: COREG  TAKE 1 TABLET TWICE DAILY WITH MEALS     diphenhydrAMINE 25 MG tablet  Commonly known as: BENADRYL     glimepiride 4 MG tablet  Commonly known as: AMARYL  Take 1 tablet by mouth 2 times daily (with meals)     hydroCHLOROthiazide 25 MG tablet  Commonly known as: HYDRODIURIL  TAKE 1 TABLET EVERY DAY     Invokana 300 MG Tabs tablet  Generic drug: canagliflozin  1 tablet po daily     magnesium citrate solution  Take 296 mLs by mouth once for 1 dose     metFORMIN 1000 MG tablet  Commonly known as: GLUCOPHAGE  1 tablet po bid with food  Notes to patient: Take 1 tablet by mouth twice a day with meal MULTIVITAMIN PO            Discharge recommendations given to patient. Follow Up. in 1 week   Disposition. Home   Activity. As tolerated   Diet: Diet NPO      Spent > 30  minutes in discharge process.     Signed:  KWAKU Amaral CNP     2/21/2022 2:08 PM

## 2022-02-21 NOTE — PROGRESS NOTES
Instructed on Lexiscan Stress Test Procedure including possible side effects/ adverse reactions. Patient verbalizes  understanding and denies having any questions . See [facility] Cardiology

## 2022-02-21 NOTE — CONSULTS
[address] [facility]  [phone]        Reason for Consultation/Chief Complaint: \". Chest pain, elevated troponin. \"    History of Present Illness:  Dax Borjas is a 61 y.o. patient who presented to the hospital with complaints of shortness of breath and chest discomfort. He just received cortisone shots to each the and had noted indigestion-like, midsternal, nonradiating chest discomfort. He states that he does have occasional indigestion but is usually triggered by certain foods. The chest discomfort that he experienced was different than what he experienced with his prior CAD revascularization. Cardiac troponin was minimally elevated. Past history includes CAD status post PCI 2011. Past Medical History:   has a past medical history of CAD (coronary artery disease), DDD (degenerative disc disease), cervical, Disc disorder, ED (erectile dysfunction), Hyperlipidemia, Hyperlipidemia, Hypertension, MI (myocardial infarction) ([address] Obstructive sleep apnea, Other disorders of kidney and ureter in diseases classified elsewhere, Pneumonia, Polyp of colon, Renal calculi, Type 2 diabetes mellitus without complication, without long-term current use of insulin ([address] Type 2 diabetes mellitus without complication, without long-term current use of insulin ([address] Type 2 diabetes mellitus without complication, without long-term current use of insulin ([address] and Type II or unspecified type diabetes mellitus without mention of complication, not stated as uncontrolled. Surgical History:   has a past surgical history that includes Tonsillectomy and adenoidectomy; Carpal tunnel release; Coronary angioplasty with stent; Colonoscopy (12/2/13); Colonoscopy (12/2/13); Abdomen surgery; fracture surgery; Cystoscopy (Left, 12/12/14); Carpal tunnel release (Left, 10/27/2020); Arm Surgery (Left, 10/27/2020); Cardiac surgery; Knee arthroscopy (Right, 7/15/2021); and cervical fusion (N/A, 12/6/2021). Social History:   reports that he quit smoking about 10 years ago. His smoking use included cigarettes and cigars. He has a 7.50 pack-year smoking history. He has never used smokeless tobacco. He reports current alcohol use of about 1.0 standard drink of alcohol per week. He reports that he does not use drugs. Family History:  family history includes Diabetes in his father; Heart Disease in his brother; High Blood Pressure in his brother; Mental Illness in his mother. Home Medications:  Were reviewed and are listed in nursing record. and/or listed below  Prior to Admission medications    Medication Sig Start Date End Date Taking?  Authorizing Provider   amLODIPine (NORVASC) 10 MG tablet TAKE 1 TABLET EVERY DAY 12/10/21  Yes Edmar Soriano MD   aspirin 81 MG tablet Take 1 tablet by mouth daily 12/13/21  Yes KWAKU Adams CNP   glimepiride (AMARYL) 4 MG tablet Take 1 tablet by mouth 2 times daily (with meals) 11/24/21  Yes Fernando Breen MD   canagliflozin (INVOKANA) 300 MG TABS tablet 1 tablet po daily 11/24/21  Yes Fernando Breen MD   metFORMIN (GLUCOPHAGE) 1000 MG tablet 1 tablet po bid with food 11/24/21  Yes Fernando Breen MD   carvedilol (COREG) 6.25 MG tablet TAKE 1 TABLET TWICE DAILY WITH MEALS 10/13/21  Yes Edmar Soriano MD   hydroCHLOROthiazide (HYDRODIURIL) 25 MG tablet TAKE 1 TABLET EVERY DAY 8/2/21  Yes Edmar Soriano MD   atorvastatin (LIPITOR) 40 MG tablet Take 1 tablet by mouth daily 6/25/21  Yes Zen King DO   diphenhydrAMINE (BENADRYL) 25 MG tablet Take 25 mg by mouth nightly as needed for Allergies    Yes Historical Provider, MD   magnesium citrate solution Take 296 mLs by mouth once for 1 dose 12/11/21 12/11/21  KWAKU Villatoro CNP   Multiple Vitamins-Minerals (MULTIVITAMIN PO) Take by mouth     Historical Provider, MD        Allergies:  Fexofenadine-pseudoephed er, Lisinopril, Morphine, Zithromax [azithromycin], and Losartan     Review of Systems:   A complete review of systems has been reviewed and updated today and is negative except as noted in the history of present illness.       Physical Examination:    Vitals:    02/21/22 0845   BP: 136/87   Pulse:    Resp: 18   Temp: 96.5 °F (35.8 °C)   SpO2: 96%    Weight: 262 lb 2 oz (118.9 kg)         General Appearance:  Alert, cooperative, no distress, appears stated age   Head:  Normocephalic, without obvious abnormality, atraumatic   Eyes:  EOMI, conjunctiva/corneas clear       Nose: Nares normal   Throat: Lips normal   Neck: Supple, symmetrical, trachea midline,  no carotid bruit or JVD       Lungs:   Clear to auscultation bilaterally, respirations unlabored   Chest Wall:  No tenderness or deformity   Heart:  Regular rate and rhythm, S1, S2 normal, no murmur, rub or gallop   Abdomen:   Soft, non-tender, bowel sounds active all four quadrants,  no masses, no organomegaly           Extremities: Extremities normal, atraumatic, no cyanosis or edema   Pulses: 2+ and symmetric   Skin: Skin color, texture, turgor normal, no rashes or lesions   Pysch: Normal mood and affect   Neurologic: Normal gross motor and sensory exam.         Labs  CBC:   Lab Results   Component Value Date    WBC 10.1 02/21/2022    RBC 4.55 02/21/2022    HGB 13.1 02/21/2022    HCT 39.0 02/21/2022    MCV 85.9 02/21/2022    RDW 15.2 02/21/2022     02/21/2022     CMP:    Lab Results   Component Value Date     02/21/2022    K 4.2 02/21/2022    K 3.8 12/08/2021     02/21/2022    CO2 25 02/21/2022    BUN 26 02/21/2022    CREATININE 1.0 02/21/2022    GFRAA >60 02/21/2022    GFRAA >60 07/15/2011    AGRATIO 1.6 02/19/2022    LABGLOM >60 02/21/2022    LABGLOM 61 07/14/2018    GLUCOSE 200 02/21/2022    PROT 7.7 02/19/2022    PROT 6.8 03/04/2013    PROT 6.8 03/04/2013    CALCIUM 9.1 02/21/2022    BILITOT 0.3 02/19/2022    ALKPHOS 62 02/19/2022    AST 30 02/19/2022    ALT 36 02/19/2022     LIPIDS: No components found for: TOTAL CHOLESTEROL,  HDL,  LDL, TRIGLYCERIDES  PT/INR:  No results found for: PTINR  Lab Results   Component Value Date    CKTOTAL 315 (H) 04/09/2011    TROPONINI 0.02 (H) 02/20/2022       EKG:  I have reviewed EKG with the following interpretation:  Impression:    19-FEB-2022 16:19:26 [facility] ROUTINE RECORD  Normal sinus rhythm  Normal ECG  Imaging/Procedures:   Neurosurgical procedure 12/6/2021:  S/P C4-C5,C5-C6, C6-C7 laminectomy fusion, 12/6/21 by Dr. Juan C Capellan and is currently on 5# weight restrictions. Echo 8/16/2021:  Summary   Technically limited due to lung interface. No IV access. Normal left ventricle size and systolic function with an estimated ejection fraction of 55%. No regional wall motion abnormalities are noted. There is mild concentric left ventricular hypertrophy. Normal diastolic function. Aortic valve appears sclerotic but opens adequately. Myoview stress test 1/29/2019:   Summary    Normal myocardial perfusion study.    Normal LV size and systolic function. Assessment/Plan:  Principal Problem:    Chest pain  Plan: Mixed features. Recommend Myoview stress test and 2D echo with Doppler. Elevated troponin  Plan: Etiology unclear. Further cardiac risk stratify with Myoview stress test and 2D echo with Doppler. Active Problems:    CAD (coronary artery disease)  Plan: Now with complaints of chest pain. Last stress test was normal in 2019. Shortness of breath  Plan: Likely related to deconditioning. Hypertension  Plan: Stable. Dyslipidemia  Plan: Continue statin. Proceed with 2D echo with Doppler and Myoview stress test.  If stress test and echo are unremarkable, he be discharged home cardiology standpoint    Thank you for allowing us to participate in the care of Rachel Malcolm. Further evaluation will be based upon the patient's clinical course and testing results. All questions and concerns were addressed to the patient/family.  Alternatives to my treatment were discussed. The note was completed using EMR. Every effort was made to ensure accuracy; however, inadvertent computerized transcription errors may be present.     Benita Burkitt M.D.

## 2022-02-21 NOTE — PROGRESS NOTES
Physician notified of medication reconciliation to be completed, patient stress test negative, awaiting response for discharge.

## 2022-02-21 NOTE — PROGRESS NOTES
Contacted cardiology RN to discuss medications able to administer prior to stress test, per RN hold beta blockers and Lovenox. May administer other medications.

## 2022-03-10 ENCOUNTER — OFFICE VISIT (OUTPATIENT)
Dept: FAMILY MEDICINE CLINIC | Age: 67
End: 2022-03-10

## 2022-03-10 VITALS
BODY MASS INDEX: 35.62 KG/M2 | HEART RATE: 73 BPM | WEIGHT: 263 LBS | DIASTOLIC BLOOD PRESSURE: 86 MMHG | SYSTOLIC BLOOD PRESSURE: 139 MMHG | HEIGHT: 72 IN | OXYGEN SATURATION: 96 %

## 2022-03-10 DIAGNOSIS — E78.5 DYSLIPIDEMIA: ICD-10-CM

## 2022-03-10 DIAGNOSIS — I10 ESSENTIAL HYPERTENSION: ICD-10-CM

## 2022-03-10 DIAGNOSIS — Z87.891 FORMER TOBACCO USE: ICD-10-CM

## 2022-03-10 DIAGNOSIS — E11.9 TYPE 2 DIABETES MELLITUS WITHOUT COMPLICATION, WITHOUT LONG-TERM CURRENT USE OF INSULIN (HCC): Primary | ICD-10-CM

## 2022-03-10 DIAGNOSIS — I10 PRIMARY HYPERTENSION: ICD-10-CM

## 2022-03-10 DIAGNOSIS — G47.33 OBSTRUCTIVE SLEEP APNEA: ICD-10-CM

## 2022-03-10 PROCEDURE — 3051F HG A1C>EQUAL 7.0%<8.0%: CPT | Performed by: FAMILY MEDICINE

## 2022-03-10 PROCEDURE — 99214 OFFICE O/P EST MOD 30 MIN: CPT | Performed by: FAMILY MEDICINE

## 2022-03-10 RX ORDER — ATORVASTATIN CALCIUM 40 MG/1
40 TABLET, FILM COATED ORAL DAILY
Qty: 90 TABLET | Refills: 3 | Status: SHIPPED | OUTPATIENT
Start: 2022-03-10

## 2022-03-10 RX ORDER — HYDROCHLOROTHIAZIDE 25 MG/1
TABLET ORAL
Qty: 90 TABLET | Refills: 3 | Status: SHIPPED | OUTPATIENT
Start: 2022-03-10

## 2022-03-10 RX ORDER — CARVEDILOL 6.25 MG/1
TABLET ORAL
Qty: 180 TABLET | Refills: 3 | Status: SHIPPED | OUTPATIENT
Start: 2022-03-10 | End: 2022-03-11 | Stop reason: SDUPTHER

## 2022-03-10 RX ORDER — AMLODIPINE BESYLATE 10 MG/1
TABLET ORAL
Qty: 90 TABLET | Refills: 3 | Status: SHIPPED | OUTPATIENT
Start: 2022-03-10

## 2022-03-10 RX ORDER — GLIMEPIRIDE 4 MG/1
4 TABLET ORAL 2 TIMES DAILY WITH MEALS
Qty: 180 TABLET | Refills: 3 | Status: SHIPPED | OUTPATIENT
Start: 2022-03-10

## 2022-03-10 ASSESSMENT — PATIENT HEALTH QUESTIONNAIRE - PHQ9
SUM OF ALL RESPONSES TO PHQ9 QUESTIONS 1 & 2: 0
1. LITTLE INTEREST OR PLEASURE IN DOING THINGS: 0
SUM OF ALL RESPONSES TO PHQ QUESTIONS 1-9: 0
2. FEELING DOWN, DEPRESSED OR HOPELESS: 0
SUM OF ALL RESPONSES TO PHQ QUESTIONS 1-9: 0

## 2022-03-10 NOTE — PROGRESS NOTES
Paulette Herrera is a 74 y.o. male. HPI: Here for medical Hospital follow-up  On December 6 he had posterior cervical spine surgery for cervical spine stenosis by Dr. Jeff Murillo  He recovered well from that went through physical therapy was improving but on February 19 he developed chest pain shortness of breath and was admitted for 3 days  Echocardiogram showed an ejection fraction of 55 to 60%  Stress Myoview test was negative  Is felt perhaps he had GI atypical chest pain  Since then he has been doing fine  He now is on Medicare and is having trouble affording his [employer]  Gradually has been increasing his activity  No further chest pain  Pleased with his neck surgery results  Meds, vitamins and allergies reviewed with pt    ROS: No TIA's or unusual headaches, no dysphagia. No prolonged cough. No dyspnea or chest pain on exertion. No abdominal pain, change in bowel habits, black or bloody stools. No urinary tract symptoms. No new or unusual musculoskeletal symptoms. Prior to Visit Medications    Medication Sig Taking?  Authorizing Provider   amLODIPine (NORVASC) 10 MG tablet TAKE 1 TABLET EVERY DAY Yes Daria Neumann MD   aspirin 81 MG tablet Take 1 tablet by mouth daily Yes Neha Kerns, APRN - CNP   glimepiride (AMARYL) 4 MG tablet Take 1 tablet by mouth 2 times daily (with meals) Yes Lucy Rush MD   canagliflozin (INVOKANA) 300 MG TABS tablet 1 tablet po daily Yes Lucy Rush MD   metFORMIN (GLUCOPHAGE) 1000 MG tablet 1 tablet po bid with food Yes Lucy Rush MD   carvedilol (COREG) 6.25 MG tablet TAKE 1 TABLET TWICE DAILY WITH MEALS Yes Daria Neumann MD   hydroCHLOROthiazide (HYDRODIURIL) 25 MG tablet TAKE 1 TABLET EVERY DAY Yes Daria Neumann MD   atorvastatin (LIPITOR) 40 MG tablet Take 1 tablet by mouth daily Yes Agustina Picking, DO   diphenhydrAMINE (BENADRYL) 25 MG tablet Take 25 mg by mouth nightly as needed for Allergies  Yes Historical Provider, MD   Multiple Vitamins-Minerals (MULTIVITAMIN PO) Take by mouth  Yes Historical Provider, MD   magnesium citrate solution Take 296 mLs by mouth once for 1 dose  Dori North APRN - CNP       Past Medical History:   Diagnosis Date    CAD (coronary artery disease)     DDD (degenerative disc disease), cervical 11/24/2021    Disc disorder     ED (erectile dysfunction)     Hyperlipidemia     Hyperlipidemia     Hypertension     MI (myocardial infarction) ([address]     Obstructive sleep apnea     Other disorders of kidney and ureter in diseases classified elsewhere     Pneumonia 1970s    Polyp of colon     Renal calculi     Type 2 diabetes mellitus without complication, without long-term current use of insulin ([address] 5/16/2019    Type 2 diabetes mellitus without complication, without long-term current use of insulin ([address] 5/16/2019    Type 2 diabetes mellitus without complication, without long-term current use of insulin ([address] 6/7/2021    Type II or unspecified type diabetes mellitus without mention of complication, not stated as uncontrolled        Social History     Tobacco Use    Smoking status: Former Smoker     Packs/day: 0.25     Years: 30.00     Pack years: 7.50     Types: Cigarettes, Cigars     Quit date: 4/9/2011     Years since quitting: 10.9    Smokeless tobacco: Never Used    Tobacco comment: cigars every once in a while    Vaping Use    Vaping Use: Never used   Substance Use Topics    Alcohol use:  Yes     Alcohol/week: 1.0 standard drink     Types: 1 Cans of beer per week     Comment: social    Drug use: No       Family History   Problem Relation Age of Onset    Diabetes Father     Mental Illness Mother     High Blood Pressure Brother     Heart Disease Brother     High Cholesterol Neg Hx        Allergies   Allergen Reactions    Fexofenadine-Pseudoephed Er Other (See Comments)     cough    Lisinopril      cough    Morphine Hives    Zithromax [Azithromycin]      Heart palpitations     Losartan Dry c ough       OBJECTIVE:  /86   Pulse 73   Ht 6' (1.829 m)   Wt 263 lb (119.3 kg)   SpO2 96%   BMI 35.67 kg/m²   GEN:  in NAD, obese weight stable  NECK:  Supple without adenopathy. Large vertical posterior cervical spine 6 cm scar well-healed, range of motion 90% in all directions except for extension which is only 70%  CV:  Regular rate and rhythm, S1 and S2 normal, no murmurs, clicks  PULM:  Chest is clear, no wheezing ,  symmetric air entry throughout both lung fields. EXT: No rash or edema  NEURO: nonfocal  Lab Results   Component Value Date    LABA1C 7.5 02/19/2022     Lab Results   Component Value Date    .6 02/19/2022   Notes, labs,tests, discharge summary, imaging, procedures from recent hospitalization reviewed   ASSESSMENT/PLAN:  1. Type 2 diabetes mellitus without complication, without long-term current use of insulin ([address]  Fair control  We will switch from Dyn to [place] per insurance    2 CSPINE - spinal stenosis -  Better after surgey  Continue home neck exercises    3 cad - stable    Encounter Diagnoses   Name Primary?     Type 2 diabetes mellitus without complication, without long-term current use of insulin ([address] Yes    Obstructive sleep apnea     Dyslipidemia-stable     Primary hypertension-stable    IMM UTD    aaa u/s  Resume [employer]  Return to office in 3 months

## 2022-03-10 NOTE — TELEPHONE ENCOUNTER
Medication:   Requested Prescriptions     Pending Prescriptions Disp Refills    dapagliflozin (FARXIGA) 10 MG tablet 90 tablet 3     Sig: Take 1 tablet by mouth every morning    amLODIPine (NORVASC) 10 MG tablet 90 tablet 3     Sig: TAKE 1 TABLET EVERY DAY    glimepiride (AMARYL) 4 MG tablet 180 tablet 3     Sig: Take 1 tablet by mouth 2 times daily (with meals)    metFORMIN (GLUCOPHAGE) 1000 MG tablet 180 tablet 3     Si tablet po bid with food    carvedilol (COREG) 6.25 MG tablet 180 tablet 3     Sig: TAKE 1 TABLET TWICE DAILY WITH MEALS      hydroCHLOROthiazide (HYDRODIURIL) 25 MG tablet 90 tablet 3     Sig: TAKE 1 TABLET EVERY DAY    atorvastatin (LIPITOR) 40 MG tablet 90 tablet 3     Sig: Take 1 tablet by mouth daily       Last Filled:      Patient Phone Number: [phone] (home) [phone] (work)    Last appt: 3/10/2022   Next appt: 2022    Last Labs DM:   Lab Results   Component Value Date    LABA1C 7.5 2022     Last Lipid:   Lab Results   Component Value Date    CHOL 108 2021    TRIG 139 2021    HDL 32 2021    HDL 31 07/15/2011    LDLCALC 48 2021     Last PSA:   Lab Results   Component Value Date    PSA 0.80 2021     Last Thyroid:   Lab Results   Component Value Date    TSH 2.64 2019    T4FREE CANCELED 2010    L9RJXHC 6.5 2010

## 2022-03-10 NOTE — TELEPHONE ENCOUNTER
Pt saw Dr Hilary Turner today. Pt has new insurance and had to give us his new pharmacy. All scripts now go to [facility], phone number [phone]. dapagliflozin (FARXIGA) 10 MG    He needs the above medication sent to [facility]. Call pt to discuss other medications that he might need.

## 2022-03-11 RX ORDER — CARVEDILOL 6.25 MG/1
TABLET ORAL
Qty: 180 TABLET | Refills: 3 | Status: SHIPPED | OUTPATIENT
Start: 2022-03-11 | End: 2022-09-19 | Stop reason: SDUPTHER

## 2022-03-23 ENCOUNTER — TELEPHONE (OUTPATIENT)
Dept: CARDIOLOGY CLINIC | Age: 67
End: 2022-03-23

## 2022-03-23 ENCOUNTER — HOSPITAL ENCOUNTER (OUTPATIENT)
Dept: ULTRASOUND IMAGING | Age: 67
Discharge: HOME | End: 2022-03-23
Payer: MEDICARE

## 2022-03-23 DIAGNOSIS — Z87.891 FORMER TOBACCO USE: ICD-10-CM

## 2022-03-23 PROBLEM — R77.8 ELEVATED TROPONIN: Status: RESOLVED | Noted: 2022-02-21 | Resolved: 2022-03-23

## 2022-03-23 PROBLEM — R79.89 ELEVATED TROPONIN: Status: RESOLVED | Noted: 2022-02-21 | Resolved: 2022-03-23

## 2022-03-23 PROBLEM — I71.40 ABDOMINAL AORTIC ANEURYSM (AAA), 30-34 MM DIAMETER (HCC): Status: ACTIVE | Noted: 2022-03-23

## 2022-03-23 PROCEDURE — 76706 US ABDL AORTA SCREEN AAA: CPT

## 2022-03-23 NOTE — TELEPHONE ENCOUNTER
LVM to call to reschedule 10:15 am appt with NPTS to either 1:45/3:00 pm same day or another day that is convenient for him.   mg

## 2022-03-30 ENCOUNTER — OFFICE VISIT (OUTPATIENT)
Dept: CARDIOLOGY CLINIC | Age: 67
End: 2022-03-30
Payer: MEDICARE

## 2022-03-30 VITALS
OXYGEN SATURATION: 97 % | HEIGHT: 72 IN | SYSTOLIC BLOOD PRESSURE: 122 MMHG | HEART RATE: 81 BPM | DIASTOLIC BLOOD PRESSURE: 68 MMHG | WEIGHT: 263.4 LBS | BODY MASS INDEX: 35.68 KG/M2

## 2022-03-30 DIAGNOSIS — I10 PRIMARY HYPERTENSION: ICD-10-CM

## 2022-03-30 DIAGNOSIS — E78.2 MIXED HYPERLIPIDEMIA: ICD-10-CM

## 2022-03-30 DIAGNOSIS — I25.10 CORONARY ARTERY DISEASE INVOLVING NATIVE CORONARY ARTERY OF NATIVE HEART WITHOUT ANGINA PECTORIS: Primary | ICD-10-CM

## 2022-03-30 PROCEDURE — 99214 OFFICE O/P EST MOD 30 MIN: CPT | Performed by: NURSE PRACTITIONER

## 2022-03-30 NOTE — PROGRESS NOTES
[facility]     Outpatient Follow Up Note    Jorge Larkin is 74 y.o. male who presents today with a history of IWMI CAD s/p PTCA prox & distal-RCA '11, HTN and hyperlipidemia. Interval hx: 2/19 - 2/21/22  The patient sought care in the ED due to shortness of breath. He was admitted and followed by cardiology due to his known CAD history with previous stenting in 2011.       Upon admission he was noted to have an elevated WBC of 17 then 12. He was placed on rocephin , however pro calcitonin was low and chest xray did not suggest any PNA therefore antibiotics were discontinued ( he had normal WBC on the day of d/c.  Prior to admission he had recently had steroid injections in his knees)     D dimer was low (<200) and he was never hypoxic  ECHO : Normal global systolic function with an ejection fraction estimated at 55-60%. -No obvious regional wall motion abnormalities noted  Stress test was performed and was normal    CHIEF COMPLAINT / HPI:  Follow Up secondary to CAD. He recalls feeling a heartburn/indigestion discomfort and SOB. His chest discomfort was not similar though to his angina. He recalls having had cortisone shots just before admission attributing to his high WBC. Subjective:     He's nearing the end of his restrictions post-cervical neck fusion which he had done in Dec '21. He denies chest discomfort. There is no SOB/DAMIAN. The patient denies orthopnea/PND. He has sleep apnea yet intolerant to using a mask The patient does not have palpitations/dizziness/swelling. His angina equivalent was severe left elbow pain (heaviness like a buffalo sitting on his arms; and his eyes were stuttering). He denies recurrence. His wt is going down by office scales ~ 3#/1 month. His activity has been limited after having had neck surgery. He's now back at the gym as of earlier this month. These symptoms are better since the hospitalization.    With regard to medication therapy the patient has been compliant with prescribed regimen. They have tolerated therapy to date.      Past Medical History:   Diagnosis Date    Abdominal aortic aneurysm (AAA), 30-34 mm diameter ([address] 3/23/2022    CAD (coronary artery disease)     DDD (degenerative disc disease), cervical 11/24/2021    Disc disorder     ED (erectile dysfunction)     Hyperlipidemia     Hyperlipidemia     Hypertension     MI (myocardial infarction) ([address]     Obstructive sleep apnea     Other disorders of kidney and ureter in diseases classified elsewhere     Pneumonia 1970s    Polyp of colon     Renal calculi     Type 2 diabetes mellitus without complication, without long-term current use of insulin ([address] 5/16/2019    Type 2 diabetes mellitus without complication, without long-term current use of insulin ([facility] [address] 5/16/2019    Type 2 diabetes mellitus without complication, without long-term current use of insulin ([facility] [address] 6/7/2021    Type II or unspecified type diabetes mellitus without mention of complication, not stated as uncontrolled      Social History:    Social History     Tobacco Use   Smoking Status Former Smoker    Packs/day: 0.25    Years: 30.00    Pack years: 7.50    Types: Cigarettes, Cigars    Quit date: 4/9/2011    Years since quitting: 10.9   Smokeless Tobacco Never Used   Tobacco Comment    cigars every once in a while      Current Medications:  Current Outpatient Medications   Medication Sig Dispense Refill    carvedilol (COREG) 6.25 MG tablet TAKE 1 TABLET TWICE DAILY WITH MEALS 180 tablet 3    dapagliflozin (FARXIGA) 10 MG tablet Take 1 tablet by mouth every morning 90 tablet 3    amLODIPine (NORVASC) 10 MG tablet TAKE 1 TABLET EVERY DAY 90 tablet 3    glimepiride (AMARYL) 4 MG tablet Take 1 tablet by mouth 2 times daily (with meals) 180 tablet 3    metFORMIN (GLUCOPHAGE) 1000 MG tablet 1 tablet po bid with food 180 tablet 3    hydroCHLOROthiazide (HYDRODIURIL) 25 MG tablet TAKE 1 TABLET EVERY DAY 90 tablet 3    atorvastatin (LIPITOR) 40 MG tablet Take 1 tablet by mouth daily 90 tablet 3    aspirin 81 MG tablet Take 1 tablet by mouth daily 1 tablet 0    diphenhydrAMINE (BENADRYL) 25 MG tablet Take 25 mg by mouth nightly as needed for Allergies       Multiple Vitamins-Minerals (MULTIVITAMIN PO) Take by mouth daily       magnesium citrate solution Take 296 mLs by mouth once for 1 dose 296 mL 0    canagliflozin (INVOKANA) 300 MG TABS tablet 1 tablet po daily 90 tablet 3     No current facility-administered medications for this visit. REVIEW OF SYSTEMS:    CONSTITUTIONAL: - weight loss; - fatigue, weakness, night sweats or fever. HEENT: No new vision difficulties or ringing in the ears; narrow angle glaucoma. RESPIRATORY: No new SOB, PND, orthopnea or cough. CARDIOVASCULAR: See HPI  GI: No nausea, vomiting, diarrhea, constipation, abdominal pain or changes in bowel habits. : No urinary frequency, urgency, incontinence hematuria or dysuria. SKIN: No cyanosis or skin lesions. MUSCULOSKELETAL: No new muscle or joint pain. NEUROLOGICAL: No syncope or TIA-like symptoms. PSYCHIATRIC: No anxiety, pain, insomnia or depression    Objective:   PHYSICAL EXAM:      Vitals:    03/30/22 1347 03/30/22 1409   BP: 120/70 122/68   Site: Right Upper Arm Left Upper Arm   Position: Sitting    Cuff Size: Large Adult Large Adult   Pulse: 81    SpO2: 97%    Weight: 263 lb 6.4 oz (119.5 kg)    Height: 6' (1.829 m)        CONSTITUTIONAL: Cooperative, no apparent distress, and appears well nourished / over weight  NEUROLOGIC:  Awake and orientated to person, place and time. PSYCH: Calm affect. SKIN: Warm and dry; tan. HEENT: Sclera non-icteric, normocephalic, neck supple, no elevation of JVP, normal carotid pulses with no bruits and thyroid normal size.   LUNGS:  No increased work of breathing and clear to auscultation, no crackles or wheezing  CARDIOVASCULAR:  Regular rate 68 and rhythm with no murmurs, gallops, rubs, or abnormal heart sounds, normal PMI. The apical impulses not displaced  JVP less than 8 cm H2O  Heart tones are crisp and normal  Cervical veins are not engorged  The carotid upstroke is normal in amplitude and contour without delay or bruit  JVP is not elevated  ABDOMEN:  Normal bowel sounds, non-distended and non-tender to palpation  EXT:no edema, no calf tenderness. Pulses are present bilaterally. DATA:    Lab Results   Component Value Date    ALT 36 02/19/2022    AST 30 02/19/2022    ALKPHOS 62 02/19/2022    BILITOT 0.3 02/19/2022     Lab Results   Component Value Date    CREATININE 1.0 02/21/2022    BUN 26 (H) 02/21/2022     02/21/2022    K 4.2 02/21/2022     02/21/2022    CO2 25 02/21/2022       Lab Results   Component Value Date    TRIG 139 11/30/2021    TRIG 240 (H) 08/24/2020    TRIG 168 (H) 05/16/2019     Lab Results   Component Value Date    HDL 32 (L) 11/30/2021    HDL 38 (L) 08/24/2020    HDL 37 (L) 05/16/2019     Lab Results   Component Value Date    LDLCALC 48 11/30/2021    [address] 62 08/24/2020    [address] 61 05/16/2019     Radiology Review:  Pertinent images / reports were reviewed as a part of this visit and reveals the following:    Myoview: Feb '22:  Summary    Normal myocardial perfusion study.    Normal myocardial perfusion     Echo: 2/21/22:    Summary   -Normal global systolic function with an ejection fraction estimated at   55-60%. -No obvious regional wall motion abnormalities noted. -Mild septal hypertrophy.   -Normal diastolic function. Avg. E/e'=9.2   -Nodular sclerotic trileaflet aortic valve without evidence of significant   stenosis or regurgitation    Last Angiogram: April '11: [facility]  PCI of the RCA with BMS to mid (culprit) and proximal eccentric plaque. Inferior wall, HK; Low normal LVEF at 50%    Assessment:     1.  Atherosclerosis of native coronary artery of native heart without angina pectoris  ~stable : denies angina  ~atypical pain experienced in Feb ; neg NM for ischemia  ~has returned to some exercise routine after surgical restrictions ended  ~normal LVF by echo Feb '22  ~neg nuclear stress for ischemia Feb '22  ~s/p PTCA '11  ~ ASA / statin / BB    2. Primary hypertension   ~controlled   ~ carvedilol / amlodipine / HCTZ    3. Mixed hyperlipidemia   ~HDL low on last profile   ~last A1c 7.5% Feb '22  ~atorvastatin with diabetic agents (DM followed by PCP)      I had the opportunity to review the clinical symptoms and presentation of Desmond Littlejohn. Plan:     1. Continue present management   2. F/U in 6 months     Overall the patient is stable from CV standpoint    I have addresed the patient's cardiac risk factors and adjusted pharmacologic treatment as needed. In addition, I have reinforced the need for patient directed risk factor modification. Further evaluation will be based upon the patient's clinical course and testing results. All questions and concerns were addressed to the patient. Alternatives to my treatment were discussed. The patient is not currently smoking: quit April '11. The risks related to smoking were reviewed with the patient. Recommend maintaining a smoke-free lifestyle. Patient is on a beta-blocker  Patient is not on an ARB / ace : intolerant  Patient is on a statin     Antiplatelet therapy has been recommended / prescribed for this patient. Education conducted on adverse reactions including bleeding was discussed. The patient verbalizes understanding not to stop medications without discussing with us. Discussed exercise: 30-60 minutes 7 days/week. Gym 3 x / week : lifting wts and uses stationary bike; can't tolerate using the treadmill d/t Lt knee discomfort ; anticipates needing a knee replacement. 122/68  Discussed Low saturated fat/NCC diet. SMBG 124 . A1c 7.2 - 7.5%    Thank you for allowing to us to participate in the care of Desomnd Littlejohn.

## 2022-05-12 ENCOUNTER — HOSPITAL ENCOUNTER (OUTPATIENT)
Dept: GENERAL RADIOLOGY | Age: 67
Discharge: HOME | End: 2022-05-12
Payer: MEDICARE

## 2022-05-12 ENCOUNTER — HOSPITAL ENCOUNTER (OUTPATIENT)
Age: 67
Discharge: HOME | End: 2022-05-12
Payer: MEDICARE

## 2022-05-12 DIAGNOSIS — Z98.1 ARTHRODESIS STATUS: ICD-10-CM

## 2022-05-12 PROCEDURE — 72040 X-RAY EXAM NECK SPINE 2-3 VW: CPT

## 2022-05-19 ENCOUNTER — APPOINTMENT (RX ONLY)
Dept: URBAN - METROPOLITAN AREA CLINIC 170 | Facility: CLINIC | Age: 67
Setting detail: DERMATOLOGY
End: 2022-05-19

## 2022-05-19 DIAGNOSIS — D22 MELANOCYTIC NEVI: ICD-10-CM | Status: STABLE

## 2022-05-19 DIAGNOSIS — D485 NEOPLASM OF UNCERTAIN BEHAVIOR OF SKIN: ICD-10-CM

## 2022-05-19 DIAGNOSIS — L82.1 OTHER SEBORRHEIC KERATOSIS: ICD-10-CM | Status: STABLE

## 2022-05-19 DIAGNOSIS — L81.4 OTHER MELANIN HYPERPIGMENTATION: ICD-10-CM | Status: STABLE

## 2022-05-19 DIAGNOSIS — L57.0 ACTINIC KERATOSIS: ICD-10-CM

## 2022-05-19 DIAGNOSIS — D18.0 HEMANGIOMA: ICD-10-CM | Status: STABLE

## 2022-05-19 PROBLEM — D22.5 MELANOCYTIC NEVI OF TRUNK: Status: ACTIVE | Noted: 2022-05-19

## 2022-05-19 PROBLEM — D48.5 NEOPLASM OF UNCERTAIN BEHAVIOR OF SKIN: Status: ACTIVE | Noted: 2022-05-19

## 2022-05-19 PROBLEM — D18.01 HEMANGIOMA OF SKIN AND SUBCUTANEOUS TISSUE: Status: ACTIVE | Noted: 2022-05-19

## 2022-05-19 PROCEDURE — 99213 OFFICE O/P EST LOW 20 MIN: CPT | Mod: 25

## 2022-05-19 PROCEDURE — 17003 DESTRUCT PREMALG LES 2-14: CPT

## 2022-05-19 PROCEDURE — ? FULL BODY SKIN EXAM

## 2022-05-19 PROCEDURE — ? LIQUID NITROGEN

## 2022-05-19 PROCEDURE — ? TREATMENT REGIMEN

## 2022-05-19 PROCEDURE — 17000 DESTRUCT PREMALG LESION: CPT | Mod: 59

## 2022-05-19 PROCEDURE — ? BIOPSY BY SHAVE METHOD

## 2022-05-19 PROCEDURE — ? COUNSELING

## 2022-05-19 PROCEDURE — ? ADDITIONAL NOTES

## 2022-05-19 PROCEDURE — 11102 TANGNTL BX SKIN SINGLE LES: CPT

## 2022-05-19 ASSESSMENT — LOCATION DETAILED DESCRIPTION DERM
LOCATION DETAILED: PERIUMBILICAL SKIN
LOCATION DETAILED: POSTERIOR MID-PARIETAL SCALP
LOCATION DETAILED: EPIGASTRIC SKIN
LOCATION DETAILED: LEFT SUPERIOR PARIETAL SCALP
LOCATION DETAILED: RIGHT MID-UPPER BACK
LOCATION DETAILED: LEFT INFERIOR TEMPLE
LOCATION DETAILED: RIGHT MEDIAL INFERIOR CHEST
LOCATION DETAILED: LEFT CENTRAL PARIETAL SCALP
LOCATION DETAILED: LEFT SUPERIOR MEDIAL MIDBACK
LOCATION DETAILED: RIGHT ANTERIOR DISTAL UPPER ARM

## 2022-05-19 ASSESSMENT — LOCATION SIMPLE DESCRIPTION DERM
LOCATION SIMPLE: SCALP
LOCATION SIMPLE: RIGHT UPPER ARM
LOCATION SIMPLE: LEFT TEMPLE
LOCATION SIMPLE: LEFT LOWER BACK
LOCATION SIMPLE: ABDOMEN
LOCATION SIMPLE: RIGHT UPPER BACK
LOCATION SIMPLE: CHEST
LOCATION SIMPLE: POSTERIOR SCALP

## 2022-05-19 ASSESSMENT — LOCATION ZONE DERM
LOCATION ZONE: FACE
LOCATION ZONE: SCALP
LOCATION ZONE: TRUNK
LOCATION ZONE: ARM

## 2022-05-19 NOTE — HPI: EVALUATION OF SKIN LESION(S)
What Type Of Note Output Would You Prefer (Optional)?: Bullet Format
Hpi Title: Evaluation of Skin Lesions
How Severe Are Your Spot(S)?: mild
Have Your Spot(S) Been Treated In The Past?: has been treated
Additional History: Check chest and scalp\\nPigmentation arms

## 2022-05-19 NOTE — PROCEDURE: LIQUID NITROGEN
Consent: The patient's consent was obtained including but not limited to risks of crusting, scabbing, blistering, scarring, darker or lighter pigmentary change, recurrence, incomplete removal and infection.
Post-Care Instructions: I reviewed with the patient in detail post-care instructions. Patient is to wear sunprotection, and avoid picking at any of the treated lesions. Pt may apply Vaseline to crusted or scabbing areas.
Detail Level: Detailed
Show Applicator Variable?: Yes
Number Of Freeze-Thaw Cycles: 1 freeze-thaw cycle
Render Note In Bullet Format When Appropriate: No
Duration Of Freeze Thaw-Cycle (Seconds): 5

## 2022-05-19 NOTE — PROCEDURE: BIOPSY BY SHAVE METHOD

## 2022-06-16 ENCOUNTER — OFFICE VISIT (OUTPATIENT)
Dept: FAMILY MEDICINE CLINIC | Age: 67
End: 2022-06-16
Payer: MEDICARE

## 2022-06-16 VITALS
SYSTOLIC BLOOD PRESSURE: 138 MMHG | BODY MASS INDEX: 36.08 KG/M2 | DIASTOLIC BLOOD PRESSURE: 86 MMHG | WEIGHT: 266 LBS | HEART RATE: 78 BPM | OXYGEN SATURATION: 99 %

## 2022-06-16 DIAGNOSIS — E78.5 DYSLIPIDEMIA: ICD-10-CM

## 2022-06-16 DIAGNOSIS — G47.33 OBSTRUCTIVE SLEEP APNEA: ICD-10-CM

## 2022-06-16 DIAGNOSIS — E11.9 TYPE 2 DIABETES MELLITUS WITHOUT COMPLICATION, WITHOUT LONG-TERM CURRENT USE OF INSULIN (HCC): Primary | ICD-10-CM

## 2022-06-16 DIAGNOSIS — I25.10 CORONARY ARTERY DISEASE INVOLVING NATIVE CORONARY ARTERY OF NATIVE HEART WITHOUT ANGINA PECTORIS: ICD-10-CM

## 2022-06-16 DIAGNOSIS — I10 PRIMARY HYPERTENSION: ICD-10-CM

## 2022-06-16 LAB
CREATININE URINE POCT: 50
HBA1C MFR BLD: 7.8 %
MICROALBUMIN/CREAT 24H UR: 80 MG/G{CREAT}
MICROALBUMIN/CREAT UR-RTO: NORMAL

## 2022-06-16 PROCEDURE — 3051F HG A1C>EQUAL 7.0%<8.0%: CPT | Performed by: FAMILY MEDICINE

## 2022-06-16 PROCEDURE — 83036 HEMOGLOBIN GLYCOSYLATED A1C: CPT | Performed by: FAMILY MEDICINE

## 2022-06-16 PROCEDURE — 1123F ACP DISCUSS/DSCN MKR DOCD: CPT | Performed by: FAMILY MEDICINE

## 2022-06-16 PROCEDURE — 99214 OFFICE O/P EST MOD 30 MIN: CPT | Performed by: FAMILY MEDICINE

## 2022-06-16 PROCEDURE — 82044 UR ALBUMIN SEMIQUANTITATIVE: CPT | Performed by: FAMILY MEDICINE

## 2022-06-16 SDOH — ECONOMIC STABILITY: FOOD INSECURITY: WITHIN THE PAST 12 MONTHS, YOU WORRIED THAT YOUR FOOD WOULD RUN OUT BEFORE YOU GOT MONEY TO BUY MORE.: NEVER TRUE

## 2022-06-16 SDOH — ECONOMIC STABILITY: FOOD INSECURITY: WITHIN THE PAST 12 MONTHS, THE FOOD YOU BOUGHT JUST DIDN'T LAST AND YOU DIDN'T HAVE MONEY TO GET MORE.: NEVER TRUE

## 2022-06-16 ASSESSMENT — SOCIAL DETERMINANTS OF HEALTH (SDOH): HOW HARD IS IT FOR YOU TO PAY FOR THE VERY BASICS LIKE FOOD, HOUSING, MEDICAL CARE, AND HEATING?: NOT HARD AT ALL

## 2022-06-16 NOTE — PROGRESS NOTES
Mike Wagoner is a 74 y.o. male. HPI:here for complex medical visit  Using accupuncture for knee pain and neck-finds it very helpful  Not using his CPAP and wife says he is not snoring  Retired now and exercising at least 3 days a week  No chest pain or shortness of breath  Trying to eat right but frustrated that his sugars bounce around from 1 10-1 60 fasting  Some numbness in his feet but no open sores no burning  Sees ophthalmology every 3 months  Meds, vitamins and allergies reviewed with pt    ROS: No TIA's or unusual headaches, no dysphagia. No prolonged cough. No dyspnea or chest pain on exertion. No abdominal pain, change in bowel habits, black or bloody stools. No urinary tract symptoms. No new or unusual musculoskeletal symptoms. Prior to Visit Medications    Medication Sig Taking?  Authorizing Provider   carvedilol (COREG) 6.25 MG tablet TAKE 1 TABLET TWICE DAILY WITH MEALS Yes Rasta Quintanilla MD   dapagliflozin (FARXIGA) 10 MG tablet Take 1 tablet by mouth every morning Yes Rasta Quintanilla MD   amLODIPine (NORVASC) 10 MG tablet TAKE 1 TABLET EVERY DAY Yes Rasta Quintanilla MD   glimepiride (AMARYL) 4 MG tablet Take 1 tablet by mouth 2 times daily (with meals) Yes Rasta Quintanilla MD   metFORMIN (GLUCOPHAGE) 1000 MG tablet 1 tablet po bid with food Yes Rasta Quintanilla MD   hydroCHLOROthiazide (HYDRODIURIL) 25 MG tablet TAKE 1 TABLET EVERY DAY Yes Rasta Quintanilla MD   atorvastatin (LIPITOR) 40 MG tablet Take 1 tablet by mouth daily Yes Rasta Quintanilla MD   aspirin 81 MG tablet Take 1 tablet by mouth daily Yes KWAKU Porras - CNP   diphenhydrAMINE (BENADRYL) 25 MG tablet Take 25 mg by mouth nightly as needed for Allergies  Yes Historical Provider, MD   Multiple Vitamins-Minerals (MULTIVITAMIN PO) Take by mouth daily  Yes Historical Provider, MD       Past Medical History:   Diagnosis Date    Abdominal aortic aneurysm (AAA), 30-34 mm diameter ([facility] [address] 3/23/2022    CAD (coronary artery disease)     DDD (degenerative disc disease), cervical 2021    Disc disorder     ED (erectile dysfunction)     Hyperlipidemia     Hyperlipidemia     Hypertension     MI (myocardial infarction) ([address]     Obstructive sleep apnea     Other disorders of kidney and ureter in diseases classified elsewhere     Pneumonia 1970s    Polyp of colon     Renal calculi     Type 2 diabetes mellitus without complication, without long-term current use of insulin ([address] 2019    Type 2 diabetes mellitus without complication, without long-term current use of insulin ([address] 2019    Type 2 diabetes mellitus without complication, without long-term current use of insulin ([address] 2021    Type 2 diabetes mellitus without complication, without long-term current use of insulin ([address] 2021    Type II or unspecified type diabetes mellitus without mention of complication, not stated as uncontrolled        Social History     Tobacco Use    Smoking status: Former Smoker     Packs/day: 0.25     Years: 30.00     Pack years: 7.50     Types: Cigarettes, Cigars     Quit date: 2011     Years since quittin.1    Smokeless tobacco: Never Used    Tobacco comment: cigars every once in a while    Vaping Use    Vaping Use: Never used   Substance Use Topics    Alcohol use:  Yes     Alcohol/week: 1.0 standard drink     Types: 1 Cans of beer per week     Comment: social    Drug use: No       Family History   Problem Relation Age of Onset    Diabetes Father     Mental Illness Mother     High Blood Pressure Brother     Heart Disease Brother     High Cholesterol Neg Hx        Allergies   Allergen Reactions    Fexofenadine-Pseudoephed Er Other (See Comments)     cough    Lisinopril      cough    Morphine Hives    Zithromax [Azithromycin]      Heart palpitations     Losartan      Dry c ough     Lab Results   Component Value Date    LABA1C 7.8 2022     Lab Results   Component Value Date .6 02/19/2022     OBJECTIVE:  /86   Pulse 78   Wt 266 lb (120.7 kg)   SpO2 99%   BMI 36.08 kg/m²   GEN:  in NAD, obese weight up 3 pounds  NECK:  Supple without adenopathy. No bruits  CV:  Regular rate and rhythm, S1 and S2 normal, no murmurs, clicks  PULM:  Chest is clear, no wheezing ,  symmetric air entry throughout both lung fields. EXT: No rash or edema  NEURO: nonfocal  A1c up to 7.8  ASSESSMENT/PLAN:  1. Type 2 diabetes mellitus without complication, without long-term current use of insulin ([facility] [address]  Fair control  Saw opthal at [facility] DIABETES FOOT EXAM  - POCT microalbumin  - POCT glycosylated hemoglobin (Hb A1C)  Tighter diet, continue Farxiga glimepiride and glipizide metformin at maximum doses  More exercise  Repeat A1c in 3 months  If still elevated may consider stopping glimepiride and starting the Ozempic    2. Coronary artery disease involving native coronary artery of native heart without angina pectoris  Stable, continue aggressive risk factor modification    3. Obstructive sleep apnea  Not using CPAP  Further wt loss    4. Primary hypertension  stable continue Coreg, hydrochlorothiazide, Norvasc    5.  Dyslipidemia  stable, continue Lipitor

## 2022-07-15 NOTE — PROGRESS NOTES
Patient paged this provider stating he is positive for covid. He was wanting to have Paxlovid called in. After discussing symptoms, they are very mild, headache and slight nasal congestion, feel not necessary at this time as he would have to hold several medications to take Paxlovid due to interactions. Recommend drinking plenty of fluids, rest as much as possible, tylenol or motrin as needed for body aches, pain or fever. May also use other the counter cold and flu medications such as NyQuil or DayQuil as needed. If using OTC cough and cold medication avoid tylenol. New COVID guidelines are as follows: If you are SYMPTOMATIC:  You must quarantine for five full days from the start of symptoms. At the end of five days if you are continuing to improve you can leave quarantine with strict mask wearing at all times for five more days. If your symptoms have not started to improve, recommend full ten days of quarantine. If symptoms worsen over weekend please page and can send in script.

## 2022-09-07 ENCOUNTER — OFFICE VISIT (OUTPATIENT)
Dept: ORTHOPEDIC SURGERY | Age: 67
End: 2022-09-07
Payer: MEDICARE

## 2022-09-07 VITALS — BODY MASS INDEX: 35.87 KG/M2 | HEIGHT: 72 IN | WEIGHT: 264.8 LBS

## 2022-09-07 DIAGNOSIS — M17.0 BILATERAL PRIMARY OSTEOARTHRITIS OF KNEE: Primary | ICD-10-CM

## 2022-09-07 PROCEDURE — 99213 OFFICE O/P EST LOW 20 MIN: CPT | Performed by: PHYSICIAN ASSISTANT

## 2022-09-07 PROCEDURE — 1123F ACP DISCUSS/DSCN MKR DOCD: CPT | Performed by: PHYSICIAN ASSISTANT

## 2022-09-07 NOTE — PROGRESS NOTES
Bilateral primary osteoarthritis of knee            Treatment Plan:    Patient presented today with a chief complaint of worsening left knee pain, locking, catching and instability. He has known moderate tricompartmental osteoarthritic changes of the left knee. His last cortisone injection was in February and he states it helped for about 4 months. He is not really interested in additional cortisone or joint fluid injections. He would like to discuss proceeding with a left total knee arthroplasty. Patient will be referred to Dr. Izaiah Donovan. Cindy Herrmann was informed of the results of any imaging. We discussed treatment options and a time was given to answer questions. A plan was proposed and Cindy Herrmann understand and accepts this course of care. Electronically signed by Génesis Paulson PA-C on 8/1/8208  Board Certified [facility]    Please note that portions of this dictation was performed with a voice recognition program ([employer]). All efforts were made to edit the dictation but occasionally words are mis-transcribed. It is possible that there are still dictated errors within this office note.   If so, please bring any errors to my attention for an addendum

## 2022-09-12 ENCOUNTER — OFFICE VISIT (OUTPATIENT)
Dept: ORTHOPEDIC SURGERY | Age: 67
End: 2022-09-12
Payer: MEDICARE

## 2022-09-12 VITALS — HEIGHT: 73 IN | BODY MASS INDEX: 35.25 KG/M2 | WEIGHT: 266 LBS

## 2022-09-12 DIAGNOSIS — M17.0 BILATERAL PRIMARY OSTEOARTHRITIS OF KNEE: Primary | ICD-10-CM

## 2022-09-12 DIAGNOSIS — Z98.890 S/P LEFT KNEE SURGERY: ICD-10-CM

## 2022-09-12 PROCEDURE — 99214 OFFICE O/P EST MOD 30 MIN: CPT | Performed by: ORTHOPAEDIC SURGERY

## 2022-09-12 PROCEDURE — 1123F ACP DISCUSS/DSCN MKR DOCD: CPT | Performed by: ORTHOPAEDIC SURGERY

## 2022-09-12 NOTE — PROGRESS NOTES
Patient: Kit Barbour  : 1955    MRN: [id number]    Date of Visit: 22    Attending Physician: Jennifer Shay    History of Present Illness  Mr. Kaylan Bellamy is a very pleasant 74 y.o. patient with a several year history of progressive LEFT knee pain. There is no precipitating event or trauma. The pain is located predominantly in the medial and anterior aspect of the knee aggravated by weight bearing. Walking even short distances can be painful. Stair climbing is progressing becoming more difficult and painful. However, it can also awaken the patient at night. He has tried the following interventions without sustained functional improvement:    Low-impact, structured therapy/exercise program  NSAID's/Tylenol Arthritis strength  Crtisone injections/viscosupplementation   Knee brace    Quality of life is negatively impacted with daily tasks being more difficult. The patient would like to talk about surgical treatment options.   His A1C was previously 7.8    PMH/PSH:  Past Medical History:   Diagnosis Date    Abdominal aortic aneurysm (AAA), 30-34 mm diameter (HCC) 3/23/2022    CAD (coronary artery disease)     DDD (degenerative disc disease), cervical 2021    Disc disorder     ED (erectile dysfunction)     Hyperlipidemia     Hyperlipidemia     Hypertension     MI (myocardial infarction) ([address]     Obstructive sleep apnea     Other disorders of kidney and ureter in diseases classified elsewhere     Pneumonia 1970s    Polyp of colon     Renal calculi     Type 2 diabetes mellitus without complication, without long-term current use of insulin ([address] 2019    Type 2 diabetes mellitus without complication, without long-term current use of insulin ([address] 2019    Type 2 diabetes mellitus without complication, without long-term current use of insulin ([address] 2021    Type 2 diabetes mellitus without complication, without long-term current use of insulin ([address] 2021    Type II or unspecified type diabetes mellitus without mention of complication, not stated as uncontrolled      Patient Active Problem List   Diagnosis    Hypertension    Disorder of intervertebral disc    Polyp of colon    CAD (coronary artery disease)    Dyslipidemia    PVD (peripheral vascular disease) ([facility])    Cough due to ACE inhibitor    MI (myocardial infarction) [facility])    ED (erectile dysfunction)    Ureteral calculi    Renal colic on left side    Hydronephrosis    Microscopic hematuria    Chest pain    Obstructive sleep apnea    Type 2 diabetes mellitus without complication, without long-term current use of insulin ([facility])    Complex tear of medial meniscus of right knee as current injury    DDD (degenerative disc disease), cervical    Cervical spinal stenosis    Acute blood loss as cause of postoperative anemia    Shortness of breath    Abdominal aortic aneurysm (AAA), 30-34 mm diameter [facility])     Past Surgical History:   Procedure Laterality Date    ABDOMEN SURGERY      Exploratory Laparotomy    ARM SURGERY Left 10/27/2020    .  performed by Bianka Perkins MD at [facility]      right and left    CARPAL TUNNEL RELEASE Left 10/27/2020    LEFT CARPAL TUNNEL RELEASE; LEFT INSITU CUBITAL TUNNEL RELEASE performed by Bianka Perkins MD at [address] [place] [address] N/A 12/6/2021    POSTERIOR CERVICAL DECOMPRESSION AND FUSION [address], [address] [facility] ([id number], [id number], [facility], [address] [place] [address] , [address] [place] [address], [address] [place], [place] [id number], [facility], [id number]) [employer] performed by Yonis Torres MD at [address] [place] [address]  12/2/13    COLONOSCOPY  12/2/13    polyps X 2 removed    CORONARY ANGIOPLASTY WITH STENT PLACEMENT      CYSTOSCOPY Left 12/12/14    CYSTOSCOPY, LEFT RETROGRADE PYELOGRAM, PLACEMENT OF LEFT    FRACTURE SURGERY      Ankle & Wrist    KNEE ARTHROSCOPY Right 7/15/2021    RIGHT KNEE ARTHROSCOPY PARTIAL MEDIAL MENISCECTOMY performed by Emily Squires MD at hospitals TONSILLECTOMY AND ADENOIDECTOMY             MEDS:  Scheduled Meds:  Continuous Infusions:  PRN Meds:  Current Meds:  Current Outpatient Medications:     carvedilol (COREG) 6.25 MG tablet, TAKE 1 TABLET TWICE DAILY WITH MEALS, Disp: 180 tablet, Rfl: 3    dapagliflozin (FARXIGA) 10 MG tablet, Take 1 tablet by mouth every morning, Disp: 90 tablet, Rfl: 3    amLODIPine (NORVASC) 10 MG tablet, TAKE 1 TABLET EVERY DAY, Disp: 90 tablet, Rfl: 3    glimepiride (AMARYL) 4 MG tablet, Take 1 tablet by mouth 2 times daily (with meals), Disp: 180 tablet, Rfl: 3    metFORMIN (GLUCOPHAGE) 1000 MG tablet, 1 tablet po bid with food, Disp: 180 tablet, Rfl: 3    hydroCHLOROthiazide (HYDRODIURIL) 25 MG tablet, TAKE 1 TABLET EVERY DAY, Disp: 90 tablet, Rfl: 3    atorvastatin (LIPITOR) 40 MG tablet, Take 1 tablet by mouth daily, Disp: 90 tablet, Rfl: 3    aspirin 81 MG tablet, Take 1 tablet by mouth daily, Disp: 1 tablet, Rfl: 0    diphenhydrAMINE (BENADRYL) 25 MG tablet, Take 25 mg by mouth nightly as needed for Allergies , Disp: , Rfl:     Multiple Vitamins-Minerals (MULTIVITAMIN PO), Take by mouth daily , Disp: , Rfl:         ALLERGIES:  Allergies   Allergen Reactions    Fexofenadine-Pseudoephed Er Other (See Comments)     cough    Lisinopril      cough    Morphine Hives    Zithromax [Azithromycin]      Heart palpitations     Losartan      Dry c ough         Social History:   Social History     Socioeconomic History    Marital status:      Spouse name: Not on file    Number of children: Not on file    Years of education: Not on file    Highest education level: Not on file   Occupational History    Not on file   Tobacco Use    Smoking status: Former     Packs/day: 0.25     Years: 30.00     Pack years: 7.50     Types: Cigarettes, Cigars     Quit date: 2011     Years since quittin.4    Smokeless tobacco: Never    Tobacco comments:     cigars every once in a while    Vaping Use    Vaping Use: Never used   Substance and Sexual Activity    Alcohol use: Yes     Alcohol/week: 1.0 standard drink     Types: 1 Cans of beer per week     Comment: social    Drug use: No    Sexual activity: Not on file   Other Topics Concern    Not on file   Social History Narrative    Not on file     Social Determinants of Health     Financial Resource Strain: Low Risk     Difficulty of Paying Living Expenses: Not hard at all   Food Insecurity: No Food Insecurity    Worried About Running Out of Food in the Last Year: Never true    Ran Out of Food in the Last Year: Never true   Transportation Needs: Not on file   Physical Activity: Not on file   Stress: Not on file   Social Connections: Not on file   Intimate Partner Violence: Not on file   Housing Stability: Not on file         Family History:   Cancer-related family history is not on file. Review of Systems:  No personal history of DVT, PE. 12 point ROS otherwise negative other than reported in HPI. Physical Examination:  Patient is alert and oriented x 3 and appears well nourished and appropriate for today's visit. Height:   Ht Readings from Last 3 Encounters:   09/12/22 6' 1\" (1.854 m)   09/07/22 6' (1.829 m)   03/30/22 6' (1.829 m)     Weight:   Wt Readings from Last 3 Encounters:   09/12/22 266 lb (120.7 kg)   09/07/22 264 lb 12.8 oz (120.1 kg)   06/16/22 266 lb (120.7 kg)     Physical Examination:  Patient is alert and oriented x 3 and appears well nourished and appropriate for today's visit. Gait: The patient walks with an antalgic gait. Right Knee: no effusion             Ligaments stable to varus/valgus stress at full extension and 30 degrees. Negative anterior drawer. AROM Right: 5-120 deg               No patellofemoral crepitus             No medial/lateral joint line tenderness     Left knee: no effusion             Ligaments stable to varus/valgus stress at full extension and 30 degrees.  Negative anterior drawer             AROM: L: 5-115 Deg             + patellofemoral crepitus             + medial  joint line tenderness  Hips: Preserved, pain free range of motion in both hips. Peripheral vasculature: Bilateral 2+ dorsal pedis pulses; bilateral 2+ posterior tibial pulses; No lower extremity edema  Skin: Skin appears to be intact in both upper and lower extremities. There does not appear to be any ulceration or other non-healing wounds. Radiographs: : Multiple views of the left knee: There are advanced degenerative changes in the medial compartment with joint space narrowing subchondral sclerosis and osteophyte formation there is relatively preserved in the lateral compartments however there ar    Elective LEFT TKA      Assessment and Plan?: The patient has functionally disabling knee pain with advanced degenerative changes of the left knee medial and patellofemraol compartments. I do not think hes a UKA candidate    We discussed the diagnosis and treatment options in detail with the patient. Patient has tried conservative treatment including anti-inflammatories, activity modification, physical therapy, use of cane, injections. Given the failure of these conservative measures the patient is a good candidate for an elective total knee arthroplasty  We discussed the surgical procedure, recovery period, and protocol for pain management, expected post-operative course in detail. We discussed the potential benefits of the surgery including pain relief and improved range of motion as well as the inherent risks including but are not limited to incomplete resolution of symptoms, infection, dislocation, leg length inequality requiring shoe lift, fracture, implant loosening, hardware failure, nerve or vascular injury, need for further surgery, deep vein thrombus, pulmonary embolism. The patient has verbalized understanding of these risks and wishes to proceed. The patient will need to obtain pre-operative medical clearance.       ?___________________________ Jhonatan Gilmore MD  ?   ??cc: Mauri Reyna MD  ??

## 2022-09-14 ENCOUNTER — TELEPHONE (OUTPATIENT)
Dept: ORTHOPEDIC SURGERY | Age: 67
End: 2022-09-14

## 2022-09-14 NOTE — TELEPHONE ENCOUNTER
Surgery and/or Procedure Scheduling     Contact Name: Vanita Ott Request: LT KNEE 10/12    Patient Contact Number: [phone]    Patient has regarding upcoming surgery. Please return call to the above number.

## 2022-09-15 ENCOUNTER — HOSPITAL ENCOUNTER (OUTPATIENT)
Age: 67
Discharge: HOME | End: 2022-09-15
Payer: MEDICARE

## 2022-09-15 DIAGNOSIS — M17.0 BILATERAL PRIMARY OSTEOARTHRITIS OF KNEE: ICD-10-CM

## 2022-09-15 LAB
A/G RATIO: 1.6 (ref 1.1–2.2)
ALBUMIN SERPL-MCNC: 4.6 G/DL (ref 3.4–5)
ALP BLD-CCNC: 70 U/L (ref 40–129)
ALT SERPL-CCNC: 69 U/L (ref 10–40)
ANION GAP SERPL CALCULATED.3IONS-SCNC: 17 MMOL/L (ref 3–16)
APTT: 27.4 SEC (ref 23–34.3)
AST SERPL-CCNC: 37 U/L (ref 15–37)
BASOPHILS ABSOLUTE: 0 K/UL (ref 0–0.2)
BASOPHILS RELATIVE PERCENT: 0.6 %
BILIRUB SERPL-MCNC: 0.4 MG/DL (ref 0–1)
BUN BLDV-MCNC: 19 MG/DL (ref 7–20)
CALCIUM SERPL-MCNC: 9.9 MG/DL (ref 8.3–10.6)
CHLORIDE BLD-SCNC: 99 MMOL/L (ref 99–110)
CO2: 23 MMOL/L (ref 21–32)
CREAT SERPL-MCNC: 1.1 MG/DL (ref 0.8–1.3)
EOSINOPHILS ABSOLUTE: 0.3 K/UL (ref 0–0.6)
EOSINOPHILS RELATIVE PERCENT: 3.4 %
GFR AFRICAN AMERICAN: >60
GFR NON-AFRICAN AMERICAN: >60
GLUCOSE BLD-MCNC: 238 MG/DL (ref 70–99)
HCT VFR BLD CALC: 40.6 % (ref 40.5–52.5)
HEMOGLOBIN: 13.9 G/DL (ref 13.5–17.5)
INR BLD: 0.9 (ref 0.87–1.14)
LYMPHOCYTES ABSOLUTE: 1.8 K/UL (ref 1–5.1)
LYMPHOCYTES RELATIVE PERCENT: 22.1 %
MCH RBC QN AUTO: 29.3 PG (ref 26–34)
MCHC RBC AUTO-ENTMCNC: 34.1 G/DL (ref 31–36)
MCV RBC AUTO: 85.9 FL (ref 80–100)
MONOCYTES ABSOLUTE: 0.6 K/UL (ref 0–1.3)
MONOCYTES RELATIVE PERCENT: 7.8 %
NEUTROPHILS ABSOLUTE: 5.5 K/UL (ref 1.7–7.7)
NEUTROPHILS RELATIVE PERCENT: 66.1 %
PDW BLD-RTO: 14.1 % (ref 12.4–15.4)
PLATELET # BLD: 202 K/UL (ref 135–450)
PMV BLD AUTO: 7.9 FL (ref 5–10.5)
POTASSIUM SERPL-SCNC: 4.3 MMOL/L (ref 3.5–5.1)
PROTHROMBIN TIME: 12 SEC (ref 11.7–14.5)
RBC # BLD: 4.73 M/UL (ref 4.2–5.9)
SODIUM BLD-SCNC: 139 MMOL/L (ref 136–145)
TOTAL PROTEIN: 7.5 G/DL (ref 6.4–8.2)
VITAMIN D 25-HYDROXY: 55.6 NG/ML
WBC # BLD: 8.3 K/UL (ref 4–11)

## 2022-09-15 PROCEDURE — 36415 COLL VENOUS BLD VENIPUNCTURE: CPT

## 2022-09-15 PROCEDURE — 83036 HEMOGLOBIN GLYCOSYLATED A1C: CPT

## 2022-09-15 PROCEDURE — 85730 THROMBOPLASTIN TIME PARTIAL: CPT

## 2022-09-15 PROCEDURE — 82306 VITAMIN D 25 HYDROXY: CPT

## 2022-09-15 PROCEDURE — 85025 COMPLETE CBC W/AUTO DIFF WBC: CPT

## 2022-09-15 PROCEDURE — 85610 PROTHROMBIN TIME: CPT

## 2022-09-15 PROCEDURE — 80053 COMPREHEN METABOLIC PANEL: CPT

## 2022-09-15 PROCEDURE — 87081 CULTURE SCREEN ONLY: CPT

## 2022-09-15 NOTE — TELEPHONE ENCOUNTER
Surgery and/or Procedure Scheduling     Contact Name: Angely Rowe Request: L KNEE SURGERY  Patient Contact Number:  [phone]    Patient is requesting another call back to schedule surgery. Please call patient back at the number above.

## 2022-09-16 ENCOUNTER — TELEPHONE (OUTPATIENT)
Dept: ORTHOPEDIC SURGERY | Age: 67
End: 2022-09-16

## 2022-09-16 LAB
ESTIMATED AVERAGE GLUCOSE: 174.3 MG/DL
HBA1C MFR BLD: 7.7 %

## 2022-09-17 LAB — MRSA CULTURE ONLY: NORMAL

## 2022-09-19 ENCOUNTER — OFFICE VISIT (OUTPATIENT)
Dept: FAMILY MEDICINE CLINIC | Age: 67
End: 2022-09-19
Payer: MEDICARE

## 2022-09-19 VITALS
OXYGEN SATURATION: 98 % | DIASTOLIC BLOOD PRESSURE: 85 MMHG | HEART RATE: 79 BPM | WEIGHT: 261 LBS | BODY MASS INDEX: 34.43 KG/M2 | SYSTOLIC BLOOD PRESSURE: 135 MMHG

## 2022-09-19 DIAGNOSIS — I10 PRIMARY HYPERTENSION: ICD-10-CM

## 2022-09-19 DIAGNOSIS — G47.33 OBSTRUCTIVE SLEEP APNEA: ICD-10-CM

## 2022-09-19 DIAGNOSIS — E11.9 TYPE 2 DIABETES MELLITUS WITHOUT COMPLICATION, WITHOUT LONG-TERM CURRENT USE OF INSULIN (HCC): ICD-10-CM

## 2022-09-19 DIAGNOSIS — M17.12 PRIMARY OSTEOARTHRITIS OF LEFT KNEE: ICD-10-CM

## 2022-09-19 DIAGNOSIS — I71.40 ABDOMINAL AORTIC ANEURYSM (AAA), 30-34 MM DIAMETER: Primary | ICD-10-CM

## 2022-09-19 DIAGNOSIS — I73.9 PVD (PERIPHERAL VASCULAR DISEASE) (HCC): ICD-10-CM

## 2022-09-19 DIAGNOSIS — I25.10 CORONARY ARTERY DISEASE INVOLVING NATIVE CORONARY ARTERY OF NATIVE HEART WITHOUT ANGINA PECTORIS: ICD-10-CM

## 2022-09-19 DIAGNOSIS — Z23 NEED FOR VACCINATION AGAINST STREPTOCOCCUS PNEUMONIAE: ICD-10-CM

## 2022-09-19 PROCEDURE — 90677 PCV20 VACCINE IM: CPT | Performed by: FAMILY MEDICINE

## 2022-09-19 PROCEDURE — 1123F ACP DISCUSS/DSCN MKR DOCD: CPT | Performed by: FAMILY MEDICINE

## 2022-09-19 PROCEDURE — 99214 OFFICE O/P EST MOD 30 MIN: CPT | Performed by: FAMILY MEDICINE

## 2022-09-19 PROCEDURE — 3051F HG A1C>EQUAL 7.0%<8.0%: CPT | Performed by: FAMILY MEDICINE

## 2022-09-19 PROCEDURE — 90471 IMMUNIZATION ADMIN: CPT | Performed by: FAMILY MEDICINE

## 2022-09-19 RX ORDER — CARVEDILOL 6.25 MG/1
TABLET ORAL
Qty: 180 TABLET | Refills: 3 | Status: SHIPPED | OUTPATIENT
Start: 2022-09-19

## 2022-09-19 NOTE — PROGRESS NOTES
Fexofenadine-Pseudoephed Er Other (See Comments)     cough    Lisinopril      cough    Morphine Hives    Zithromax [Azithromycin]      Heart palpitations     Losartan      Dry c ough       OBJECTIVE:  BP (!) 144/91   Pulse 79   Wt 261 lb (118.4 kg)   SpO2 98%   BMI 34.43 kg/m²   NECK:  Supple without adenopathy. CV:  Regular rate and rhythm, S1 and S2 normal, no murmurs, clicks  PULM:  Chest is clear, no wheezing ,  symmetric air entry throughout both lung fields. EXT: No rash or edema  NEURO: nonfocal  Lab Results   Component Value Date     09/15/2022    K 4.3 09/15/2022    CL 99 09/15/2022    CO2 23 09/15/2022    BUN 19 09/15/2022    CREATININE 1.1 09/15/2022    GLUCOSE 238 (H) 09/15/2022    CALCIUM 9.9 09/15/2022    PROT 7.5 09/15/2022    LABALBU 4.6 09/15/2022    BILITOT 0.4 09/15/2022    ALKPHOS 70 09/15/2022    AST 37 09/15/2022    ALT 69 (H) 09/15/2022    LABGLOM >60 09/15/2022    GFRAA >60 09/15/2022    AGRATIO 1.6 09/15/2022    GLOB 2.5 08/24/2020        Lab Results   Component Value Date    LABA1C 7.7 09/15/2022     Lab Results   Component Value Date    .3 09/15/2022      Lab Results   Component Value Date    WBC 8.3 09/15/2022    HGB 13.9 09/15/2022    HCT 40.6 09/15/2022    MCV 85.9 09/15/2022     09/15/2022      ASSESSMENT/PLAN:  1. Abdominal aortic aneurysm (AAA), 30-34 mm diameter (HCC)  U/s q year ago March 2023    2. Type 2 diabetes mellitus without complication, without long-term current use of insulin ([address]  Fair, same meds  Her diet and more exercise  Farxiga samples provided for 1 month    3. Coronary artery disease involving native coronary artery of native heart without angina pectoris  stable, continue aggressive risk factor modification    4. PVD (peripheral vascular disease) ([address]  Not addressed today    5. Primary hypertension  stable stable continue present medication    6.  Obstructive sleep apnea  Not using cpap  Urged him to see a new sleep specialist to see what other options are may be but he declines at this time    7 imm - prevnar 20 today  Covid booster at pharm, then flu    8 Left knee OA - second opinion to [facility] per patient request

## 2022-09-27 ENCOUNTER — APPOINTMENT (RX ONLY)
Dept: URBAN - METROPOLITAN AREA CLINIC 170 | Facility: CLINIC | Age: 67
Setting detail: DERMATOLOGY
End: 2022-09-27

## 2022-09-27 DIAGNOSIS — L57.0 ACTINIC KERATOSIS: ICD-10-CM

## 2022-09-27 PROCEDURE — ? ADDITIONAL NOTES

## 2022-09-27 PROCEDURE — ? COUNSELING

## 2022-09-27 PROCEDURE — 17000 DESTRUCT PREMALG LESION: CPT

## 2022-09-27 PROCEDURE — ? PRESCRIPTION

## 2022-09-27 PROCEDURE — ? LIQUID NITROGEN

## 2022-09-27 PROCEDURE — 17003 DESTRUCT PREMALG LES 2-14: CPT

## 2022-09-27 RX ORDER — FLUOROURACIL 5 MG/G
CREAM TOPICAL
Qty: 40 | Refills: 2 | Status: ERX | COMMUNITY
Start: 2022-09-27

## 2022-09-27 RX ADMIN — FLUOROURACIL: 5 CREAM TOPICAL at 00:00

## 2022-09-27 ASSESSMENT — LOCATION DETAILED DESCRIPTION DERM
LOCATION DETAILED: LEFT SUPERIOR MEDIAL FOREHEAD
LOCATION DETAILED: LEFT SUPERIOR PARIETAL SCALP
LOCATION DETAILED: LEFT CENTRAL PARIETAL SCALP
LOCATION DETAILED: RIGHT SUPERIOR PARIETAL SCALP
LOCATION DETAILED: LEFT CENTRAL FRONTAL SCALP

## 2022-09-27 ASSESSMENT — LOCATION SIMPLE DESCRIPTION DERM
LOCATION SIMPLE: LEFT SCALP
LOCATION SIMPLE: LEFT FOREHEAD
LOCATION SIMPLE: SCALP

## 2022-09-27 ASSESSMENT — LOCATION ZONE DERM
LOCATION ZONE: FACE
LOCATION ZONE: SCALP

## 2022-09-30 ENCOUNTER — OFFICE VISIT (OUTPATIENT)
Dept: CARDIOLOGY CLINIC | Age: 67
End: 2022-09-30
Payer: MEDICARE

## 2022-09-30 VITALS
SYSTOLIC BLOOD PRESSURE: 130 MMHG | OXYGEN SATURATION: 94 % | HEART RATE: 84 BPM | DIASTOLIC BLOOD PRESSURE: 80 MMHG | HEIGHT: 73 IN | WEIGHT: 264.6 LBS | BODY MASS INDEX: 35.07 KG/M2

## 2022-09-30 DIAGNOSIS — E78.2 MIXED HYPERLIPIDEMIA: ICD-10-CM

## 2022-09-30 DIAGNOSIS — I25.10 CORONARY ARTERY DISEASE INVOLVING NATIVE CORONARY ARTERY OF NATIVE HEART WITHOUT ANGINA PECTORIS: Primary | ICD-10-CM

## 2022-09-30 DIAGNOSIS — I10 PRIMARY HYPERTENSION: ICD-10-CM

## 2022-09-30 PROCEDURE — 99214 OFFICE O/P EST MOD 30 MIN: CPT | Performed by: NURSE PRACTITIONER

## 2022-09-30 PROCEDURE — 1123F ACP DISCUSS/DSCN MKR DOCD: CPT | Performed by: NURSE PRACTITIONER

## 2022-09-30 PROCEDURE — 93000 ELECTROCARDIOGRAM COMPLETE: CPT | Performed by: NURSE PRACTITIONER

## 2022-09-30 NOTE — PROGRESS NOTES
[address]     Outpatient Follow Up Note    Elsa Ayala is 74 y.o. male who presents today with a history of IWMI CAD s/p PTCA prox & distal-RCA '11, HTN and hyperlipidemia. CHIEF COMPLAINT / HPI:  Follow Up secondary to CAD: seeking pre-op clearance for knee surgery    Subjective:     He denies chest discomfort. There is no SOB/DAMIAN. The patient denies orthopnea/PND. He has sleep apnea yet intolerant to using a mask. He has decided to redo a sleep study once his knee surgery is over. He's interested in having the [employer]. The patient does not have palpitations/dizziness/swelling. His angina equivalent was severe left elbow pain (heaviness like a buffalo sitting on his arms; and his eyes were stuttering). He denies recurrence. His wt is stable. These symptoms are stable since the OV. With regard to medication therapy the patient has been compliant with prescribed regimen. They have tolerated therapy to date.      Past Medical History:   Diagnosis Date    Abdominal aortic aneurysm (AAA), 30-34 mm diameter ([address] 3/23/2022    CAD (coronary artery disease)     DDD (degenerative disc disease), cervical 11/24/2021    Disc disorder     ED (erectile dysfunction)     Hyperlipidemia     Hyperlipidemia     Hypertension     MI (myocardial infarction) ([address]     Obstructive sleep apnea     Other disorders of kidney and ureter in diseases classified elsewhere     Pneumonia 1970s    Polyp of colon     Renal calculi     Type 2 diabetes mellitus without complication, without long-term current use of insulin ([address] 5/16/2019    Type 2 diabetes mellitus without complication, without long-term current use of insulin ([address] 5/16/2019    Type 2 diabetes mellitus without complication, without long-term current use of insulin ([address] 6/7/2021    Type 2 diabetes mellitus without complication, without long-term current use of insulin ([address] 6/7/2021    Type II or unspecified type diabetes mellitus without mention of complication, not stated as uncontrolled      Social History:    Social History     Tobacco Use   Smoking Status Former    Packs/day: 0.25    Years: 30.00    Pack years: 7.50    Types: Cigarettes, Cigars    Quit date: 2011    Years since quittin.4   Smokeless Tobacco Never   Tobacco Comments    cigars every once in a while      Current Medications:  Current Outpatient Medications   Medication Sig Dispense Refill    carvedilol (COREG) 6.25 MG tablet TAKE 1 TABLET TWICE DAILY WITH MEALS 180 tablet 3    dapagliflozin (FARXIGA) 10 MG tablet Take 1 tablet by mouth every morning 90 tablet 3    amLODIPine (NORVASC) 10 MG tablet TAKE 1 TABLET EVERY DAY 90 tablet 3    glimepiride (AMARYL) 4 MG tablet Take 1 tablet by mouth 2 times daily (with meals) 180 tablet 3    metFORMIN (GLUCOPHAGE) 1000 MG tablet 1 tablet po bid with food 180 tablet 3    hydroCHLOROthiazide (HYDRODIURIL) 25 MG tablet TAKE 1 TABLET EVERY DAY 90 tablet 3    atorvastatin (LIPITOR) 40 MG tablet Take 1 tablet by mouth daily 90 tablet 3    aspirin 81 MG tablet Take 1 tablet by mouth daily 1 tablet 0    diphenhydrAMINE (BENADRYL) 25 MG tablet Take 25 mg by mouth nightly as needed for Allergies       Multiple Vitamins-Minerals (MULTIVITAMIN PO) Take by mouth daily        No current facility-administered medications for this visit. REVIEW OF SYSTEMS:    CONSTITUTIONAL: - weight loss; - fatigue, weakness, night sweats or fever. HEENT: No new vision difficulties or ringing in the ears; narrow angle glaucoma. RESPIRATORY: No new SOB, PND, orthopnea or cough. CARDIOVASCULAR: See HPI  GI: No nausea, vomiting, diarrhea, constipation, abdominal pain or changes in bowel habits. : No urinary frequency, urgency, incontinence hematuria or dysuria. SKIN: No cyanosis or skin lesions. MUSCULOSKELETAL: No new muscle or joint pain. NEUROLOGICAL: No syncope or TIA-like symptoms.   PSYCHIATRIC: No anxiety, pain, insomnia or depression    Objective: PHYSICAL EXAM:      Vitals:    09/30/22 0942 09/30/22 1011   BP: (!) 140/70 130/80   Site: Right Upper Arm Left Upper Arm   Position: Sitting    Cuff Size: Large Adult Large Adult   Pulse: 84    SpO2: 94%    Weight: 264 lb 9.6 oz (120 kg)    Height: 6' 1\" (1.854 m)        CONSTITUTIONAL: Cooperative, no apparent distress, and appears well nourished / over weight  NEUROLOGIC:  Awake and orientated to person, place and time. PSYCH: Calm affect. SKIN: Warm and dry; tan. HEENT: Sclera non-icteric, normocephalic, neck supple, no elevation of JVP, normal carotid pulses with no bruits and thyroid normal size. LUNGS:  No increased work of breathing and clear to auscultation, no crackles or wheezing  CARDIOVASCULAR:  Regular rate 80 and rhythm with no murmurs, gallops, rubs, or abnormal heart sounds, normal PMI. The apical impulses not displaced  JVP less than 8 cm H2O  Heart tones are crisp and normal  Cervical veins are not engorged  The carotid upstroke is normal in amplitude and contour without delay or bruit  JVP is not elevated  ABDOMEN:  Normal bowel sounds, non-distended and non-tender to palpation  EXT:no edema, no calf tenderness. Pulses are present bilaterally.     DATA:    Lab Results   Component Value Date    ALT 69 (H) 09/15/2022    AST 37 09/15/2022    ALKPHOS 70 09/15/2022    BILITOT 0.4 09/15/2022     Lab Results   Component Value Date    CREATININE 1.1 09/15/2022    BUN 19 09/15/2022     09/15/2022    K 4.3 09/15/2022    CL 99 09/15/2022    CO2 23 09/15/2022       Lab Results   Component Value Date    TRIG 139 11/30/2021    TRIG 240 (H) 08/24/2020    TRIG 168 (H) 05/16/2019     Lab Results   Component Value Date    HDL 32 (L) 11/30/2021    HDL 38 (L) 08/24/2020    HDL 37 (L) 05/16/2019     Lab Results   Component Value Date    LDLCALC 48 11/30/2021 [address] 08/24/2020 [address] 05/16/2019     Radiology Review:  Pertinent images / reports were reviewed as a part of this visit and reveals the following:    Myoview: Feb '22:  Summary    Normal myocardial perfusion study. Normal myocardial perfusion     Echo: 2/21/22:    Summary   -Normal global systolic function with an ejection fraction estimated at   55-60%. -No obvious regional wall motion abnormalities noted. -Mild septal hypertrophy.   -Normal diastolic function. Avg. E/e'=9.2   -Nodular sclerotic trileaflet aortic valve without evidence of significant   stenosis or regurgitation    Last Angiogram: April '11: [facility]  PCI of the RCA with BMS to mid (culprit) and proximal eccentric plaque. Inferior wall, HK; Low normal LVEF at 50%    Assessment:     1. Atherosclerosis of native coronary artery of native heart without angina pectoris  ~stable : denies angina equivalent   ~normal LVF by echo Feb '22  ~neg nuclear stress for ischemia Feb '22  ~s/p PTCA '11  ~ ASA / statin / BB    2. Primary hypertension   ~controlled   ~ carvedilol / amlodipine / HCTZ    3. Mixed hyperlipidemia   ~unchanged with last profile Nov '21  ~HDL low on last profile   ~last A1c 7.7% Sept '22  ~atorvastatin with diabetic agents (DM followed by PCP)      I had the opportunity to review the clinical symptoms and presentation of Troy Yu. Plan:     1. EKG : sinus rhythm   2. F/U in 6 months     Overall the patient is stable from CV standpoint    I have addresed the patient's cardiac risk factors and adjusted pharmacologic treatment as needed. In addition, I have reinforced the need for patient directed risk factor modification. Further evaluation will be based upon the patient's clinical course and testing results. All questions and concerns were addressed to the patient. Alternatives to my treatment were discussed. The patient is not currently smoking: quit April '11. The risks related to smoking were reviewed with the patient. Recommend maintaining a smoke-free lifestyle.      Patient is on a beta-blocker  Patient is not on an ARB / ace : intolerant  Patient is on a statin     Antiplatelet therapy has been recommended / prescribed for this patient. Education conducted on adverse reactions including bleeding was discussed. The patient verbalizes understanding not to stop medications without discussing with us. Discussed exercise: 30-60 minutes 7 days/week. Gym 3 x / week : lifting wts and uses stationary bike; can't tolerate using the treadmill d/t Lt knee discomfort ;  needing a knee replacement. Decided to go with Dr. Roberto Barbour. Venus Dan  Discussed Low saturated fat/NCC diet. SMBG 142 . A1c 7.7%    Thank you for allowing to us to participate in the care of Arnol Farley.

## 2022-10-11 NOTE — PROGRESS NOTES
[facility] Medicine Preoperative Evaluation       Akilah Garcia M.D.     [address] [facility], [address]     (phone) [phone]       (fax) [phone]    Dear Dr. Phillip Wiseman. Shalonda Duncan,          Thank you for referring David Harris to me for Preoperative Evaluation for left knee replacement surgery. Below are the relevant portions of my assessment and plan of care.     David Harris    67 y.o.   1955    [address] [facility] [address] [address]    Vitals:    10/12/22 0855   BP: 131/81   Site: Left Upper Arm   Position: Sitting   Cuff Size: Large Adult   Pulse: 76   Resp: 16   SpO2: 97%   Weight: 265 lb (120.2 kg)   Height: 6' 1\" (1.854 m)      Wt Readings from Last 2 Encounters:   10/12/22 265 lb (120.2 kg)   09/30/22 264 lb 9.6 oz (120 kg)     BP Readings from Last 3 Encounters:   10/12/22 131/81   09/30/22 130/80   09/19/22 135/85        Allergies   Allergen Reactions    Fexofenadine-Pseudoephed Er Other (See Comments)     cough    Lisinopril      cough    Morphine Hives    Zithromax [Azithromycin]      Heart palpitations     Losartan      Dry c ough     Current Outpatient Medications   Medication Sig Dispense Refill    carvedilol (COREG) 6.25 MG tablet TAKE 1 TABLET TWICE DAILY WITH MEALS 180 tablet 3    dapagliflozin (FARXIGA) 10 MG tablet Take 1 tablet by mouth every morning 90 tablet 3    amLODIPine (NORVASC) 10 MG tablet TAKE 1 TABLET EVERY DAY 90 tablet 3    glimepiride (AMARYL) 4 MG tablet Take 1 tablet by mouth 2 times daily (with meals) 180 tablet 3    metFORMIN (GLUCOPHAGE) 1000 MG tablet 1 tablet po bid with food 180 tablet 3    hydroCHLOROthiazide (HYDRODIURIL) 25 MG tablet TAKE 1 TABLET EVERY DAY 90 tablet 3    atorvastatin (LIPITOR) 40 MG tablet Take 1 tablet by mouth daily 90 tablet 3    aspirin 81 MG tablet Take 1 tablet by mouth daily 1 tablet 0    diphenhydrAMINE (BENADRYL) 25 MG tablet Take 25 mg by mouth nightly as needed for Allergies       Multiple Vitamins-Minerals (MULTIVITAMIN PO) Take by mouth daily        No current facility-administered medications for this visit. He presents to the office today for a preoperative consultation at the request of surgeon, Dr. Diane Kaur who plans on performing surgery on October 25 ,2022. The current problem has been longstanding but has worsened over the last year. Conservative therapy, including injections/PT, has failed. Planned anesthesia is General.  The patient has no known anesthesia issues. Patient has no bleeding risk. No loose teeth or dentures      Past Medical History:   Diagnosis Date    Abdominal aortic aneurysm (AAA), 30-34 mm diameter 3/23/2022    CAD (coronary artery disease)     DDD (degenerative disc disease), cervical 11/24/2021    Disc disorder     ED (erectile dysfunction)     Hyperlipidemia     Hyperlipidemia     Hypertension     MI (myocardial infarction) ([address]     Obstructive sleep apnea     Other disorders of kidney and ureter in diseases classified elsewhere     Pneumonia 1970s    Polyp of colon     Renal calculi     Type 2 diabetes mellitus without complication, without long-term current use of insulin ([address] 5/16/2019    Type 2 diabetes mellitus without complication, without long-term current use of insulin ([address] 5/16/2019    Type 2 diabetes mellitus without complication, without long-term current use of insulin ([address] 6/7/2021    Type 2 diabetes mellitus without complication, without long-term current use of insulin ([address] 6/7/2021    Type II or unspecified type diabetes mellitus without mention of complication, not stated as uncontrolled      Past Surgical History:   Procedure Laterality Date    ABDOMEN SURGERY      Exploratory Laparotomy    ARM SURGERY Left 10/27/2020    .  performed by Kamla Hood MD at [facility]      right and left    CARPAL TUNNEL RELEASE Left 10/27/2020    LEFT CARPAL TUNNEL RELEASE; LEFT INSITU CUBITAL TUNNEL RELEASE performed by Teodoro Sevilla MD at [address] [place] [address] N/A 2021    POSTERIOR CERVICAL DECOMPRESSION AND FUSION Janes 23, [facility], [address] ([address], [address], [facility], [address], [address] [facility], [address], [address], [facility], [address]) [employer], [place] performed by Lisette Peña MD at [place] [address]  13    COLONOSCOPY  13    polyps X 2 removed    CORONARY ANGIOPLASTY WITH STENT PLACEMENT      CYSTOSCOPY Left 14    CYSTOSCOPY, LEFT RETROGRADE PYELOGRAM, PLACEMENT OF LEFT    FRACTURE SURGERY      Ankle & Wrist    KNEE ARTHROSCOPY Right 7/15/2021    RIGHT KNEE ARTHROSCOPY PARTIAL MEDIAL MENISCECTOMY performed by Maria Eugenia Bautista MD at [place] [address]       Family History is not significant for reactions to anesthesia  Social History     Socioeconomic History    Marital status:      Spouse name: Not on file    Number of children: Not on file    Years of education: Not on file    Highest education level: Not on file   Occupational History    Not on file   Tobacco Use    Smoking status: Former     Packs/day: 0.25     Years: 30.00     Pack years: 7.50     Types: Cigarettes, Cigars     Quit date: 2011     Years since quittin.5    Smokeless tobacco: Never    Tobacco comments:     cigars every once in a while    Vaping Use    Vaping Use: Never used   Substance and Sexual Activity    Alcohol use:  Yes     Alcohol/week: 1.0 standard drink     Types: 1 Cans of beer per week     Comment: social    Drug use: No    Sexual activity: Not on file   Other Topics Concern    Not on file   Social History Narrative    Not on file     Social Determinants of Health     Financial Resource Strain: Low Risk     Difficulty of Paying Living Expenses: Not hard at all   Food Insecurity: No Food Insecurity    Worried About Running Out of Food in the Last Year: Never true    Ran Out of Food in the Last Year: Never true   Transportation Needs: Not on file   Physical Activity: Not on file   Stress: Not on file   Social Connections: Not on file   Intimate Partner Violence: Not on file   Housing Stability: Not on file       REVIEW OF SYSTEMS:   CONSTITUTIONAL: No major weight gain or loss, fatigue, weakness, night sweats or fever. HEENT: No new vision difficulties or ringing in the ears. RESPIRATORY: No new SOB, PND, orthopnea or cough. CARDIOVASCULAR: no CP, palpitations or SOB with exertion  GI: No nausea, vomiting, diarrhea, constipation, abdominal pain or changes in bowel habits. : No urinary frequency, urgency, incontinence hematuria or dysuria. SKIN: No cyanosis or skin lesions. MUSCULOSKELETAL: Osteoarthritis left knee/pain  NEUROLOGICAL: No syncope or TIA-like symptoms. PSYCHIATRIC: No anxiety, insomnia or depression     Physical Exam   /81 (Site: Left Upper Arm, Position: Sitting, Cuff Size: Large Adult)   Pulse 76   Resp 16   Ht 6' 1\" (1.854 m)   Wt 265 lb (120.2 kg)   SpO2 97%   BMI 34.96 kg/m²   Constitutional: Patient is oriented to person, place, and time. He appears in no distress. Head: Normocephalic and atraumatic. Right Ear: Not well seen due to wax in canal  Left Ear: Well seen due to wax and can  Nose: Nose normal.   Mouth/Throat: There is no cervical adenopathy. There are no loose teeth. Eyes: Conjunctivae and extraocular motions are normal. Pupils are equal, round, and reactive to light bilaterally. Neck: Neck supple. No JVD present. No mass and no thyromegaly present. Cardiovascular: Normal rate, regular rhythm, normal heart sounds and intact distal pulses. Exam reveals no gallop and no friction rub. No murmur heard. Pulmonary/Chest: Effort normal and breath sounds normal. No respiratory distress. There are no wheezes, rhonchi or rales. Abdominal: Soft, non-tender. BMI noted, normal bowel sounds and aorta. There is no organomegaly, bruit or palpable mass.    Neurological: He is alert and oriented to person, place, and time. He has normal reflexes. No cranial nerve deficit. Coordination normal.   Skin: Skin is warm and dry. There is no rash or erythema. No suspicious lesions noted. Psychiatric: He has a normal mood and affect. Speech and behavior are normal. Judgment, cognition and memory are normal.     Updated labs:    Lab Results   Component Value Date    APTT 32.7 10/12/2022        Lab Results   Component Value Date    INR 0.92 10/12/2022    INR 0.90 09/15/2022    INR 0.93 11/30/2021    PROTIME 12.3 10/12/2022    PROTIME 12.0 09/15/2022    PROTIME 10.5 11/30/2021        Hemoglobin A1C   Date Value Ref Range Status   10/12/2022 7.5 See comment % Final     Comment:     Comment:  Diagnosis of Diabetes: > or = 6.5%  Increased risk of diabetes (Prediabetes): 5.7-6.4%  Glycemic Control: Nonpregnant Adults: <7.0%                    Pregnant: <6.0%            Lab Results   Component Value Date    CREATININE 1.0 10/12/2022      Lab Results   Component Value Date     10/12/2022    K 4.3 10/12/2022    CL 99 10/12/2022    CO2 26 10/12/2022        Lab Results   Component Value Date    WBC 9.0 10/12/2022    HGB 14.3 10/12/2022    HCT 42.7 10/12/2022    MCV 86.3 10/12/2022     10/12/2022        EKG Interpretation: 9/30/22   NSR, no acute changes/attached  Comparison to prior EKG: unchanged    Has been seen and cleared by cardiology  Nuclear med stress test February 2022 was unremarkable/attached       Assessment:  Encounter Diagnoses   Name Primary? Preop examination Yes    Primary osteoarthritis of left knee     Type 2 diabetes mellitus without complication, without long-term current use of insulin (HCC)     Primary hypertension     Coronary artery disease involving native coronary artery of native heart without angina pectoris     Flu vaccine need         74 y.o. patient with planned surgery as above. Known risk factors for perioperative complications: Coronary disease  Diabetes mellitus        Plan:    1. Preoperative workup as follows: ECG, hemoglobin, hematocrit, electrolytes, creatinine, glucose, liver function studies, coagulation studies. 2. Change in medication regimen before surgery: discontinue NSAIDs (asa+ fish oil ) 7d before surgery. 3. Prophylaxis for cardiac events with perioperative beta-blockers: On Coreg twice a day. 4. Invasive hemodynamic monitoring perioperatively: not indicated. 5. Patient is cleared for upcoming surgery, repeat labs will be performed here in the office      If you have questions, please do not hesitate to call me. Sincerely,        Mohini Smallwood M.D.    30 Total minutes spent precharting(reviewing problem list, meds, any test results, consultant and hospital notes) and obtaining present visit history, performing appropriate medical examination, counseling and educating the patient(and family), ordering medications, completing preop testing and labs, coordinating care for this patient and documenting in electronic health record

## 2022-10-12 ENCOUNTER — OFFICE VISIT (OUTPATIENT)
Dept: FAMILY MEDICINE CLINIC | Age: 67
End: 2022-10-12
Payer: MEDICARE

## 2022-10-12 VITALS
BODY MASS INDEX: 35.12 KG/M2 | OXYGEN SATURATION: 97 % | HEIGHT: 73 IN | WEIGHT: 265 LBS | SYSTOLIC BLOOD PRESSURE: 131 MMHG | DIASTOLIC BLOOD PRESSURE: 81 MMHG | RESPIRATION RATE: 16 BRPM | HEART RATE: 76 BPM

## 2022-10-12 DIAGNOSIS — I10 PRIMARY HYPERTENSION: ICD-10-CM

## 2022-10-12 DIAGNOSIS — Z01.818 PREOP EXAMINATION: ICD-10-CM

## 2022-10-12 DIAGNOSIS — I25.10 CORONARY ARTERY DISEASE INVOLVING NATIVE CORONARY ARTERY OF NATIVE HEART WITHOUT ANGINA PECTORIS: ICD-10-CM

## 2022-10-12 DIAGNOSIS — Z23 FLU VACCINE NEED: ICD-10-CM

## 2022-10-12 DIAGNOSIS — Z01.818 PREOP EXAMINATION: Primary | ICD-10-CM

## 2022-10-12 DIAGNOSIS — E11.9 TYPE 2 DIABETES MELLITUS WITHOUT COMPLICATION, WITHOUT LONG-TERM CURRENT USE OF INSULIN (HCC): ICD-10-CM

## 2022-10-12 DIAGNOSIS — M17.12 PRIMARY OSTEOARTHRITIS OF LEFT KNEE: ICD-10-CM

## 2022-10-12 LAB
A/G RATIO: 2.2 (ref 1.1–2.2)
ALBUMIN SERPL-MCNC: 4.9 G/DL (ref 3.4–5)
ALP BLD-CCNC: 64 U/L (ref 40–129)
ALT SERPL-CCNC: 73 U/L (ref 10–40)
ANION GAP SERPL CALCULATED.3IONS-SCNC: 15 MMOL/L (ref 3–16)
APTT: 32.7 SEC (ref 23–34.3)
AST SERPL-CCNC: 40 U/L (ref 15–37)
BASOPHILS ABSOLUTE: 0 K/UL (ref 0–0.2)
BASOPHILS RELATIVE PERCENT: 0.5 %
BILIRUB SERPL-MCNC: 0.5 MG/DL (ref 0–1)
BUN BLDV-MCNC: 15 MG/DL (ref 7–20)
CALCIUM SERPL-MCNC: 9.6 MG/DL (ref 8.3–10.6)
CHLORIDE BLD-SCNC: 99 MMOL/L (ref 99–110)
CO2: 26 MMOL/L (ref 21–32)
CREAT SERPL-MCNC: 1 MG/DL (ref 0.8–1.3)
EOSINOPHILS ABSOLUTE: 0.3 K/UL (ref 0–0.6)
EOSINOPHILS RELATIVE PERCENT: 3.1 %
GFR AFRICAN AMERICAN: >60
GFR NON-AFRICAN AMERICAN: >60
GLUCOSE BLD-MCNC: 152 MG/DL (ref 70–99)
HCT VFR BLD CALC: 42.7 % (ref 40.5–52.5)
HEMOGLOBIN: 14.3 G/DL (ref 13.5–17.5)
INR BLD: 0.92 (ref 0.87–1.14)
LYMPHOCYTES ABSOLUTE: 2.2 K/UL (ref 1–5.1)
LYMPHOCYTES RELATIVE PERCENT: 24 %
MCH RBC QN AUTO: 28.9 PG (ref 26–34)
MCHC RBC AUTO-ENTMCNC: 33.5 G/DL (ref 31–36)
MCV RBC AUTO: 86.3 FL (ref 80–100)
MONOCYTES ABSOLUTE: 0.7 K/UL (ref 0–1.3)
MONOCYTES RELATIVE PERCENT: 8 %
NEUTROPHILS ABSOLUTE: 5.8 K/UL (ref 1.7–7.7)
NEUTROPHILS RELATIVE PERCENT: 64.4 %
PDW BLD-RTO: 14.5 % (ref 12.4–15.4)
PLATELET # BLD: 198 K/UL (ref 135–450)
PMV BLD AUTO: 7.8 FL (ref 5–10.5)
POTASSIUM SERPL-SCNC: 4.3 MMOL/L (ref 3.5–5.1)
PROTHROMBIN TIME: 12.3 SEC (ref 11.7–14.5)
RBC # BLD: 4.95 M/UL (ref 4.2–5.9)
SODIUM BLD-SCNC: 140 MMOL/L (ref 136–145)
TOTAL PROTEIN: 7.1 G/DL (ref 6.4–8.2)
WBC # BLD: 9 K/UL (ref 4–11)

## 2022-10-12 PROCEDURE — 1123F ACP DISCUSS/DSCN MKR DOCD: CPT | Performed by: FAMILY MEDICINE

## 2022-10-12 PROCEDURE — G0008 ADMIN INFLUENZA VIRUS VAC: HCPCS | Performed by: FAMILY MEDICINE

## 2022-10-12 PROCEDURE — 90694 VACC AIIV4 NO PRSRV 0.5ML IM: CPT | Performed by: FAMILY MEDICINE

## 2022-10-12 PROCEDURE — 3051F HG A1C>EQUAL 7.0%<8.0%: CPT | Performed by: FAMILY MEDICINE

## 2022-10-12 PROCEDURE — 99214 OFFICE O/P EST MOD 30 MIN: CPT | Performed by: FAMILY MEDICINE

## 2022-10-12 ASSESSMENT — PATIENT HEALTH QUESTIONNAIRE - PHQ9
SUM OF ALL RESPONSES TO PHQ QUESTIONS 1-9: 0
2. FEELING DOWN, DEPRESSED OR HOPELESS: 0
SUM OF ALL RESPONSES TO PHQ9 QUESTIONS 1 & 2: 0
1. LITTLE INTEREST OR PLEASURE IN DOING THINGS: 0
SUM OF ALL RESPONSES TO PHQ QUESTIONS 1-9: 0

## 2022-10-13 LAB
ESTIMATED AVERAGE GLUCOSE: 168.6 MG/DL
HBA1C MFR BLD: 7.5 %

## 2022-12-18 PROBLEM — G95.9 CERVICAL MYELOPATHY (HCC): Status: ACTIVE | Noted: 2022-12-18

## 2022-12-19 ENCOUNTER — OFFICE VISIT (OUTPATIENT)
Dept: FAMILY MEDICINE CLINIC | Age: 67
End: 2022-12-19
Payer: MEDICARE

## 2022-12-19 VITALS
DIASTOLIC BLOOD PRESSURE: 80 MMHG | SYSTOLIC BLOOD PRESSURE: 120 MMHG | OXYGEN SATURATION: 95 % | BODY MASS INDEX: 34.83 KG/M2 | WEIGHT: 264 LBS | HEART RATE: 86 BPM

## 2022-12-19 DIAGNOSIS — G95.9 CERVICAL MYELOPATHY (HCC): Primary | ICD-10-CM

## 2022-12-19 DIAGNOSIS — I10 PRIMARY HYPERTENSION: ICD-10-CM

## 2022-12-19 DIAGNOSIS — E11.9 TYPE 2 DIABETES MELLITUS WITHOUT COMPLICATION, WITHOUT LONG-TERM CURRENT USE OF INSULIN (HCC): ICD-10-CM

## 2022-12-19 DIAGNOSIS — G47.33 OBSTRUCTIVE SLEEP APNEA: ICD-10-CM

## 2022-12-19 DIAGNOSIS — I25.10 CORONARY ARTERY DISEASE INVOLVING NATIVE CORONARY ARTERY OF NATIVE HEART WITHOUT ANGINA PECTORIS: ICD-10-CM

## 2022-12-19 PROCEDURE — 3078F DIAST BP <80 MM HG: CPT | Performed by: FAMILY MEDICINE

## 2022-12-19 PROCEDURE — 3051F HG A1C>EQUAL 7.0%<8.0%: CPT | Performed by: FAMILY MEDICINE

## 2022-12-19 PROCEDURE — 3074F SYST BP LT 130 MM HG: CPT | Performed by: FAMILY MEDICINE

## 2022-12-19 PROCEDURE — 1123F ACP DISCUSS/DSCN MKR DOCD: CPT | Performed by: FAMILY MEDICINE

## 2022-12-19 PROCEDURE — 99214 OFFICE O/P EST MOD 30 MIN: CPT | Performed by: FAMILY MEDICINE

## 2022-12-19 NOTE — PROGRESS NOTES
Ron Borrero is a 74 y.o. male. HPI:still  in PT for LTKR  Nocturia x4-5, very annoying. It is happening despite stopping all alcohol and cutting Afternoon and cutting water off by 7 PM  Suspect it is due to [address] which otherwise has been helping him as she feels better his feet and hands do not tingles much and his weights down slightly  Not Using his CPAP machine and according to his wife he is not snoring  Meds, vitamins and allergies reviewed with pt    ROS: No TIA's or unusual headaches, no dysphagia. No prolonged cough. No dyspnea or chest pain on exertion. No abdominal pain, change in bowel habits, black or bloody stools. No urinary tract symptoms. No new or unusual musculoskeletal symptoms. Prior to Visit Medications    Medication Sig Taking?  Authorizing Provider   carvedilol (COREG) 6.25 MG tablet TAKE 1 TABLET TWICE DAILY WITH MEALS Yes Cherry Manzano MD   dapagliflozin (FARXIGA) 10 MG tablet Take 1 tablet by mouth every morning Yes Cherry Manzano MD   amLODIPine (NORVASC) 10 MG tablet TAKE 1 TABLET EVERY DAY Yes Cherry Manzano MD   glimepiride (AMARYL) 4 MG tablet Take 1 tablet by mouth 2 times daily (with meals) Yes Cherry Manzano MD   metFORMIN (GLUCOPHAGE) 1000 MG tablet 1 tablet po bid with food Yes Cherry Manzano MD   hydroCHLOROthiazide (HYDRODIURIL) 25 MG tablet TAKE 1 TABLET EVERY DAY Yes Cherry Manzano MD   atorvastatin (LIPITOR) 40 MG tablet Take 1 tablet by mouth daily Yes Cherry Manzano MD   aspirin 81 MG tablet Take 1 tablet by mouth daily Yes KWAKU Castellanos - CNP   diphenhydrAMINE (BENADRYL) 25 MG tablet Take 25 mg by mouth nightly as needed for Allergies  Yes Historical Provider, MD       Past Medical History:   Diagnosis Date    Abdominal aortic aneurysm (AAA), 30-34 mm diameter 3/23/2022    CAD (coronary artery disease)     DDD (degenerative disc disease), cervical 11/24/2021    Disc disorder     ED (erectile dysfunction) Hyperlipidemia     Hyperlipidemia     Hypertension     Hypertension     MI (myocardial infarction) ([address]     Obstructive sleep apnea     Other disorders of kidney and ureter in diseases classified elsewhere     Pneumonia 1970s    Polyp of colon     Renal calculi     Type 2 diabetes mellitus without complication, without long-term current use of insulin ([address] 2019    Type 2 diabetes mellitus without complication, without long-term current use of insulin ([address] 2019    Type 2 diabetes mellitus without complication, without long-term current use of insulin ([address] 2021    Type 2 diabetes mellitus without complication, without long-term current use of insulin ([address] 2021    Type II or unspecified type diabetes mellitus without mention of complication, not stated as uncontrolled        Social History     Tobacco Use    Smoking status: Former     Packs/day: 0.25     Years: 30.00     Pack years: 7.50     Types: Cigarettes, Cigars     Quit date: 2011     Years since quittin.7    Smokeless tobacco: Never    Tobacco comments:     cigars every once in a while    Vaping Use    Vaping Use: Never used   Substance Use Topics    Alcohol use: Yes     Alcohol/week: 1.0 standard drink     Types: 1 Cans of beer per week     Comment: social    Drug use: No       Family History   Problem Relation Age of Onset    Diabetes Father     Mental Illness Mother     High Blood Pressure Brother     Heart Disease Brother     High Cholesterol Neg Hx        Allergies   Allergen Reactions    Fexofenadine-Pseudoephed Er Other (See Comments)     cough    Lisinopril      cough    Morphine Hives    Zithromax [Azithromycin]      Heart palpitations     Losartan      Dry c ough       OBJECTIVE:  /80   Pulse 86   Wt 264 lb (119.7 kg)   SpO2 95%   BMI 34.83 kg/m²   GEN:  in NAD, obese but weight has come down from 300 pounds slowly over the last year to  NECK:  Supple without adenopathy.   Bruits  CV:  Regular rate and rhythm, S1 and S2 normal, no murmurs, clicks  PULM:  Chest is clear, no wheezing ,  symmetric air entry throughout both lung fields. EXT: No rash or edema  NEURO: nonfocal  Hemoglobin A1C   Date Value Ref Range Status   10/12/2022 7.5 See comment % Final     Comment:     Comment:  Diagnosis of Diabetes: > or = 6.5%  Increased risk of diabetes (Prediabetes): 5.7-6.4%  Glycemic Control: Nonpregnant Adults: <7.0%                    Pregnant: <6.0%          Lab Results   Component Value Date    CHOL 108 11/30/2021    CHOL 148 08/24/2020    CHOL 132 05/16/2019     Lab Results   Component Value Date    TRIG 139 11/30/2021    TRIG 240 (H) 08/24/2020    TRIG 168 (H) 05/16/2019     Lab Results   Component Value Date    HDL 32 (L) 11/30/2021    HDL 38 (L) 08/24/2020    HDL 37 (L) 05/16/2019     Lab Results   Component Value Date    LDLCALC 48 11/30/2021    LDLCALC 62 08/24/2020    LDLCALC 61 05/16/2019     Lab Results   Component Value Date    LABVLDL 28 11/30/2021    LABVLDL 48 08/24/2020    LABVLDL 34 05/16/2019     Lab Results   Component Value Date    CHOLHDLRATIO 4.5 07/14/2018    CHOLHDLRATIO 3.2 11/13/2015    CHOLHDLRATIO 3.9 10/29/2014      ASSESSMENT/PLAN:  1. Cervical myelopathy (HCC)  S/p cspine surgery and better    2. Type 2 diabetes mellitus without complication, without long-term current use of insulin ([address]  On farxiga, metformin, amaryl  Nocturia x5 - hold farxiga and observe     3. Primary hypertension  Stable on norvasc, coreg, hctz    4. Obstructive sleep apnea  Not using cpap   Further wt loss    5.  Coronary artery disease involving native coronary artery of native heart without angina pectoris  Stable, continue aggressive risk factor modification    6 oa left knee - s/p LTKR  Physical therapy    7 elevated lft  Further wt loss  Ronnie LFTs and other labs in 3 months follow-up visit    8 IMM UTD

## 2023-01-05 DIAGNOSIS — I10 ESSENTIAL HYPERTENSION: ICD-10-CM

## 2023-01-05 RX ORDER — PIOGLITAZONEHYDROCHLORIDE 45 MG/1
45 TABLET ORAL DAILY
Qty: 30 TABLET | Refills: 3 | Status: SHIPPED | OUTPATIENT
Start: 2023-01-05

## 2023-01-06 RX ORDER — AMLODIPINE BESYLATE 10 MG/1
TABLET ORAL
Qty: 90 TABLET | Refills: 3 | Status: SHIPPED | OUTPATIENT
Start: 2023-01-06

## 2023-01-06 RX ORDER — HYDROCHLOROTHIAZIDE 25 MG/1
TABLET ORAL
Qty: 90 TABLET | Refills: 3 | Status: SHIPPED | OUTPATIENT
Start: 2023-01-06

## 2023-01-06 RX ORDER — ATORVASTATIN CALCIUM 40 MG/1
40 TABLET, FILM COATED ORAL DAILY
Qty: 90 TABLET | Refills: 3 | Status: SHIPPED | OUTPATIENT
Start: 2023-01-06

## 2023-01-06 RX ORDER — GLIMEPIRIDE 4 MG/1
4 TABLET ORAL 2 TIMES DAILY WITH MEALS
Qty: 180 TABLET | Refills: 3 | Status: SHIPPED | OUTPATIENT
Start: 2023-01-06

## 2023-01-06 RX ORDER — CARVEDILOL 6.25 MG/1
TABLET ORAL
Qty: 180 TABLET | Refills: 3 | Status: SHIPPED | OUTPATIENT
Start: 2023-01-06

## 2023-01-06 NOTE — TELEPHONE ENCOUNTER
Medication:   Requested Prescriptions     Pending Prescriptions Disp Refills    amLODIPine (NORVASC) 10 MG tablet 90 tablet 3     Sig: TAKE 1 TABLET EVERY DAY    glimepiride (AMARYL) 4 MG tablet 180 tablet 3     Sig: Take 1 tablet by mouth 2 times daily (with meals)    metFORMIN (GLUCOPHAGE) 1000 MG tablet 180 tablet 3     Si tablet po bid with food    hydroCHLOROthiazide (HYDRODIURIL) 25 MG tablet 90 tablet 3     Sig: TAKE 1 TABLET EVERY DAY    atorvastatin (LIPITOR) 40 MG tablet 90 tablet 3     Sig: Take 1 tablet by mouth daily    carvedilol (COREG) 6.25 MG tablet 180 tablet 3     Sig: TAKE 1 TABLET TWICE DAILY WITH MEALS       Last Filled:  3/10/2022    Patient Phone Number: [phone] (home) [phone] (work)    Last appt: 2022   Next appt: 3/20/2023    Lab Results   Component Value Date     10/12/2022    K 4.3 10/12/2022    CL 99 10/12/2022    CO2 26 10/12/2022    BUN 15 10/12/2022    CREATININE 1.0 10/12/2022    GLUCOSE 152 (H) 10/12/2022    CALCIUM 9.6 10/12/2022    PROT 7.1 10/12/2022    LABALBU 4.9 10/12/2022    BILITOT 0.5 10/12/2022    ALKPHOS 64 10/12/2022    AST 40 (H) 10/12/2022    ALT 73 (H) 10/12/2022    LABGLOM >60 10/12/2022    GFRAA >60 10/12/2022    AGRATIO 2.2 10/12/2022    GLOB 2.5 2020

## 2023-01-12 RX ORDER — CARVEDILOL 6.25 MG/1
TABLET ORAL
Qty: 180 TABLET | Refills: 3 | OUTPATIENT
Start: 2023-01-12

## 2023-02-13 ENCOUNTER — PATIENT MESSAGE (OUTPATIENT)
Dept: FAMILY MEDICINE CLINIC | Age: 68
End: 2023-02-13

## 2023-02-16 RX ORDER — CANAGLIFLOZIN 300 MG/1
300 TABLET, FILM COATED ORAL
Qty: 90 TABLET | Refills: 1 | Status: SHIPPED | OUTPATIENT
Start: 2023-02-16

## 2023-02-16 NOTE — TELEPHONE ENCOUNTER
From: Juan Perry  To: Dr. Cobos Said: 2/13/2023 8:13 PM EST  Subject: Prescription change    Doc  I have been taking Pioglitazone for about 2 months and this pill makes me hungry that is hard to control. I quit taking it about a week ago and started back on Invokina I feel better and not at all hungry . so I would like to continue Invokina but I would need a scrip called in for it   Thanks.

## 2023-03-20 ENCOUNTER — OFFICE VISIT (OUTPATIENT)
Dept: FAMILY MEDICINE CLINIC | Age: 68
End: 2023-03-20
Payer: MEDICARE

## 2023-03-20 VITALS
WEIGHT: 266 LBS | DIASTOLIC BLOOD PRESSURE: 70 MMHG | SYSTOLIC BLOOD PRESSURE: 118 MMHG | BODY MASS INDEX: 35.09 KG/M2 | HEART RATE: 79 BPM | OXYGEN SATURATION: 95 %

## 2023-03-20 DIAGNOSIS — I10 PRIMARY HYPERTENSION: ICD-10-CM

## 2023-03-20 DIAGNOSIS — G47.33 OBSTRUCTIVE SLEEP APNEA: ICD-10-CM

## 2023-03-20 DIAGNOSIS — I71.40 ABDOMINAL AORTIC ANEURYSM (AAA), 30-34 MM DIAMETER (HCC): Primary | ICD-10-CM

## 2023-03-20 DIAGNOSIS — G95.9 CERVICAL MYELOPATHY (HCC): ICD-10-CM

## 2023-03-20 DIAGNOSIS — E11.9 TYPE 2 DIABETES MELLITUS WITHOUT COMPLICATION, WITHOUT LONG-TERM CURRENT USE OF INSULIN (HCC): ICD-10-CM

## 2023-03-20 DIAGNOSIS — I73.9 PVD (PERIPHERAL VASCULAR DISEASE) (HCC): ICD-10-CM

## 2023-03-20 DIAGNOSIS — E78.5 DYSLIPIDEMIA: ICD-10-CM

## 2023-03-20 DIAGNOSIS — M79.601 PAIN IN BOTH UPPER EXTREMITIES: ICD-10-CM

## 2023-03-20 DIAGNOSIS — M79.602 PAIN IN BOTH UPPER EXTREMITIES: ICD-10-CM

## 2023-03-20 LAB — HBA1C MFR BLD: 6.9 %

## 2023-03-20 PROCEDURE — 3074F SYST BP LT 130 MM HG: CPT | Performed by: FAMILY MEDICINE

## 2023-03-20 PROCEDURE — 3044F HG A1C LEVEL LT 7.0%: CPT | Performed by: FAMILY MEDICINE

## 2023-03-20 PROCEDURE — 83036 HEMOGLOBIN GLYCOSYLATED A1C: CPT | Performed by: FAMILY MEDICINE

## 2023-03-20 PROCEDURE — 3078F DIAST BP <80 MM HG: CPT | Performed by: FAMILY MEDICINE

## 2023-03-20 PROCEDURE — 1123F ACP DISCUSS/DSCN MKR DOCD: CPT | Performed by: FAMILY MEDICINE

## 2023-03-20 PROCEDURE — 99214 OFFICE O/P EST MOD 30 MIN: CPT | Performed by: FAMILY MEDICINE

## 2023-03-20 ASSESSMENT — PATIENT HEALTH QUESTIONNAIRE - PHQ9
SUM OF ALL RESPONSES TO PHQ QUESTIONS 1-9: 0
SUM OF ALL RESPONSES TO PHQ9 QUESTIONS 1 & 2: 0
2. FEELING DOWN, DEPRESSED OR HOPELESS: 0
1. LITTLE INTEREST OR PLEASURE IN DOING THINGS: 0
SUM OF ALL RESPONSES TO PHQ QUESTIONS 1-9: 0

## 2023-03-20 NOTE — PROGRESS NOTES
Hyperlipidemia     Hyperlipidemia     Hypertension     Hypertension     MI (myocardial infarction) ([address]     Obstructive sleep apnea     Other disorders of kidney and ureter in diseases classified elsewhere     Pneumonia 1970s    Polyp of colon     PVD (peripheral vascular disease) ([address] 2011    Renal calculi     Type 2 diabetes mellitus without complication, without long-term current use of insulin ([address] 2019    Type 2 diabetes mellitus without complication, without long-term current use of insulin ([facility] 2019    Type 2 diabetes mellitus without complication, without long-term current use of insulin ([address] 2021    Type 2 diabetes mellitus without complication, without long-term current use of insulin ([facility] 2021    Type 2 diabetes mellitus without complication, without long-term current use of insulin ([facility] 2021    Type II or unspecified type diabetes mellitus without mention of complication, not stated as uncontrolled        Social History     Tobacco Use    Smoking status: Former     Packs/day: 0.25     Years: 30.00     Pack years: 7.50     Types: Cigarettes, Cigars     Quit date: 2011     Years since quittin.9    Smokeless tobacco: Never    Tobacco comments:     cigars every once in a while    Vaping Use    Vaping Use: Never used   Substance Use Topics    Alcohol use:  Yes     Alcohol/week: 1.0 standard drink     Types: 1 Cans of beer per week     Comment: social    Drug use: No       Family History   Problem Relation Age of Onset    Diabetes Father     Mental Illness Mother     High Blood Pressure Brother     Heart Disease Brother     High Cholesterol Neg Hx        Allergies   Allergen Reactions    Fexofenadine-Pseudoephed Er Other (See Comments)     cough    Lisinopril      cough    Morphine Hives    Zithromax [Azithromycin]      Heart palpitations     Losartan      Dry c ough       OBJECTIVE:  /70   Pulse 79   Wt 266 lb (120.7 kg)   SpO2 95%   BMI 35.09 kg/m²   GEN:

## 2023-05-12 ENCOUNTER — APPOINTMENT (RX ONLY)
Dept: URBAN - METROPOLITAN AREA CLINIC 170 | Facility: CLINIC | Age: 68
Setting detail: DERMATOLOGY
End: 2023-05-12

## 2023-05-12 DIAGNOSIS — D22 MELANOCYTIC NEVI: ICD-10-CM

## 2023-05-12 DIAGNOSIS — L57.0 ACTINIC KERATOSIS: ICD-10-CM

## 2023-05-12 DIAGNOSIS — L81.4 OTHER MELANIN HYPERPIGMENTATION: ICD-10-CM

## 2023-05-12 DIAGNOSIS — D18.0 HEMANGIOMA: ICD-10-CM

## 2023-05-12 DIAGNOSIS — L82.1 OTHER SEBORRHEIC KERATOSIS: ICD-10-CM

## 2023-05-12 PROBLEM — D22.5 MELANOCYTIC NEVI OF TRUNK: Status: ACTIVE | Noted: 2023-05-12

## 2023-05-12 PROBLEM — D18.01 HEMANGIOMA OF SKIN AND SUBCUTANEOUS TISSUE: Status: ACTIVE | Noted: 2023-05-12

## 2023-05-12 PROCEDURE — 17000 DESTRUCT PREMALG LESION: CPT

## 2023-05-12 PROCEDURE — ? COUNSELING

## 2023-05-12 PROCEDURE — ? TREATMENT REGIMEN

## 2023-05-12 PROCEDURE — 17003 DESTRUCT PREMALG LES 2-14: CPT

## 2023-05-12 PROCEDURE — ? LIQUID NITROGEN

## 2023-05-12 PROCEDURE — ? FULL BODY SKIN EXAM

## 2023-05-12 PROCEDURE — 99213 OFFICE O/P EST LOW 20 MIN: CPT | Mod: 25

## 2023-05-12 ASSESSMENT — LOCATION DETAILED DESCRIPTION DERM
LOCATION DETAILED: EPIGASTRIC SKIN
LOCATION DETAILED: PERIUMBILICAL SKIN
LOCATION DETAILED: LEFT SUPERIOR MEDIAL MIDBACK
LOCATION DETAILED: LEFT SUPERIOR MEDIAL FOREHEAD
LOCATION DETAILED: RIGHT CENTRAL FRONTAL SCALP
LOCATION DETAILED: RIGHT MEDIAL FOREHEAD
LOCATION DETAILED: LEFT MEDIAL FRONTAL SCALP
LOCATION DETAILED: RIGHT MID-UPPER BACK

## 2023-05-12 ASSESSMENT — LOCATION SIMPLE DESCRIPTION DERM
LOCATION SIMPLE: RIGHT FOREHEAD
LOCATION SIMPLE: RIGHT UPPER BACK
LOCATION SIMPLE: LEFT SCALP
LOCATION SIMPLE: LEFT FOREHEAD
LOCATION SIMPLE: ABDOMEN
LOCATION SIMPLE: RIGHT SCALP
LOCATION SIMPLE: LEFT LOWER BACK

## 2023-05-12 ASSESSMENT — LOCATION ZONE DERM
LOCATION ZONE: TRUNK
LOCATION ZONE: SCALP
LOCATION ZONE: FACE

## 2023-05-12 NOTE — PROCEDURE: MIPS QUALITY
Quality 111:Pneumonia Vaccination Status For Older Adults: Patient received any pneumococcal conjugate or polysaccharide vaccine on or after their 60th birthday and before the end of the measurement period
Quality 130: Documentation Of Current Medications In The Medical Record: Current Medications Documented
Quality 431: Preventive Care And Screening: Unhealthy Alcohol Use - Screening: Patient not identified as an unhealthy alcohol user when screened for unhealthy alcohol use using a systematic screening method
Quality 47: Advance Care Plan: Advance Care Planning discussed and documented in the medical record; patient did not wish or was not able to name a surrogate decision maker or provide an advance care plan.
Quality 110: Preventive Care And Screening: Influenza Immunization: Influenza Immunization Administered during Influenza season
Detail Level: Detailed
Quality 226: Preventive Care And Screening: Tobacco Use: Screening And Cessation Intervention: Patient screened for tobacco use and is an ex/non-smoker

## 2023-05-12 NOTE — PROCEDURE: LIQUID NITROGEN
Number Of Freeze-Thaw Cycles: 1 freeze-thaw cycle
Detail Level: Detailed
Post-Care Instructions: I reviewed with the patient in detail post-care instructions. Patient is to wear sunprotection, and avoid picking at any of the treated lesions. Pt may apply Vaseline to crusted or scabbing areas.
Duration Of Freeze Thaw-Cycle (Seconds): 5
Show Applicator Variable?: Yes
Render Note In Bullet Format When Appropriate: No
Consent: The patient's consent was obtained including but not limited to risks of crusting, scabbing, blistering, scarring, darker or lighter pigmentary change, recurrence, incomplete removal and infection.

## 2023-05-24 RX ORDER — TAMSULOSIN HYDROCHLORIDE 0.4 MG/1
CAPSULE ORAL
Qty: 90 CAPSULE | Refills: 1 | Status: SHIPPED | OUTPATIENT
Start: 2023-05-24

## 2023-06-05 ENCOUNTER — OFFICE VISIT (OUTPATIENT)
Dept: CARDIOLOGY CLINIC | Age: 68
End: 2023-06-05
Payer: MEDICARE

## 2023-06-05 VITALS
SYSTOLIC BLOOD PRESSURE: 124 MMHG | HEART RATE: 82 BPM | WEIGHT: 269 LBS | BODY MASS INDEX: 36.44 KG/M2 | HEIGHT: 72 IN | DIASTOLIC BLOOD PRESSURE: 70 MMHG | OXYGEN SATURATION: 98 %

## 2023-06-05 DIAGNOSIS — I25.10 CORONARY ARTERY DISEASE INVOLVING NATIVE CORONARY ARTERY OF NATIVE HEART WITHOUT ANGINA PECTORIS: Primary | ICD-10-CM

## 2023-06-05 DIAGNOSIS — I10 PRIMARY HYPERTENSION: ICD-10-CM

## 2023-06-05 DIAGNOSIS — E78.5 DYSLIPIDEMIA: ICD-10-CM

## 2023-06-05 PROCEDURE — 93000 ELECTROCARDIOGRAM COMPLETE: CPT | Performed by: INTERNAL MEDICINE

## 2023-06-05 PROCEDURE — 3074F SYST BP LT 130 MM HG: CPT | Performed by: INTERNAL MEDICINE

## 2023-06-05 PROCEDURE — 3078F DIAST BP <80 MM HG: CPT | Performed by: INTERNAL MEDICINE

## 2023-06-05 PROCEDURE — 1123F ACP DISCUSS/DSCN MKR DOCD: CPT | Performed by: INTERNAL MEDICINE

## 2023-06-05 PROCEDURE — 99214 OFFICE O/P EST MOD 30 MIN: CPT | Performed by: INTERNAL MEDICINE

## 2023-06-05 RX ORDER — OLOPATADINE HYDROCHLORIDE 1 MG/ML
1 SOLUTION/ DROPS OPHTHALMIC 2 TIMES DAILY
Qty: 1 EACH | Refills: 2 | Status: SHIPPED | OUTPATIENT
Start: 2023-06-05 | End: 2023-07-05

## 2023-06-05 RX ORDER — CETIRIZINE HYDROCHLORIDE 10 MG/1
10 TABLET ORAL DAILY
Qty: 30 TABLET | Refills: 0 | Status: SHIPPED | OUTPATIENT
Start: 2023-06-05 | End: 2023-07-05

## 2023-06-05 NOTE — PATIENT INSTRUCTIONS
Have cholesterol done with Dr Humphrey Phillip are ok for knee surgery, let us know once it is scheduled and we can send a letter or fill out a form.    Follow up next year with [facility]

## 2023-06-09 RX ORDER — AMOXICILLIN AND CLAVULANATE POTASSIUM 875; 125 MG/1; MG/1
1 TABLET, FILM COATED ORAL 2 TIMES DAILY
Qty: 14 TABLET | Refills: 0 | Status: SHIPPED | OUTPATIENT
Start: 2023-06-09 | End: 2023-06-16

## 2023-06-19 ENCOUNTER — PATIENT MESSAGE (OUTPATIENT)
Dept: FAMILY MEDICINE CLINIC | Age: 68
End: 2023-06-19

## 2023-06-20 NOTE — TELEPHONE ENCOUNTER
From: Mandy Head  To: Dr. Malik Sales: 6/19/2023 8:45 PM EDT  Subject: Dr Taylor Stephenson    I still have this aggravating cough I had this now 5 or 6 weeks i have a lot of drainage and eating Apurva-seltzer to help day time and night time. I have taken all (Amox/k clav) and cetirizine with no relief. Also quick taking the(tamsulosin) that drug took all the manhood i had left away. Need to get better .

## 2023-06-20 NOTE — TELEPHONE ENCOUNTER
Spoke to pt to schedule him and he stated he will wait until his appointment 7/5/2023 with Dr Stalin Vanegas because he knows him better.

## 2023-07-05 ENCOUNTER — OFFICE VISIT (OUTPATIENT)
Dept: FAMILY MEDICINE CLINIC | Age: 68
End: 2023-07-05
Payer: MEDICARE

## 2023-07-05 VITALS
SYSTOLIC BLOOD PRESSURE: 114 MMHG | HEART RATE: 82 BPM | WEIGHT: 263.4 LBS | BODY MASS INDEX: 35.72 KG/M2 | DIASTOLIC BLOOD PRESSURE: 84 MMHG | OXYGEN SATURATION: 95 %

## 2023-07-05 DIAGNOSIS — I21.A1 TYPE 2 MYOCARDIAL INFARCTION (HCC): ICD-10-CM

## 2023-07-05 DIAGNOSIS — I25.10 CORONARY ARTERY DISEASE INVOLVING NATIVE CORONARY ARTERY OF NATIVE HEART WITHOUT ANGINA PECTORIS: ICD-10-CM

## 2023-07-05 DIAGNOSIS — G47.33 OBSTRUCTIVE SLEEP APNEA: ICD-10-CM

## 2023-07-05 DIAGNOSIS — E11.9 TYPE 2 DIABETES MELLITUS WITHOUT COMPLICATION, WITHOUT LONG-TERM CURRENT USE OF INSULIN (HCC): Primary | ICD-10-CM

## 2023-07-05 DIAGNOSIS — I10 PRIMARY HYPERTENSION: ICD-10-CM

## 2023-07-05 LAB
ALBUMIN SERPL-MCNC: 4.8 G/DL (ref 3.4–5)
ALBUMIN/GLOB SERPL: 1.8 {RATIO} (ref 1.1–2.2)
ALP SERPL-CCNC: 72 U/L (ref 40–129)
ALT SERPL-CCNC: 38 U/L (ref 10–40)
ANION GAP SERPL CALCULATED.3IONS-SCNC: 16 MMOL/L (ref 3–16)
AST SERPL-CCNC: 27 U/L (ref 15–37)
BILIRUB SERPL-MCNC: 0.5 MG/DL (ref 0–1)
BUN SERPL-MCNC: 26 MG/DL (ref 7–20)
CALCIUM SERPL-MCNC: 10 MG/DL (ref 8.3–10.6)
CHLORIDE SERPL-SCNC: 99 MMOL/L (ref 99–110)
CHOLEST SERPL-MCNC: 129 MG/DL (ref 0–199)
CO2 SERPL-SCNC: 24 MMOL/L (ref 21–32)
CREAT SERPL-MCNC: 1.2 MG/DL (ref 0.8–1.3)
GFR SERPLBLD CREATININE-BSD FMLA CKD-EPI: >60 ML/MIN/{1.73_M2}
GLUCOSE P FAST SERPL-MCNC: 136 MG/DL (ref 70–99)
HBA1C MFR BLD: 8.2 %
HDLC SERPL-MCNC: 37 MG/DL (ref 40–60)
LDL CHOLESTEROL CALCULATED: 56 MG/DL
POTASSIUM SERPL-SCNC: 4.2 MMOL/L (ref 3.5–5.1)
PROT SERPL-MCNC: 7.5 G/DL (ref 6.4–8.2)
SODIUM SERPL-SCNC: 139 MMOL/L (ref 136–145)
TRIGL SERPL-MCNC: 180 MG/DL (ref 0–150)
VLDLC SERPL CALC-MCNC: 36 MG/DL

## 2023-07-05 PROCEDURE — 99214 OFFICE O/P EST MOD 30 MIN: CPT | Performed by: FAMILY MEDICINE

## 2023-07-05 PROCEDURE — 3074F SYST BP LT 130 MM HG: CPT | Performed by: FAMILY MEDICINE

## 2023-07-05 PROCEDURE — 83037 HB GLYCOSYLATED A1C HOME DEV: CPT | Performed by: FAMILY MEDICINE

## 2023-07-05 PROCEDURE — 3079F DIAST BP 80-89 MM HG: CPT | Performed by: FAMILY MEDICINE

## 2023-07-05 PROCEDURE — 1123F ACP DISCUSS/DSCN MKR DOCD: CPT | Performed by: FAMILY MEDICINE

## 2023-07-05 PROCEDURE — 3052F HG A1C>EQUAL 8.0%<EQUAL 9.0%: CPT | Performed by: FAMILY MEDICINE

## 2023-07-05 RX ORDER — TIRZEPATIDE 2.5 MG/.5ML
2.5 INJECTION, SOLUTION SUBCUTANEOUS WEEKLY
Qty: 2 ML | Refills: 0 | Status: SHIPPED | OUTPATIENT
Start: 2023-07-05

## 2023-07-05 SDOH — ECONOMIC STABILITY: HOUSING INSECURITY
IN THE LAST 12 MONTHS, WAS THERE A TIME WHEN YOU DID NOT HAVE A STEADY PLACE TO SLEEP OR SLEPT IN A SHELTER (INCLUDING NOW)?: NO

## 2023-07-05 SDOH — ECONOMIC STABILITY: INCOME INSECURITY: HOW HARD IS IT FOR YOU TO PAY FOR THE VERY BASICS LIKE FOOD, HOUSING, MEDICAL CARE, AND HEATING?: NOT HARD AT ALL

## 2023-07-05 SDOH — ECONOMIC STABILITY: FOOD INSECURITY: WITHIN THE PAST 12 MONTHS, THE FOOD YOU BOUGHT JUST DIDN'T LAST AND YOU DIDN'T HAVE MONEY TO GET MORE.: NEVER TRUE

## 2023-07-05 SDOH — ECONOMIC STABILITY: FOOD INSECURITY: WITHIN THE PAST 12 MONTHS, YOU WORRIED THAT YOUR FOOD WOULD RUN OUT BEFORE YOU GOT MONEY TO BUY MORE.: NEVER TRUE

## 2023-07-05 NOTE — PROGRESS NOTES
Champ Mom is a 79 y.o. male. HPI:here for complex medical visit  Dry , productive of greenish sputum for 5 weeks, allergy meds no help, chewing gum helps  Sees cardio q 3 - 6  Retired now, exercises regularly  Frustrated that he is on for diabetic medications and his A1c continues to fluctuate-willing to try with the new injectables if affordable  Denies numbness tingling or open sores of his feet  Right knee pain, had his left knee replaced 6 months ago and has been very effective  Meds, vitamins and allergies reviewed with pt    ROS: No TIA's or unusual headaches, no dysphagia. No prolonged cough. No dyspnea or chest pain on exertion. No abdominal pain, change in bowel habits, black or bloody stools. No urinary tract symptoms. No new or unusual musculoskeletal symptoms. Prior to Visit Medications    Medication Sig Taking?  Authorizing Provider   canagliflozin (INVOKANA) 300 MG TABS tablet Take 1 tablet by mouth every morning (before breakfast) Yes Ruchi Phillip MD   amLODIPine (NORVASC) 10 MG tablet TAKE 1 TABLET EVERY DAY Yes Ruchi Phillip MD   glimepiride (AMARYL) 4 MG tablet Take 1 tablet by mouth 2 times daily (with meals) Yes Ruchi Phillip MD   metFORMIN (GLUCOPHAGE) 1000 MG tablet 1 tablet po bid with food Yes Ruchi Phillip MD   hydroCHLOROthiazide (HYDRODIURIL) 25 MG tablet TAKE 1 TABLET EVERY DAY Yes Ruchi Phillip MD   atorvastatin (LIPITOR) 40 MG tablet Take 1 tablet by mouth daily Yes Ruchi Phillip MD   carvedilol (COREG) 6.25 MG tablet TAKE 1 TABLET TWICE DAILY WITH MEALS Yes Ruchi Phillip MD   aspirin 81 MG tablet Take 1 tablet by mouth daily Yes KWAKU Emerson - CNP   diphenhydrAMINE (BENADRYL) 25 MG tablet Take 1 tablet by mouth nightly as needed for Allergies Yes Historical Provider, MD   olopatadine (PATANOL) 0.1 % ophthalmic solution Place 1 drop into both eyes 2 times daily  Patient not taking: Reported on 7/5/2023  Ruchi Phillip MD

## 2023-08-08 ENCOUNTER — TELEPHONE (OUTPATIENT)
Dept: CARDIOLOGY CLINIC | Age: 68
End: 2023-08-08

## 2023-08-08 NOTE — TELEPHONE ENCOUNTER
CARDIAC CLEARANCE     What type of procedure are you having? Knee sx   Which physician is performing your procedure? Dr Gianni Orozco     When is your procedure scheduled for? 8/15/23    Where are you having this procedure? [facility]     Are you taking Blood Thinners? Aspirin    If so what? (Name/dose/frequesncy)     Does the surgeon want you to stop your blood thinner?   If so for how long? 7 days prior    Phone Number and Contact Name for Physicians office: [phone] -Mp Ocampo    Fax number to send information: [phone]

## 2023-08-09 ENCOUNTER — OFFICE VISIT (OUTPATIENT)
Dept: FAMILY MEDICINE CLINIC | Age: 68
End: 2023-08-09
Payer: MEDICARE

## 2023-08-09 VITALS
DIASTOLIC BLOOD PRESSURE: 86 MMHG | HEART RATE: 78 BPM | RESPIRATION RATE: 16 BRPM | SYSTOLIC BLOOD PRESSURE: 138 MMHG | BODY MASS INDEX: 35.65 KG/M2 | HEIGHT: 72 IN | OXYGEN SATURATION: 96 % | WEIGHT: 263.2 LBS

## 2023-08-09 DIAGNOSIS — Z01.818 PREOP EXAMINATION: ICD-10-CM

## 2023-08-09 DIAGNOSIS — Z01.818 PREOP EXAMINATION: Primary | ICD-10-CM

## 2023-08-09 DIAGNOSIS — I25.10 CORONARY ARTERY DISEASE INVOLVING NATIVE CORONARY ARTERY OF NATIVE HEART WITHOUT ANGINA PECTORIS: ICD-10-CM

## 2023-08-09 DIAGNOSIS — E11.9 TYPE 2 DIABETES MELLITUS WITHOUT COMPLICATION, WITHOUT LONG-TERM CURRENT USE OF INSULIN (HCC): ICD-10-CM

## 2023-08-09 DIAGNOSIS — I10 PRIMARY HYPERTENSION: ICD-10-CM

## 2023-08-09 LAB
BASOPHILS # BLD: 0.1 K/UL (ref 0–0.2)
BASOPHILS NFR BLD: 0.6 %
CREATININE URINE POCT: 100
DEPRECATED RDW RBC AUTO: 15.2 % (ref 12.4–15.4)
EOSINOPHIL # BLD: 0.2 K/UL (ref 0–0.6)
EOSINOPHIL NFR BLD: 2.4 %
FOLATE SERPL-MCNC: 5.88 NG/ML (ref 4.78–24.2)
HCT VFR BLD AUTO: 43.4 % (ref 40.5–52.5)
HGB BLD-MCNC: 14.2 G/DL (ref 13.5–17.5)
LYMPHOCYTES # BLD: 2.7 K/UL (ref 1–5.1)
LYMPHOCYTES NFR BLD: 26.9 %
MCH RBC QN AUTO: 28.4 PG (ref 26–34)
MCHC RBC AUTO-ENTMCNC: 32.8 G/DL (ref 31–36)
MCV RBC AUTO: 86.5 FL (ref 80–100)
MICROALBUMIN/CREAT 24H UR: 80 MG/G{CREAT}
MICROALBUMIN/CREAT UR-RTO: NORMAL
MONOCYTES # BLD: 0.7 K/UL (ref 0–1.3)
MONOCYTES NFR BLD: 7.1 %
NEUTROPHILS # BLD: 6.2 K/UL (ref 1.7–7.7)
NEUTROPHILS NFR BLD: 63 %
PLATELET # BLD AUTO: 229 K/UL (ref 135–450)
PMV BLD AUTO: 8.4 FL (ref 5–10.5)
PSA SERPL DL<=0.01 NG/ML-MCNC: 0.89 NG/ML (ref 0–4)
RBC # BLD AUTO: 5.01 M/UL (ref 4.2–5.9)
VIT B12 SERPL-MCNC: 331 PG/ML (ref 211–911)
WBC # BLD AUTO: 9.9 K/UL (ref 4–11)

## 2023-08-09 PROCEDURE — 3079F DIAST BP 80-89 MM HG: CPT | Performed by: FAMILY MEDICINE

## 2023-08-09 PROCEDURE — 99214 OFFICE O/P EST MOD 30 MIN: CPT | Performed by: FAMILY MEDICINE

## 2023-08-09 PROCEDURE — 82044 UR ALBUMIN SEMIQUANTITATIVE: CPT | Performed by: FAMILY MEDICINE

## 2023-08-09 PROCEDURE — 3052F HG A1C>EQUAL 8.0%<EQUAL 9.0%: CPT | Performed by: FAMILY MEDICINE

## 2023-08-09 PROCEDURE — 1123F ACP DISCUSS/DSCN MKR DOCD: CPT | Performed by: FAMILY MEDICINE

## 2023-08-09 PROCEDURE — 3075F SYST BP GE 130 - 139MM HG: CPT | Performed by: FAMILY MEDICINE

## 2023-08-09 SDOH — ECONOMIC STABILITY: INCOME INSECURITY: HOW HARD IS IT FOR YOU TO PAY FOR THE VERY BASICS LIKE FOOD, HOUSING, MEDICAL CARE, AND HEATING?: NOT HARD AT ALL

## 2023-08-09 SDOH — ECONOMIC STABILITY: FOOD INSECURITY: WITHIN THE PAST 12 MONTHS, THE FOOD YOU BOUGHT JUST DIDN'T LAST AND YOU DIDN'T HAVE MONEY TO GET MORE.: NEVER TRUE

## 2023-08-09 SDOH — ECONOMIC STABILITY: FOOD INSECURITY: WITHIN THE PAST 12 MONTHS, YOU WORRIED THAT YOUR FOOD WOULD RUN OUT BEFORE YOU GOT MONEY TO BUY MORE.: NEVER TRUE

## 2023-08-09 ASSESSMENT — PATIENT HEALTH QUESTIONNAIRE - PHQ9
SUM OF ALL RESPONSES TO PHQ QUESTIONS 1-9: 0
1. LITTLE INTEREST OR PLEASURE IN DOING THINGS: 0
2. FEELING DOWN, DEPRESSED OR HOPELESS: 0
SUM OF ALL RESPONSES TO PHQ9 QUESTIONS 1 & 2: 0

## 2023-08-09 NOTE — PROGRESS NOTES
Visual inspection:  Deformity/amputation: absent  Skin lesions/pre-ulcerative calluses: absent  Edema: right- negative, left- negative    Sensory exam:  Monofilament sensation: normal  (minimum of 5 random plantar locations tested, avoiding callused areas - > 1 area with absence of sensation is + for neuropathy)    Pulses: normal,
Large brace on left knee, in discomfort  Head: Normocephalic and atraumatic. Right Ear: Tympanic membrane, external ear and ear canal normal.   Left Ear: Tympanic membrane, external ear and ear canal normal.   Nose: Nose normal.   ENT: There is no cervical adenopathy. There are no loose teeth. Eyes: Conjunctivae and extraocular motions are normal. Pupils are equal, round, and reactive to light bilaterally. Neck: Neck supple. No JVD present. No mass and no thyromegaly present. Cardiovascular: Normal rate, regular rhythm, normal heart sounds and intact distal pulses. Exam reveals no gallop and no friction rub. No murmur heard. Pulmonary/Chest: Effort normal and breath sounds normal. No respiratory distress. There are no wheezes, rhonchi or rales. Abdominal: Soft, non-tender. Normal bowel sounds and aorta. There is no organomegaly, bruit or palpable mass. BMI noted  Neurological: He is alert and oriented to person, place, and time. Left knee in large brace  Skin: Skin is warm and dry. There is no rash or erythema. No suspicious lesions noted. Psychiatric: He has a normal mood and affect.  Speech and behavior are normal. Judgment, cognition and memory are normal.     EKG Interpretation: 6/5/2023, NSR no acute changes   Comparison to prior EKG: unchanged    Cleared by Dr. Judy Barry,  his cardiologist    Lab Results   Component Value Date    CREATININE 1.2 08/09/2023        Lab Results   Component Value Date     08/09/2023    K 5.0 08/09/2023    CL 96 (L) 08/09/2023    CO2 26 08/09/2023        Lab Results   Component Value Date    WBC 9.9 08/09/2023    HGB 14.2 08/09/2023    HCT 43.4 08/09/2023    MCV 86.5 08/09/2023     08/09/2023         Lab Results   Component Value Date    CHOL 108 11/30/2021    TRIG 139 11/30/2021    HDL 37 (L) 07/05/2023    LDLCALC 56 07/05/2023    LABVLDL 36 07/05/2023    CHOLHDLRATIO 4.5 07/14/2018        Hemoglobin A1C   Date Value Ref Range Status   07/05/2023 8.2 %

## 2023-08-10 LAB
ALBUMIN SERPL-MCNC: 4.6 G/DL (ref 3.4–5)
ALBUMIN/GLOB SERPL: 1.8 {RATIO} (ref 1.1–2.2)
ALP SERPL-CCNC: 64 U/L (ref 40–129)
ALT SERPL-CCNC: 35 U/L (ref 10–40)
ANION GAP SERPL CALCULATED.3IONS-SCNC: 16 MMOL/L (ref 3–16)
AST SERPL-CCNC: 24 U/L (ref 15–37)
BILIRUB SERPL-MCNC: 0.5 MG/DL (ref 0–1)
BUN SERPL-MCNC: 17 MG/DL (ref 7–20)
CALCIUM SERPL-MCNC: 10.2 MG/DL (ref 8.3–10.6)
CHLORIDE SERPL-SCNC: 96 MMOL/L (ref 99–110)
CO2 SERPL-SCNC: 26 MMOL/L (ref 21–32)
CREAT SERPL-MCNC: 1.2 MG/DL (ref 0.8–1.3)
GFR SERPLBLD CREATININE-BSD FMLA CKD-EPI: >60 ML/MIN/{1.73_M2}
GLUCOSE SERPL-MCNC: 151 MG/DL (ref 70–99)
POTASSIUM SERPL-SCNC: 5 MMOL/L (ref 3.5–5.1)
PROT SERPL-MCNC: 7.1 G/DL (ref 6.4–8.2)
SODIUM SERPL-SCNC: 138 MMOL/L (ref 136–145)

## 2023-08-10 RX ORDER — FOLIC ACID 1 MG/1
1 TABLET ORAL
Qty: 50 TABLET | Refills: 3 | Status: SHIPPED | OUTPATIENT
Start: 2023-08-10

## 2023-08-11 ENCOUNTER — PATIENT MESSAGE (OUTPATIENT)
Dept: FAMILY MEDICINE CLINIC | Age: 68
End: 2023-08-11

## 2023-08-11 NOTE — TELEPHONE ENCOUNTER
From: Marj Hanson  To: Dr. Anne Roberts: 8/11/2023 10:39 AM EDT  Subject: Handicap permit     Is it possible to get a prescription from you for a handicap permit for me? Will come in handy down the road.  Thanks and have a nice day

## 2023-09-28 RX ORDER — SEMAGLUTIDE 0.68 MG/ML
INJECTION, SOLUTION SUBCUTANEOUS
Qty: 3 ML | Refills: 0 | OUTPATIENT
Start: 2023-09-28

## 2023-09-28 RX ORDER — SEMAGLUTIDE 1.34 MG/ML
1 INJECTION, SOLUTION SUBCUTANEOUS WEEKLY
Qty: 3 ML | Refills: 2 | Status: SHIPPED | OUTPATIENT
Start: 2023-09-28

## 2023-09-28 RX ORDER — CANAGLIFLOZIN 300 MG/1
300 TABLET, FILM COATED ORAL
Qty: 90 TABLET | Refills: 1 | Status: SHIPPED | OUTPATIENT
Start: 2023-09-28

## 2023-09-28 NOTE — TELEPHONE ENCOUNTER
Medication:   Requested Prescriptions     Pending Prescriptions Disp Refills    OZEMPIC, 0.25 OR 0.5 MG/DOSE, 2 MG/3ML SOPN [Pharmacy Med Name: Noé García (0.25 or 0.5 MG/DOSE) 2 MG/3ML Subcutaneous Solution Pen-injector] 3 mL 0     Sig: INJECT 0.25 MG SUBCUTANEOUSLY ONCE A WEEK       Last Filled:  7/6/2023    Patient Phone Number: [phone] (home) [phone] (work)    Last appt: 8/9/2023   Next appt: 10/5/2023    Last Labs DM:   Lab Results   Component Value Date/Time    LABA1C 8.2 07/05/2023 09:34 AM

## 2023-10-05 ENCOUNTER — OFFICE VISIT (OUTPATIENT)
Dept: FAMILY MEDICINE CLINIC | Age: 68
End: 2023-10-05
Payer: MEDICARE

## 2023-10-05 VITALS
WEIGHT: 280 LBS | OXYGEN SATURATION: 97 % | SYSTOLIC BLOOD PRESSURE: 129 MMHG | HEART RATE: 74 BPM | BODY MASS INDEX: 37.97 KG/M2 | DIASTOLIC BLOOD PRESSURE: 84 MMHG

## 2023-10-05 DIAGNOSIS — E11.9 TYPE 2 DIABETES MELLITUS WITHOUT COMPLICATION, WITHOUT LONG-TERM CURRENT USE OF INSULIN (HCC): Primary | ICD-10-CM

## 2023-10-05 DIAGNOSIS — I10 PRIMARY HYPERTENSION: ICD-10-CM

## 2023-10-05 DIAGNOSIS — H61.23 BILATERAL IMPACTED CERUMEN: ICD-10-CM

## 2023-10-05 DIAGNOSIS — G47.33 OBSTRUCTIVE SLEEP APNEA: ICD-10-CM

## 2023-10-05 DIAGNOSIS — Z23 FLU VACCINE NEED: ICD-10-CM

## 2023-10-05 DIAGNOSIS — M17.12 PRIMARY OSTEOARTHRITIS OF LEFT KNEE: ICD-10-CM

## 2023-10-05 DIAGNOSIS — I25.10 CORONARY ARTERY DISEASE INVOLVING NATIVE CORONARY ARTERY OF NATIVE HEART WITHOUT ANGINA PECTORIS: ICD-10-CM

## 2023-10-05 LAB — HBA1C MFR BLD: 7.4 %

## 2023-10-05 PROCEDURE — 90694 VACC AIIV4 NO PRSRV 0.5ML IM: CPT | Performed by: FAMILY MEDICINE

## 2023-10-05 PROCEDURE — G0008 ADMIN INFLUENZA VIRUS VAC: HCPCS | Performed by: FAMILY MEDICINE

## 2023-10-05 PROCEDURE — 99214 OFFICE O/P EST MOD 30 MIN: CPT | Performed by: FAMILY MEDICINE

## 2023-10-05 PROCEDURE — 1123F ACP DISCUSS/DSCN MKR DOCD: CPT | Performed by: FAMILY MEDICINE

## 2023-10-05 PROCEDURE — 83036 HEMOGLOBIN GLYCOSYLATED A1C: CPT | Performed by: FAMILY MEDICINE

## 2023-10-05 PROCEDURE — 69210 REMOVE IMPACTED EAR WAX UNI: CPT | Performed by: FAMILY MEDICINE

## 2023-10-05 PROCEDURE — 3074F SYST BP LT 130 MM HG: CPT | Performed by: FAMILY MEDICINE

## 2023-10-05 PROCEDURE — 3079F DIAST BP 80-89 MM HG: CPT | Performed by: FAMILY MEDICINE

## 2023-10-05 PROCEDURE — 3051F HG A1C>EQUAL 7.0%<8.0%: CPT | Performed by: FAMILY MEDICINE

## 2023-10-05 RX ORDER — CANAGLIFLOZIN 300 MG/1
300 TABLET, FILM COATED ORAL
Qty: 90 TABLET | Refills: 3 | Status: SHIPPED | OUTPATIENT
Start: 2023-10-05

## 2023-10-05 NOTE — PROGRESS NOTES
Nancy Mancini is a 79 y.o. male. HPI:had repeat surgery for torn left quad after fall 6 weeks ago, previously had had a left total knee replacement  Slowly healing now going to PT  Had his diabetic eye exam  Tolerating Ozempic 0.25 well. About to start the 1 mg  Denies any numbness or tingling or open sores in his feet  Ears are bothersome, hearing is down and he hears some crackling noises  Meds, vitamins and allergies reviewed with pt    ROS: No TIA's or unusual headaches, no dysphagia. No prolonged cough. No dyspnea or chest pain on exertion. No abdominal pain, change in bowel habits, black or bloody stools. No urinary tract symptoms. No new or unusual musculoskeletal symptoms. Prior to Visit Medications    Medication Sig Taking?  Authorizing Provider   canagliflozin (INVOKANA) 300 MG TABS tablet Take 1 tablet by mouth every morning (before breakfast) Yes Leon Valdovinos MD   Semaglutide, 1 MG/DOSE, (OZEMPIC, 1 MG/DOSE,) 4 MG/3ML SOPN Inject 1 mg into the skin once a week Yes Adama Barrow MD   folic acid (FOLVITE) 1 MG tablet Take 1 tablet by mouth four times a week Yes Uma Cates MD   amLODIPine (NORVASC) 10 MG tablet TAKE 1 TABLET EVERY DAY Yes Adama Barrow MD   metFORMIN (GLUCOPHAGE) 1000 MG tablet 1 tablet po bid with food Yes Adama Barrow MD   hydroCHLOROthiazide (HYDRODIURIL) 25 MG tablet TAKE 1 TABLET EVERY DAY Yes Adama Barrow MD   atorvastatin (LIPITOR) 40 MG tablet Take 1 tablet by mouth daily Yes Adama Barrow MD   carvedilol (COREG) 6.25 MG tablet TAKE 1 TABLET TWICE DAILY WITH MEALS Yes Adama Barrow MD   diphenhydrAMINE (BENADRYL) 25 MG tablet Take 1 tablet by mouth nightly as needed for Allergies Yes ProviderPaco MD       Past Medical History:   Diagnosis Date    Abdominal aortic aneurysm (AAA), 30-34 mm diameter ([address]) 3/23/2022    CAD (coronary artery disease)     DDD (degenerative disc disease), cervical 11/24/2021    Disc

## 2023-11-22 RX ORDER — MELOXICAM 15 MG/1
15 TABLET ORAL DAILY PRN
Qty: 30 TABLET | Refills: 0 | Status: SHIPPED | OUTPATIENT
Start: 2023-11-22

## 2023-12-05 ENCOUNTER — OFFICE VISIT (OUTPATIENT)
Dept: CARDIOLOGY CLINIC | Age: 68
End: 2023-12-05
Payer: MEDICARE

## 2023-12-05 VITALS
OXYGEN SATURATION: 94 % | BODY MASS INDEX: 34.95 KG/M2 | WEIGHT: 258 LBS | HEART RATE: 72 BPM | SYSTOLIC BLOOD PRESSURE: 132 MMHG | DIASTOLIC BLOOD PRESSURE: 70 MMHG | HEIGHT: 72 IN

## 2023-12-05 DIAGNOSIS — E78.2 MIXED HYPERLIPIDEMIA: ICD-10-CM

## 2023-12-05 DIAGNOSIS — I25.10 CORONARY ARTERY DISEASE INVOLVING NATIVE CORONARY ARTERY OF NATIVE HEART WITHOUT ANGINA PECTORIS: Primary | ICD-10-CM

## 2023-12-05 DIAGNOSIS — I10 PRIMARY HYPERTENSION: ICD-10-CM

## 2023-12-05 PROBLEM — J18.9 PNEUMONIA: Status: ACTIVE | Noted: 2023-12-05

## 2023-12-05 PROCEDURE — 3078F DIAST BP <80 MM HG: CPT | Performed by: NURSE PRACTITIONER

## 2023-12-05 PROCEDURE — 1123F ACP DISCUSS/DSCN MKR DOCD: CPT | Performed by: NURSE PRACTITIONER

## 2023-12-05 PROCEDURE — 99214 OFFICE O/P EST MOD 30 MIN: CPT | Performed by: NURSE PRACTITIONER

## 2023-12-05 PROCEDURE — 3075F SYST BP GE 130 - 139MM HG: CPT | Performed by: NURSE PRACTITIONER

## 2023-12-05 NOTE — PROGRESS NOTES
Education conducted on adverse reactions including bleeding was discussed. The patient verbalizes understanding not to stop medications without discussing with us. Discussed exercise: 30-60 minutes 7 days/week. Recent release to return to gym post-Lt knee surgery  Discussed Low saturated fat/NCC diet. No longer checks BS daily . A1c 7.4%    Thank you for allowing to us to participate in the care of Summer Young.

## 2024-01-09 PROBLEM — I11.0 HYPERTENSIVE HEART DISEASE WITH HEART FAILURE (HCC): Status: ACTIVE | Noted: 2024-01-09

## 2024-01-09 PROBLEM — G95.9 CERVICAL MYELOPATHY (HCC): Status: RESOLVED | Noted: 2022-12-18 | Resolved: 2024-01-09

## 2024-01-10 ENCOUNTER — OFFICE VISIT (OUTPATIENT)
Dept: FAMILY MEDICINE CLINIC | Age: 69
End: 2024-01-10
Payer: COMMERCIAL

## 2024-01-10 VITALS
WEIGHT: 261.4 LBS | BODY MASS INDEX: 35.45 KG/M2 | SYSTOLIC BLOOD PRESSURE: 126 MMHG | DIASTOLIC BLOOD PRESSURE: 80 MMHG | OXYGEN SATURATION: 96 % | HEART RATE: 72 BPM

## 2024-01-10 DIAGNOSIS — I10 ESSENTIAL HYPERTENSION: ICD-10-CM

## 2024-01-10 DIAGNOSIS — E66.01 SEVERE OBESITY (BMI 35.0-39.9) WITH COMORBIDITY (HCC): ICD-10-CM

## 2024-01-10 DIAGNOSIS — Z12.11 COLON CANCER SCREENING: ICD-10-CM

## 2024-01-10 DIAGNOSIS — E11.9 TYPE 2 DIABETES MELLITUS WITHOUT COMPLICATION, WITHOUT LONG-TERM CURRENT USE OF INSULIN (HCC): ICD-10-CM

## 2024-01-10 DIAGNOSIS — I11.0 HYPERTENSIVE HEART DISEASE WITH HEART FAILURE (HCC): Primary | ICD-10-CM

## 2024-01-10 DIAGNOSIS — I73.9 PVD (PERIPHERAL VASCULAR DISEASE) (HCC): ICD-10-CM

## 2024-01-10 LAB — HBA1C MFR BLD: 9.2 %

## 2024-01-10 PROCEDURE — 83036 HEMOGLOBIN GLYCOSYLATED A1C: CPT | Performed by: FAMILY MEDICINE

## 2024-01-10 PROCEDURE — 1123F ACP DISCUSS/DSCN MKR DOCD: CPT | Performed by: FAMILY MEDICINE

## 2024-01-10 PROCEDURE — 3046F HEMOGLOBIN A1C LEVEL >9.0%: CPT | Performed by: FAMILY MEDICINE

## 2024-01-10 PROCEDURE — 99214 OFFICE O/P EST MOD 30 MIN: CPT | Performed by: FAMILY MEDICINE

## 2024-01-10 PROCEDURE — 3074F SYST BP LT 130 MM HG: CPT | Performed by: FAMILY MEDICINE

## 2024-01-10 PROCEDURE — 3079F DIAST BP 80-89 MM HG: CPT | Performed by: FAMILY MEDICINE

## 2024-01-10 RX ORDER — ATORVASTATIN CALCIUM 40 MG/1
40 TABLET, FILM COATED ORAL DAILY
Qty: 90 TABLET | Refills: 3 | Status: SHIPPED | OUTPATIENT
Start: 2024-01-10

## 2024-01-10 RX ORDER — HYDROCHLOROTHIAZIDE 25 MG/1
TABLET ORAL
Qty: 90 TABLET | Refills: 3 | Status: SHIPPED | OUTPATIENT
Start: 2024-01-10

## 2024-01-10 RX ORDER — CARVEDILOL 6.25 MG/1
TABLET ORAL
Qty: 180 TABLET | Refills: 3 | Status: SHIPPED | OUTPATIENT
Start: 2024-01-10

## 2024-01-10 RX ORDER — AMLODIPINE BESYLATE 10 MG/1
TABLET ORAL
Qty: 90 TABLET | Refills: 3 | Status: SHIPPED | OUTPATIENT
Start: 2024-01-10

## 2024-01-10 ASSESSMENT — PATIENT HEALTH QUESTIONNAIRE - PHQ9
SUM OF ALL RESPONSES TO PHQ QUESTIONS 1-9: 0
1. LITTLE INTEREST OR PLEASURE IN DOING THINGS: 0
2. FEELING DOWN, DEPRESSED OR HOPELESS: 0
SUM OF ALL RESPONSES TO PHQ QUESTIONS 1-9: 0
SUM OF ALL RESPONSES TO PHQ9 QUESTIONS 1 & 2: 0

## 2024-01-10 NOTE — PROGRESS NOTES
Jeramie Ortiz is a 68 y.o. male.    HPI: Here for complicated medical visit  For a while was taking Ozempic and lost some weight and got his sugars down but he could no longer afford it.  Then he was on Jardiance and that also became unaffordable.  Now he is only taking metformin thousand twice daily  However he has changed insurance and thinks Jardiance will be covered  Left knee surgery recovery still ongoing with physical therapy.  Eventually he will have the right knee done and then hopefully that will help the pain he is having in his right hip  No further neck pain  Meds, vitamins and allergies reviewed with pt    ROS: No TIA's or unusual headaches, no dysphagia.  No prolonged cough. No dyspnea or chest pain on exertion.  No abdominal pain, change in bowel habits, black or bloody stools.  No urinary tract symptoms.  No new or unusual musculoskeletal symptoms.       Prior to Visit Medications    Medication Sig Taking? Authorizing Provider   folic acid (FOLVITE) 1 MG tablet Take 1 tablet by mouth four times a week Yes Akilah Callejas MD   amLODIPine (NORVASC) 10 MG tablet TAKE 1 TABLET EVERY DAY Yes Leon Valdovinos MD   metFORMIN (GLUCOPHAGE) 1000 MG tablet 1 tablet po bid with food Yes Leon Valdovinos MD   hydroCHLOROthiazide (HYDRODIURIL) 25 MG tablet TAKE 1 TABLET EVERY DAY Yes Leon Valdovinos MD   atorvastatin (LIPITOR) 40 MG tablet Take 1 tablet by mouth daily Yes Leon Valdovinos MD   carvedilol (COREG) 6.25 MG tablet TAKE 1 TABLET TWICE DAILY WITH MEALS Yes Leon Valdovinos MD   diphenhydrAMINE (BENADRYL) 25 MG tablet Take 1 tablet by mouth nightly as needed for Sleep Yes ProviderPaco MD       Past Medical History:   Diagnosis Date    Abdominal aortic aneurysm (AAA), 30-34 mm diameter ([facility]) 03/23/2022    CAD (coronary artery disease)     DDD (degenerative disc disease), cervical 11/24/2021    Disc disorder     ED (erectile dysfunction)     Hyperlipidemia     Hypertension

## 2024-02-02 RX ORDER — AMOXICILLIN AND CLAVULANATE POTASSIUM 875; 125 MG/1; MG/1
1 TABLET, FILM COATED ORAL 2 TIMES DAILY
Qty: 14 TABLET | Refills: 0 | Status: SHIPPED | OUTPATIENT
Start: 2024-02-02 | End: 2024-02-09

## 2024-03-14 ENCOUNTER — APPOINTMENT (RX ONLY)
Dept: URBAN - METROPOLITAN AREA CLINIC 170 | Facility: CLINIC | Age: 69
Setting detail: DERMATOLOGY
End: 2024-03-14

## 2024-03-14 DIAGNOSIS — L81.4 OTHER MELANIN HYPERPIGMENTATION: ICD-10-CM | Status: STABLE

## 2024-03-14 DIAGNOSIS — D22 MELANOCYTIC NEVI: ICD-10-CM | Status: STABLE

## 2024-03-14 DIAGNOSIS — L82.1 OTHER SEBORRHEIC KERATOSIS: ICD-10-CM | Status: STABLE

## 2024-03-14 DIAGNOSIS — L91.8 OTHER HYPERTROPHIC DISORDERS OF THE SKIN: ICD-10-CM | Status: STABLE

## 2024-03-14 DIAGNOSIS — L57.0 ACTINIC KERATOSIS: ICD-10-CM | Status: INADEQUATELY CONTROLLED

## 2024-03-14 DIAGNOSIS — D18.0 HEMANGIOMA: ICD-10-CM | Status: STABLE

## 2024-03-14 PROBLEM — D22.5 MELANOCYTIC NEVI OF TRUNK: Status: ACTIVE | Noted: 2024-03-14

## 2024-03-14 PROBLEM — D18.01 HEMANGIOMA OF SKIN AND SUBCUTANEOUS TISSUE: Status: ACTIVE | Noted: 2024-03-14

## 2024-03-14 PROCEDURE — 99213 OFFICE O/P EST LOW 20 MIN: CPT | Mod: 25

## 2024-03-14 PROCEDURE — 17003 DESTRUCT PREMALG LES 2-14: CPT

## 2024-03-14 PROCEDURE — ? FULL BODY SKIN EXAM

## 2024-03-14 PROCEDURE — ? TREATMENT REGIMEN

## 2024-03-14 PROCEDURE — 17000 DESTRUCT PREMALG LESION: CPT

## 2024-03-14 PROCEDURE — ? COUNSELING

## 2024-03-14 PROCEDURE — ? LIQUID NITROGEN

## 2024-03-14 PROCEDURE — ? LIQUID NITROGEN (COSMETIC)

## 2024-03-14 ASSESSMENT — LOCATION SIMPLE DESCRIPTION DERM
LOCATION SIMPLE: RIGHT AXILLARY VAULT
LOCATION SIMPLE: LEFT POSTERIOR THIGH
LOCATION SIMPLE: RIGHT SCALP
LOCATION SIMPLE: LEFT SCALP
LOCATION SIMPLE: SCALP
LOCATION SIMPLE: LEFT AXILLARY VAULT
LOCATION SIMPLE: RIGHT UPPER BACK
LOCATION SIMPLE: ABDOMEN
LOCATION SIMPLE: LEFT NOSE
LOCATION SIMPLE: LEFT LOWER BACK

## 2024-03-14 ASSESSMENT — LOCATION DETAILED DESCRIPTION DERM
LOCATION DETAILED: LEFT SUPERIOR MEDIAL MIDBACK
LOCATION DETAILED: LEFT NASAL SIDEWALL
LOCATION DETAILED: EPIGASTRIC SKIN
LOCATION DETAILED: RIGHT MID-UPPER BACK
LOCATION DETAILED: RIGHT CENTRAL PARIETAL SCALP
LOCATION DETAILED: LEFT CENTRAL FRONTAL SCALP
LOCATION DETAILED: LEFT SUPERIOR PARIETAL SCALP
LOCATION DETAILED: RIGHT LATERAL FRONTAL SCALP
LOCATION DETAILED: LEFT AXILLARY VAULT
LOCATION DETAILED: RIGHT AXILLARY VAULT
LOCATION DETAILED: RIGHT CENTRAL FRONTAL SCALP
LOCATION DETAILED: LEFT DISTAL POSTERIOR THIGH
LOCATION DETAILED: PERIUMBILICAL SKIN

## 2024-03-14 ASSESSMENT — LOCATION ZONE DERM
LOCATION ZONE: NOSE
LOCATION ZONE: SCALP
LOCATION ZONE: LEG
LOCATION ZONE: TRUNK
LOCATION ZONE: AXILLAE

## 2024-03-14 NOTE — PROCEDURE: LIQUID NITROGEN (COSMETIC)
Detail Level: Detailed
Billing Information: Bill by Static Price
Show Spray Paint Technique Variable?: Yes
Price (Use Numbers Only, No Special Characters Or $): 60.00
Post-Care Instructions: I reviewed with the patient in detail post-care instructions. Patient is to wear sunprotection, and avoid picking at any of the treated lesions. Pt may apply Vaseline to crusted or scabbing areas.
Render Post-Care Instructions In Note?: no
Consent: The patient's consent was obtained including but not limited to risks of crusting, scabbing, blistering, scarring, darker or lighter pigmentary change, recurrence, incomplete removal and infection. The patient understands that the procedure is cosmetic in nature and is not covered by insurance.
Spray Paint Text: The liquid nitrogen was applied to the skin utilizing a spray paint frosting technique.

## 2024-03-14 NOTE — HPI: EVALUATION OF SKIN LESION(S)
What Type Of Note Output Would You Prefer (Optional)?: Bullet Format
Hpi Title: Evaluation of Skin Lesions
Additional History: Check spot in axillary and back of right leg

## 2024-03-22 NOTE — TELEPHONE ENCOUNTER
"Behavioral Health Psychotherapy Group Progress Note    Psychotherapy Provided: Group Therapy    1. Current moderate episode of major depressive disorder, unspecified whether recurrent (HCC)            Goals addressed in session: Goal 1     Group Name: Sleep Matters    Topic(s) covered: Mood and sleep    Skill(s) covered: Behavioral Activation/Thought Defusion     Group summary:  Group was attended by two members; limited by some technology difficulties.     Data: Lucas attended today's group. He continues to be severely depressed and knows he has \"been this way before\"; will address thorugh going for more walk and listening ot music at bedtime to get away from financial woes. May try hypnosis.     Substance Abuse was not addressed during this session. If the client is diagnosed with a co-occurring substance use disorder, please indicate any changes in the frequency or amount of use: . Stage of change for addressing substance use diagnoses: No substance use/Not applicable    ASSESSMENT:  Lucas appeared  with a Depressed mood. His affect is Normal range and intensity, which is congruent, with his mood and the content of the session. He appeared to actively participated in the group and interacted appropriately with and was supportive of the other group members.     Lucas Shirley presents with a nonerisk of suicide,nonerisk of self-harm, and none risk of harm to others.    For any risk assessment that surpasses a \"low\" rating, a safety plan must be developed.    A safety plan was indicated: no  If yes, describe in detail     PLAN: Lucas will resume sleep log. The next group is scheduled for 3/29 and will cover the topic of separation of sleep from worry.    Behavioral Health Treatment Plan and Discharge Planning: Lucas Shirley is aware of and agrees to continue to work on their treatment plan. They have identified and are working toward their discharge goals. yes    Visit start and stop times:    03/22/24  Start Time: 1502  Stop Time: " LVM for patient informing MRI is approved and can be scheduled at [facility] by calling [phone], and asked that a follow up appt be made with Dr Jer Lopez after MRI is done to discuss results and treatment options. 1545  Total Visit Time: 43 minutes

## 2024-04-02 NOTE — PROCEDURE: LIQUID NITROGEN
Consent: The patient's consent was obtained including but not limited to risks of crusting, scabbing, blistering, scarring, darker or lighter pigmentary change, recurrence, incomplete removal and infection.
Show Aperture Variable?: Yes
Number Of Freeze-Thaw Cycles: 1 freeze-thaw cycle
Duration Of Freeze Thaw-Cycle (Seconds): 5
Post-Care Instructions: I reviewed with the patient in detail post-care instructions. Patient is to wear sunprotection, and avoid picking at any of the treated lesions. Pt may apply Vaseline to crusted or scabbing areas.
Render Note In Bullet Format When Appropriate: No
Detail Level: Detailed
Admission

## 2024-04-10 ENCOUNTER — OFFICE VISIT (OUTPATIENT)
Dept: FAMILY MEDICINE CLINIC | Age: 69
End: 2024-04-10
Payer: COMMERCIAL

## 2024-04-10 VITALS
WEIGHT: 258 LBS | DIASTOLIC BLOOD PRESSURE: 84 MMHG | BODY MASS INDEX: 34.99 KG/M2 | HEART RATE: 73 BPM | OXYGEN SATURATION: 96 % | SYSTOLIC BLOOD PRESSURE: 133 MMHG

## 2024-04-10 DIAGNOSIS — Z79.4 TYPE 2 DIABETES MELLITUS WITH DIABETIC NEUROPATHY, WITH LONG-TERM CURRENT USE OF INSULIN (HCC): ICD-10-CM

## 2024-04-10 DIAGNOSIS — Z91.81 AT HIGH RISK FOR FALLS: ICD-10-CM

## 2024-04-10 DIAGNOSIS — I73.9 PVD (PERIPHERAL VASCULAR DISEASE) (HCC): ICD-10-CM

## 2024-04-10 DIAGNOSIS — E11.9 TYPE 2 DIABETES MELLITUS WITHOUT COMPLICATION, WITHOUT LONG-TERM CURRENT USE OF INSULIN (HCC): Primary | ICD-10-CM

## 2024-04-10 DIAGNOSIS — E11.40 TYPE 2 DIABETES MELLITUS WITH DIABETIC NEUROPATHY, WITH LONG-TERM CURRENT USE OF INSULIN (HCC): ICD-10-CM

## 2024-04-10 DIAGNOSIS — G47.33 OBSTRUCTIVE SLEEP APNEA: ICD-10-CM

## 2024-04-10 DIAGNOSIS — E78.5 DYSLIPIDEMIA: ICD-10-CM

## 2024-04-10 DIAGNOSIS — I10 PRIMARY HYPERTENSION: ICD-10-CM

## 2024-04-10 DIAGNOSIS — I11.0 HYPERTENSIVE HEART DISEASE WITH HEART FAILURE (HCC): ICD-10-CM

## 2024-04-10 LAB — HBA1C MFR BLD: 8.8 %

## 2024-04-10 PROCEDURE — 3075F SYST BP GE 130 - 139MM HG: CPT | Performed by: FAMILY MEDICINE

## 2024-04-10 PROCEDURE — G2211 COMPLEX E/M VISIT ADD ON: HCPCS | Performed by: FAMILY MEDICINE

## 2024-04-10 PROCEDURE — 3079F DIAST BP 80-89 MM HG: CPT | Performed by: FAMILY MEDICINE

## 2024-04-10 PROCEDURE — 83036 HEMOGLOBIN GLYCOSYLATED A1C: CPT | Performed by: FAMILY MEDICINE

## 2024-04-10 PROCEDURE — 99214 OFFICE O/P EST MOD 30 MIN: CPT | Performed by: FAMILY MEDICINE

## 2024-04-10 PROCEDURE — 3052F HG A1C>EQUAL 8.0%<EQUAL 9.0%: CPT | Performed by: FAMILY MEDICINE

## 2024-04-10 PROCEDURE — 1123F ACP DISCUSS/DSCN MKR DOCD: CPT | Performed by: FAMILY MEDICINE

## 2024-04-10 RX ORDER — PREGABALIN 50 MG/1
50 CAPSULE ORAL 3 TIMES DAILY
Qty: 90 CAPSULE | Refills: 0 | Status: SHIPPED | OUTPATIENT
Start: 2024-04-10 | End: 2024-05-10

## 2024-04-10 RX ORDER — GLIMEPIRIDE 4 MG/1
4 TABLET ORAL EVERY MORNING
Qty: 30 TABLET | Refills: 3 | Status: SHIPPED | OUTPATIENT
Start: 2024-04-10

## 2024-04-10 NOTE — PROGRESS NOTES
Jeramie Ortiz is a 69 y.o. male.    HPI:here for complex medical visit  Pain in lower legs ,arms, hands all numb  Fully retired, no chest pain, which she felt better, not exercising much  Ozempic made him sick when increased dose  Meds, vitamins and allergies reviewed with pt    ROS: No TIA's or unusual headaches, no dysphagia.  No prolonged cough. No dyspnea or chest pain on exertion.  No abdominal pain, change in bowel habits, black or bloody stools.  No urinary tract symptoms.  No new or unusual musculoskeletal symptoms.       Prior to Visit Medications    Medication Sig Taking? Authorizing Provider   empagliflozin (JARDIANCE) 25 MG tablet Take 1 tablet by mouth daily Yes Leon Valdovinos MD   amLODIPine (NORVASC) 10 MG tablet TAKE 1 TABLET EVERY DAY Yes Leon Valdovinos MD   metFORMIN (GLUCOPHAGE) 1000 MG tablet 1 tablet po bid with food Yes Leon Valdovinos MD   hydroCHLOROthiazide (HYDRODIURIL) 25 MG tablet TAKE 1 TABLET EVERY DAY Yes Leon Valdovinos MD   atorvastatin (LIPITOR) 40 MG tablet Take 1 tablet by mouth daily Yes Leon Valdovinos MD   carvedilol (COREG) 6.25 MG tablet TAKE 1 TABLET TWICE DAILY WITH MEALS Yes Leon Valdovinos MD   folic acid (FOLVITE) 1 MG tablet Take 1 tablet by mouth four times a week Yes Akilah Callejas MD   diphenhydrAMINE (BENADRYL) 25 MG tablet Take 1 tablet by mouth nightly as needed for Sleep Yes Provider, MD Paco       Past Medical History:   Diagnosis Date    Abdominal aortic aneurysm (AAA), 30-34 mm diameter ([facility]) 03/23/2022    CAD (coronary artery disease)     DDD (degenerative disc disease), cervical 11/24/2021    Disc disorder     ED (erectile dysfunction)     Hyperlipidemia     Hypertension     MI (myocardial infarction) ([facility])     Obstructive sleep apnea     Other disorders of kidney and ureter in diseases classified elsewhere     Pneumonia 1970s    Polyp of colon     PVD (peripheral vascular disease) ([facility]) 11/22/2011    Renal calculi

## 2024-04-19 DIAGNOSIS — G89.4 CHRONIC PAIN SYNDROME: Primary | ICD-10-CM

## 2024-04-19 RX ORDER — HYDROCODONE BITARTRATE AND ACETAMINOPHEN 7.5; 325 MG/1; MG/1
1 TABLET ORAL EVERY 6 HOURS PRN
Qty: 28 TABLET | Refills: 0 | Status: SHIPPED | OUTPATIENT
Start: 2024-04-19 | End: 2024-04-26

## 2024-05-06 NOTE — PROCEDURE: LIQUID NITROGEN
"OBSTETRICS AND GYNECOLOGY    Subjective:      Chief Complaint:  Irregular bleeding    HPI:  Laura Erazo is an 22 y.o. P0 presenting with concerns of irregular menses/contraception. History of oligomenorrhea. Started OCPs in order to regulate menses in the past. Stopped OCPs last fall. Did not like how it made her feel. Endorses feeling bloated, emotional, mood swings. Feeling better now that she is off of the OCPs. Menses took several months to return. No menses until 4/2024. In the past, menses have also been about q7 months. Sexually active: yes. Mother had similar menses prior to kids. Condoms for contraception. No additional complaints.    Review of systems:  Denies abnormal vaginal discharge.     OB History   No obstetric history on file.     Past Medical History:   Diagnosis Date    Reactive airway disease     1-2 times /year     No past surgical history on file.  Family History   Problem Relation Name Age of Onset    Cancer Maternal Grandfather      Hyperlipidemia Maternal Grandfather      Heart disease Paternal Grandfather          smoker with early ds    No Known Problems Mother miguelangel     No Known Problems Father sienna     Heart disease Maternal Grandmother      Thyroid disease Maternal Grandmother         Allergies: Review of patient's allergies indicates:  No Known Allergies    Medications:   Current Outpatient Medications   Medication Sig Dispense Refill    dextroamphetamine-amphetamine (ADDERALL XR) 20 MG 24 hr capsule Take 1 capsule (20 mg total) by mouth once daily. 30 capsule 0    levalbuterol (XOPENEX) 0.63 mg/3 mL nebulizer solution Take 1 ampule by nebulization every 4 (four) hours as needed. (Patient not taking: Reported on 5/13/2024)       No current facility-administered medications for this visit.       Social History     Tobacco Use    Smoking status: Never    Smokeless tobacco: Never   Substance Use Topics    Alcohol use: Yes       Objective:   Ht 4' 11" (1.499 m)   Wt 43.5 kg "
(95 lb 12.6 oz)   LMP 04/19/2024   BMI 19.35 kg/m²   Physical Exam    GENERAL: Alert, well dressed, well nourished. Appropriate mood and affect. Pleasant.  HEENT: Normocephalic, atraumatic. Extraocular movements intact. Hearing and vision grossly intact.   PULMONARY: No respiratory distress. No use of accessory muscles of respiration. No cyanosis. Speaking comfortably in full sentences.   CARDIAC: Well perfused.    EXTREMITIES: No visible rashes.     Assessment/Plan:     Oligomenorrhea  - Contraception:  condoms  Aware condoms not 100% effective  - Declines STD screening today  - UPT  - History of oligomenorrhea  No pelvic surgeries in the past.   Medication list reviewed with patient.   Denies galactorrhea.   Stress level - nursing school.   Endorses healthy appetite.   BMI 19  - Discussed options  At this time, patient would like to see what menses pattern looks like over next 2-3 cycles now that she has had a period  To RTC if no menses by about 3 month jackelin  - Possibly interested in IUD  Consider IUD, patch, nuvaring, different OCP  - Update labs, consider repeat US pending menses pattern  - Follow up in 12 weeks for f/u            As of April 1, 2021, the Cures Act has been passed nationally. This new law requires that all doctors progress notes, lab results, pathology reports and radiology reports be released IMMEDIATELY to the patient in the patient portal. That means that the results are released to you at the EXACT same time they are released to me. Therefore, with all of the patients that I have I am not able to reply to each patient exactly when the results come in. So there will be a delay from when you see the results to when I see them and have time to come up with a response to send you. Also I only see these results when I am on the computer at work. So if the results come in over the weekend or after 5 pm of a work day, I will not see them until the next business day. As you can tell, this is a 
challenge as a physician to give every patient the quick response they hope for and deserve. So please be patient!   Thanks for your understanding and patience.    
Show Aperture Variable?: Yes
Duration Of Freeze Thaw-Cycle (Seconds): 5
Render Note In Bullet Format When Appropriate: No
Consent: The patient's consent was obtained including but not limited to risks of crusting, scabbing, blistering, scarring, darker or lighter pigmentary change, recurrence, incomplete removal and infection.
Number Of Freeze-Thaw Cycles: 1 freeze-thaw cycle
Detail Level: Detailed
Post-Care Instructions: I reviewed with the patient in detail post-care instructions. Patient is to wear sunprotection, and avoid picking at any of the treated lesions. Pt may apply Vaseline to crusted or scabbing areas.

## 2024-06-05 ENCOUNTER — OFFICE VISIT (OUTPATIENT)
Dept: CARDIOLOGY CLINIC | Age: 69
End: 2024-06-05
Payer: MEDICARE

## 2024-06-05 VITALS
OXYGEN SATURATION: 99 % | HEART RATE: 70 BPM | HEIGHT: 73 IN | WEIGHT: 262 LBS | DIASTOLIC BLOOD PRESSURE: 72 MMHG | BODY MASS INDEX: 34.72 KG/M2 | SYSTOLIC BLOOD PRESSURE: 132 MMHG

## 2024-06-05 DIAGNOSIS — I25.10 CORONARY ARTERY DISEASE INVOLVING NATIVE CORONARY ARTERY OF NATIVE HEART WITHOUT ANGINA PECTORIS: Primary | ICD-10-CM

## 2024-06-05 DIAGNOSIS — I10 PRIMARY HYPERTENSION: ICD-10-CM

## 2024-06-05 DIAGNOSIS — E78.2 MIXED HYPERLIPIDEMIA: ICD-10-CM

## 2024-06-05 PROBLEM — D62 ACUTE BLOOD LOSS AS CAUSE OF POSTOPERATIVE ANEMIA: Status: RESOLVED | Noted: 2021-12-07 | Resolved: 2024-06-05

## 2024-06-05 PROBLEM — I11.0 HYPERTENSIVE HEART DISEASE WITH HEART FAILURE (HCC): Status: RESOLVED | Noted: 2024-01-09 | Resolved: 2024-06-05

## 2024-06-05 PROBLEM — J18.9 PNEUMONIA: Status: RESOLVED | Noted: 2023-12-05 | Resolved: 2024-06-05

## 2024-06-05 PROBLEM — R07.9 CHEST PAIN: Status: RESOLVED | Noted: 2017-01-03 | Resolved: 2024-06-05

## 2024-06-05 PROCEDURE — 1036F TOBACCO NON-USER: CPT | Performed by: NURSE PRACTITIONER

## 2024-06-05 PROCEDURE — 3078F DIAST BP <80 MM HG: CPT | Performed by: NURSE PRACTITIONER

## 2024-06-05 PROCEDURE — 3074F SYST BP LT 130 MM HG: CPT | Performed by: NURSE PRACTITIONER

## 2024-06-05 PROCEDURE — 99214 OFFICE O/P EST MOD 30 MIN: CPT | Performed by: NURSE PRACTITIONER

## 2024-06-05 PROCEDURE — 3017F COLORECTAL CA SCREEN DOC REV: CPT | Performed by: NURSE PRACTITIONER

## 2024-06-05 PROCEDURE — G8427 DOCREV CUR MEDS BY ELIG CLIN: HCPCS | Performed by: NURSE PRACTITIONER

## 2024-06-05 PROCEDURE — 1123F ACP DISCUSS/DSCN MKR DOCD: CPT | Performed by: NURSE PRACTITIONER

## 2024-06-05 PROCEDURE — G8417 CALC BMI ABV UP PARAM F/U: HCPCS | Performed by: NURSE PRACTITIONER

## 2024-06-05 NOTE — PATIENT INSTRUCTIONS
Ask Dr. Valdovinos to do cholesterol levels when you get your blood work done next    Appt with Dr. Farmer in six months

## 2024-06-05 NOTE — PROGRESS NOTES
[facility]     Outpatient Follow Up Note    Jeramie Ortiz is 69 y.o. male who presents today with a history of IWMI CAD s/p PTCA prox & distal-RCA '11, HTN and hyperlipidemia.      CHIEF COMPLAINT / HPI:  Follow Up secondary to CAD    Subjective:     He denies chest discomfort. There is no SOB/DAMIAN. The patient denies orthopnea/PND. He has sleep apnea yet intolerant to using a mask. He had decided to redo a sleep study with an appt scheduled for . He expressed interest in the [facility].   The patient does not have palpitations/dizziness/swelling.     His angina equivalent was severe left elbow pain (heaviness like a buffalo sitting on his arms; and his eyes were stuttering). He denies recurrence.    His wt is stable.     These symptoms are stable since the OV.   With regard to medication therapy the patient has been compliant with prescribed regimen. They have tolerated therapy to date.     Past Medical History:   Diagnosis Date    Abdominal aortic aneurysm (AAA), 30-34 mm diameter ([facility]) 3/23/2022    CAD (coronary artery disease)     DDD (degenerative disc disease), cervical 2021    Disc disorder     ED (erectile dysfunction)     Hyperlipidemia     Hyperlipidemia     Hypertension     Hypertension     MI (myocardial infarction) ([facility])     Obstructive sleep apnea     Other disorders of kidney and ureter in diseases classified elsewhere     Pneumonia 1970s    Polyp of colon     PVD (peripheral vascular disease) ([facility]) 2011    Renal calculi     Type 2 diabetes mellitus without complication, without long-term current use of insulin ([facility]) 2019     Social History:    Social History     Tobacco Use   Smoking Status Former    Current packs/day: 0.00    Average packs/day: 0.3 packs/day for 30.0 years (7.5 ttl pk-yrs)    Types: Cigarettes, Cigars    Start date: 1981    Quit date: 2011    Years since quittin.1   Smokeless Tobacco Never   Tobacco Comments    cigars every once in a while

## 2024-06-26 DIAGNOSIS — E11.40 TYPE 2 DIABETES MELLITUS WITH DIABETIC NEUROPATHY, WITH LONG-TERM CURRENT USE OF INSULIN (HCC): ICD-10-CM

## 2024-06-26 DIAGNOSIS — Z79.4 TYPE 2 DIABETES MELLITUS WITH DIABETIC NEUROPATHY, WITH LONG-TERM CURRENT USE OF INSULIN (HCC): ICD-10-CM

## 2024-06-26 RX ORDER — TAMSULOSIN HYDROCHLORIDE 0.4 MG/1
CAPSULE ORAL
Qty: 90 CAPSULE | Refills: 3 | Status: SHIPPED | OUTPATIENT
Start: 2024-06-26

## 2024-06-26 RX ORDER — GLIMEPIRIDE 4 MG/1
4 TABLET ORAL EVERY MORNING
Qty: 90 TABLET | Refills: 3 | Status: SHIPPED | OUTPATIENT
Start: 2024-06-26

## 2024-06-26 RX ORDER — PREGABALIN 50 MG/1
50 CAPSULE ORAL 3 TIMES DAILY
Qty: 90 CAPSULE | Refills: 0 | Status: SHIPPED | OUTPATIENT
Start: 2024-06-26 | End: 2024-07-26

## 2024-06-26 RX ORDER — FLUTICASONE PROPIONATE 50 MCG
2 SPRAY, SUSPENSION (ML) NASAL DAILY
Qty: 1 EACH | Refills: 5 | Status: SHIPPED | OUTPATIENT
Start: 2024-06-26

## 2024-06-26 NOTE — TELEPHONE ENCOUNTER
Medication:   Requested Prescriptions     Pending Prescriptions Disp Refills    glimepiride (AMARYL) 4 MG tablet 30 tablet 3     Sig: Take 1 tablet by mouth every morning    pregabalin (LYRICA) 50 MG capsule 90 capsule 0     Sig: Take 1 capsule by mouth 3 times daily for 30 days. Max Daily Amount: 150 mg    tamsulosin (FLOMAX) 0.4 MG capsule 90 capsule 1     Si capsule p.o. ghs    fluticasone (FLONASE) 50 MCG/ACT nasal spray 1 each 3     Si sprays by Nasal route daily        Last Filled:  4/10/2024, 30, 3  4/10/2024, 90, 0  2023, 90, 1  2017, 1, 3    Patient Phone Number: [phone] (home) [phone] (work)    Last appt: 4/10/2024   Next appt: 7/10/2024    Last OARRS:       2021     3:51 PM   RX Monitoring   Periodic Controlled Substance Monitoring Possible medication side effects, risk of tolerance/dependence & alternative treatments discussed.;No signs of potential drug abuse or diversion identified.

## 2024-06-26 NOTE — TELEPHONE ENCOUNTER
Medication and Quantity requested:   pregabalin (LYRICA) 50 MG capsule       tamsulosin (FLOMAX) 0.4 MG capsule       glimepiride (AMARYL) 4 MG tablet      fluticasone (FLONASE) 50 MCG/ACT nasal spray     Last Visit  4/10/24      Pharmacy and phone number updated in [facility]:  yes  [facility] - [place], [place] - [address] - P [phone] - F [phone]  [address], [facility] [address]  Phone: [phone]  Fax: [phone]

## 2024-06-28 ENCOUNTER — OFFICE VISIT (OUTPATIENT)
Dept: SLEEP MEDICINE | Age: 69
End: 2024-06-28
Payer: MEDICARE

## 2024-06-28 VITALS
WEIGHT: 261.8 LBS | OXYGEN SATURATION: 98 % | DIASTOLIC BLOOD PRESSURE: 80 MMHG | HEART RATE: 80 BPM | HEIGHT: 72 IN | RESPIRATION RATE: 18 BRPM | SYSTOLIC BLOOD PRESSURE: 120 MMHG | BODY MASS INDEX: 35.46 KG/M2

## 2024-06-28 DIAGNOSIS — G47.19 EXCESSIVE DAYTIME SLEEPINESS: ICD-10-CM

## 2024-06-28 DIAGNOSIS — G47.33 OSA (OBSTRUCTIVE SLEEP APNEA): Primary | ICD-10-CM

## 2024-06-28 DIAGNOSIS — E66.9 OBESITY (BMI 30.0-34.9): ICD-10-CM

## 2024-06-28 DIAGNOSIS — R06.83 SNORING: ICD-10-CM

## 2024-06-28 DIAGNOSIS — I25.10 CORONARY ARTERY DISEASE INVOLVING NATIVE CORONARY ARTERY OF NATIVE HEART WITHOUT ANGINA PECTORIS: ICD-10-CM

## 2024-06-28 DIAGNOSIS — I10 PRIMARY HYPERTENSION: ICD-10-CM

## 2024-06-28 PROCEDURE — 1123F ACP DISCUSS/DSCN MKR DOCD: CPT | Performed by: PSYCHIATRY & NEUROLOGY

## 2024-06-28 PROCEDURE — G8427 DOCREV CUR MEDS BY ELIG CLIN: HCPCS | Performed by: PSYCHIATRY & NEUROLOGY

## 2024-06-28 PROCEDURE — 1036F TOBACCO NON-USER: CPT | Performed by: PSYCHIATRY & NEUROLOGY

## 2024-06-28 PROCEDURE — 99204 OFFICE O/P NEW MOD 45 MIN: CPT | Performed by: PSYCHIATRY & NEUROLOGY

## 2024-06-28 PROCEDURE — 3074F SYST BP LT 130 MM HG: CPT | Performed by: PSYCHIATRY & NEUROLOGY

## 2024-06-28 PROCEDURE — 3017F COLORECTAL CA SCREEN DOC REV: CPT | Performed by: PSYCHIATRY & NEUROLOGY

## 2024-06-28 PROCEDURE — G8417 CALC BMI ABV UP PARAM F/U: HCPCS | Performed by: PSYCHIATRY & NEUROLOGY

## 2024-06-28 PROCEDURE — 3079F DIAST BP 80-89 MM HG: CPT | Performed by: PSYCHIATRY & NEUROLOGY

## 2024-06-28 ASSESSMENT — SLEEP AND FATIGUE QUESTIONNAIRES
HOW LIKELY ARE YOU TO NOD OFF OR FALL ASLEEP WHEN YOU ARE A PASSENGER IN A CAR FOR AN HOUR WITHOUT A BREAK: WOULD NEVER DOZE
NECK CIRCUMFERENCE (INCHES): 17.5
ESS TOTAL SCORE: 12
HOW LIKELY ARE YOU TO NOD OFF OR FALL ASLEEP WHILE SITTING QUIETLY AFTER LUNCH WITHOUT ALCOHOL: MODERATE CHANCE OF DOZING
HOW LIKELY ARE YOU TO NOD OFF OR FALL ASLEEP IN A CAR, WHILE STOPPED FOR A FEW MINUTES IN TRAFFIC: WOULD NEVER DOZE
HOW LIKELY ARE YOU TO NOD OFF OR FALL ASLEEP WHILE SITTING AND TALKING TO SOMEONE: WOULD NEVER DOZE
HOW LIKELY ARE YOU TO NOD OFF OR FALL ASLEEP WHILE SITTING AND READING: MODERATE CHANCE OF DOZING
HOW LIKELY ARE YOU TO NOD OFF OR FALL ASLEEP WHILE WATCHING TV: MODERATE CHANCE OF DOZING
HOW LIKELY ARE YOU TO NOD OFF OR FALL ASLEEP WHILE LYING DOWN TO REST IN THE AFTERNOON WHEN CIRCUMSTANCES PERMIT: HIGH CHANCE OF DOZING
HOW LIKELY ARE YOU TO NOD OFF OR FALL ASLEEP WHILE SITTING INACTIVE IN A PUBLIC PLACE: HIGH CHANCE OF DOZING

## 2024-06-28 ASSESSMENT — ENCOUNTER SYMPTOMS
EYES NEGATIVE: 1
ALLERGIC/IMMUNOLOGIC NEGATIVE: 1
RESPIRATORY NEGATIVE: 1

## 2024-06-28 NOTE — PATIENT INSTRUCTIONS
Orders Placed This Encounter   Procedures    Baseline Diagnostic Sleep Study     Standing Status:   Future     Standing Expiration Date:   6/28/2025     Order Specific Question:   Adult or Pediatric     Answer:   Adult Study (>7 Years)     Order Specific Question:   Location For Sleep Study     Answer:   [place]     Order Specific Question:   Select Sleep Lab Location     Answer:   [facility]    Sleep Study with PAP Titration     Standing Status:   Future     Standing Expiration Date:   6/28/2025     Order Specific Question:   Sleep Study Titration Type     Answer:   CPAP     Order Specific Question:   Location For Sleep Study     Answer:   [place]     Order Specific Question:   Select Sleep Lab Location     Answer:   [facility]

## 2024-06-28 NOTE — PROGRESS NOTES
MD EMIL Harden Board Certified in Sleep Medicine  Certified inBehavioral Sleep Medicine  Board Certified in Neurology [facility]  [address]   [address]  [place]  [address]  P- [phone]   [facility]   [address]  [address]   [place], [place] [address]           [facility]  [address] [facility] [address] [phone]    Subjective:     Patient ID: Jeramie Ortiz is a 69 y.o. male.    Chief Complaint   Patient presents with    [facility] Care    Sleep Apnea       HPI:        Jeramie Ortiz is a 69 y.o. male referred by Dr. Valdovinos for a sleep evaluation. He complains of snoring, tossing and turning, excessive daytime sleepiness, feels sleepy during the day, take naps during the day but he denies snorting, choking, periods of not breathing, knees buckling with laughing, completely or partially paralyzed while falling asleep or waking up.  Symptoms began several years ago, gradually improving since that time with weight loss, probably no much snoring at all according to the patient's wife.   SLEEP SCHEDULE: Goes to bed around 10:30 PM in the weekdays and 10:30 PM in the weekends. It usually takes the patient 20 minutes to fall asleep. The patient gets up 1 per night to go to the bathroom. The Patient finally gets up at 6:30 AM during the weekdays and 6:30 AM in the weekends.  The patient has restless sleep with frequent arousals in addition to the Patient has significant daytime sleepiness. The Patient scored Venice Sleepiness Score: 12 on Venice Sleepiness Scale ( more than 10 is indicative of daytime sleepiness)and 15 in fatigue scale ( more than 36 is indicative of daytime fatigue). The patient takes daily 2 naps for 20-25 minutes and usually is refreshing nap.     Previous evaluation and treatment has included- PSG and PSG with C PAP

## 2024-07-10 ENCOUNTER — OFFICE VISIT (OUTPATIENT)
Dept: FAMILY MEDICINE CLINIC | Age: 69
End: 2024-07-10
Payer: MEDICARE

## 2024-07-10 VITALS
SYSTOLIC BLOOD PRESSURE: 132 MMHG | OXYGEN SATURATION: 98 % | DIASTOLIC BLOOD PRESSURE: 81 MMHG | BODY MASS INDEX: 36.21 KG/M2 | WEIGHT: 267 LBS | HEART RATE: 67 BPM

## 2024-07-10 DIAGNOSIS — I25.10 CORONARY ARTERY DISEASE INVOLVING NATIVE CORONARY ARTERY OF NATIVE HEART WITHOUT ANGINA PECTORIS: ICD-10-CM

## 2024-07-10 DIAGNOSIS — E11.9 TYPE 2 DIABETES MELLITUS WITHOUT COMPLICATION, WITHOUT LONG-TERM CURRENT USE OF INSULIN (HCC): Primary | ICD-10-CM

## 2024-07-10 DIAGNOSIS — I10 PRIMARY HYPERTENSION: ICD-10-CM

## 2024-07-10 DIAGNOSIS — E78.5 DYSLIPIDEMIA: ICD-10-CM

## 2024-07-10 DIAGNOSIS — G47.33 OBSTRUCTIVE SLEEP APNEA: ICD-10-CM

## 2024-07-10 DIAGNOSIS — Z12.5 PROSTATE CANCER SCREENING: ICD-10-CM

## 2024-07-10 DIAGNOSIS — I71.40 ABDOMINAL AORTIC ANEURYSM (AAA), 30-34 MM DIAMETER (HCC): ICD-10-CM

## 2024-07-10 LAB
BASOPHILS # BLD: 0.1 K/UL (ref 0–0.2)
BASOPHILS NFR BLD: 0.6 %
DEPRECATED RDW RBC AUTO: 15 % (ref 12.4–15.4)
EOSINOPHIL # BLD: 0.4 K/UL (ref 0–0.6)
EOSINOPHIL NFR BLD: 4.1 %
HCT VFR BLD AUTO: 41.6 % (ref 40.5–52.5)
HGB BLD-MCNC: 13.6 G/DL (ref 13.5–17.5)
LYMPHOCYTES # BLD: 2.9 K/UL (ref 1–5.1)
LYMPHOCYTES NFR BLD: 32 %
MCH RBC QN AUTO: 28.1 PG (ref 26–34)
MCHC RBC AUTO-ENTMCNC: 32.6 G/DL (ref 31–36)
MCV RBC AUTO: 86.3 FL (ref 80–100)
MONOCYTES # BLD: 0.8 K/UL (ref 0–1.3)
MONOCYTES NFR BLD: 8.7 %
NEUTROPHILS # BLD: 5 K/UL (ref 1.7–7.7)
NEUTROPHILS NFR BLD: 54.6 %
PLATELET # BLD AUTO: 216 K/UL (ref 135–450)
PMV BLD AUTO: 8.6 FL (ref 5–10.5)
PSA SERPL DL<=0.01 NG/ML-MCNC: 0.91 NG/ML (ref 0–4)
RBC # BLD AUTO: 4.82 M/UL (ref 4.2–5.9)
WBC # BLD AUTO: 9.2 K/UL (ref 4–11)

## 2024-07-10 PROCEDURE — 99214 OFFICE O/P EST MOD 30 MIN: CPT | Performed by: FAMILY MEDICINE

## 2024-07-10 PROCEDURE — 1123F ACP DISCUSS/DSCN MKR DOCD: CPT | Performed by: FAMILY MEDICINE

## 2024-07-10 PROCEDURE — G8427 DOCREV CUR MEDS BY ELIG CLIN: HCPCS | Performed by: FAMILY MEDICINE

## 2024-07-10 PROCEDURE — 3075F SYST BP GE 130 - 139MM HG: CPT | Performed by: FAMILY MEDICINE

## 2024-07-10 PROCEDURE — 3017F COLORECTAL CA SCREEN DOC REV: CPT | Performed by: FAMILY MEDICINE

## 2024-07-10 PROCEDURE — 3052F HG A1C>EQUAL 8.0%<EQUAL 9.0%: CPT | Performed by: FAMILY MEDICINE

## 2024-07-10 PROCEDURE — 3079F DIAST BP 80-89 MM HG: CPT | Performed by: FAMILY MEDICINE

## 2024-07-10 PROCEDURE — G8417 CALC BMI ABV UP PARAM F/U: HCPCS | Performed by: FAMILY MEDICINE

## 2024-07-10 PROCEDURE — 1036F TOBACCO NON-USER: CPT | Performed by: FAMILY MEDICINE

## 2024-07-10 PROCEDURE — 83036 HEMOGLOBIN GLYCOSYLATED A1C: CPT | Performed by: FAMILY MEDICINE

## 2024-07-10 PROCEDURE — 2022F DILAT RTA XM EVC RTNOPTHY: CPT | Performed by: FAMILY MEDICINE

## 2024-07-10 RX ORDER — ASPIRIN 81 MG/1
81 TABLET ORAL DAILY
COMMUNITY

## 2024-07-10 RX ORDER — GLIMEPIRIDE 4 MG/1
4 TABLET ORAL 2 TIMES DAILY
Qty: 180 TABLET | Refills: 3 | Status: SHIPPED | OUTPATIENT
Start: 2024-07-10

## 2024-07-10 RX ORDER — TRAZODONE HYDROCHLORIDE 50 MG/1
50 TABLET ORAL NIGHTLY PRN
Qty: 30 TABLET | Refills: 5 | Status: SHIPPED | OUTPATIENT
Start: 2024-07-10

## 2024-07-10 ASSESSMENT — PATIENT HEALTH QUESTIONNAIRE - PHQ9
1. LITTLE INTEREST OR PLEASURE IN DOING THINGS: NOT AT ALL
SUM OF ALL RESPONSES TO PHQ QUESTIONS 1-9: 0
SUM OF ALL RESPONSES TO PHQ QUESTIONS 1-9: 0
2. FEELING DOWN, DEPRESSED OR HOPELESS: NOT AT ALL
SUM OF ALL RESPONSES TO PHQ9 QUESTIONS 1 & 2: 0
SUM OF ALL RESPONSES TO PHQ QUESTIONS 1-9: 0
SUM OF ALL RESPONSES TO PHQ QUESTIONS 1-9: 0

## 2024-07-10 NOTE — PROGRESS NOTES
Jeramie Ortiz is a 69 y.o. male.    HPI: Here for complex medical visit  Exercising regularly, lifting weights  Saw cardiol -all good  Not able to wear cpap, will f/u with sleep doctor, not snoring, getting sleep study, may consider inspire  Pain and numbness in both feet - good not tolerate lyrica, gabapentin was not effective  Takes advil and tylenol qhs  Stopped ozempic could not tolerate the higher dose, on jardiance  Left knee pain, s/p ltkr, limping, had 2 procedures 1 for total knee replacement 1 for quadriceps tear, has lost confidence in his Ortho  Using benadry 50 mg qhs for years for sleep, family history of dementia  Meds, vitamins and allergies reviewed with pt    ROS: No TIA's or unusual headaches, no dysphagia.  No prolonged cough. No dyspnea or chest pain on exertion.  No abdominal pain, change in bowel habits, black or bloody stools.  No urinary tract symptoms.  No new or unusual musculoskeletal symptoms.       Prior to Visit Medications    Medication Sig Taking? Authorizing Provider   glimepiride (AMARYL) 4 MG tablet Take 1 tablet by mouth every morning Yes Leon Valdovinos MD   pregabalin (LYRICA) 50 MG capsule Take 1 capsule by mouth 3 times daily for 30 days. Max Daily Amount: 150 mg Yes Leon Valdovinos MD   tamsulosin (FLOMAX) 0.4 MG capsule 1 capsule p.o. ghs Yes Leon Valdovinos MD   fluticasone (FLONASE) 50 MCG/ACT nasal spray 2 sprays by Nasal route daily Yes Leon Valdovinos MD   empagliflozin (JARDIANCE) 25 MG tablet Take 1 tablet by mouth daily Yes Leon Valdovinos MD   amLODIPine (NORVASC) 10 MG tablet TAKE 1 TABLET EVERY DAY Yes Leon Valdovinos MD   metFORMIN (GLUCOPHAGE) 1000 MG tablet 1 tablet po bid with food Yes Leon Valdovinos MD   hydroCHLOROthiazide (HYDRODIURIL) 25 MG tablet TAKE 1 TABLET EVERY DAY Yes Leon Valdovinos MD   atorvastatin (LIPITOR) 40 MG tablet Take 1 tablet by mouth daily Yes Leon Valdovinos MD   carvedilol (COREG) 6.25 MG

## 2024-07-11 LAB
ALBUMIN SERPL-MCNC: 4.5 G/DL (ref 3.4–5)
ALBUMIN/GLOB SERPL: 1.7 {RATIO} (ref 1.1–2.2)
ALP SERPL-CCNC: 67 U/L (ref 40–129)
ALT SERPL-CCNC: 32 U/L (ref 10–40)
ANION GAP SERPL CALCULATED.3IONS-SCNC: 13 MMOL/L (ref 3–16)
AST SERPL-CCNC: 26 U/L (ref 15–37)
BILIRUB SERPL-MCNC: 0.4 MG/DL (ref 0–1)
BUN SERPL-MCNC: 19 MG/DL (ref 7–20)
CALCIUM SERPL-MCNC: 9.7 MG/DL (ref 8.3–10.6)
CHLORIDE SERPL-SCNC: 100 MMOL/L (ref 99–110)
CHOLEST SERPL-MCNC: 121 MG/DL (ref 0–199)
CO2 SERPL-SCNC: 27 MMOL/L (ref 21–32)
CREAT SERPL-MCNC: 1.1 MG/DL (ref 0.8–1.3)
GFR SERPLBLD CREATININE-BSD FMLA CKD-EPI: 73 ML/MIN/{1.73_M2}
GLUCOSE P FAST SERPL-MCNC: 258 MG/DL (ref 70–99)
HDLC SERPL-MCNC: 35 MG/DL (ref 40–60)
LDL CHOLESTEROL: 48 MG/DL
POTASSIUM SERPL-SCNC: 5 MMOL/L (ref 3.5–5.1)
PROT SERPL-MCNC: 7.2 G/DL (ref 6.4–8.2)
SODIUM SERPL-SCNC: 140 MMOL/L (ref 136–145)
TRIGL SERPL-MCNC: 191 MG/DL (ref 0–150)
VLDLC SERPL CALC-MCNC: 38 MG/DL

## 2024-07-12 RX ORDER — FOLIC ACID 1 MG/1
1 TABLET ORAL
Qty: 50 TABLET | Refills: 3 | Status: SHIPPED | OUTPATIENT
Start: 2024-07-13

## 2024-07-12 NOTE — TELEPHONE ENCOUNTER
Medication and Quantity requested:      folic acid (FOLVITE) 1 MG tablet [[id number]]     90 day supply     Last Visit  04/10/2024    Pharmacy and phone number updated in [facility]:  yes    [facility]          
Medication:   Requested Prescriptions     Pending Prescriptions Disp Refills    folic acid (FOLVITE) 1 MG tablet 50 tablet 3     Sig: Take 1 tablet by mouth four times a week        Last Filled:  8/10/2023, 50, 3    Patient Phone Number: [phone] (home) [phone] (work)    Last appt: 7/10/2024   Next appt: 10/10/2024    Last OARRS:       12/8/2021     3:51 PM   RX Monitoring   Periodic Controlled Substance Monitoring Possible medication side effects, risk of tolerance/dependence & alternative treatments discussed.;No signs of potential drug abuse or diversion identified.           
no

## 2024-07-29 ENCOUNTER — HOSPITAL ENCOUNTER (OUTPATIENT)
Dept: SLEEP CENTER | Age: 69
Discharge: HOME | End: 2024-07-29
Attending: PSYCHIATRY & NEUROLOGY
Payer: MEDICARE

## 2024-07-29 DIAGNOSIS — G47.33 OSA (OBSTRUCTIVE SLEEP APNEA): ICD-10-CM

## 2024-07-29 PROCEDURE — 95811 POLYSOM 6/>YRS CPAP 4/> PARM: CPT

## 2024-07-30 PROCEDURE — 95811 POLYSOM 6/>YRS CPAP 4/> PARM: CPT | Performed by: PSYCHIATRY & NEUROLOGY

## 2024-07-31 ENCOUNTER — TELEPHONE (OUTPATIENT)
Dept: PULMONOLOGY | Age: 69
End: 2024-07-31

## 2024-07-31 NOTE — TELEPHONE ENCOUNTER
Split Sleep study showed severe GREGORIO (on a scale of mild, moderate and severe).  AHI was 35.0  per hr. (Average times per hr breathing was obstructed).  O2 Desaturations to 87% (lowest o2) Because of this CPAP/BiPAP was initiated.  Adequate control of the events at a BiPAP pressure of 17/14.        Recommends:    Follow up with the patient's sleep physician to discuss results      Avoid sedatives, alcohol and caffeinated drinks at bedtime.    Recommend:  BiPAP 19/15     Sleep Lab used: [facility] ([phone])    Avoid driving while sleepy.       The patient has been notified of this information and all questions answered.  DME: [facility]

## 2024-08-12 NOTE — PROGRESS NOTES
Jeramie Ortiz         : 1955  [] [facility]     [x] [facility]      [] [facility]     [][facility]    [] [facility]  [] [facility]  [] [facility]   [] [facility] [] Other:  Diagnosis: [x] GREGORIO (G47.33) [] CSA (G47.31) [] Apnea (G47.30)   Length of Need: [] 12 Months [x] 99 Months [] Other:    Machine (ISAMAR!):  [x] [employer]Med AirSense     Auto [] Other:     [x]  CPAP () [x] Bilevel ()   Mode: [x] Auto [] Spontaneous    Mode: [] Auto [] Spontaneous                Please change to 15/11 cm           Start Order Date: 24    MEDICAL JUSTIFICATION:  I, the undersigned, certify that the above prescribed supplies are medically necessary for this patient’s wellbeing.  In my opinion, the supplies are both reasonable and necessary in reference to accepted standards of medicalpractice in treatment of this patient’s condition.    Dora Flannery MD      NPI: [id number]       Order Signed Date: 24    Electronically signed by Dora Flannery MD on 2024 at 12:58 PM

## 2024-08-13 RX ORDER — DIAPER,BRIEF,ADULT, DISPOSABLE
EACH MISCELLANEOUS
Qty: 100 EACH | Refills: 3 | Status: SHIPPED | OUTPATIENT
Start: 2024-08-13

## 2024-08-13 NOTE — TELEPHONE ENCOUNTER
Medication:   Requested Prescriptions     Pending Prescriptions Disp Refills    TRUETRACK TEST strip [Pharmacy Med Name: TRUETRACK TEST STRIPS]  0     Sig: TESTING ONCE DAILY        Last Filled:  unknown     Patient Phone Number: [phone] (home) [phone] (work)    Last appt: 7/10/2024   Next appt: 10/10/2024    Last OARRS:       12/8/2021     3:51 PM   RX Monitoring   Periodic Controlled Substance Monitoring Possible medication side effects, risk of tolerance/dependence & alternative treatments discussed.;No signs of potential drug abuse or diversion identified.

## 2024-08-19 ENCOUNTER — TELEPHONE (OUTPATIENT)
Dept: PULMONOLOGY | Age: 69
End: 2024-08-19

## 2024-08-19 RX ORDER — CETIRIZINE HYDROCHLORIDE 10 MG/1
10 TABLET ORAL DAILY
Qty: 30 TABLET | Refills: 0 | Status: SHIPPED | OUTPATIENT
Start: 2024-08-19

## 2024-08-19 RX ORDER — AZELASTINE 1 MG/ML
2 SPRAY, METERED NASAL 2 TIMES DAILY
Qty: 120 ML | Refills: 1 | Status: SHIPPED | OUTPATIENT
Start: 2024-08-19

## 2024-08-19 NOTE — TELEPHONE ENCOUNTER
Sent this message to Maddison: This patient is having so many problems with his pap. He is ready to give up. He can wear it for maybe 15 min and then hes done. Can someone (RT) please call him. I assure him that we can take care of this and make it right. He may need a different mask. He said his is leaking so bad and the pressure was to be changed and he cant feel the difference. He is in the severe lj range. Thank you. Mirtha       Set up date was 8/7/24 with [facility]

## 2024-09-04 ENCOUNTER — TELEPHONE (OUTPATIENT)
Dept: CARDIOLOGY CLINIC | Age: 69
End: 2024-09-04

## 2024-09-11 ENCOUNTER — PATIENT MESSAGE (OUTPATIENT)
Dept: SLEEP MEDICINE | Age: 69
End: 2024-09-11

## 2024-09-17 ENCOUNTER — TELEPHONE (OUTPATIENT)
Dept: FAMILY MEDICINE CLINIC | Age: 69
End: 2024-09-17

## 2024-09-17 ENCOUNTER — OFFICE VISIT (OUTPATIENT)
Dept: FAMILY MEDICINE CLINIC | Age: 69
End: 2024-09-17

## 2024-09-17 VITALS
SYSTOLIC BLOOD PRESSURE: 132 MMHG | DIASTOLIC BLOOD PRESSURE: 76 MMHG | WEIGHT: 269 LBS | HEIGHT: 72 IN | BODY MASS INDEX: 36.44 KG/M2 | HEART RATE: 78 BPM | OXYGEN SATURATION: 97 %

## 2024-09-17 DIAGNOSIS — Z01.818 PREOP EXAMINATION: ICD-10-CM

## 2024-09-17 DIAGNOSIS — S83.231S COMPLEX TEAR OF MEDIAL MENISCUS OF RIGHT KNEE AS CURRENT INJURY, SEQUELA: ICD-10-CM

## 2024-09-17 DIAGNOSIS — E11.9 TYPE 2 DIABETES MELLITUS WITHOUT COMPLICATION, WITHOUT LONG-TERM CURRENT USE OF INSULIN (HCC): Primary | ICD-10-CM

## 2024-09-17 LAB — HBA1C MFR BLD: 7.4 %

## 2024-09-17 ASSESSMENT — ENCOUNTER SYMPTOMS
BLOOD IN STOOL: 0
NAUSEA: 0
COUGH: 0
BACK PAIN: 0
ABDOMINAL PAIN: 0
RHINORRHEA: 0
SHORTNESS OF BREATH: 0
CONSTIPATION: 0
VOMITING: 0
SORE THROAT: 0
DIARRHEA: 0
WHEEZING: 0

## 2024-09-19 ENCOUNTER — OFFICE VISIT (OUTPATIENT)
Dept: SLEEP MEDICINE | Age: 69
End: 2024-09-19

## 2024-09-19 ENCOUNTER — TELEPHONE (OUTPATIENT)
Dept: FAMILY MEDICINE CLINIC | Age: 69
End: 2024-09-19

## 2024-09-19 VITALS
WEIGHT: 264.4 LBS | BODY MASS INDEX: 35.81 KG/M2 | HEART RATE: 86 BPM | HEIGHT: 72 IN | SYSTOLIC BLOOD PRESSURE: 131 MMHG | DIASTOLIC BLOOD PRESSURE: 85 MMHG | TEMPERATURE: 98.4 F | OXYGEN SATURATION: 99 % | RESPIRATION RATE: 18 BRPM

## 2024-09-19 DIAGNOSIS — I10 PRIMARY HYPERTENSION: ICD-10-CM

## 2024-09-19 DIAGNOSIS — Z99.89 DEPENDENCE ON OTHER ENABLING MACHINES AND DEVICES: Primary | ICD-10-CM

## 2024-09-19 DIAGNOSIS — I25.10 CORONARY ARTERY DISEASE INVOLVING NATIVE CORONARY ARTERY OF NATIVE HEART WITHOUT ANGINA PECTORIS: ICD-10-CM

## 2024-09-19 DIAGNOSIS — G47.33 OSA TREATED WITH BIPAP: ICD-10-CM

## 2024-09-19 DIAGNOSIS — E66.01 CLASS 2 SEVERE OBESITY DUE TO EXCESS CALORIES WITH SERIOUS COMORBIDITY AND BODY MASS INDEX (BMI) OF 36.0 TO 36.9 IN ADULT (HCC): ICD-10-CM

## 2024-09-19 ASSESSMENT — SLEEP AND FATIGUE QUESTIONNAIRES
HOW LIKELY ARE YOU TO NOD OFF OR FALL ASLEEP WHILE WATCHING TV: SLIGHT CHANCE OF DOZING
HOW LIKELY ARE YOU TO NOD OFF OR FALL ASLEEP WHILE SITTING AND READING: WOULD NEVER DOZE
HOW LIKELY ARE YOU TO NOD OFF OR FALL ASLEEP WHILE SITTING QUIETLY AFTER LUNCH WITHOUT ALCOHOL: WOULD NEVER DOZE
HOW LIKELY ARE YOU TO NOD OFF OR FALL ASLEEP IN A CAR, WHILE STOPPED FOR A FEW MINUTES IN TRAFFIC: WOULD NEVER DOZE
HOW LIKELY ARE YOU TO NOD OFF OR FALL ASLEEP WHEN YOU ARE A PASSENGER IN A CAR FOR AN HOUR WITHOUT A BREAK: MODERATE CHANCE OF DOZING
HOW LIKELY ARE YOU TO NOD OFF OR FALL ASLEEP WHILE SITTING INACTIVE IN A PUBLIC PLACE: SLIGHT CHANCE OF DOZING
ESS TOTAL SCORE: 6
HOW LIKELY ARE YOU TO NOD OFF OR FALL ASLEEP WHILE SITTING AND TALKING TO SOMEONE: WOULD NEVER DOZE
HOW LIKELY ARE YOU TO NOD OFF OR FALL ASLEEP WHILE LYING DOWN TO REST IN THE AFTERNOON WHEN CIRCUMSTANCES PERMIT: MODERATE CHANCE OF DOZING

## 2024-09-20 DIAGNOSIS — I10 PRIMARY HYPERTENSION: Primary | ICD-10-CM

## 2024-09-20 DIAGNOSIS — E11.9 TYPE 2 DIABETES MELLITUS WITHOUT COMPLICATION, WITHOUT LONG-TERM CURRENT USE OF INSULIN (HCC): ICD-10-CM

## 2024-09-23 RX ORDER — CETIRIZINE HYDROCHLORIDE 10 MG/1
10 TABLET ORAL DAILY
Qty: 30 TABLET | Refills: 11 | Status: SHIPPED | OUTPATIENT
Start: 2024-09-23

## 2024-09-24 ENCOUNTER — NURSE ONLY (OUTPATIENT)
Dept: FAMILY MEDICINE CLINIC | Age: 69
End: 2024-09-24
Payer: MEDICARE

## 2024-09-24 DIAGNOSIS — I10 PRIMARY HYPERTENSION: ICD-10-CM

## 2024-09-24 DIAGNOSIS — E11.9 TYPE 2 DIABETES MELLITUS WITHOUT COMPLICATION, WITHOUT LONG-TERM CURRENT USE OF INSULIN (HCC): ICD-10-CM

## 2024-09-24 DIAGNOSIS — Z01.811 PRE-OP CHEST EXAM: Primary | ICD-10-CM

## 2024-09-24 PROCEDURE — 93000 ELECTROCARDIOGRAM COMPLETE: CPT | Performed by: FAMILY MEDICINE

## 2024-09-24 PROCEDURE — 36415 COLL VENOUS BLD VENIPUNCTURE: CPT | Performed by: FAMILY MEDICINE

## 2024-09-25 LAB
ANION GAP SERPL CALCULATED.3IONS-SCNC: 17 MMOL/L (ref 3–16)
BUN SERPL-MCNC: 16 MG/DL (ref 7–20)
CALCIUM SERPL-MCNC: 9.8 MG/DL (ref 8.3–10.6)
CHLORIDE SERPL-SCNC: 98 MMOL/L (ref 99–110)
CO2 SERPL-SCNC: 25 MMOL/L (ref 21–32)
CREAT SERPL-MCNC: 1.1 MG/DL (ref 0.8–1.3)
DEPRECATED RDW RBC AUTO: 14.7 % (ref 12.4–15.4)
GFR SERPLBLD CREATININE-BSD FMLA CKD-EPI: 73 ML/MIN/{1.73_M2}
GLUCOSE SERPL-MCNC: 162 MG/DL (ref 70–99)
HCT VFR BLD AUTO: 41.4 % (ref 40.5–52.5)
HGB BLD-MCNC: 13.6 G/DL (ref 13.5–17.5)
MCH RBC QN AUTO: 28.2 PG (ref 26–34)
MCHC RBC AUTO-ENTMCNC: 32.8 G/DL (ref 31–36)
MCV RBC AUTO: 85.9 FL (ref 80–100)
PLATELET # BLD AUTO: 245 K/UL (ref 135–450)
PMV BLD AUTO: 8.3 FL (ref 5–10.5)
POTASSIUM SERPL-SCNC: 3.6 MMOL/L (ref 3.5–5.1)
RBC # BLD AUTO: 4.82 M/UL (ref 4.2–5.9)
SODIUM SERPL-SCNC: 140 MMOL/L (ref 136–145)
WBC # BLD AUTO: 9.8 K/UL (ref 4–11)

## 2024-09-26 ENCOUNTER — TELEPHONE (OUTPATIENT)
Dept: FAMILY MEDICINE CLINIC | Age: 69
End: 2024-09-26

## 2024-11-07 DIAGNOSIS — I10 ESSENTIAL HYPERTENSION: ICD-10-CM

## 2024-11-07 RX ORDER — HYDROCHLOROTHIAZIDE 25 MG/1
TABLET ORAL
Qty: 90 TABLET | Refills: 3 | Status: SHIPPED | OUTPATIENT
Start: 2024-11-07

## 2024-11-07 RX ORDER — AMLODIPINE BESYLATE 10 MG/1
TABLET ORAL
Qty: 90 TABLET | Refills: 3 | Status: SHIPPED | OUTPATIENT
Start: 2024-11-07

## 2024-11-07 RX ORDER — ATORVASTATIN CALCIUM 40 MG/1
40 TABLET, FILM COATED ORAL DAILY
Qty: 90 TABLET | Refills: 3 | Status: SHIPPED | OUTPATIENT
Start: 2024-11-07

## 2024-11-07 RX ORDER — CARVEDILOL 6.25 MG/1
TABLET ORAL
Qty: 180 TABLET | Refills: 3 | Status: SHIPPED | OUTPATIENT
Start: 2024-11-07

## 2024-11-07 NOTE — TELEPHONE ENCOUNTER
Last OV: 9/17/2024  Next OV: 12/17/2024    Next appointment due:    Last fill:01/10/2024  Refills:90/3  Medication:   Requested Prescriptions     Pending Prescriptions Disp Refills    hydroCHLOROthiazide (HYDRODIURIL) 25 MG tablet [Pharmacy Med Name: hydroCHLOROthiazide Oral Tablet 25 MG] 90 tablet 3     Sig: TAKE 1 TABLET EVERY DAY    atorvastatin (LIPITOR) 40 MG tablet [Pharmacy Med Name: Atorvastatin Calcium Oral Tablet 40 MG] 90 tablet 3     Sig: TAKE 1 TABLET EVERY DAY    carvedilol (COREG) 6.25 MG tablet [Pharmacy Med Name: Carvedilol Oral Tablet 6.25 MG] 180 tablet 3     Sig: TAKE 1 TABLET TWICE DAILY WITH MEALS    amLODIPine (NORVASC) 10 MG tablet [Pharmacy Med Name: amLODIPine Besylate Oral Tablet 10 MG] 90 tablet 3     Sig: TAKE 1 TABLET EVERY DAY    metFORMIN (GLUCOPHAGE) 1000 MG tablet [Pharmacy Med Name: metFORMIN HCl Oral Tablet 1000 MG] 180 tablet 3     Sig: TAKE 1 TABLET TWICE DAILY WITH FOOD       Last Filled:      Patient Phone Number: [phone] (home) [phone] (work)    Last appt: 9/17/2024   Next appt: 12/17/2024    Last Lipid:   Lab Results   Component Value Date/Time    CHOL 108 11/30/2021 10:41 AM    TRIG 139 11/30/2021 10:41 AM    HDL 35 07/10/2024 11:10 AM    HDL 31 07/15/2011 08:40 AM             Medication:   Requested Prescriptions     Pending Prescriptions Disp Refills    hydroCHLOROthiazide (HYDRODIURIL) 25 MG tablet [Pharmacy Med Name: hydroCHLOROthiazide Oral Tablet 25 MG] 90 tablet 3     Sig: TAKE 1 TABLET EVERY DAY    atorvastatin (LIPITOR) 40 MG tablet [Pharmacy Med Name: Atorvastatin Calcium Oral Tablet 40 MG] 90 tablet 3     Sig: TAKE 1 TABLET EVERY DAY    carvedilol (COREG) 6.25 MG tablet [Pharmacy Med Name: Carvedilol Oral Tablet 6.25 MG] 180 tablet 3     Sig: TAKE 1 TABLET TWICE DAILY WITH MEALS    amLODIPine (NORVASC) 10 MG tablet [Pharmacy Med Name: amLODIPine Besylate Oral Tablet 10 MG] 90 tablet 3     Sig: TAKE 1 TABLET EVERY DAY    metFORMIN (GLUCOPHAGE) 1000 MG

## 2024-12-05 ENCOUNTER — OFFICE VISIT (OUTPATIENT)
Dept: CARDIOLOGY CLINIC | Age: 69
End: 2024-12-05
Payer: MEDICARE

## 2024-12-05 VITALS
HEIGHT: 72 IN | BODY MASS INDEX: 35.76 KG/M2 | SYSTOLIC BLOOD PRESSURE: 130 MMHG | HEART RATE: 70 BPM | OXYGEN SATURATION: 99 % | DIASTOLIC BLOOD PRESSURE: 78 MMHG | WEIGHT: 264 LBS

## 2024-12-05 DIAGNOSIS — I10 PRIMARY HYPERTENSION: ICD-10-CM

## 2024-12-05 DIAGNOSIS — I25.10 CORONARY ARTERY DISEASE INVOLVING NATIVE CORONARY ARTERY OF NATIVE HEART WITHOUT ANGINA PECTORIS: Primary | ICD-10-CM

## 2024-12-05 DIAGNOSIS — E78.2 MIXED HYPERLIPIDEMIA: ICD-10-CM

## 2024-12-05 PROCEDURE — G8484 FLU IMMUNIZE NO ADMIN: HCPCS | Performed by: NURSE PRACTITIONER

## 2024-12-05 PROCEDURE — 1036F TOBACCO NON-USER: CPT | Performed by: NURSE PRACTITIONER

## 2024-12-05 PROCEDURE — G8427 DOCREV CUR MEDS BY ELIG CLIN: HCPCS | Performed by: NURSE PRACTITIONER

## 2024-12-05 PROCEDURE — 3017F COLORECTAL CA SCREEN DOC REV: CPT | Performed by: NURSE PRACTITIONER

## 2024-12-05 PROCEDURE — G8417 CALC BMI ABV UP PARAM F/U: HCPCS | Performed by: NURSE PRACTITIONER

## 2024-12-05 PROCEDURE — 3075F SYST BP GE 130 - 139MM HG: CPT | Performed by: NURSE PRACTITIONER

## 2024-12-05 PROCEDURE — 1123F ACP DISCUSS/DSCN MKR DOCD: CPT | Performed by: NURSE PRACTITIONER

## 2024-12-05 PROCEDURE — 1160F RVW MEDS BY RX/DR IN RCRD: CPT | Performed by: NURSE PRACTITIONER

## 2024-12-05 PROCEDURE — 1159F MED LIST DOCD IN RCRD: CPT | Performed by: NURSE PRACTITIONER

## 2024-12-05 PROCEDURE — 3078F DIAST BP <80 MM HG: CPT | Performed by: NURSE PRACTITIONER

## 2024-12-05 PROCEDURE — 99214 OFFICE O/P EST MOD 30 MIN: CPT | Performed by: NURSE PRACTITIONER

## 2024-12-05 NOTE — PROGRESS NOTES
[facility]     Outpatient Follow Up Note    Jeramie Ortiz is 69 y.o. male who presents today with a history of IWMI CAD s/p PTCA prox & distal-RCA '11, HTN and hyperlipidemia.      Interval hx: 10/3/24  SUTURE QUADRICEPS/HAMSTRING RUPTURE PRIMARY   ID SUTURE QUADRICEPS/HAMSTRING RUPTURE PRIMARY   REPAIR, TENDON, QUADRICEPS     CHIEF COMPLAINT / HPI:  Follow Up secondary to CAD    Subjective:     He denies chest discomfort. There is no SOB/DAMIAN. The patient denies orthopnea/PND. He has sleep apnea yet intolerant to using a mask. He felt he was suffocating with a CPAP.  He is interested in the [facility] but has to wait about 4 months to be re-evaluated. He'll be seeing Dr. Flannery again the end of January  The patient does not have palpitations/dizziness/swelling.     His angina equivalent was severe left elbow pain (heaviness like a buffalo sitting on his arms; and his eyes were stuttering). He denies recurrence.    His wt is stable.     These symptoms are stable since the OV.   With regard to medication therapy the patient has been compliant with prescribed regimen. They have tolerated therapy to date.     Past Medical History:   Diagnosis Date    Abdominal aortic aneurysm (AAA), 30-34 mm diameter ([facility]) 3/23/2022    CAD (coronary artery disease)     DDD (degenerative disc disease), cervical 11/24/2021    Disc disorder     ED (erectile dysfunction)     Hyperlipidemia     Hyperlipidemia     Hypertension     Hypertension     MI (myocardial infarction) ([facility])     Obstructive sleep apnea     Other disorders of kidney and ureter in diseases classified elsewhere     Pneumonia 1970s    Polyp of colon     PVD (peripheral vascular disease) ([facility]) 11/22/2011    Renal calculi     Type 2 diabetes mellitus without complication, without long-term current use of insulin ([facility]) 5/16/2019     Social History:    Social History     Tobacco Use   Smoking Status Former    Current packs/day: 0.00    Average packs/day: 0.3

## 2024-12-06 SDOH — HEALTH STABILITY: PHYSICAL HEALTH: ON AVERAGE, HOW MANY DAYS PER WEEK DO YOU ENGAGE IN MODERATE TO STRENUOUS EXERCISE (LIKE A BRISK WALK)?: 2 DAYS

## 2024-12-06 SDOH — HEALTH STABILITY: PHYSICAL HEALTH: ON AVERAGE, HOW MANY MINUTES DO YOU ENGAGE IN EXERCISE AT THIS LEVEL?: 80 MIN

## 2024-12-06 ASSESSMENT — PATIENT HEALTH QUESTIONNAIRE - PHQ9
SUM OF ALL RESPONSES TO PHQ QUESTIONS 1-9: 0
1. LITTLE INTEREST OR PLEASURE IN DOING THINGS: NOT AT ALL
SUM OF ALL RESPONSES TO PHQ9 QUESTIONS 1 & 2: 0
SUM OF ALL RESPONSES TO PHQ QUESTIONS 1-9: 0
2. FEELING DOWN, DEPRESSED OR HOPELESS: NOT AT ALL

## 2024-12-06 ASSESSMENT — LIFESTYLE VARIABLES
HOW MANY STANDARD DRINKS CONTAINING ALCOHOL DO YOU HAVE ON A TYPICAL DAY: PATIENT DOES NOT DRINK
HOW OFTEN DO YOU HAVE A DRINK CONTAINING ALCOHOL: NEVER

## 2024-12-17 SDOH — ECONOMIC STABILITY: FOOD INSECURITY: WITHIN THE PAST 12 MONTHS, YOU WORRIED THAT YOUR FOOD WOULD RUN OUT BEFORE YOU GOT MONEY TO BUY MORE.: NEVER TRUE

## 2024-12-17 SDOH — ECONOMIC STABILITY: FOOD INSECURITY: WITHIN THE PAST 12 MONTHS, THE FOOD YOU BOUGHT JUST DIDN'T LAST AND YOU DIDN'T HAVE MONEY TO GET MORE.: NEVER TRUE

## 2024-12-17 SDOH — HEALTH STABILITY: PHYSICAL HEALTH: ON AVERAGE, HOW MANY DAYS PER WEEK DO YOU ENGAGE IN MODERATE TO STRENUOUS EXERCISE (LIKE A BRISK WALK)?: 2 DAYS

## 2024-12-17 SDOH — ECONOMIC STABILITY: TRANSPORTATION INSECURITY
IN THE PAST 12 MONTHS, HAS LACK OF TRANSPORTATION KEPT YOU FROM MEETINGS, WORK, OR FROM GETTING THINGS NEEDED FOR DAILY LIVING?: NO

## 2024-12-17 SDOH — HEALTH STABILITY: PHYSICAL HEALTH: ON AVERAGE, HOW MANY MINUTES DO YOU ENGAGE IN EXERCISE AT THIS LEVEL?: 80 MIN

## 2024-12-17 SDOH — ECONOMIC STABILITY: INCOME INSECURITY: HOW HARD IS IT FOR YOU TO PAY FOR THE VERY BASICS LIKE FOOD, HOUSING, MEDICAL CARE, AND HEATING?: NOT HARD AT ALL

## 2024-12-17 ASSESSMENT — LIFESTYLE VARIABLES
HOW OFTEN DO YOU HAVE A DRINK CONTAINING ALCOHOL: 1
HOW OFTEN DO YOU HAVE SIX OR MORE DRINKS ON ONE OCCASION: 1
HOW OFTEN DO YOU HAVE A DRINK CONTAINING ALCOHOL: NEVER
HOW MANY STANDARD DRINKS CONTAINING ALCOHOL DO YOU HAVE ON A TYPICAL DAY: PATIENT DOES NOT DRINK
HOW MANY STANDARD DRINKS CONTAINING ALCOHOL DO YOU HAVE ON A TYPICAL DAY: 0

## 2024-12-17 ASSESSMENT — PATIENT HEALTH QUESTIONNAIRE - PHQ9
1. LITTLE INTEREST OR PLEASURE IN DOING THINGS: NOT AT ALL
SUM OF ALL RESPONSES TO PHQ QUESTIONS 1-9: 0
SUM OF ALL RESPONSES TO PHQ QUESTIONS 1-9: 0
SUM OF ALL RESPONSES TO PHQ9 QUESTIONS 1 & 2: 0
2. FEELING DOWN, DEPRESSED OR HOPELESS: NOT AT ALL
SUM OF ALL RESPONSES TO PHQ QUESTIONS 1-9: 0
SUM OF ALL RESPONSES TO PHQ QUESTIONS 1-9: 0

## 2024-12-20 ENCOUNTER — OFFICE VISIT (OUTPATIENT)
Dept: FAMILY MEDICINE CLINIC | Age: 69
End: 2024-12-20

## 2024-12-20 VITALS
BODY MASS INDEX: 36.54 KG/M2 | SYSTOLIC BLOOD PRESSURE: 128 MMHG | HEART RATE: 72 BPM | WEIGHT: 269.8 LBS | HEIGHT: 72 IN | OXYGEN SATURATION: 97 % | DIASTOLIC BLOOD PRESSURE: 74 MMHG

## 2024-12-20 DIAGNOSIS — Z23 FLU VACCINE NEED: ICD-10-CM

## 2024-12-20 DIAGNOSIS — Z00.00 MEDICARE ANNUAL WELLNESS VISIT, INITIAL: Primary | ICD-10-CM

## 2024-12-20 DIAGNOSIS — E11.9 TYPE 2 DIABETES MELLITUS WITHOUT COMPLICATION, WITHOUT LONG-TERM CURRENT USE OF INSULIN (HCC): ICD-10-CM

## 2024-12-20 LAB — HBA1C MFR BLD: 7.7 %

## 2024-12-20 SDOH — ECONOMIC STABILITY: FOOD INSECURITY: WITHIN THE PAST 12 MONTHS, YOU WORRIED THAT YOUR FOOD WOULD RUN OUT BEFORE YOU GOT MONEY TO BUY MORE.: NEVER TRUE

## 2024-12-20 SDOH — ECONOMIC STABILITY: FOOD INSECURITY: WITHIN THE PAST 12 MONTHS, THE FOOD YOU BOUGHT JUST DIDN'T LAST AND YOU DIDN'T HAVE MONEY TO GET MORE.: NEVER TRUE

## 2024-12-20 SDOH — ECONOMIC STABILITY: INCOME INSECURITY: HOW HARD IS IT FOR YOU TO PAY FOR THE VERY BASICS LIKE FOOD, HOUSING, MEDICAL CARE, AND HEATING?: NOT HARD AT ALL

## 2024-12-20 NOTE — PROGRESS NOTES
Visual inspection:  Deformity/amputation: absent  Skin lesions/pre-ulcerative calluses: absent  Edema: right- negative, left- negative    Sensory exam:  Monofilament sensation: normal  (minimum of 5 random plantar locations tested, avoiding callused areas - > 1 area with absence of sensation is + for neuropathy)    Plus at least one of the following:  Pulses: normal,   Pinprick: Intact  Proprioception: Intact  Vibration (128 Hz): Intact    
TESTING ONCE DAILY Yes Leon Valdovinos MD   folic acid (FOLVITE) 1 MG tablet Take 1 tablet by mouth four times a week Yes Leon Valdovinos MD   aspirin 81 MG EC tablet Take 1 tablet by mouth daily 1 po qd Yes Paco Chino MD   glimepiride (AMARYL) 4 MG tablet Take 1 tablet by mouth in the morning and at bedtime Yes Leon Valdovinos MD   traZODone (DESYREL) 50 MG tablet Take 1 tablet by mouth nightly as needed for Sleep Yes Leon Valdovinos MD   tamsulosin (FLOMAX) 0.4 MG capsule 1 capsule p.o. ghs Yes Leon Valdovinos MD   fluticasone (FLONASE) 50 MCG/ACT nasal spray 2 sprays by Nasal route daily Yes Leon Valdovinos MD   empagliflozin (JARDIANCE) 25 MG tablet Take 1 tablet by mouth daily Yes Leon Valdovinos MD   A1c - 7.7  Lab Results   Component Value Date/Time     09/24/2024 02:38 PM    K 3.6 09/24/2024 02:38 PM    K 3.8 12/08/2021 09:28 AM    CL 98 09/24/2024 02:38 PM    CO2 25 09/24/2024 02:38 PM    BUN 16 09/24/2024 02:38 PM    CREATININE 1.1 09/24/2024 02:38 PM    GLUCOSE 162 09/24/2024 02:38 PM    CALCIUM 9.8 09/24/2024 02:38 PM    LABGLOM 73 09/24/2024 02:38 PM    LABGLOM >60 08/09/2023 11:53 AM       Lab Results   Component Value Date    WBC 9.8 09/24/2024    HGB 13.6 09/24/2024    HCT 41.4 09/24/2024    MCV 85.9 09/24/2024     09/24/2024     Lab Results   Component Value Date    CHOL 108 11/30/2021    TRIG 139 11/30/2021    HDL 35 (L) 07/10/2024    LDL 48 07/10/2024    VLDL 38 07/10/2024    CHOLHDLRATIO 4.5 07/14/2018      Lab Results   Component Value Date    PSA 0.91 07/10/2024       CareTeam (Including outside providers/suppliers regularly involved in providing care):   Patient Care Team:  Leon Valdovinos MD as PCP - General (Family Medicine)  Leon Valdovinos MD as PCP - Empaneled Provider  Ernst Aguilera MD as Consulting Physician (Cardiology)   Brie - ortho  Alma Delia - Sleep  Daniela - Cardio  [place] - Gastro  Schule - eye  Dentist

## 2024-12-30 RX ORDER — FLUTICASONE PROPIONATE 50 MCG
SPRAY, SUSPENSION (ML) NASAL
Qty: 48 G | Refills: 3 | Status: SHIPPED | OUTPATIENT
Start: 2024-12-30

## 2024-12-30 RX ORDER — TRAZODONE HYDROCHLORIDE 50 MG/1
50 TABLET, FILM COATED ORAL NIGHTLY PRN
Qty: 90 TABLET | Refills: 3 | Status: SHIPPED | OUTPATIENT
Start: 2024-12-30

## 2024-12-30 NOTE — TELEPHONE ENCOUNTER
Medication:   Requested Prescriptions     Pending Prescriptions Disp Refills    fluticasone (FLONASE) 50 MCG/ACT nasal spray [Pharmacy Med Name: Fluticasone Propionate Nasal Suspension 50 MCG/ACT] 48 g 3     Sig: USE 2 SPRAYS NASALLY EVERY DAY        Last Filled:  06/26/2024    Patient Phone Number: [phone] (home) [phone] (work)    Last appt: 12/20/2024   Next appt: 2/27/2025    Last OARRS:       12/8/2021     3:51 PM   RX Monitoring   Periodic Controlled Substance Monitoring Possible medication side effects, risk of tolerance/dependence & alternative treatments discussed.;No signs of potential drug abuse or diversion identified.

## 2024-12-30 NOTE — TELEPHONE ENCOUNTER
Last OV: 12/20/2024  Next OV: 12/28/2024    Next appointment due:    Last fill:07/10/2024  Refills:30/5

## 2025-01-02 RX ORDER — GABAPENTIN 300 MG/1
CAPSULE ORAL
Qty: 90 CAPSULE | Refills: 1 | Status: SHIPPED | OUTPATIENT
Start: 2025-01-02 | End: 2025-02-03

## 2025-01-02 RX ORDER — GABAPENTIN 300 MG/1
CAPSULE ORAL
Qty: 270 CAPSULE | Refills: 1 | Status: SHIPPED | OUTPATIENT
Start: 2025-01-02 | End: 2025-01-02 | Stop reason: SDUPTHER

## 2025-01-08 ENCOUNTER — PATIENT MESSAGE (OUTPATIENT)
Dept: FAMILY MEDICINE CLINIC | Age: 70
End: 2025-01-08

## 2025-01-08 DIAGNOSIS — I10 ESSENTIAL HYPERTENSION: ICD-10-CM

## 2025-01-09 RX ORDER — ATORVASTATIN CALCIUM 40 MG/1
40 TABLET, FILM COATED ORAL DAILY
Qty: 90 TABLET | Refills: 2 | Status: SHIPPED | OUTPATIENT
Start: 2025-01-09

## 2025-01-09 RX ORDER — AMLODIPINE BESYLATE 10 MG/1
TABLET ORAL
Qty: 90 TABLET | Refills: 2 | Status: SHIPPED | OUTPATIENT
Start: 2025-01-09

## 2025-01-09 RX ORDER — GLIMEPIRIDE 4 MG/1
4 TABLET ORAL 2 TIMES DAILY
Qty: 180 TABLET | Refills: 2 | Status: SHIPPED | OUTPATIENT
Start: 2025-01-09

## 2025-01-09 RX ORDER — CARVEDILOL 6.25 MG/1
TABLET ORAL
Qty: 180 TABLET | Refills: 2 | Status: SHIPPED | OUTPATIENT
Start: 2025-01-09

## 2025-01-09 RX ORDER — HYDROCHLOROTHIAZIDE 25 MG/1
TABLET ORAL
Qty: 90 TABLET | Refills: 0 | Status: SHIPPED | OUTPATIENT
Start: 2025-01-09

## 2025-01-09 RX ORDER — FOLIC ACID 1 MG/1
1 TABLET ORAL
Qty: 50 TABLET | Refills: 2 | Status: SHIPPED | OUTPATIENT
Start: 2025-01-09

## 2025-01-09 NOTE — TELEPHONE ENCOUNTER
Lov 12/20/24  Lrf 90 3 1/7/24  90 3 11/7/24   80 3 1/7/24   50 3 7/13/24   180 3 7/10/24   90 3 11/7/24   180 3 11/7/24 Medication:   Requested Prescriptions     Pending Prescriptions Disp Refills    amLODIPine (NORVASC) 10 MG tablet 90 tablet 0     Sig: TAKE 1 TABLET EVERY DAY    atorvastatin (LIPITOR) 40 MG tablet 90 tablet 0     Sig: Take 1 tablet by mouth daily    carvedilol (COREG) 6.25 MG tablet 180 tablet 0     Sig: TAKE 1 TABLET TWICE DAILY WITH MEALS    folic acid (FOLVITE) 1 MG tablet 50 tablet 0     Sig: Take 1 tablet by mouth four times a week    glimepiride (AMARYL) 4 MG tablet 180 tablet 0     Sig: Take 1 tablet by mouth in the morning and at bedtime    hydroCHLOROthiazide (HYDRODIURIL) 25 MG tablet 90 tablet 0     Sig: TAKE 1 TABLET EVERY DAY    metFORMIN (GLUCOPHAGE) 1000 MG tablet 180 tablet 0     Sig: TAKE 1 TABLET TWICE DAILY WITH FOOD       Last Filled:      Patient Phone Number: [phone] (home) [phone] (work)    Last appt: 12/20/2024   Next appt: 2/27/2025    Last Labs DM:   Lab Results   Component Value Date/Time    LABA1C 7.7 12/20/2024 10:13 AM       Medication:   Requested Prescriptions     Pending Prescriptions Disp Refills    amLODIPine (NORVASC) 10 MG tablet 90 tablet 0     Sig: TAKE 1 TABLET EVERY DAY    atorvastatin (LIPITOR) 40 MG tablet 90 tablet 0     Sig: Take 1 tablet by mouth daily    carvedilol (COREG) 6.25 MG tablet 180 tablet 0     Sig: TAKE 1 TABLET TWICE DAILY WITH MEALS    folic acid (FOLVITE) 1 MG tablet 50 tablet 0     Sig: Take 1 tablet by mouth four times a week    glimepiride (AMARYL) 4 MG tablet 180 tablet 0     Sig: Take 1 tablet by mouth in the morning and at bedtime    hydroCHLOROthiazide (HYDRODIURIL) 25 MG tablet 90 tablet 0     Sig: TAKE 1 TABLET EVERY DAY    metFORMIN (GLUCOPHAGE) 1000 MG tablet 180 tablet 0     Sig: TAKE 1 TABLET TWICE DAILY WITH FOOD       Last Filled:      Patient Phone Number: [phone] (home) [phone] (work)    Last appt:

## 2025-01-10 NOTE — TELEPHONE ENCOUNTER
Medication:   Requested Prescriptions     Pending Prescriptions Disp Refills    amoxicillin-clavulanate (AUGMENTIN) 875-125 MG per tablet [Pharmacy Med Name: Amoxicillin-Pot Clavulanate 875-125 MG Oral Tablet] 14 tablet 0     Sig: Take 1 tablet by mouth twice daily for 7 days        Last Filled:  11/25/2024, 14, 0    Patient Phone Number: [phone] (home) [phone] (work)    Last appt: 12/20/2024   Next appt: 2/27/2025    Last OARRS:       12/8/2021     3:51 PM   RX Monitoring   Periodic Controlled Substance Monitoring Possible medication side effects, risk of tolerance/dependence & alternative treatments discussed.;No signs of potential drug abuse or diversion identified.

## 2025-02-07 RX ORDER — DICYCLOMINE HCL 20 MG
20 TABLET ORAL 4 TIMES DAILY
Qty: 20 TABLET | Refills: 1 | Status: SHIPPED | OUTPATIENT
Start: 2025-02-07

## 2025-02-21 ENCOUNTER — OFFICE VISIT (OUTPATIENT)
Age: 70
End: 2025-02-21

## 2025-02-21 ENCOUNTER — TELEPHONE (OUTPATIENT)
Dept: FAMILY MEDICINE CLINIC | Age: 70
End: 2025-02-21

## 2025-02-21 VITALS
HEART RATE: 79 BPM | HEIGHT: 72 IN | RESPIRATION RATE: 18 BRPM | DIASTOLIC BLOOD PRESSURE: 78 MMHG | BODY MASS INDEX: 35.89 KG/M2 | SYSTOLIC BLOOD PRESSURE: 153 MMHG | WEIGHT: 265 LBS | TEMPERATURE: 98.1 F | OXYGEN SATURATION: 94 %

## 2025-02-21 DIAGNOSIS — I10 HYPERTENSION, UNSPECIFIED TYPE: ICD-10-CM

## 2025-02-21 DIAGNOSIS — R30.0 DYSURIA: Primary | ICD-10-CM

## 2025-02-21 DIAGNOSIS — R10.9 FLANK PAIN: ICD-10-CM

## 2025-02-21 LAB
BILIRUBIN, POC: NEGATIVE
BLOOD URINE, POC: NEGATIVE
CLARITY, POC: CLEAR
COLOR, POC: YELLOW
GLUCOSE URINE, POC: >=1000 MG/DL
KETONES, POC: NEGATIVE MG/DL
LEUKOCYTE EST, POC: NEGATIVE
NITRITE, POC: NEGATIVE
PH, POC: 5.5
PROTEIN, POC: 30 MG/DL
SPECIFIC GRAVITY, POC: 1.01
UROBILINOGEN, POC: 0.2 MG/DL

## 2025-02-21 ASSESSMENT — ENCOUNTER SYMPTOMS
CONSTIPATION: 0
COUGH: 0
VOMITING: 0
DIARRHEA: 0
ABDOMINAL PAIN: 0
NAUSEA: 0
BACK PAIN: 0

## 2025-02-21 NOTE — PROGRESS NOTES
Appearance: He is obese.   HENT:      Nose: No congestion.      Mouth/Throat:      Mouth: Mucous membranes are moist.      Pharynx: No posterior oropharyngeal erythema.   Eyes:      Conjunctiva/sclera: Conjunctivae normal.      Pupils: Pupils are equal, round, and reactive to light.   Cardiovascular:      Rate and Rhythm: Normal rate and regular rhythm.   Pulmonary:      Effort: Pulmonary effort is normal. No respiratory distress.      Breath sounds: Normal breath sounds.   Abdominal:      General: Bowel sounds are normal. There is no distension.      Palpations: Abdomen is soft.      Tenderness: There is no abdominal tenderness. There is no right CVA tenderness or left CVA tenderness.   Musculoskeletal:      Cervical back: Neck supple. No rigidity.      Right lower leg: No edema.      Left lower leg: No edema.   Lymphadenopathy:      Cervical: No cervical adenopathy.   Skin:     Findings: No rash.   Neurological:      General: No focal deficit present.      Mental Status: He is alert and oriented to person, place, and time.           An electronic signature was used to authenticate this note.    --BRISA KIMBROUGH MD

## 2025-02-21 NOTE — TELEPHONE ENCOUNTER
Patient called and said he is having pain all over left and right back and going around to the front.    Patient is having frequency urination as well .      No fever.     Pharm [facility]  Pharm [facility]     Please call and advise

## 2025-02-24 ASSESSMENT — PATIENT HEALTH QUESTIONNAIRE - PHQ9
SUM OF ALL RESPONSES TO PHQ QUESTIONS 1-9: 0
SUM OF ALL RESPONSES TO PHQ QUESTIONS 1-9: 0
1. LITTLE INTEREST OR PLEASURE IN DOING THINGS: NOT AT ALL
SUM OF ALL RESPONSES TO PHQ9 QUESTIONS 1 & 2: 0
1. LITTLE INTEREST OR PLEASURE IN DOING THINGS: NOT AT ALL
2. FEELING DOWN, DEPRESSED OR HOPELESS: NOT AT ALL
SUM OF ALL RESPONSES TO PHQ9 QUESTIONS 1 & 2: 0
SUM OF ALL RESPONSES TO PHQ QUESTIONS 1-9: 0
SUM OF ALL RESPONSES TO PHQ QUESTIONS 1-9: 0
2. FEELING DOWN, DEPRESSED OR HOPELESS: NOT AT ALL

## 2025-02-27 ENCOUNTER — OFFICE VISIT (OUTPATIENT)
Dept: FAMILY MEDICINE CLINIC | Age: 70
End: 2025-02-27
Payer: MEDICARE

## 2025-02-27 VITALS
DIASTOLIC BLOOD PRESSURE: 80 MMHG | OXYGEN SATURATION: 91 % | HEART RATE: 77 BPM | SYSTOLIC BLOOD PRESSURE: 122 MMHG | WEIGHT: 263.4 LBS | BODY MASS INDEX: 35.72 KG/M2

## 2025-02-27 DIAGNOSIS — E78.5 DYSLIPIDEMIA: ICD-10-CM

## 2025-02-27 DIAGNOSIS — I25.10 CORONARY ARTERY DISEASE INVOLVING NATIVE CORONARY ARTERY OF NATIVE HEART WITHOUT ANGINA PECTORIS: ICD-10-CM

## 2025-02-27 DIAGNOSIS — G47.33 OSA (OBSTRUCTIVE SLEEP APNEA): ICD-10-CM

## 2025-02-27 DIAGNOSIS — E11.9 TYPE 2 DIABETES MELLITUS WITHOUT COMPLICATION, WITHOUT LONG-TERM CURRENT USE OF INSULIN (HCC): Primary | ICD-10-CM

## 2025-02-27 PROCEDURE — 99214 OFFICE O/P EST MOD 30 MIN: CPT | Performed by: FAMILY MEDICINE

## 2025-02-27 PROCEDURE — 3074F SYST BP LT 130 MM HG: CPT | Performed by: FAMILY MEDICINE

## 2025-02-27 PROCEDURE — G2211 COMPLEX E/M VISIT ADD ON: HCPCS | Performed by: FAMILY MEDICINE

## 2025-02-27 PROCEDURE — 1123F ACP DISCUSS/DSCN MKR DOCD: CPT | Performed by: FAMILY MEDICINE

## 2025-02-27 PROCEDURE — 1159F MED LIST DOCD IN RCRD: CPT | Performed by: FAMILY MEDICINE

## 2025-02-27 PROCEDURE — 3079F DIAST BP 80-89 MM HG: CPT | Performed by: FAMILY MEDICINE

## 2025-02-27 SDOH — ECONOMIC STABILITY: FOOD INSECURITY: WITHIN THE PAST 12 MONTHS, THE FOOD YOU BOUGHT JUST DIDN'T LAST AND YOU DIDN'T HAVE MONEY TO GET MORE.: NEVER TRUE

## 2025-02-27 SDOH — ECONOMIC STABILITY: FOOD INSECURITY: WITHIN THE PAST 12 MONTHS, YOU WORRIED THAT YOUR FOOD WOULD RUN OUT BEFORE YOU GOT MONEY TO BUY MORE.: NEVER TRUE

## 2025-02-27 NOTE — PROGRESS NOTES
MD   azelastine (ASTELIN) 0.1 % nasal spray 2 sprays by Nasal route 2 times daily Use in each nostril as directed Yes Leon Valdovinos MD   blood glucose test strips (TRUETRACK TEST) strip TESTING ONCE DAILY Yes Leon Valdovinos MD   aspirin 81 MG EC tablet Take 1 tablet by mouth daily 1 po qd Yes ProviderPaco MD   tamsulosin (FLOMAX) 0.4 MG capsule 1 capsule p.o. ghs Yes Leon Valdovinos MD       Past Medical History:   Diagnosis Date    Abdominal aortic aneurysm (AAA), 30-34 mm diameter 2022    CAD (coronary artery disease)     DDD (degenerative disc disease), cervical 2021    Disc disorder     ED (erectile dysfunction)     Hyperlipidemia     Hypertension     MI (myocardial infarction) ([facility])     Obstructive sleep apnea     Other disorders of kidney and ureter in diseases classified elsewhere     Pneumonia 1970s    Polyp of colon     PVD (peripheral vascular disease) 2011    Renal calculi     Type 2 diabetes mellitus without complication, without long-term current use of insulin ([facility]) 2019       Social History     Tobacco Use    Smoking status: Former     Current packs/day: 0.00     Average packs/day: 0.3 packs/day for 30.0 years (7.5 ttl pk-yrs)     Types: Cigarettes, Cigars     Start date: 1981     Quit date: 2011     Years since quittin.8     Passive exposure: Past    Smokeless tobacco: Never    Tobacco comments:     cigars every once in a while    Vaping Use    Vaping status: Never Used   Substance Use Topics    Alcohol use: Not Currently     Comment: social    Drug use: Never       Family History   Problem Relation Age of Onset    Mental Illness Mother     Diabetes Father     High Blood Pressure Brother     Heart Disease Brother     High Cholesterol Neg Hx        Allergies   Allergen Reactions    Fexofenadine-Pseudoephed Er Other (See Comments)     cough    Lisinopril      cough    Morphine Hives    Zithromax [Azithromycin]      Heart palpitations

## 2025-04-01 RX ORDER — TRAZODONE HYDROCHLORIDE 50 MG/1
50 TABLET ORAL NIGHTLY PRN
Qty: 90 TABLET | Refills: 3 | Status: SHIPPED | OUTPATIENT
Start: 2025-04-01

## 2025-04-01 NOTE — TELEPHONE ENCOUNTER
Medication:   Requested Prescriptions     Pending Prescriptions Disp Refills    traZODone (DESYREL) 50 MG tablet 90 tablet 3     Sig: Take 1 tablet by mouth nightly as needed for Sleep        Last Filled:  12/30/24    Patient Phone Number: [phone] (home) [phone] (work)    Last appt: 2/27/2025   Next appt: 5/28/2025    Last OARRS:       12/8/2021     3:51 PM   RX Monitoring   Periodic Controlled Substance Monitoring Possible medication side effects, risk of tolerance/dependence & alternative treatments discussed.;No signs of potential drug abuse or diversion identified.

## 2025-04-01 NOTE — TELEPHONE ENCOUNTER
Medication and Quantity requested:      traZODone (DESYREL) 50 MG tablet [[id number]]     Last Visit  2-27-25    Pharmacy and phone number updated in [facility]:    [facility] - [address] [place] - [address]

## 2025-04-04 DIAGNOSIS — I10 ESSENTIAL HYPERTENSION: ICD-10-CM

## 2025-04-04 RX ORDER — HYDROCHLOROTHIAZIDE 25 MG/1
25 TABLET ORAL DAILY
Qty: 90 TABLET | Refills: 0 | Status: SHIPPED | OUTPATIENT
Start: 2025-04-04

## 2025-04-04 NOTE — TELEPHONE ENCOUNTER
Medication:   Requested Prescriptions     Pending Prescriptions Disp Refills    hydroCHLOROthiazide (HYDRODIURIL) 25 MG tablet [Pharmacy Med Name: hydroCHLOROthiazide 25 MG Oral Tablet] 90 tablet 0     Sig: Take 1 tablet by mouth once daily       Last Filled:  01/09/2025    Patient Phone Number: [phone] (home) [phone] (work)    Last appt: 2/27/2025   Next appt: 5/28/2025    Lab Results   Component Value Date     09/24/2024    K 3.6 09/24/2024    CL 98 (L) 09/24/2024    CO2 25 09/24/2024    BUN 16 09/24/2024    CREATININE 1.1 09/24/2024    GLUCOSE 162 (H) 09/24/2024    CALCIUM 9.8 09/24/2024    BILITOT 0.4 07/10/2024    ALKPHOS 67 07/10/2024    AST 26 07/10/2024    ALT 32 07/10/2024    LABGLOM 73 09/24/2024    GFRAA >60 10/12/2022    AGRATIO 1.7 07/10/2024    GLOB 2.5 08/24/2020

## 2025-04-22 PROBLEM — E11.40 TYPE 2 DIABETES MELLITUS WITH DIABETIC NEUROPATHY, WITH LONG-TERM CURRENT USE OF INSULIN (HCC): Status: ACTIVE | Noted: 2025-04-22

## 2025-04-22 PROBLEM — Z79.4 TYPE 2 DIABETES MELLITUS WITH DIABETIC NEUROPATHY, WITH LONG-TERM CURRENT USE OF INSULIN (HCC): Status: ACTIVE | Noted: 2025-04-22

## 2025-04-22 RX ORDER — ATORVASTATIN CALCIUM 40 MG/1
40 TABLET, FILM COATED ORAL DAILY
Qty: 90 TABLET | Refills: 2 | Status: SHIPPED | OUTPATIENT
Start: 2025-04-22

## 2025-04-22 NOTE — TELEPHONE ENCOUNTER
Lov 2/27/25  Lrf 90 2 1/9/25 Medication:   Requested Prescriptions     Pending Prescriptions Disp Refills    atorvastatin (LIPITOR) 40 MG tablet 90 tablet 2     Sig: Take 1 tablet by mouth daily       Last Filled:      Patient Phone Number: [phone] (home) [phone] (work)    Last appt: 2/27/2025   Next appt: 4/23/2025    Last Lipid:   Lab Results   Component Value Date/Time    CHOL 108 11/30/2021 10:41 AM    TRIG 139 11/30/2021 10:41 AM    HDL 35 07/10/2024 11:10 AM    HDL 31 07/15/2011 08:40 AM

## 2025-04-22 NOTE — PROGRESS NOTES
Jeramie Ortiz is a 70 y.o. male.    HPI: Here bilateral ear irrigation as his hearing is poor and he has had earwax problems before  Widespread myalgias, norco helps-has used 28 Norco tablets over the last 1 year  Meds, vitamins and allergies reviewed with pt    ROS: No TIA's or unusual headaches, no dysphagia.  No prolonged cough. No dyspnea or chest pain on exertion.  No abdominal pain, change in bowel habits, black or bloody stools.  No urinary tract symptoms.  No new or unusual musculoskeletal symptoms.       Prior to Visit Medications    Medication Sig Taking? Authorizing Provider   atorvastatin (LIPITOR) 40 MG tablet Take 1 tablet by mouth daily Yes Leon Valdovinos MD   hydroCHLOROthiazide (HYDRODIURIL) 25 MG tablet Take 1 tablet by mouth once daily Yes Leon Valdovinos MD   traZODone (DESYREL) 50 MG tablet Take 1 tablet by mouth nightly as needed for Sleep Yes Leon Valdovinos MD   dicyclomine (BENTYL) 20 MG tablet Take 1 tablet by mouth 4 times daily Yes Leon Valdovinos MD   empagliflozin (JARDIANCE) 25 MG tablet Take 1 tablet by mouth daily Yes Leon Valdovinos MD   amLODIPine (NORVASC) 10 MG tablet TAKE 1 TABLET EVERY DAY Yes Leon Valdovinos MD   carvedilol (COREG) 6.25 MG tablet TAKE 1 TABLET TWICE DAILY WITH MEALS Yes Leon Valdovinos MD   folic acid (FOLVITE) 1 MG tablet Take 1 tablet by mouth four times a week Yes Leon Valdovinos MD   glimepiride (AMARYL) 4 MG tablet Take 1 tablet by mouth in the morning and at bedtime Yes Leon Valdovinos MD   metFORMIN (GLUCOPHAGE) 1000 MG tablet TAKE 1 TABLET TWICE DAILY WITH FOOD Yes Leon Valdovinos MD   fluticasone (FLONASE) 50 MCG/ACT nasal spray USE 2 SPRAYS NASALLY EVERY DAY Yes Leon Valdovinos MD   azelastine (ASTELIN) 0.1 % nasal spray 2 sprays by Nasal route 2 times daily Use in each nostril as directed Yes Leon Valdovinos MD   blood glucose test strips (TRUETRACK TEST) strip TESTING ONCE DAILY Yes Aruna

## 2025-04-23 ENCOUNTER — OFFICE VISIT (OUTPATIENT)
Dept: FAMILY MEDICINE CLINIC | Age: 70
End: 2025-04-23
Payer: MEDICARE

## 2025-04-23 VITALS
OXYGEN SATURATION: 97 % | SYSTOLIC BLOOD PRESSURE: 132 MMHG | DIASTOLIC BLOOD PRESSURE: 78 MMHG | BODY MASS INDEX: 36.08 KG/M2 | WEIGHT: 266 LBS | HEART RATE: 73 BPM

## 2025-04-23 DIAGNOSIS — G89.4 CHRONIC PAIN SYNDROME: ICD-10-CM

## 2025-04-23 DIAGNOSIS — I10 HYPERTENSION, UNSPECIFIED TYPE: ICD-10-CM

## 2025-04-23 DIAGNOSIS — E11.40 TYPE 2 DIABETES MELLITUS WITH DIABETIC NEUROPATHY, WITH LONG-TERM CURRENT USE OF INSULIN (HCC): Primary | ICD-10-CM

## 2025-04-23 DIAGNOSIS — G47.33 OSA (OBSTRUCTIVE SLEEP APNEA): ICD-10-CM

## 2025-04-23 DIAGNOSIS — E78.5 DYSLIPIDEMIA: ICD-10-CM

## 2025-04-23 DIAGNOSIS — I25.10 CORONARY ARTERY DISEASE INVOLVING NATIVE CORONARY ARTERY OF NATIVE HEART WITHOUT ANGINA PECTORIS: ICD-10-CM

## 2025-04-23 DIAGNOSIS — Z79.4 TYPE 2 DIABETES MELLITUS WITH DIABETIC NEUROPATHY, WITH LONG-TERM CURRENT USE OF INSULIN (HCC): Primary | ICD-10-CM

## 2025-04-23 DIAGNOSIS — H61.23 BILATERAL HEARING LOSS DUE TO CERUMEN IMPACTION: ICD-10-CM

## 2025-04-23 PROCEDURE — 1123F ACP DISCUSS/DSCN MKR DOCD: CPT | Performed by: FAMILY MEDICINE

## 2025-04-23 PROCEDURE — 99213 OFFICE O/P EST LOW 20 MIN: CPT | Performed by: FAMILY MEDICINE

## 2025-04-23 PROCEDURE — 1159F MED LIST DOCD IN RCRD: CPT | Performed by: FAMILY MEDICINE

## 2025-04-23 PROCEDURE — 69210 REMOVE IMPACTED EAR WAX UNI: CPT | Performed by: FAMILY MEDICINE

## 2025-04-23 PROCEDURE — 3075F SYST BP GE 130 - 139MM HG: CPT | Performed by: FAMILY MEDICINE

## 2025-04-23 PROCEDURE — 3078F DIAST BP <80 MM HG: CPT | Performed by: FAMILY MEDICINE

## 2025-04-23 RX ORDER — HYDROCODONE BITARTRATE AND ACETAMINOPHEN 7.5; 325 MG/1; MG/1
1 TABLET ORAL EVERY 6 HOURS PRN
Qty: 28 TABLET | Refills: 0 | Status: SHIPPED | OUTPATIENT
Start: 2025-04-23 | End: 2025-04-30

## 2025-05-28 ENCOUNTER — OFFICE VISIT (OUTPATIENT)
Dept: FAMILY MEDICINE CLINIC | Age: 70
End: 2025-05-28
Payer: MEDICARE

## 2025-05-28 VITALS
SYSTOLIC BLOOD PRESSURE: 122 MMHG | BODY MASS INDEX: 35.89 KG/M2 | WEIGHT: 265 LBS | HEIGHT: 72 IN | OXYGEN SATURATION: 98 % | DIASTOLIC BLOOD PRESSURE: 70 MMHG | HEART RATE: 75 BPM

## 2025-05-28 DIAGNOSIS — I10 HYPERTENSION, UNSPECIFIED TYPE: ICD-10-CM

## 2025-05-28 DIAGNOSIS — E11.9 TYPE 2 DIABETES MELLITUS WITHOUT COMPLICATION, WITHOUT LONG-TERM CURRENT USE OF INSULIN (HCC): Primary | ICD-10-CM

## 2025-05-28 DIAGNOSIS — M35.3 POLYMYALGIA: ICD-10-CM

## 2025-05-28 DIAGNOSIS — I25.10 CORONARY ARTERY DISEASE INVOLVING NATIVE CORONARY ARTERY OF NATIVE HEART WITHOUT ANGINA PECTORIS: ICD-10-CM

## 2025-05-28 DIAGNOSIS — G47.33 OSA (OBSTRUCTIVE SLEEP APNEA): ICD-10-CM

## 2025-05-28 DIAGNOSIS — H93.13 TINNITUS OF BOTH EARS: ICD-10-CM

## 2025-05-28 DIAGNOSIS — I11.0 HYPERTENSIVE HEART DISEASE WITH HEART FAILURE (HCC): ICD-10-CM

## 2025-05-28 DIAGNOSIS — E78.5 DYSLIPIDEMIA: ICD-10-CM

## 2025-05-28 LAB
CREAT UR-MCNC: 34.7 MG/DL (ref 39–259)
CRP SERPL-MCNC: 3.7 MG/L (ref 0–5.1)
ERYTHROCYTE [SEDIMENTATION RATE] IN BLOOD BY WESTERGREN METHOD: 19 MM/HR (ref 0–20)
HBA1C MFR BLD: 7.5 %
MICROALBUMIN UR DL<=1MG/L-MCNC: 8.97 MG/DL
MICROALBUMIN/CREAT UR: 258.5 MG/G (ref 0–30)

## 2025-05-28 PROCEDURE — 3074F SYST BP LT 130 MM HG: CPT | Performed by: FAMILY MEDICINE

## 2025-05-28 PROCEDURE — 99214 OFFICE O/P EST MOD 30 MIN: CPT | Performed by: FAMILY MEDICINE

## 2025-05-28 PROCEDURE — 1123F ACP DISCUSS/DSCN MKR DOCD: CPT | Performed by: FAMILY MEDICINE

## 2025-05-28 PROCEDURE — 1159F MED LIST DOCD IN RCRD: CPT | Performed by: FAMILY MEDICINE

## 2025-05-28 PROCEDURE — 3051F HG A1C>EQUAL 7.0%<8.0%: CPT | Performed by: FAMILY MEDICINE

## 2025-05-28 PROCEDURE — 83036 HEMOGLOBIN GLYCOSYLATED A1C: CPT | Performed by: FAMILY MEDICINE

## 2025-05-28 PROCEDURE — G2211 COMPLEX E/M VISIT ADD ON: HCPCS | Performed by: FAMILY MEDICINE

## 2025-05-28 PROCEDURE — 3078F DIAST BP <80 MM HG: CPT | Performed by: FAMILY MEDICINE

## 2025-05-28 RX ORDER — FLUTICASONE PROPIONATE 50 MCG
SPRAY, SUSPENSION (ML) NASAL
Qty: 48 G | Refills: 3 | OUTPATIENT
Start: 2025-05-28

## 2025-05-28 RX ORDER — FLUTICASONE PROPIONATE 50 MCG
2 SPRAY, SUSPENSION (ML) NASAL DAILY
Qty: 48 G | Refills: 3 | Status: SHIPPED | OUTPATIENT
Start: 2025-05-28

## 2025-05-28 NOTE — TELEPHONE ENCOUNTER
Medication:   Requested Prescriptions     Pending Prescriptions Disp Refills    fluticasone (FLONASE) 50 MCG/ACT nasal spray 48 g 3    metFORMIN (GLUCOPHAGE) 1000 MG tablet 180 tablet 2     Sig: TAKE 1 TABLET TWICE DAILY WITH FOOD        Last Filled:  12/30/24 1/9/25    Patient Phone Number: [phone] (home) [phone] (work)    Last appt: 4/23/2025   Next appt: 5/28/2025    Last OARRS:       12/8/2021     3:51 PM   RX Monitoring   Periodic Controlled Substance Monitoring Possible medication side effects, risk of tolerance/dependence & alternative treatments discussed.;No signs of potential drug abuse or diversion identified.

## 2025-05-29 ENCOUNTER — RESULTS FOLLOW-UP (OUTPATIENT)
Dept: FAMILY MEDICINE CLINIC | Age: 70
End: 2025-05-29

## 2025-05-29 LAB
ANA SER QL IA: NEGATIVE
CK SERPL-CCNC: 314 U/L (ref 39–308)
RHEUMATOID FACT SER IA-ACNC: <10 IU/ML

## 2025-06-17 ENCOUNTER — APPOINTMENT (OUTPATIENT)
Dept: URBAN - METROPOLITAN AREA CLINIC 170 | Facility: CLINIC | Age: 70
Setting detail: DERMATOLOGY
End: 2025-06-17

## 2025-06-17 DIAGNOSIS — L81.4 OTHER MELANIN HYPERPIGMENTATION: ICD-10-CM | Status: STABLE

## 2025-06-17 DIAGNOSIS — D22 MELANOCYTIC NEVI: ICD-10-CM | Status: STABLE

## 2025-06-17 DIAGNOSIS — L57.0 ACTINIC KERATOSIS: ICD-10-CM

## 2025-06-17 DIAGNOSIS — D18.0 HEMANGIOMA: ICD-10-CM | Status: STABLE

## 2025-06-17 DIAGNOSIS — L82.1 OTHER SEBORRHEIC KERATOSIS: ICD-10-CM | Status: STABLE

## 2025-06-17 PROBLEM — D18.01 HEMANGIOMA OF SKIN AND SUBCUTANEOUS TISSUE: Status: ACTIVE | Noted: 2025-06-17

## 2025-06-17 PROBLEM — D22.5 MELANOCYTIC NEVI OF TRUNK: Status: ACTIVE | Noted: 2025-06-17

## 2025-06-17 PROCEDURE — ? FULL BODY SKIN EXAM

## 2025-06-17 PROCEDURE — ? PRESCRIPTION MEDICATION MANAGEMENT

## 2025-06-17 PROCEDURE — ? PRESCRIPTION

## 2025-06-17 PROCEDURE — ? LIQUID NITROGEN

## 2025-06-17 PROCEDURE — ? TREATMENT REGIMEN

## 2025-06-17 PROCEDURE — ? COUNSELING

## 2025-06-17 RX ORDER — FLUOROURACIL 5 MG/G
CREAM TOPICAL
Qty: 40 | Refills: 1 | Status: ERX

## 2025-06-17 ASSESSMENT — LOCATION DETAILED DESCRIPTION DERM
LOCATION DETAILED: LEFT CENTRAL PARIETAL SCALP
LOCATION DETAILED: RIGHT CENTRAL PARIETAL SCALP
LOCATION DETAILED: LEFT CENTRAL FRONTAL SCALP
LOCATION DETAILED: LEFT SUPERIOR PARIETAL SCALP
LOCATION DETAILED: RIGHT MID-UPPER BACK
LOCATION DETAILED: PERIUMBILICAL SKIN
LOCATION DETAILED: LEFT CENTRAL TEMPLE
LOCATION DETAILED: LEFT SUPERIOR MEDIAL FOREHEAD
LOCATION DETAILED: RIGHT SUPERIOR PARIETAL SCALP
LOCATION DETAILED: LEFT SUPERIOR MEDIAL MIDBACK
LOCATION DETAILED: EPIGASTRIC SKIN

## 2025-06-17 ASSESSMENT — LOCATION ZONE DERM
LOCATION ZONE: TRUNK
LOCATION ZONE: SCALP
LOCATION ZONE: FACE

## 2025-06-17 ASSESSMENT — LOCATION SIMPLE DESCRIPTION DERM
LOCATION SIMPLE: ABDOMEN
LOCATION SIMPLE: LEFT FOREHEAD
LOCATION SIMPLE: LEFT TEMPLE
LOCATION SIMPLE: RIGHT UPPER BACK
LOCATION SIMPLE: SCALP
LOCATION SIMPLE: LEFT LOWER BACK
LOCATION SIMPLE: LEFT SCALP

## 2025-06-17 NOTE — PROCEDURE: PRESCRIPTION MEDICATION MANAGEMENT
Detail Level: Detailed
Render In Strict Bullet Format?: No
Plan: Discussed fluorouracil would only get rid of precancerous lesions. May need other treatments/consult with  for benign growths and discoloration
Initiate Treatment: Fluorouracil to entire scalp as needed

## 2025-07-08 DIAGNOSIS — I10 ESSENTIAL HYPERTENSION: ICD-10-CM

## 2025-07-08 RX ORDER — HYDROCHLOROTHIAZIDE 25 MG/1
25 TABLET ORAL DAILY
Qty: 90 TABLET | Refills: 1 | Status: SHIPPED | OUTPATIENT
Start: 2025-07-08

## 2025-07-08 NOTE — TELEPHONE ENCOUNTER
Medication:   Requested Prescriptions     Pending Prescriptions Disp Refills    hydroCHLOROthiazide (HYDRODIURIL) 25 MG tablet [Pharmacy Med Name: hydroCHLOROthiazide 25 MG Oral Tablet] 90 tablet 0     Sig: Take 1 tablet by mouth once daily       Last Filled:  4/4/25    Patient Phone Number: [phone] (home) [phone] (work)    Last appt: 5/28/2025   Next appt: 12/10/2025    Lab Results   Component Value Date     09/24/2024    K 3.6 09/24/2024    CL 98 (L) 09/24/2024    CO2 25 09/24/2024    BUN 16 09/24/2024    CREATININE 1.1 09/24/2024    GLUCOSE 162 (H) 09/24/2024    CALCIUM 9.8 09/24/2024    BILITOT 0.4 07/10/2024    ALKPHOS 67 07/10/2024    AST 26 07/10/2024    ALT 32 07/10/2024    LABGLOM 73 09/24/2024    GFRAA >60 10/12/2022    AGRATIO 1.7 07/10/2024    GLOB 2.5 08/24/2020

## 2025-07-18 NOTE — TELEPHONE ENCOUNTER
Medication:   Requested Prescriptions     Pending Prescriptions Disp Refills    empagliflozin (JARDIANCE) 25 MG tablet 90 tablet 3     Sig: Take 1 tablet by mouth daily        Last Filled:  1/20/25    Patient Phone Number: [phone] (home) [phone] (work)    Last appt: 5/28/2025   Next appt: 12/10/2025    Last OARRS:       12/8/2021     3:51 PM   RX Monitoring   Periodic Controlled Substance Monitoring Possible medication side effects, risk of tolerance/dependence & alternative treatments discussed.;No signs of potential drug abuse or diversion identified.

## 2025-08-07 ENCOUNTER — PROCEDURE VISIT (OUTPATIENT)
Dept: AUDIOLOGY | Age: 70
End: 2025-08-07

## 2025-08-07 ENCOUNTER — OFFICE VISIT (OUTPATIENT)
Dept: ENT CLINIC | Age: 70
End: 2025-08-07
Payer: MEDICARE

## 2025-08-07 VITALS
TEMPERATURE: 97.3 F | WEIGHT: 268 LBS | SYSTOLIC BLOOD PRESSURE: 131 MMHG | HEIGHT: 72 IN | BODY MASS INDEX: 36.3 KG/M2 | DIASTOLIC BLOOD PRESSURE: 75 MMHG | HEART RATE: 75 BPM | OXYGEN SATURATION: 96 %

## 2025-08-07 DIAGNOSIS — H90.3 SENSORINEURAL HEARING LOSS (SNHL) OF BOTH EARS: Primary | ICD-10-CM

## 2025-08-07 DIAGNOSIS — H90.3 SENSORINEURAL HEARING LOSS, BILATERAL: Primary | ICD-10-CM

## 2025-08-07 DIAGNOSIS — H93.13 TINNITUS OF BOTH EARS: ICD-10-CM

## 2025-08-07 DIAGNOSIS — J30.9 ALLERGIC RHINITIS, UNSPECIFIED SEASONALITY, UNSPECIFIED TRIGGER: ICD-10-CM

## 2025-08-07 PROCEDURE — 1123F ACP DISCUSS/DSCN MKR DOCD: CPT | Performed by: STUDENT IN AN ORGANIZED HEALTH CARE EDUCATION/TRAINING PROGRAM

## 2025-08-07 PROCEDURE — 1159F MED LIST DOCD IN RCRD: CPT | Performed by: STUDENT IN AN ORGANIZED HEALTH CARE EDUCATION/TRAINING PROGRAM

## 2025-08-07 PROCEDURE — 3075F SYST BP GE 130 - 139MM HG: CPT | Performed by: STUDENT IN AN ORGANIZED HEALTH CARE EDUCATION/TRAINING PROGRAM

## 2025-08-07 PROCEDURE — 3078F DIAST BP <80 MM HG: CPT | Performed by: STUDENT IN AN ORGANIZED HEALTH CARE EDUCATION/TRAINING PROGRAM

## 2025-08-07 PROCEDURE — 99203 OFFICE O/P NEW LOW 30 MIN: CPT | Performed by: STUDENT IN AN ORGANIZED HEALTH CARE EDUCATION/TRAINING PROGRAM

## 2025-08-21 RX ORDER — TRAZODONE HYDROCHLORIDE 100 MG/1
100 TABLET ORAL NIGHTLY
Qty: 90 TABLET | Refills: 1 | Status: SHIPPED | OUTPATIENT
Start: 2025-08-21

## 2025-08-21 RX ORDER — FAMOTIDINE 20 MG/1
20 TABLET, FILM COATED ORAL DAILY
Qty: 90 TABLET | Refills: 1 | Status: SHIPPED | OUTPATIENT
Start: 2025-08-21

## (undated) DEVICE — 3.5 MM FULL RADIUS ELITE STRAIGHT                                    DISPOSABLE BLADES, BEIGE,PACKAGED 6                                    PER BOX, STERILE

## (undated) DEVICE — COTTON UNDERCAST PADDING,CRIMPED FINISH: Brand: WEBRIL

## (undated) DEVICE — SET IRRIG L94IN ID0.281IN W/ 4.5IN DST FLX CONN 2 LD ON OFF

## (undated) DEVICE — SUTURE ETHLN SZ 8-0 L12IN NONABSORBABLE BLK L7MM TG175-8 1716G

## (undated) DEVICE — 3M™ IOBAN™ 2 ANTIMICROBIAL INCISE DRAPE 6650EZ: Brand: IOBAN™ 2

## (undated) DEVICE — SUTURE ETHLN SZ 4-0 L18IN NONABSORBABLE BLK L19MM PS-2 3/8 1667H

## (undated) DEVICE — SURE SET SINGLE BASIN-LF: Brand: MEDLINE INDUSTRIES, INC.

## (undated) DEVICE — 3M™ STERI-STRIP™ REINFORCED ADHESIVE SKIN CLOSURES, R1547, 1/2 IN X 4 IN (12 MM X 100 MM), 6 STRIPS/ENVELOPE: Brand: 3M™ STERI-STRIP™

## (undated) DEVICE — JEWISH HOSPITAL TURNOVER KIT: Brand: MEDLINE INDUSTRIES, INC.

## (undated) DEVICE — SOLUTION IV 1000ML 0.9% SOD CHL

## (undated) DEVICE — Z INACTIVE USE 2660664 SOLUTION IRRIG 3000ML 0.9% SOD CHL USP UROMATIC PLAS CONT

## (undated) DEVICE — GLOVE ORTHO 7 1/2   MSG9475

## (undated) DEVICE — ZIMMER® STERILE DISPOSABLE TOURNIQUET CUFF WITH PLC, DUAL PORT, SINGLE BLADDER, 34 IN. (86 CM)

## (undated) DEVICE — Device

## (undated) DEVICE — APPLICATOR PREP 26ML 0.7% IOD POVACRYLEX 74% ISO ALC ST

## (undated) DEVICE — GOWN,SIRUS,POLYRNF,BRTHSLV,XLN/XL,20/CS: Brand: MEDLINE

## (undated) DEVICE — APPLICATOR MEDICATED 26 CC SOLUTION HI LT ORNG CHLORAPREP

## (undated) DEVICE — SUTURE MCRYL SZ 3-0 L18IN ABSRB UD L19MM PS-2 3/8 CIR PRIM Y497G

## (undated) DEVICE — TOWEL,OR,DSP,ST,BLUE,STD,4/PK,20PK/CS: Brand: MEDLINE

## (undated) DEVICE — NEURO HBO 85949

## (undated) DEVICE — SUTURE NONABSORBABLE MONOFILAMENT 4-0 PS-2 18 IN BLK ETHILON 1667G

## (undated) DEVICE — UNDERGLOVE SURG SZ 8 BLU LTX FREE SYN POLYISOPRENE POLYMER

## (undated) DEVICE — LOTION PREP REMV 5OZ IODO CLR TINC OF BENZ DURAPREP

## (undated) DEVICE — COVER,MAYO STAND,XL,STERILE: Brand: MEDLINE

## (undated) DEVICE — NEEDLE HYPO 22GA L1.5IN BLK POLYPR HUB S STL REG BVL STR

## (undated) DEVICE — MERCY FAIRFIELD TURNOVER KIT: Brand: MEDLINE INDUSTRIES, INC.

## (undated) DEVICE — ZIMMER® STERILE DISPOSABLE TOURNIQUET CUFF, DUAL PORT, SINGLE BLADDER, 18 IN. (46 CM)

## (undated) DEVICE — GAUZE,SPONGE,4"X4",16PLY,XRAY,STRL,LF: Brand: MEDLINE

## (undated) DEVICE — Z DISCONTINUED NO SUB IDED SPLINT ORTH W5XL30IN LAYERED FBRGLS FOAM PD BRTH BK MOLD

## (undated) DEVICE — STERILE VELCLOSE ELASTIC BANDAGE, 6IN: Brand: VELCLOSE

## (undated) DEVICE — PADDING CAST W4INXL4YD HIGHLY ABSRB THAN COT EZ APPL

## (undated) DEVICE — SHEET,DRAPE,53X77,STERILE: Brand: MEDLINE

## (undated) DEVICE — STERILE LATEX POWDER-FREE SURGICAL GLOVESWITH NITRILE AND EMOLLIENT COATINGS: Brand: PROTEXIS

## (undated) DEVICE — STRIP,CLOSURE,WOUND,MEDI-STRIP,1/2X4: Brand: MEDLINE

## (undated) DEVICE — MAT FLR ABS DISP

## (undated) DEVICE — TRAY,IRRIGATION,BULBSYRINGE,60ML,CSR,PVP: Brand: MEDLINE

## (undated) DEVICE — GARMENT,MEDLINE,DVT,INT,CALF,MED, GEN2: Brand: MEDLINE

## (undated) DEVICE — NEEDLE SPNL L3.5IN PNK HUB S STL REG WALL FIT STYL W/ QNCKE

## (undated) DEVICE — PADDING,UNDERCAST,COTTON, 3X4YD STERILE: Brand: MEDLINE

## (undated) DEVICE — GLOVE ORANGE PI 8 1/2   MSG9085

## (undated) DEVICE — DRAPE MICSCP W132XL406CM LENS DIA68MM W VARI LENS2 FOR LEICA

## (undated) DEVICE — PODIATRY PK

## (undated) DEVICE — COVER LT HNDL BLU PLAS

## (undated) DEVICE — SOLUTION IRRIG 1000ML 0.9% SOD CHL USP POUR PLAS BTL

## (undated) DEVICE — PROTECTOR EYE PT SELF ADH NS OPT GRD LF

## (undated) DEVICE — SURGICAL SUCTION CONNECTING TUBE WITH MALE CONNECTOR AND SUCTION CLAMP, 2 FT. LONG (.6 M), 5 MM I.D.: Brand: CONMED

## (undated) DEVICE — DRAPE,LAP,CHOLE,W/TROUGHS,STERILE: Brand: MEDLINE

## (undated) DEVICE — SOLUTION IV IRRIG WATER 1000ML POUR BRL 2F7114

## (undated) DEVICE — STOCKINETTE,DOUBLE PLY,6X48,STERILE: Brand: MEDLINE

## (undated) DEVICE — SUTURE MCRYL SZ 4-0 L27IN ABSRB UD L19MM PS-2 1/2 CIR PRIM Y426H

## (undated) DEVICE — 3 ML SYRINGE LUER-LOCK TIP: Brand: MONOJECT

## (undated) DEVICE — 1010 S-DRAPE TOWEL DRAPE 10/BX: Brand: STERI-DRAPE™

## (undated) DEVICE — BLADE CLIPPER GEN PURP NS

## (undated) DEVICE — NEEDLE HYPO 18GA L1.5IN THN WALL PIVOTING SHLD BVL ORIENTED

## (undated) DEVICE — SUTURE VCRL + SZ 2-0 L18IN ABSRB UD CT1 L36MM 1/2 CIR VCP839D

## (undated) DEVICE — PAD,ABDOMINAL,5"X9",ST,LF,25/BX: Brand: MEDLINE INDUSTRIES, INC.

## (undated) DEVICE — COVER,MAYO STAND,STERILE: Brand: MEDLINE

## (undated) DEVICE — STAPLER SKIN H3.9MM WIRE DIA0.58MM CRWN 6.9MM 35 STPL ROT

## (undated) DEVICE — BANDAGE COBAN 4 IN COMPR W4INXL5YD FOAM COHESIVE QUIK STK SELF ADH SFT

## (undated) DEVICE — 3M™ IOBAN™ 2 ANTIMICROBIAL INCISE DRAPE 6640EZ: Brand: IOBAN™ 2

## (undated) DEVICE — SUTURE VCRL SZ 4-0 L18IN ABSRB UD L19MM PS-2 3/8 CIR PRIM J496H

## (undated) DEVICE — CABLE BPLR L12FT FLYING LD DISPOSABLE

## (undated) DEVICE — COVER,TABLE,HEAVY DUTY,77"X90",STRL: Brand: MEDLINE

## (undated) DEVICE — GOWN,AURORA,NONREINF,RAGLAN,XXL,STERILE: Brand: MEDLINE

## (undated) DEVICE — DRILL BIT G3606010 2.4MM

## (undated) DEVICE — ELECTRODE PT RET AD L9FT HI MOIST COND ADH HYDRGEL CORDED

## (undated) DEVICE — DRAPE 70X60IN SPLIT IMPERV ADHES STRIP

## (undated) DEVICE — GOWNS CAPE 3 PLY WHT DISPOSABLE 30 X 42INCH

## (undated) DEVICE — MAYFIELD® DISPOSABLE ADULT SKULL PIN (STAINLESS STEEL): Brand: MAYFIELD®

## (undated) DEVICE — SUTURE VCRL SZ 4-0 L27IN ABSRB UD L26MM SH 1/2 CIR J415H

## (undated) DEVICE — DRESSING PETRO W3XL3IN OIL EMUL N ADH GZ KNIT IMPREG CELOS

## (undated) DEVICE — SUTURE ETHLN SZ 3-0 L18IN NONABSORBABLE BLK PS-2 L19MM 3/8 1669H

## (undated) DEVICE — 3.0MM NEURO (MATCH HEAD)

## (undated) DEVICE — C-ARM: Brand: UNBRANDED

## (undated) DEVICE — Z DISCONTINUED USE 2272117 DRAPE SURG 3 QTR N INVASIVE 2 LAYR DISP

## (undated) DEVICE — DRAPE SURGICAL HAND PROX AURORA

## (undated) DEVICE — DRESSING PETRO W3XL8IN OIL EMUL N ADH GZ KNIT IMPREG CELOS

## (undated) DEVICE — SUTURE VCRL + SZ 0 L18IN ABSRB UD L36MM CT-1 1/2 CIR VCP840D

## (undated) DEVICE — CHLORAPREP 26ML ORANGE

## (undated) DEVICE — VESSEL LOOPS,MAXI, BLUE: Brand: DEVON

## (undated) DEVICE — TUBING SUCT 10FR MAL ALUM SHFT FN CAP VENT UNIV CONN W/ OBT